# Patient Record
Sex: MALE | Race: WHITE | Employment: OTHER | URBAN - METROPOLITAN AREA
[De-identification: names, ages, dates, MRNs, and addresses within clinical notes are randomized per-mention and may not be internally consistent; named-entity substitution may affect disease eponyms.]

---

## 2020-06-19 ENCOUNTER — HOSPITAL ENCOUNTER (EMERGENCY)
Age: 75
Discharge: HOME | End: 2020-06-19
Attending: EMERGENCY MEDICINE
Payer: MEDICARE

## 2020-06-19 ENCOUNTER — APPOINTMENT (OUTPATIENT)
Dept: GENERAL RADIOLOGY | Age: 75
End: 2020-06-19
Attending: EMERGENCY MEDICINE
Payer: MEDICARE

## 2020-06-19 VITALS
OXYGEN SATURATION: 96 % | SYSTOLIC BLOOD PRESSURE: 165 MMHG | BODY MASS INDEX: 25.71 KG/M2 | WEIGHT: 160 LBS | HEART RATE: 80 BPM | RESPIRATION RATE: 15 BRPM | DIASTOLIC BLOOD PRESSURE: 107 MMHG | HEIGHT: 66 IN | TEMPERATURE: 98.6 F

## 2020-06-19 DIAGNOSIS — M10.9 ACUTE GOUT INVOLVING TOE OF LEFT FOOT, UNSPECIFIED CAUSE: ICD-10-CM

## 2020-06-19 DIAGNOSIS — M79.675 GREAT TOE PAIN, LEFT: Primary | ICD-10-CM

## 2020-06-19 LAB
ANION GAP SERPL CALC-SCNC: 5 MMOL/L (ref 5–15)
BASOPHILS # BLD: 0 K/UL (ref 0–0.1)
BASOPHILS NFR BLD: 0 % (ref 0–1)
BUN SERPL-MCNC: 19 MG/DL (ref 6–20)
BUN/CREAT SERPL: 18 (ref 12–20)
CALCIUM SERPL-MCNC: 8.5 MG/DL (ref 8.5–10.1)
CHLORIDE SERPL-SCNC: 104 MMOL/L (ref 97–108)
CO2 SERPL-SCNC: 29 MMOL/L (ref 21–32)
CREAT SERPL-MCNC: 1.06 MG/DL (ref 0.7–1.3)
DIFFERENTIAL METHOD BLD: ABNORMAL
EOSINOPHIL # BLD: 0 K/UL (ref 0–0.4)
EOSINOPHIL NFR BLD: 0 % (ref 0–7)
ERYTHROCYTE [DISTWIDTH] IN BLOOD BY AUTOMATED COUNT: 13.4 % (ref 11.5–14.5)
GLUCOSE SERPL-MCNC: 109 MG/DL (ref 65–100)
HCT VFR BLD AUTO: 42.4 % (ref 36.6–50.3)
HGB BLD-MCNC: 14.4 G/DL (ref 12.1–17)
IMM GRANULOCYTES # BLD AUTO: 0 K/UL (ref 0–0.04)
IMM GRANULOCYTES NFR BLD AUTO: 0 % (ref 0–0.5)
LYMPHOCYTES # BLD: 0.8 K/UL (ref 0.8–3.5)
LYMPHOCYTES NFR BLD: 8 % (ref 12–49)
MCH RBC QN AUTO: 31.2 PG (ref 26–34)
MCHC RBC AUTO-ENTMCNC: 34 G/DL (ref 30–36.5)
MCV RBC AUTO: 91.8 FL (ref 80–99)
MONOCYTES # BLD: 0.8 K/UL (ref 0–1)
MONOCYTES NFR BLD: 8 % (ref 5–13)
NEUTS SEG # BLD: 8.2 K/UL (ref 1.8–8)
NEUTS SEG NFR BLD: 84 % (ref 32–75)
NRBC # BLD: 0 K/UL (ref 0–0.01)
NRBC BLD-RTO: 0 PER 100 WBC
PLATELET # BLD AUTO: 178 K/UL (ref 150–400)
PMV BLD AUTO: 12.4 FL (ref 8.9–12.9)
POTASSIUM SERPL-SCNC: 3.5 MMOL/L (ref 3.5–5.1)
RBC # BLD AUTO: 4.62 M/UL (ref 4.1–5.7)
RBC MORPH BLD: ABNORMAL
SODIUM SERPL-SCNC: 138 MMOL/L (ref 136–145)
WBC # BLD AUTO: 9.8 K/UL (ref 4.1–11.1)

## 2020-06-19 PROCEDURE — 73630 X-RAY EXAM OF FOOT: CPT

## 2020-06-19 PROCEDURE — 99283 EMERGENCY DEPT VISIT LOW MDM: CPT

## 2020-06-19 PROCEDURE — 80048 BASIC METABOLIC PNL TOTAL CA: CPT

## 2020-06-19 PROCEDURE — 85025 COMPLETE CBC W/AUTO DIFF WBC: CPT

## 2020-06-19 PROCEDURE — 74011250637 HC RX REV CODE- 250/637: Performed by: EMERGENCY MEDICINE

## 2020-06-19 PROCEDURE — 36415 COLL VENOUS BLD VENIPUNCTURE: CPT

## 2020-06-19 RX ORDER — IBUPROFEN 600 MG/1
600 TABLET ORAL
Status: COMPLETED | OUTPATIENT
Start: 2020-06-19 | End: 2020-06-19

## 2020-06-19 RX ORDER — NAPROXEN 500 MG/1
500 TABLET ORAL 2 TIMES DAILY WITH MEALS
Qty: 10 TAB | Refills: 0 | Status: SHIPPED | OUTPATIENT
Start: 2020-06-19 | End: 2020-07-31

## 2020-06-19 RX ORDER — COLCHICINE 0.6 MG/1
0.6 TABLET ORAL
Status: COMPLETED | OUTPATIENT
Start: 2020-06-19 | End: 2020-06-19

## 2020-06-19 RX ORDER — CEPHALEXIN 500 MG/1
500 CAPSULE ORAL 4 TIMES DAILY
Qty: 28 CAP | Refills: 0 | Status: SHIPPED | OUTPATIENT
Start: 2020-06-19 | End: 2020-06-26

## 2020-06-19 RX ORDER — LIDOCAINE 40 MG/G
CREAM TOPICAL
Qty: 30 G | Refills: 0 | Status: SHIPPED | OUTPATIENT
Start: 2020-06-19 | End: 2020-07-31

## 2020-06-19 RX ADMIN — IBUPROFEN 600 MG: 600 TABLET, FILM COATED ORAL at 11:33

## 2020-06-19 RX ADMIN — COLCHICINE 0.6 MG: 0.6 TABLET, FILM COATED ORAL at 11:33

## 2020-06-19 NOTE — ED NOTES
Dr Lee Vargas  reviewed discharge instructions with the patient. The patient verbalized understanding. All questions and concerns were addressed. The patient declined a wheelchair and is discharged ambulatory in the care of family members with instructions and prescriptions in hand. Pt is alert and oriented x 4. Respirations are clear and unlabored.

## 2020-06-19 NOTE — ED PROVIDER NOTES
EMERGENCY DEPARTMENT HISTORY AND PHYSICAL EXAM      Date: 6/19/2020  Patient Name: Rayray Angel    Please note that this dictation was completed with NetMinder, the computer voice recognition software. Quite often unanticipated grammatical, syntax, homophones, and other interpretive errors are inadvertently transcribed by the computer software. Please disregard these errors. Please excuse any errors that have escaped final proofreading. History of Presenting Illness     Chief Complaint   Patient presents with    Foot Injury     Into triage via wheelchair with c/ Lt foot pain that started 6/16, when he was carrying something heavy - pt does not recall injury, but thinks he may have \"stepped wrong. History Provided By: Patient    HPI: Rayray Angel, 76 y.o. male, with a past medical history significant for coronary artery disease. He denied any past medical history of me, this is in the chart. He is complaining of left foot pain. States he does remember hitting it on something. He has a painful and swollen left first MTP. Rates his pain as moderate. Has tried some tumor rec with no relief. No other exacerbating or relieving factors. Reports generally feeling unwell and feeling mildly nauseous secondary to pain. PCP: Carrie, Not On File    No current facility-administered medications on file prior to encounter. Current Outpatient Medications on File Prior to Encounter   Medication Sig Dispense Refill    cod liver oil Cap Take 1 Cap by mouth daily. Past History     Past Medical History:  Past Medical History:   Diagnosis Date    Acute MI anterior wall first episode care [facility]) 9/21/2010    CAD (coronary artery disease), native coronary artery 9/21/2010    Coronary stent 9/21/2010    Hyperlipidemia 9/21/2010       Past Surgical History:  No past surgical history on file. Family History:  No family history on file.     Social History:  Social History     Tobacco Use    Smoking status: Never Smoker   Substance Use Topics    Alcohol use: Yes    Drug use: No       Allergies:  No Known Allergies      Review of Systems   Review of Systems   Constitutional: Positive for fatigue. Negative for fever. HENT: Negative for congestion. Respiratory: Negative for shortness of breath. Gastrointestinal: Positive for nausea. Musculoskeletal: Positive for arthralgias and joint swelling. Negative for back pain and myalgias. Skin: Negative for wound. Hematological: Does not bruise/bleed easily. Physical Exam   Physical Exam  Vitals signs and nursing note reviewed. Constitutional:       Appearance: He is well-developed. HENT:      Head: Normocephalic and atraumatic. Eyes:      General:         Right eye: No discharge. Left eye: No discharge. Conjunctiva/sclera: Conjunctivae normal.      Pupils: Pupils are equal, round, and reactive to light. Neck:      Musculoskeletal: Normal range of motion and neck supple. Trachea: No tracheal deviation. Cardiovascular:      Rate and Rhythm: Regular rhythm. Tachycardia present. Heart sounds: Normal heart sounds. No murmur. Pulmonary:      Effort: Pulmonary effort is normal. No respiratory distress. Breath sounds: Normal breath sounds. No wheezing or rales. Abdominal:      General: Bowel sounds are normal.      Palpations: Abdomen is soft. Tenderness: There is no abdominal tenderness. There is no guarding or rebound. Musculoskeletal: Normal range of motion. General: No tenderness or deformity. Skin:     General: Skin is warm and dry. Findings: No erythema or rash. Neurological:      Mental Status: He is alert and oriented to person, place, and time.    Psychiatric:         Behavior: Behavior normal.         Diagnostic Study Results     Labs -     Recent Results (from the past 12 hour(s))   CBC WITH AUTOMATED DIFF    Collection Time: 06/19/20 11:27 AM   Result Value Ref Range WBC 9.8 4.1 - 11.1 K/uL    RBC 4.62 4.10 - 5.70 M/uL    HGB 14.4 12.1 - 17.0 g/dL    HCT 42.4 36.6 - 50.3 %    MCV 91.8 80.0 - 99.0 FL    MCH 31.2 26.0 - 34.0 PG    MCHC 34.0 30.0 - 36.5 g/dL    RDW 13.4 11.5 - 14.5 %    PLATELET 676 823 - 933 K/uL    MPV 12.4 8.9 - 12.9 FL    NRBC 0.0 0  WBC    ABSOLUTE NRBC 0.00 0.00 - 0.01 K/uL    NEUTROPHILS 84 (H) 32 - 75 %    LYMPHOCYTES 8 (L) 12 - 49 %    MONOCYTES 8 5 - 13 %    EOSINOPHILS 0 0 - 7 %    BASOPHILS 0 0 - 1 %    IMMATURE GRANULOCYTES 0 0.0 - 0.5 %    ABS. NEUTROPHILS 8.2 (H) 1.8 - 8.0 K/UL    ABS. LYMPHOCYTES 0.8 0.8 - 3.5 K/UL    ABS. MONOCYTES 0.8 0.0 - 1.0 K/UL    ABS. EOSINOPHILS 0.0 0.0 - 0.4 K/UL    ABS. BASOPHILS 0.0 0.0 - 0.1 K/UL    ABS. IMM. GRANS. 0.0 0.00 - 0.04 K/UL    DF AUTOMATED      RBC COMMENTS NORMOCYTIC, NORMOCHROMIC     METABOLIC PANEL, BASIC    Collection Time: 06/19/20 11:27 AM   Result Value Ref Range    Sodium 138 136 - 145 mmol/L    Potassium 3.5 3.5 - 5.1 mmol/L    Chloride 104 97 - 108 mmol/L    CO2 29 21 - 32 mmol/L    Anion gap 5 5 - 15 mmol/L    Glucose 109 (H) 65 - 100 mg/dL    BUN 19 6 - 20 MG/DL    Creatinine 1.06 0.70 - 1.30 MG/DL    BUN/Creatinine ratio 18 12 - 20      GFR est AA >60 >60 ml/min/1.73m2    GFR est non-AA >60 >60 ml/min/1.73m2    Calcium 8.5 8.5 - 10.1 MG/DL       Radiologic Studies -   XR FOOT LT MIN 3 V   Final Result   IMPRESSION: No acute abnormality. CT Results  (Last 48 hours)    None        CXR Results  (Last 48 hours)    None            Medical Decision Making   I am the first provider for this patient. I reviewed the vital signs, available nursing notes, past medical history, past surgical history, family history and social history. Vital Signs-Reviewed the patient's vital signs.   Patient Vitals for the past 12 hrs:   Temp Pulse Resp BP SpO2   06/19/20 1232  80 15 (!) 165/107    06/19/20 1045 98.6 °F (37 °C) 88 17 (!) 162/98 96 %           Records Reviewed:   Nursing notes, Prior visits     Provider Notes (Medical Decision Making):   Swollen left first MTP consistent with gout. Clinically doubt infection. Doubt that this is a traumatic injury. Patient is reporting nausea and general malaise and fatigue. He does not see a primary care doctor. For this reason before starting him on NSAIDs or colchicine will check basic labs. ED Course:   Initial assessment performed. The patients presenting problems have been discussed, and they are in agreement with the care plan formulated and outlined with them. I have encouraged them to ask questions as they arise throughout their visit. Hypertensive Education  Patient was hypertensive here in the emergency department. Their symptoms do no appear to be cause by the hypertension. The abnormal vital sign is likely related to the patient's pain, stress, discomfort, or missed dose of medicine. It could also be caused by untreated or undiagnosed hypertension. Patient educated on hypertension including the disease course, complications and common treatments. Patient educated to check blood pressure at home once daily, but do not be alarmed if pressure is high without any symptoms. Patient educated that no matter their blood pressure if they are having signs of stroke, chest pain, shortness of breath or any other concerning symptom they should return to the ED regardless of their blood pressure. If they are concerned about their blood pressure they are welcome back at anytime for further evaluation. Patient educated to follow up with their primary care provider at the next available appointment to discuss their blood pressure and for a blood pressure recheck. Critical Care Time:   none    Disposition:  DISCHARGE NOTE  Patients results have been reviewed with them.   Patient and/or family have verbally conveyed their understanding and agreement of the patient's signs, symptoms, diagnosis, treatment and prognosis and additionally agree to follow up as recommended or return to the Emergency Room should their condition change or have any new concerns prior to their follow-up appointment. Patient verbally agrees with the care-plan and verbally conveys that all of their questions have been answered. Discharge instructions have also been provided to the patient with some educational information regarding their diagnosis as well a list of reasons why they would want to return to the ER prior to their follow-up appointment should their condition change. PLAN:  1. Discharge Medication List as of 6/19/2020 12:19 PM      CONTINUE these medications which have NOT CHANGED    Details   cod liver oil Cap 1 Cap, Oral, DAILY, Until Discontinued, Historical Med           2. Follow-up Information     Follow up With Specialties Details Why Contact Info    [facility] EM[facility] Emergency Medicine  If symptoms worsen [address] [place] [address]  [phone]    See attached list for local pcp              Return to ED if worse     Diagnosis     Clinical Impression:   1. Great toe pain, left    2. Acute gout involving toe of left foot, unspecified cause        Attestations:   This note was completed by Burgess Lashell DO

## 2020-06-19 NOTE — DISCHARGE INSTRUCTIONS
Patient Education        Gout: Care Instructions  Your Care Instructions     Gout is a form of arthritis caused by a buildup of uric acid crystals in a joint. It causes sudden attacks of pain, swelling, redness, and stiffness, usually in one joint, especially the big toe. Gout usually comes on without a cause. But it can be brought on by drinking alcohol (especially beer) or eating seafood and red meat. Taking certain medicines, such as diuretics or aspirin, also can bring on an attack of gout. Taking your medicines as prescribed and following up with your doctor regularly can help you avoid gout attacks in the future. Follow-up care is a key part of your treatment and safety. Be sure to make and go to all appointments, and call your doctor if you are having problems. It's also a good idea to know your test results and keep a list of the medicines you take. How can you care for yourself at home? · If the joint is swollen, put ice or a cold pack on the area for 10 to 20 minutes at a time. Put a thin cloth between the ice and your skin. · Prop up the sore limb on a pillow when you ice it or anytime you sit or lie down during the next 3 days. Try to keep it above the level of your heart. This will help reduce swelling. · Rest sore joints. Avoid activities that put weight or strain on the joints for a few days. Take short rest breaks from your regular activities during the day. · Take your medicines exactly as prescribed. Call your doctor if you think you are having a problem with your medicine. · Take pain medicines exactly as directed. ? If the doctor gave you a prescription medicine for pain, take it as prescribed. ? If you are not taking a prescription pain medicine, ask your doctor if you can take an over-the-counter medicine. · Eat less seafood and red meat. · Check with your doctor before drinking alcohol. · Losing weight, if you are overweight, may help reduce attacks of gout.  But do not go on a \"crash diet. \" Losing a lot of weight in a short amount of time can cause a gout attack. When should you call for help? Call your doctor now or seek immediate medical care if:  · You have a fever. · The joint is so painful you cannot use it. · You have sudden, unexplained swelling, redness, warmth, or severe pain in one or more joints. Watch closely for changes in your health, and be sure to contact your doctor if:  · You have joint pain. · Your symptoms get worse or are not improving after 2 or 3 days. Where can you learn more? Go to http://torin-bobbi.info/  Enter E531 in the search box to learn more about \"Gout: Care Instructions. \"  Current as of: December 9, 2019               Content Version: 12.5  © 3016-9056 [employer]. Care instructions adapted under license by [employer] (which disclaims liability or warranty for this information). If you have questions about a medical condition or this instruction, always ask your healthcare professional. [place] [address] any warranty or liability for your use of this information. Patient Education        Foot Pain: Care Instructions  Your Care Instructions  Foot injuries that cause pain and swelling are fairly common. Almost all sports or home repair projects can cause a misstep that ends up as foot pain. Normal wear and tear, especially as you get older, also can cause foot pain. Most minor foot injuries will heal on their own, and home treatment is usually all you need to do. If you have a severe injury, you may need tests and treatment. Follow-up care is a key part of your treatment and safety. Be sure to make and go to all appointments, and call your doctor if you are having problems. It's also a good idea to know your test results and keep a list of the medicines you take. How can you care for yourself at home? · Take pain medicines exactly as directed.   ? If the doctor gave you a prescription medicine for pain, take it as prescribed. ? If you are not taking a prescription pain medicine, ask your doctor if you can take an over-the-counter medicine. · Rest and protect your foot. Take a break from any activity that may cause pain. · Put ice or a cold pack on your foot for 10 to 20 minutes at a time. Put a thin cloth between the ice and your skin. · Prop up the sore foot on a pillow when you ice it or anytime you sit or lie down during the next 3 days. Try to keep it above the level of your heart. This will help reduce swelling. · Your doctor may recommend that you wrap your foot with an elastic bandage. Keep your foot wrapped for as long as your doctor advises. · If your doctor recommends crutches, use them as directed. · Wear roomy footwear. · As soon as pain and swelling end, begin gentle exercises of your foot. Your doctor can tell you which exercises will help. When should you call for help? [id number] anytime you think you may need emergency care. For example, call if:  · Your foot turns pale, white, blue, or cold. Call your doctor now or seek immediate medical care if:  · You cannot move or stand on your foot. · Your foot looks twisted or out of its normal position. · Your foot is not stable when you step down. · You have signs of infection, such as:  ? Increased pain, swelling, warmth, or redness. ? Red streaks leading from the sore area. ? Pus draining from a place on your foot. ? A fever. · Your foot is numb or tingly. Watch closely for changes in your health, and be sure to contact your doctor if:  · You do not get better as expected. · You have bruises from an injury that last longer than 2 weeks. Where can you learn more? Go to http://torin-bobbi.info/  Enter D999 in the search box to learn more about \"Foot Pain: Care Instructions. \"  Current as of: March 2, 2020               Content Version: 12.5  © 0351-4608 [employer].    Care instructions adapted under license by [employer] (which disclaims liability or warranty for this information). If you have questions about a medical condition or this instruction, always ask your healthcare professional. [address] any warranty or liability for your use of this information. Patient Education        Purine-Restricted Diet: Care Instructions  Your Care Instructions     Purines are substances that are found in some foods. Your body turns purines into uric acid. High levels of uric acid can cause gout, which is a form of arthritis that causes pain and inflammation in joints. You may be able to help control the amount of uric acid in your body by limiting high-purine foods in your diet. Follow-up care is a key part of your treatment and safety. Be sure to make and go to all appointments, and call your doctor if you are having problems. It's also a good idea to know your test results and keep a list of the medicines you take. How can you care for yourself at home? · Plan your meals and snacks around foods that are low in purines and are safe for you to eat. These foods include:  ? Green vegetables and tomatoes. ? Fruits. ? Whole-grain breads, rice, and cereals. ? Eggs, peanut butter, and nuts. ? Low-fat milk, cheese, and other milk products. ? Popcorn. ? Gelatin desserts, chocolate, cocoa, and cakes and sweets, in small amounts. · You can eat certain foods that are medium-high in purines, but eat them only once in a while. These foods include:  ? Legumes, such as dried beans and dried peas. You can have 1 cup cooked legumes each day. ? Asparagus, cauliflower, spinach, mushrooms, and green peas. ? Fish and seafood (other than very high-purine seafood). ? Oatmeal, wheat bran, and wheat germ. · Limit very high-purine foods, including:  ? Organ meats, such as liver, kidneys, sweetbreads, and brains.   ? Meats, including bautista, beef, pork, and lamb.  ? Game meats and any other meats in large amounts. ? Anchovies, sardines, herring, mackerel, and scallops. ? Gravy. ? Beer. Where can you learn more? Go to http://torin-bobbi.info/  Enter F448 in the search box to learn more about \"Purine-Restricted Diet: Care Instructions. \"  Current as of: August 22, 2019               Content Version: 12.5  © 0023-6764 [employer]. Care instructions adapted under license by [employer] (which disclaims liability or warranty for this information). If you have questions about a medical condition or this instruction, always ask your healthcare professional. [address] any warranty or liability for your use of this information. Thank you! Thank you for allowing me to care for you in the emergency department. I sincerely hope that you are satisfied with your visit today. It is my goal to provide you with excellent care. Below you will find a list of your labs and imaging from your visit today. Should you have any questions regarding these results please do not hesitate to call the emergency department. Labs -     Recent Results (from the past 12 hour(s))   CBC WITH AUTOMATED DIFF    Collection Time: 06/19/20 11:27 AM   Result Value Ref Range    WBC 9.8 4.1 - 11.1 K/uL    RBC 4.62 4.10 - 5.70 M/uL    HGB 14.4 12.1 - 17.0 g/dL    HCT 42.4 36.6 - 50.3 %    MCV 91.8 80.0 - 99.0 FL    MCH 31.2 26.0 - 34.0 PG    MCHC 34.0 30.0 - 36.5 g/dL    RDW 13.4 11.5 - 14.5 %    PLATELET 326 775 - 079 K/uL    MPV 12.4 8.9 - 12.9 FL    NRBC 0.0 0  WBC    ABSOLUTE NRBC 0.00 0.00 - 0.01 K/uL    NEUTROPHILS PENDING %    LYMPHOCYTES PENDING %    MONOCYTES PENDING %    EOSINOPHILS PENDING %    BASOPHILS PENDING %    IMMATURE GRANULOCYTES PENDING %    ABS. NEUTROPHILS PENDING K/UL    ABS. LYMPHOCYTES PENDING K/UL    ABS. MONOCYTES PENDING K/UL    ABS. EOSINOPHILS PENDING K/UL    ABS.  BASOPHILS PENDING K/UL    ABS. IMM. GRANS. PENDING K/UL    DF PENDING    METABOLIC PANEL, BASIC    Collection Time: 06/19/20 11:27 AM   Result Value Ref Range    Sodium 138 136 - 145 mmol/L    Potassium 3.5 3.5 - 5.1 mmol/L    Chloride 104 97 - 108 mmol/L    CO2 29 21 - 32 mmol/L    Anion gap 5 5 - 15 mmol/L    Glucose 109 (H) 65 - 100 mg/dL    BUN 19 6 - 20 MG/DL    Creatinine 1.06 0.70 - 1.30 MG/DL    BUN/Creatinine ratio 18 12 - 20      GFR est AA >60 >60 ml/min/1.73m2    GFR est non-AA >60 >60 ml/min/1.73m2    Calcium 8.5 8.5 - 10.1 MG/DL       Radiologic Studies -   XR FOOT LT MIN 3 V   Final Result   IMPRESSION: No acute abnormality. CT Results  (Last 48 hours)    None        CXR Results  (Last 48 hours)    None             If you feel that you have not received excellent quality care or timely care, please ask to speak to the nurse manager. Please choose us in the future for your continued health care needs. ------------------------------------------------------------------------------------------------------------  The exam and treatment you received in the Emergency Department were for an urgent problem and are not intended as complete care. It is important that you follow up with a doctor, nurse practitioner, or physician assistant for ongoing care. If your symptoms become worse or you do not improve as expected and you are unable to reach your usual health care provider, you should return to the Emergency Department. We are available 24 hours a day. Please take your discharge instructions with you when you go to your follow-up appointment. If you have any problem arranging a follow-up appointment, contact the Emergency Department immediately. If a prescription has been provided, please have it filled as soon as possible to prevent a delay in treatment. Read the entire medication instruction sheet provided to you by the pharmacy.  If you have any questions or reservations about taking the medication due to side effects or interactions with other medications, please call your primary care physician or contact the ER to speak with the charge nurse. Make an appointment with your family doctor or the physician you were referred to for follow-up of this visit as instructed on your discharge paperwork, as this is mandatory follow-up. Return to the ER if you are unable to be seen or if you are unable to be seen in a timely manner. If you have any problem arranging the follow-up visit, contact the Emergency Department immediately.

## 2020-07-22 ENCOUNTER — APPOINTMENT (OUTPATIENT)
Dept: GENERAL RADIOLOGY | Age: 75
End: 2020-07-22
Attending: EMERGENCY MEDICINE
Payer: MEDICARE

## 2020-07-22 ENCOUNTER — HOSPITAL ENCOUNTER (EMERGENCY)
Age: 75
Discharge: HOME | End: 2020-07-22
Attending: EMERGENCY MEDICINE
Payer: MEDICARE

## 2020-07-22 VITALS
DIASTOLIC BLOOD PRESSURE: 100 MMHG | TEMPERATURE: 98 F | HEART RATE: 90 BPM | RESPIRATION RATE: 20 BRPM | OXYGEN SATURATION: 96 % | SYSTOLIC BLOOD PRESSURE: 160 MMHG

## 2020-07-22 DIAGNOSIS — R79.89 ELEVATED BRAIN NATRIURETIC PEPTIDE (BNP) LEVEL: ICD-10-CM

## 2020-07-22 DIAGNOSIS — R06.09 DYSPNEA ON EXERTION: Primary | ICD-10-CM

## 2020-07-22 DIAGNOSIS — J90 BILATERAL PLEURAL EFFUSION: ICD-10-CM

## 2020-07-22 LAB
ALBUMIN SERPL-MCNC: 3.7 G/DL (ref 3.5–5)
ALBUMIN/GLOB SERPL: 1 {RATIO} (ref 1.1–2.2)
ALP SERPL-CCNC: 76 U/L (ref 45–117)
ALT SERPL-CCNC: 55 U/L (ref 12–78)
ANION GAP SERPL CALC-SCNC: 4 MMOL/L (ref 5–15)
AST SERPL-CCNC: 29 U/L (ref 15–37)
BASOPHILS # BLD: 0 K/UL (ref 0–0.1)
BASOPHILS NFR BLD: 0 % (ref 0–1)
BILIRUB SERPL-MCNC: 1.1 MG/DL (ref 0.2–1)
BNP SERPL-MCNC: 2929 PG/ML
BUN SERPL-MCNC: 22 MG/DL (ref 6–20)
BUN/CREAT SERPL: 19 (ref 12–20)
CALCIUM SERPL-MCNC: 8.9 MG/DL (ref 8.5–10.1)
CHLORIDE SERPL-SCNC: 110 MMOL/L (ref 97–108)
CK SERPL-CCNC: 64 U/L (ref 39–308)
CO2 SERPL-SCNC: 28 MMOL/L (ref 21–32)
CREAT SERPL-MCNC: 1.13 MG/DL (ref 0.7–1.3)
DIFFERENTIAL METHOD BLD: ABNORMAL
EOSINOPHIL # BLD: 0.3 K/UL (ref 0–0.4)
EOSINOPHIL NFR BLD: 4 % (ref 0–7)
ERYTHROCYTE [DISTWIDTH] IN BLOOD BY AUTOMATED COUNT: 14 % (ref 11.5–14.5)
GLOBULIN SER CALC-MCNC: 3.7 G/DL (ref 2–4)
GLUCOSE SERPL-MCNC: 99 MG/DL (ref 65–100)
HCT VFR BLD AUTO: 45.8 % (ref 36.6–50.3)
HGB BLD-MCNC: 15.3 G/DL (ref 12.1–17)
IMM GRANULOCYTES # BLD AUTO: 0 K/UL (ref 0–0.04)
IMM GRANULOCYTES NFR BLD AUTO: 1 % (ref 0–0.5)
LYMPHOCYTES # BLD: 1.3 K/UL (ref 0.8–3.5)
LYMPHOCYTES NFR BLD: 17 % (ref 12–49)
MCH RBC QN AUTO: 31.2 PG (ref 26–34)
MCHC RBC AUTO-ENTMCNC: 33.4 G/DL (ref 30–36.5)
MCV RBC AUTO: 93.3 FL (ref 80–99)
MONOCYTES # BLD: 0.6 K/UL (ref 0–1)
MONOCYTES NFR BLD: 8 % (ref 5–13)
NEUTS SEG # BLD: 5.3 K/UL (ref 1.8–8)
NEUTS SEG NFR BLD: 70 % (ref 32–75)
NRBC # BLD: 0 K/UL (ref 0–0.01)
NRBC BLD-RTO: 0 PER 100 WBC
PLATELET # BLD AUTO: 205 K/UL (ref 150–400)
PMV BLD AUTO: 11.6 FL (ref 8.9–12.9)
POTASSIUM SERPL-SCNC: 3.4 MMOL/L (ref 3.5–5.1)
PROT SERPL-MCNC: 7.4 G/DL (ref 6.4–8.2)
RBC # BLD AUTO: 4.91 M/UL (ref 4.1–5.7)
SODIUM SERPL-SCNC: 142 MMOL/L (ref 136–145)
TROPONIN I SERPL-MCNC: <0.05 NG/ML
WBC # BLD AUTO: 7.6 K/UL (ref 4.1–11.1)

## 2020-07-22 PROCEDURE — 74011250637 HC RX REV CODE- 250/637: Performed by: EMERGENCY MEDICINE

## 2020-07-22 PROCEDURE — 74011250636 HC RX REV CODE- 250/636: Performed by: EMERGENCY MEDICINE

## 2020-07-22 PROCEDURE — 99285 EMERGENCY DEPT VISIT HI MDM: CPT

## 2020-07-22 PROCEDURE — 96374 THER/PROPH/DIAG INJ IV PUSH: CPT

## 2020-07-22 PROCEDURE — 71045 X-RAY EXAM CHEST 1 VIEW: CPT

## 2020-07-22 PROCEDURE — 80053 COMPREHEN METABOLIC PANEL: CPT

## 2020-07-22 PROCEDURE — 85025 COMPLETE CBC W/AUTO DIFF WBC: CPT

## 2020-07-22 PROCEDURE — 84484 ASSAY OF TROPONIN QUANT: CPT

## 2020-07-22 PROCEDURE — 83880 ASSAY OF NATRIURETIC PEPTIDE: CPT

## 2020-07-22 PROCEDURE — 36415 COLL VENOUS BLD VENIPUNCTURE: CPT

## 2020-07-22 PROCEDURE — 82550 ASSAY OF CK (CPK): CPT

## 2020-07-22 RX ORDER — FUROSEMIDE 10 MG/ML
20 INJECTION INTRAMUSCULAR; INTRAVENOUS
Status: COMPLETED | OUTPATIENT
Start: 2020-07-22 | End: 2020-07-22

## 2020-07-22 RX ORDER — FUROSEMIDE 20 MG/1
20 TABLET ORAL DAILY
Qty: 15 TAB | Refills: 0 | Status: SHIPPED | OUTPATIENT
Start: 2020-07-22 | End: 2020-08-14

## 2020-07-22 RX ORDER — ASPIRIN 325 MG
325 TABLET ORAL DAILY
Qty: 30 TAB | Refills: 0 | Status: SHIPPED | OUTPATIENT
Start: 2020-07-22 | End: 2020-08-14

## 2020-07-22 RX ORDER — ASPIRIN 325 MG
325 TABLET ORAL ONCE
Status: COMPLETED | OUTPATIENT
Start: 2020-07-22 | End: 2020-07-22

## 2020-07-22 RX ORDER — LISINOPRIL 20 MG/1
20 TABLET ORAL DAILY
Qty: 30 TAB | Refills: 0 | Status: SHIPPED | OUTPATIENT
Start: 2020-07-22 | End: 2020-08-14

## 2020-07-22 RX ADMIN — ASPIRIN 325 MG: 325 TABLET, FILM COATED ORAL at 09:25

## 2020-07-22 RX ADMIN — FUROSEMIDE 20 MG: 10 INJECTION, SOLUTION INTRAMUSCULAR; INTRAVENOUS at 10:17

## 2020-07-22 NOTE — ED NOTES
Discharge instructions provided to patient. Verbalized understanding. Alert and oriented. IV lock removed. Offered w/c, but patient decline. NAD. Ambulated with steady gait out of ED.

## 2020-07-22 NOTE — DISCHARGE INSTRUCTIONS
Patient Education        Heart Failure: Care Instructions  Your Care Instructions   Heart failure occurs when your heart does not pump as much blood as the body needs. Failure does not mean that the heart has stopped pumping but rather that it is not pumping as well as it should. Over time, this causes fluid buildup in your lungs and other parts of your body. Fluid buildup can cause shortness of breath, fatigue, swollen ankles, and other problems. By taking medicines regularly, reducing sodium (salt) in your diet, checking your weight every day, and making lifestyle changes, you can feel better and live longer. Follow-up care is a key part of your treatment and safety. Be sure to make and go to all appointments, and call your doctor if you are having problems. It's also a good idea to know your test results and keep a list of the medicines you take. How can you care for yourself at home? Medicines  · Be safe with medicines. Take your medicines exactly as prescribed. Call your doctor if you think you are having a problem with your medicine. · Do not take any vitamins, over-the-counter medicine, or herbal products without talking to your doctor first. Linnea Norse not take ibuprofen (Advil or Motrin) and naproxen (Aleve) without talking to your doctor first. They could make your heart failure worse. · You may take some of the following medicine. ? Angiotensin-converting enzyme inhibitors (ACEIs) or angiotensin II receptor blockers (ARBs) reduce the heart's workload, lower blood pressure, and reduce swelling. Taking an ACEI or ARB may lower your chance of needing to be hospitalized. ? Beta-blockers can slow heart rate, decrease blood pressure, and improve your condition. Taking a beta-blocker may lower your chance of needing to be hospitalized. ? Diuretics, also called water pills, reduce swelling. You will get more details on the specific medicines your doctor prescribes.   Diet  · Your doctor may suggest that you limit sodium. Your doctor can tell you how much sodium is right for you. An example is less than 3,000 mg a day. This includes all the salt you eat in cooking or in packaged foods. People get most of their sodium from processed foods. Fast food and restaurant meals also tend to be very high in sodium. · Ask your doctor how much liquid you can drink each day. You may have to limit liquids. Weight  · Weigh yourself without clothing at the same time each day. Record your weight. Call your doctor if you have a sudden weight gain, such as more than 2 to 3 pounds in a day or 5 pounds in a week. (Your doctor may suggest a different range of weight gain.) A sudden weight gain may mean that your heart failure is getting worse. Activity level  · Start light exercise (if your doctor says it is okay). Even if you can only do a small amount, exercise will help you get stronger, have more energy, and manage your weight and your stress. Walking is an easy way to get exercise. Start out by walking a little more than you did before. Bit by bit, increase the amount you walk. · When you exercise, watch for signs that your heart is working too hard. You are pushing yourself too hard if you cannot talk while you are exercising. If you become short of breath or dizzy or have chest pain, stop, sit down, and rest.  · If you feel \"wiped out\" the day after you exercise, walk slower or for a shorter distance until you can work up to a better pace. · Get enough rest at night. Sleeping with 1 or 2 pillows under your upper body and head may help you breathe easier. Lifestyle changes  · Do not smoke. Smoking can make a heart condition worse. If you need help quitting, talk to your doctor about stop-smoking programs and medicines. These can increase your chances of quitting for good. Quitting smoking may be the most important step you can take to protect your heart. · Limit alcohol to 2 drinks a day for men and 1 drink a day for women.  Too much alcohol can cause health problems. · Avoid getting sick from colds and the flu. Get a pneumococcal vaccine shot. If you have had one before, ask your doctor whether you need another dose. Get a flu shot each year. If you must be around people with colds or the flu, wash your hands often. When should you call for help? Call 911 if you have symptoms of sudden heart failure such as:  · You have severe trouble breathing. · You cough up pink, foamy mucus. · You have a new irregular or rapid heartbeat. Call your doctor now or seek immediate medical care if:  · You have new or increased shortness of breath. · You are dizzy or lightheaded, or you feel like you may faint. · You have sudden weight gain, such as more than 2 to 3 pounds in a day or 5 pounds in a week. (Your doctor may suggest a different range of weight gain.)  · You have increased swelling in your legs, ankles, or feet. · You are suddenly so tired or weak that you cannot do your usual activities. Watch closely for changes in your health, and be sure to contact your doctor if you develop new symptoms. Where can you learn more? Go to http://torin-bobbi.info/  Enter Y415 in the search box to learn more about \"Heart Failure: Care Instructions. \"  Current as of: December 16, 2019               Content Version: 12.5  © 5016-4180 Healthwise, Incorporated. Care instructions adapted under license by [employer] (which disclaims liability or warranty for this information). If you have questions about a medical condition or this instruction, always ask your healthcare professional. [place] [address] any warranty or liability for your use of this information. Patient Education        Elevated Blood Pressure: Care Instructions  Your Care Instructions  Blood pressure is a measure of how hard the blood pushes against the walls of your arteries.  It's normal for blood pressure to go up and down throughout the day. But if it stays up over time, you have high blood pressure. Two numbers tell you your blood pressure. The first number is the systolic pressure. It shows how hard the blood pushes when your heart is pumping. The second number is the diastolic pressure. It shows how hard the blood pushes between heartbeats, when your heart is relaxed and filling with blood. An ideal blood pressure in adults is less than 120/80 (say \"120 over 80\"). High blood pressure is 140/90 or higher. You have high blood pressure if your top number is 140 or higher or your bottom number is 90 or higher, or both. The main test for high blood pressure is simple, fast, and painless. To diagnose high blood pressure, your doctor will test your blood pressure at different times. After testing your blood pressure, your doctor may ask you to test it again when you are home. If you are diagnosed with high blood pressure, you can work with your doctor to make a long-term plan to manage it. Follow-up care is a key part of your treatment and safety. Be sure to make and go to all appointments, and call your doctor if you are having problems. It's also a good idea to know your test results and keep a list of the medicines you take. How can you care for yourself at home? · Do not smoke. Smoking increases your risk for heart attack and stroke. If you need help quitting, talk to your doctor about stop-smoking programs and medicines. These can increase your chances of quitting for good. · Stay at a healthy weight. · Try to limit how much sodium you eat to less than 2,300 milligrams (mg) a day. Your doctor may ask you to try to eat less than 1,500 mg a day. · Be physically active. Get at least 30 minutes of exercise on most days of the week. Walking is a good choice. You also may want to do other activities, such as running, swimming, cycling, or playing tennis or team sports. · Avoid or limit alcohol.  Talk to your doctor about whether you can drink any alcohol. · Eat plenty of fruits, vegetables, and low-fat dairy products. Eat less saturated and total fats. · Learn how to check your blood pressure at home. When should you call for help? Call your doctor now or seek immediate medical care if:  ? · Your blood pressure is much higher than normal (such as 180/110 or higher). ? · You think high blood pressure is causing symptoms such as:  ¨ Severe headache. ¨ Blurry vision. ? Watch closely for changes in your health, and be sure to contact your doctor if:  ? · You do not get better as expected. Where can you learn more? Go to http://otrin-bobbi.info/. Enter C310 in the search box to learn more about \"Elevated Blood Pressure: Care Instructions. \"  Current as of: September 21, 2016  Content Version: 11.4  © 4675-6850 Healthwise, Incorporated. Care instructions adapted under license by [employer] (which disclaims liability or warranty for this information). If you have questions about a medical condition or this instruction, always ask your healthcare professional. [address] any warranty or liability for your use of this information.

## 2020-07-22 NOTE — ED PROVIDER NOTES
EMERGENCY DEPARTMENT HISTORY AND PHYSICAL EXAM          Date: 7/22/2020  Patient Name: Corazon Kumari    History of Presenting Illness     Chief Complaint   Patient presents with    Shortness of Breath     2 weeks of shortness and breath after cleaning mice poop, patient thinks he has Hantavirus pulmonary syndrome       History Provided By: Patient    HPI: Corazon Kumari is a 76 y.o. male, pmhx CAD status post stenting not on any medications, who presents ambulatory to the ED c/o short of breath    Patient explains for the past 2 weeks he has been feeling short of breath with dyspnea on exertion as well as PND. He states he has been sleeping in a recliner. He is concerned that he has octavio virus [place] around. Patient specifically denies any recent fevers, chills, nausea, vomiting, diarrhea, abd pain, CP, coughing, urinary sxs, changes in BM, or headache. Patient admits that he does not follow-up with medical care and has not seen a cardiologist or primary care since after his heart attack. He does not take any medications including even a baby aspirin. PCP: Does not follow with a PCP or cardiology    Allergies: None known  Social Hx: -tobacco, -EtOH, -Illicit Drugs; There are no other complaints, changes, or physical findings at this time. Current Outpatient Medications   Medication Sig Dispense Refill    furosemide (Lasix) 20 mg tablet Take 1 Tab by mouth daily. 15 Tab 0    lisinopriL (PRINIVIL, ZESTRIL) 20 mg tablet Take 1 Tab by mouth daily. 30 Tab 0    aspirin (ASPIRIN) 325 mg tablet Take 1 Tab by mouth daily. 30 Tab 0    naproxen (NAPROSYN) 500 mg tablet Take 1 Tab by mouth two (2) times daily (with meals). 10 Tab 0    lidocaine (XYLOCAINE) 4 % topical cream Apply  to affected area three (3) times daily as needed for Pain. 30 g 0    cod liver oil Cap Take 1 Cap by mouth daily.          Past History     Past Medical History:  Past Medical History:   Diagnosis Date    Acute MI anterior wall first episode care [facility]) 9/21/2010    CAD (coronary artery disease), native coronary artery 9/21/2010    Coronary stent 9/21/2010    Hyperlipidemia 9/21/2010       Past Surgical History:  No past surgical history on file. Family History:  No family history on file. Social History:  Social History     Tobacco Use    Smoking status: Never Smoker   Substance Use Topics    Alcohol use: Yes    Drug use: No       Allergies:  No Known Allergies      Review of Systems   Review of Systems   Constitutional: Negative for activity change, appetite change, chills, fever and unexpected weight change (Patient is dieting and has lost 5 pounds). HENT: Negative for congestion. Eyes: Negative for pain and visual disturbance. Respiratory: Positive for shortness of breath. Negative for cough. Cardiovascular: Negative for chest pain. Gastrointestinal: Negative for abdominal pain, diarrhea, nausea and vomiting. Genitourinary: Negative for dysuria. Musculoskeletal: Negative for back pain. Skin: Negative for rash. Neurological: Negative for headaches. Physical Exam   Physical Exam  Vitals signs and nursing note reviewed. Constitutional:       Appearance: He is well-developed. He is not diaphoretic. Comments: This is a healthy-appearing elderly male with significantly elevated blood pressure currently in mild distress   HENT:      Head: Normocephalic and atraumatic. Eyes:      General:         Right eye: No discharge. Left eye: No discharge. Conjunctiva/sclera: Conjunctivae normal.      Pupils: Pupils are equal, round, and reactive to light. Neck:      Musculoskeletal: Normal range of motion and neck supple. Cardiovascular:      Rate and Rhythm: Normal rate and regular rhythm. Pulses: Normal pulses. Heart sounds: Normal heart sounds. No murmur. No friction rub. No gallop.     Pulmonary: Effort: Pulmonary effort is normal. No respiratory distress. Breath sounds: Normal breath sounds. No wheezing or rales. Abdominal:      General: Bowel sounds are normal. There is no distension. Palpations: Abdomen is soft. Tenderness: There is no abdominal tenderness. Musculoskeletal: Normal range of motion. Right lower leg: He exhibits no tenderness. Edema present. Left lower leg: He exhibits no tenderness. Edema present. Comments: Trace, symmetric bilateral lower extremity edema to the mid shin   Skin:     General: Skin is warm and dry. Findings: No rash. Neurological:      Mental Status: He is alert and oriented to person, place, and time. Cranial Nerves: No cranial nerve deficit. Motor: No abnormal muscle tone. Diagnostic Study Results     Labs -     Recent Results (from the past 12 hour(s))   CBC WITH AUTOMATED DIFF    Collection Time: 07/22/20  9:13 AM   Result Value Ref Range    WBC 7.6 4.1 - 11.1 K/uL    RBC 4.91 4.10 - 5.70 M/uL    HGB 15.3 12.1 - 17.0 g/dL    HCT 45.8 36.6 - 50.3 %    MCV 93.3 80.0 - 99.0 FL    MCH 31.2 26.0 - 34.0 PG    MCHC 33.4 30.0 - 36.5 g/dL    RDW 14.0 11.5 - 14.5 %    PLATELET 034 843 - 182 K/uL    MPV 11.6 8.9 - 12.9 FL    NRBC 0.0 0  WBC    ABSOLUTE NRBC 0.00 0.00 - 0.01 K/uL    NEUTROPHILS 70 32 - 75 %    LYMPHOCYTES 17 12 - 49 %    MONOCYTES 8 5 - 13 %    EOSINOPHILS 4 0 - 7 %    BASOPHILS 0 0 - 1 %    IMMATURE GRANULOCYTES 1 (H) 0.0 - 0.5 %    ABS. NEUTROPHILS 5.3 1.8 - 8.0 K/UL    ABS. LYMPHOCYTES 1.3 0.8 - 3.5 K/UL    ABS. MONOCYTES 0.6 0.0 - 1.0 K/UL    ABS. EOSINOPHILS 0.3 0.0 - 0.4 K/UL    ABS. BASOPHILS 0.0 0.0 - 0.1 K/UL    ABS. IMM.  GRANS. 0.0 0.00 - 0.04 K/UL    DF AUTOMATED     METABOLIC PANEL, COMPREHENSIVE    Collection Time: 07/22/20  9:13 AM   Result Value Ref Range    Sodium 142 136 - 145 mmol/L    Potassium 3.4 (L) 3.5 - 5.1 mmol/L    Chloride 110 (H) 97 - 108 mmol/L    CO2 28 21 - 32 mmol/L Anion gap 4 (L) 5 - 15 mmol/L    Glucose 99 65 - 100 mg/dL    BUN 22 (H) 6 - 20 MG/DL    Creatinine 1.13 0.70 - 1.30 MG/DL    BUN/Creatinine ratio 19 12 - 20      GFR est AA >60 >60 ml/min/1.73m2    GFR est non-AA >60 >60 ml/min/1.73m2    Calcium 8.9 8.5 - 10.1 MG/DL    Bilirubin, total 1.1 (H) 0.2 - 1.0 MG/DL    ALT (SGPT) 55 12 - 78 U/L    AST (SGOT) 29 15 - 37 U/L    Alk. phosphatase 76 45 - 117 U/L    Protein, total 7.4 6.4 - 8.2 g/dL    Albumin 3.7 3.5 - 5.0 g/dL    Globulin 3.7 2.0 - 4.0 g/dL    A-G Ratio 1.0 (L) 1.1 - 2.2     CK W/ REFLX CKMB    Collection Time: 07/22/20  9:13 AM   Result Value Ref Range    CK 64 39 - 308 U/L   TROPONIN I    Collection Time: 07/22/20  9:13 AM   Result Value Ref Range    Troponin-I, Qt. <0.05 <0.05 ng/mL   NT-PRO BNP    Collection Time: 07/22/20  9:13 AM   Result Value Ref Range    NT pro-BNP 2,929 (H) <450 PG/ML       Radiologic Studies -   XR CHEST PORT   Final Result   IMPRESSION:   Small bilateral pleural effusions and bibasilar airspace disease/atelectasis. No   pulmonary edema or pneumothorax. CT Results  (Last 48 hours)    None        CXR Results  (Last 48 hours)               07/22/20 0926  XR CHEST PORT Final result    Impression:  IMPRESSION:   Small bilateral pleural effusions and bibasilar airspace disease/atelectasis. No   pulmonary edema or pneumothorax. Narrative:  INDICATION: chest pain       EXAM:  AP CHEST RADIOGRAPH       COMPARISON: September 18, 2020       FINDINGS:       AP portable view of the chest demonstrates a normal cardiomediastinal   silhouette. Bibasilar airspace opacities and small pleural effusions. No   pulmonary edema or pneumothorax. The osseous structures are unremarkable. Medical Decision Making   I am the first provider for this patient. I reviewed the vital signs, available nursing notes, past medical history, past surgical history, family history and social history.     Vital Signs-Reviewed the patient's vital signs. Patient Vitals for the past 12 hrs:   Temp Pulse Resp BP SpO2   07/22/20 1027 98 °F (36.7 °C) 90 20 (!) 160/100 96 %   07/22/20 1017  93  (!) 162/117    07/22/20 1000  94 26 (!) 162/117 96 %   07/22/20 0930  92 26 (!) 171/124 95 %   07/22/20 0921    (!) 143/118 96 %   07/22/20 0918    (!) 152/128 96 %   07/22/20 0905 97.7 °F (36.5 °C) 99 22 (!) 164/129 97 %       Pulse Oximetry Analysis - 97% on RA    Cardiac Monitor:   Rate: 90bpm    Rhythm: Normal Sinus Rhythm        HCPS should be suspected in settings in which a patient from a rural area or with potential exposure to wild rodents presents with fever, chills, and myalgias, especially in the presence of nausea, abdominal pain, and vomiting. Specific serologic diagnosis should be considered in patients with thrombocytopenia, leukocytosis, bilateral interstitial infiltrates, or elevated lactate dehydrogenase. Records Reviewed: Nursing Notes and Old Medical Records    Provider Notes (Medical Decision Making):   MDM: Elderly male, non-comlpiant who does not follow up with routine medical care presenting with possible angina equivalent with SOB. Pt without CP and oxygen normal. Low suspicion PE, dissection, MI.    ED Course:   Initial assessment performed. The patients presenting problems have been discussed, and they are in agreement with the care plan formulated and outlined with them. I have encouraged them to ask questions as they arise throughout their visit. EKG interpretation: (Preliminary)  Rhythm: Sinus rhythm at a regular rate with an occasional PVC at 99 bpm; normal AL; prolonged QRS; widened QTC; borderline right axis deviation with a right bundle branch block. This EKG was interpreted by ED Provider Corinna Wilcox MD    PROGRESS NOTE:  10:20 PM  Pt sitting comfortably in bed. Blood pressure remains elevated but patient remains stable.   Discussed his results and the likelihood that this is coronary artery disease/chf. Of note patient is concerned that he has hot virus secondary to the mouse poop in his yard. Given his lab results with normal platelet count and white count I do not see any evidence of end-stage Silver virus causing the cardiopulmonary complications we see on x-ray. Discussed with patient the appropriate follow-up, appropriately taking medications, and strict return precautions. Patient does not remember his original cardiologist.  Recommendations given for him to follow-up with [facility] as they are our first on-call group today. Discharge note:  Pt re-evaluated and noted to be feeling better, ready for discharge. Updated pt on all final lab and x-ray findings. Will follow up as instructed. All questions have been answered, pt voiced understanding and agreement with plan. Specific return precautions provided as well as instructions to return to the ED should sx worsen at any time. Vital signs stable for discharge. Critical Care Time:   0      Diagnosis     Clinical Impression:   1. Dyspnea on exertion    2. Elevated brain natriuretic peptide (BNP) level    3. Bilateral pleural effusion        PLAN:  1. Discharge Medication List as of 7/22/2020 10:10 AM      START taking these medications    Details   furosemide (Lasix) 20 mg tablet Take 1 Tab by mouth daily. , Normal, Disp-15 Tab,R-0      lisinopriL (PRINIVIL, ZESTRIL) 20 mg tablet Take 1 Tab by mouth daily. , Normal, Disp-30 Tab,R-0      aspirin (ASPIRIN) 325 mg tablet Take 1 Tab by mouth daily. , Normal, Disp-30 Tab,R-0         CONTINUE these medications which have NOT CHANGED    Details   naproxen (NAPROSYN) 500 mg tablet Take 1 Tab by mouth two (2) times daily (with meals). , Print, Disp-10 Tab, R-0      lidocaine (XYLOCAINE) 4 % topical cream Apply  to affected area three (3) times daily as needed for Pain., Print, Disp-30 g, R-0      cod liver oil Cap 1 Cap, Oral, DAILY, Until Discontinued, Historical Med 2.   Follow-up Information     Follow up With Specialties Details Why [address] [facility] Cardiology Schedule an appointment as soon as possible for a visit  [address] [place] [address] [address]   [address]     \[facility] [place] [facility]\"" EMERGENCY DEPT Emergency Medicine  If symptoms worsen [address]  [address] [facility]  [phone]        Return to ED if worse     Disposition:  Home       Please note, this dictation was completed with One[employer]iot, the computer voice recognition software. Quite often unanticipated grammatical, syntax, homophones, and other interpretive errors are inadvertently transcribed by the computer software. Please disregard these errors. Please excuse any errors that have escaped final proof reading.

## 2020-07-23 ENCOUNTER — PATIENT OUTREACH (OUTPATIENT)
Dept: CASE MANAGEMENT | Age: 75
End: 2020-07-23

## 2020-07-23 NOTE — PROGRESS NOTES
Patient contacted regarding recent discharge and COVID-19 risk. Discussed COVID-19 related testing which was not done at this time. Test results were not done. Patient informed of results, if available? n/a    Care Transition Nurse/ Ambulatory Care Manager contacted the patient by telephone to perform post discharge assessment. Verified name and  with patient as identifiers. Patient has following risk factors of: CAD,acute MI. CTN/ACM reviewed discharge instructions, medical action plan and red flags related to discharge diagnosis. Reviewed and educated them on any new and changed medications related to discharge diagnosis. Advised obtaining a 90-day supply of all daily and as-needed medications. Advance Care Planning:   Does patient have an Advance Directive: not on file; education provided     Education provided regarding infection prevention, and signs and symptoms of COVID-19 and when to seek medical attention with patient who verbalized understanding. Discussed exposure protocols and quarantine from [address] you at higher risk for severe illness  and given an opportunity for questions and concerns. The patient agrees to contact the COVID-19 hotline [phone] or PCP office for questions related to their healthcare. CTN/ACM provided contact information for future reference. From CDC: Are you at higher risk for severe illness?  Wash your hands often.  Avoid close contact (6 feet, which is about two arm lengths) with people who are sick.  Put distance between yourself and other people if COVID-19 is spreading in your community.  Clean and disinfect frequently touched surfaces.  Avoid all cruise travel and non-essential air travel.  Call your healthcare professional if you have concerns about COVID-19 and your underlying condition or if you are sick.     For more information on steps you can take to protect yourself, see CDC's How to Protect Yourself Patient/family/caregiver given information for [employer] and agrees to enroll yes  Patient's preferred e-mail:  [email or web address]  Patient's preferred phone number:  [phone]  Based on Loop alert triggers, patient will be contacted by nurse care manager for worsening symptoms. Pt will be further monitored by COVID Loop Team based on severity of symptoms and risk factors.  DMB

## 2020-07-31 ENCOUNTER — APPOINTMENT (OUTPATIENT)
Dept: CT IMAGING | Age: 75
DRG: 233 | End: 2020-07-31
Attending: EMERGENCY MEDICINE
Payer: MEDICARE

## 2020-07-31 ENCOUNTER — HOSPITAL ENCOUNTER (INPATIENT)
Age: 75
LOS: 14 days | Discharge: NURSING, REHAB OR LONG-TERM CARE | DRG: 233 | End: 2020-08-14
Attending: EMERGENCY MEDICINE | Admitting: THORACIC SURGERY (CARDIOTHORACIC VASCULAR SURGERY)
Payer: MEDICARE

## 2020-07-31 ENCOUNTER — APPOINTMENT (OUTPATIENT)
Dept: GENERAL RADIOLOGY | Age: 75
DRG: 233 | End: 2020-07-31
Attending: EMERGENCY MEDICINE
Payer: MEDICARE

## 2020-07-31 DIAGNOSIS — I50.23 ACUTE ON CHRONIC SYSTOLIC CONGESTIVE HEART FAILURE (HCC): ICD-10-CM

## 2020-07-31 DIAGNOSIS — I50.9 ACUTE ON CHRONIC CONGESTIVE HEART FAILURE, UNSPECIFIED HEART FAILURE TYPE (HCC): ICD-10-CM

## 2020-07-31 DIAGNOSIS — I65.23 BILATERAL CAROTID ARTERY STENOSIS: ICD-10-CM

## 2020-07-31 DIAGNOSIS — I25.111 CORONARY ARTERY DISEASE INVOLVING NATIVE CORONARY ARTERY OF NATIVE HEART WITH ANGINA PECTORIS WITH DOCUMENTED SPASM (HCC): ICD-10-CM

## 2020-07-31 DIAGNOSIS — E78.2 MIXED HYPERLIPIDEMIA: Chronic | ICD-10-CM

## 2020-07-31 DIAGNOSIS — I25.5 ISCHEMIC CARDIOMYOPATHY: ICD-10-CM

## 2020-07-31 DIAGNOSIS — J90 PLEURAL EFFUSION, BILATERAL: Primary | ICD-10-CM

## 2020-07-31 DIAGNOSIS — I48.0 PAROXYSMAL ATRIAL FIBRILLATION (HCC): ICD-10-CM

## 2020-07-31 DIAGNOSIS — I25.110 CORONARY ARTERY DISEASE INVOLVING NATIVE CORONARY ARTERY OF NATIVE HEART WITH UNSTABLE ANGINA PECTORIS (HCC): Chronic | ICD-10-CM

## 2020-07-31 DIAGNOSIS — Z01.810 PREOP CARDIOVASCULAR EXAM: ICD-10-CM

## 2020-07-31 DIAGNOSIS — Z95.1 S/P CABG X 4: ICD-10-CM

## 2020-07-31 PROBLEM — I26.99 PULMONARY EMBOLISM (HCC): Status: ACTIVE | Noted: 2020-07-31

## 2020-07-31 LAB
ALBUMIN SERPL-MCNC: 3.6 G/DL (ref 3.5–5)
ALBUMIN/GLOB SERPL: 1 {RATIO} (ref 1.1–2.2)
ALP SERPL-CCNC: 80 U/L (ref 45–117)
ALT SERPL-CCNC: 97 U/L (ref 12–78)
ANION GAP SERPL CALC-SCNC: 5 MMOL/L (ref 5–15)
AST SERPL-CCNC: 50 U/L (ref 15–37)
ATRIAL RATE: 93 BPM
BASOPHILS # BLD: 0.1 K/UL (ref 0–0.1)
BASOPHILS NFR BLD: 1 % (ref 0–1)
BILIRUB SERPL-MCNC: 0.9 MG/DL (ref 0.2–1)
BUN SERPL-MCNC: 29 MG/DL (ref 6–20)
BUN/CREAT SERPL: 23 (ref 12–20)
CALCIUM SERPL-MCNC: 9 MG/DL (ref 8.5–10.1)
CALCULATED P AXIS, ECG09: 44 DEGREES
CALCULATED R AXIS, ECG10: 96 DEGREES
CALCULATED T AXIS, ECG11: 27 DEGREES
CHLORIDE SERPL-SCNC: 108 MMOL/L (ref 97–108)
CO2 SERPL-SCNC: 27 MMOL/L (ref 21–32)
CREAT SERPL-MCNC: 1.24 MG/DL (ref 0.7–1.3)
D DIMER PPP FEU-MCNC: 2.21 MG/L FEU (ref 0–0.65)
DIAGNOSIS, 93000: NORMAL
DIFFERENTIAL METHOD BLD: ABNORMAL
EOSINOPHIL # BLD: 0.1 K/UL (ref 0–0.4)
EOSINOPHIL NFR BLD: 1 % (ref 0–7)
ERYTHROCYTE [DISTWIDTH] IN BLOOD BY AUTOMATED COUNT: 14.5 % (ref 11.5–14.5)
GLOBULIN SER CALC-MCNC: 3.5 G/DL (ref 2–4)
GLUCOSE SERPL-MCNC: 91 MG/DL (ref 65–100)
HCT VFR BLD AUTO: 47.1 % (ref 36.6–50.3)
HGB BLD-MCNC: 15.6 G/DL (ref 12.1–17)
IMM GRANULOCYTES # BLD AUTO: 0.1 K/UL (ref 0–0.04)
IMM GRANULOCYTES NFR BLD AUTO: 1 % (ref 0–0.5)
LYMPHOCYTES # BLD: 1.2 K/UL (ref 0.8–3.5)
LYMPHOCYTES NFR BLD: 13 % (ref 12–49)
MCH RBC QN AUTO: 31.2 PG (ref 26–34)
MCHC RBC AUTO-ENTMCNC: 33.1 G/DL (ref 30–36.5)
MCV RBC AUTO: 94.2 FL (ref 80–99)
MONOCYTES # BLD: 0.6 K/UL (ref 0–1)
MONOCYTES NFR BLD: 7 % (ref 5–13)
NEUTS SEG # BLD: 6.7 K/UL (ref 1.8–8)
NEUTS SEG NFR BLD: 77 % (ref 32–75)
NRBC # BLD: 0 K/UL (ref 0–0.01)
NRBC BLD-RTO: 0 PER 100 WBC
P-R INTERVAL, ECG05: 148 MS
PLATELET # BLD AUTO: 221 K/UL (ref 150–400)
PMV BLD AUTO: 11.7 FL (ref 8.9–12.9)
POTASSIUM SERPL-SCNC: 3.7 MMOL/L (ref 3.5–5.1)
PROT SERPL-MCNC: 7.1 G/DL (ref 6.4–8.2)
Q-T INTERVAL, ECG07: 416 MS
QRS DURATION, ECG06: 134 MS
QTC CALCULATION (BEZET), ECG08: 517 MS
RBC # BLD AUTO: 5 M/UL (ref 4.1–5.7)
SODIUM SERPL-SCNC: 140 MMOL/L (ref 136–145)
TROPONIN I SERPL-MCNC: <0.05 NG/ML
VENTRICULAR RATE, ECG03: 93 BPM
WBC # BLD AUTO: 8.7 K/UL (ref 4.1–11.1)

## 2020-07-31 PROCEDURE — 84484 ASSAY OF TROPONIN QUANT: CPT

## 2020-07-31 PROCEDURE — 99285 EMERGENCY DEPT VISIT HI MDM: CPT

## 2020-07-31 PROCEDURE — 65660000000 HC RM CCU STEPDOWN

## 2020-07-31 PROCEDURE — 71275 CT ANGIOGRAPHY CHEST: CPT

## 2020-07-31 PROCEDURE — 74011636320 HC RX REV CODE- 636/320: Performed by: EMERGENCY MEDICINE

## 2020-07-31 PROCEDURE — 85025 COMPLETE CBC W/AUTO DIFF WBC: CPT

## 2020-07-31 PROCEDURE — 80053 COMPREHEN METABOLIC PANEL: CPT

## 2020-07-31 PROCEDURE — 36415 COLL VENOUS BLD VENIPUNCTURE: CPT

## 2020-07-31 PROCEDURE — 74011250637 HC RX REV CODE- 250/637: Performed by: INTERNAL MEDICINE

## 2020-07-31 PROCEDURE — 85379 FIBRIN DEGRADATION QUANT: CPT

## 2020-07-31 PROCEDURE — 74011250636 HC RX REV CODE- 250/636: Performed by: EMERGENCY MEDICINE

## 2020-07-31 PROCEDURE — 74011250636 HC RX REV CODE- 250/636: Performed by: INTERNAL MEDICINE

## 2020-07-31 PROCEDURE — 71045 X-RAY EXAM CHEST 1 VIEW: CPT

## 2020-07-31 RX ORDER — FUROSEMIDE 10 MG/ML
40 INJECTION INTRAMUSCULAR; INTRAVENOUS
Status: COMPLETED | OUTPATIENT
Start: 2020-07-31 | End: 2020-07-31

## 2020-07-31 RX ORDER — ACETAMINOPHEN 650 MG/1
650 SUPPOSITORY RECTAL
Status: DISCONTINUED | OUTPATIENT
Start: 2020-07-31 | End: 2020-08-06

## 2020-07-31 RX ORDER — SODIUM CHLORIDE 0.9 % (FLUSH) 0.9 %
5-40 SYRINGE (ML) INJECTION AS NEEDED
Status: DISCONTINUED | OUTPATIENT
Start: 2020-07-31 | End: 2020-08-06

## 2020-07-31 RX ORDER — ASPIRIN 81 MG/1
81 TABLET ORAL DAILY
Status: DISCONTINUED | OUTPATIENT
Start: 2020-08-01 | End: 2020-08-06

## 2020-07-31 RX ORDER — LISINOPRIL 20 MG/1
20 TABLET ORAL DAILY
Status: DISCONTINUED | OUTPATIENT
Start: 2020-07-31 | End: 2020-08-06

## 2020-07-31 RX ORDER — ACETAMINOPHEN 325 MG/1
650 TABLET ORAL
Status: DISCONTINUED | OUTPATIENT
Start: 2020-07-31 | End: 2020-08-06

## 2020-07-31 RX ORDER — HYDRALAZINE HYDROCHLORIDE 20 MG/ML
10 INJECTION INTRAMUSCULAR; INTRAVENOUS
Status: DISCONTINUED | OUTPATIENT
Start: 2020-07-31 | End: 2020-08-06

## 2020-07-31 RX ORDER — METOPROLOL TARTRATE 25 MG/1
25 TABLET, FILM COATED ORAL 2 TIMES DAILY
Status: DISCONTINUED | OUTPATIENT
Start: 2020-07-31 | End: 2020-08-02

## 2020-07-31 RX ORDER — SODIUM CHLORIDE 0.9 % (FLUSH) 0.9 %
5-40 SYRINGE (ML) INJECTION EVERY 8 HOURS
Status: DISCONTINUED | OUTPATIENT
Start: 2020-07-31 | End: 2020-08-06

## 2020-07-31 RX ORDER — PROMETHAZINE HYDROCHLORIDE 25 MG/1
12.5 TABLET ORAL
Status: DISCONTINUED | OUTPATIENT
Start: 2020-07-31 | End: 2020-08-06

## 2020-07-31 RX ORDER — ENOXAPARIN SODIUM 100 MG/ML
1 INJECTION SUBCUTANEOUS EVERY 12 HOURS
Status: DISCONTINUED | OUTPATIENT
Start: 2020-07-31 | End: 2020-08-03

## 2020-07-31 RX ORDER — FUROSEMIDE 10 MG/ML
60 INJECTION INTRAMUSCULAR; INTRAVENOUS 2 TIMES DAILY
Status: DISCONTINUED | OUTPATIENT
Start: 2020-07-31 | End: 2020-08-03

## 2020-07-31 RX ORDER — SODIUM CHLORIDE 0.9 % (FLUSH) 0.9 %
10 SYRINGE (ML) INJECTION
Status: COMPLETED | OUTPATIENT
Start: 2020-07-31 | End: 2020-07-31

## 2020-07-31 RX ORDER — ONDANSETRON 2 MG/ML
4 INJECTION INTRAMUSCULAR; INTRAVENOUS
Status: DISCONTINUED | OUTPATIENT
Start: 2020-07-31 | End: 2020-08-06

## 2020-07-31 RX ADMIN — METOPROLOL TARTRATE 25 MG: 25 TABLET, FILM COATED ORAL at 21:15

## 2020-07-31 RX ADMIN — FUROSEMIDE 40 MG: 10 INJECTION, SOLUTION INTRAMUSCULAR; INTRAVENOUS at 16:31

## 2020-07-31 RX ADMIN — FUROSEMIDE 60 MG: 10 INJECTION, SOLUTION INTRAMUSCULAR; INTRAVENOUS at 17:39

## 2020-07-31 RX ADMIN — LISINOPRIL 20 MG: 20 TABLET ORAL at 17:38

## 2020-07-31 RX ADMIN — IOPAMIDOL 68 ML: 755 INJECTION, SOLUTION INTRAVENOUS at 15:08

## 2020-07-31 RX ADMIN — Medication 10 ML: at 15:09

## 2020-07-31 RX ADMIN — ENOXAPARIN SODIUM 70 MG: 80 INJECTION SUBCUTANEOUS at 17:38

## 2020-07-31 RX ADMIN — HYDRALAZINE HYDROCHLORIDE 10 MG: 20 INJECTION INTRAMUSCULAR; INTRAVENOUS at 17:39

## 2020-07-31 RX ADMIN — Medication 10 ML: at 17:39

## 2020-07-31 RX ADMIN — Medication 5 ML: at 21:15

## 2020-07-31 NOTE — ED NOTES
Pt arrievs via triage reporting SOB for the last 3 weeks and lower leg/ankle swelling for the last 2 days. Pt denies chest pain and is afebrile at this time. Pt ankles are +4 pitting at this time.

## 2020-07-31 NOTE — PROGRESS NOTES
Pharmacy Clarification of Prior to Admission Medication Regimen     The patient was interviewed regarding clarification of the prior to admission medication regimen. Patient present in room and obtained permission from patient to discuss drug regimen with visitor(s) present. Patient was questioned regarding use of any other inhalers, topical products, over the counter medications, herbal medications, vitamin products or ophthalmic/nasal/otic medication use. Information Obtained From: Patient Doirna Bond, [address]    Pertinent Pharmacy Findings:   Updated patients preferred outpatient pharmacy to: [address] medication list was corrected to the following:     Prior to Admission Medications   Prescriptions Last Dose Informant Taking?   aspirin (ASPIRIN) 325 mg tablet 7/31/2020 at Unknown time Self Yes   Sig: Take 1 Tab by mouth daily. cod liver oil Cap 7/31/2020 at Unknown time Self Yes   Sig: Take 1 Cap by mouth daily. furosemide (Lasix) 20 mg tablet 7/31/2020 at Unknown time Self Yes   Sig: Take 1 Tab by mouth daily. lisinopriL (PRINIVIL, ZESTRIL) 20 mg tablet 7/31/2020 at Unknown time Self Yes   Sig: Take 1 Tab by mouth daily.       Facility-Administered Medications: None          Thank you,  Tammy GRIGGS Do, [facility]  Medication History Technician

## 2020-07-31 NOTE — ROUTINE PROCESS
Bedside shift change report given to Melissa Shah (oncoming nurse) by Rinku CHAN RN (offgoing nurse). Report included the following information SBAR, Kardex and MAR.

## 2020-07-31 NOTE — ROUTINE PROCESS
TRANSFER - IN REPORT: 
 
Verbal report received from SarinaRn(name) on [facility]  being received from ED(unit) for routine progression of care Report consisted of patients Situation, Background, Assessment and  
Recommendations(SBAR). Information from the following report(s) SBAR, Kardex, Intake/Output and MAR was reviewed with the receiving nurse. Opportunity for questions and clarification was provided.

## 2020-07-31 NOTE — ED NOTES
TRANSFER - OUT REPORT:    Verbal report given to Mami Preciado RN on Laquita Barth  being transferred to [facility]. Report consisted of patients Situation, Background, Assessment and   Recommendations(SBAR). Information from the following report(s) SBAR, ED Summary, Intake/Output, MAR and Recent Results was reviewed with the receiving nurse. Lines:   Peripheral IV 07/31/20 Right Antecubital (Active)   Site Assessment Clean, dry, & intact 07/31/20 1306   Phlebitis Assessment 0 07/31/20 1306   Infiltration Assessment 0 07/31/20 1306   Dressing Status Clean, dry, & intact 07/31/20 1306   Dressing Type Transparent 07/31/20 1306   Hub Color/Line Status Pink;Flushed 07/31/20 1306   Action Taken Blood drawn 07/31/20 1306        Opportunity for questions and clarification was provided.

## 2020-07-31 NOTE — H&P
Hospitalist Admission Note    NAME: Cordell Helton   :  1945   MRN:  [id number]     Date/Time:  2020 3:57 PM    Patient PCP: Nan Robb, Not On File none currently-has not seen a medical doctor for last 10 years  ______________________________________________________________________  Given the patient's current clinical presentation, I have a high level of concern for decompensation if discharged from the emergency department. Complex decision making was performed, which includes reviewing the patient's available past medical records, laboratory results, and x-ray films. My assessment of this patient's clinical condition and my plan of care is as follows. Assessment / Plan:  Bilateral pleural effusion with compressive atelectasis POA-worsening over the past few weeks  Suspect new onset CHF POA-likely systolic  History of CAD status post MI status post stent in  by Dr. Lizette Aschoff ([facility])  Uncontrolled hypertension POA  History of hyperlipidemia-not taking any meds in the last 10 years as per patient  Medical noncompliance POA  Mildly elevated LFTs  Troponin negative proBNP 2929 on recent ED visit  CTA chest= cannot rule out PE on the bases, large bilateral effusions with compressive atelectasis    Admit to telemetry bed patient not hypoxic, Oxygen as needed  Aggressive IV diuresis with Lasix 60 twice daily for now  Check BMP magnesium  Check 2D echo  Strict I's and O's  Daily weight  Start metoprolol twice daily  Continue recently started lisinopril  Continue baby aspirin  Check lipid panel in the a.m. Inpatient cardiology consult-patient already has an appointment with Dr. Inder Estrella as outpatient to continue following RCA cardiology. Patient may need thoracentesis if not improved with diuresis over the weekend.   Check cytology when the fluid sent to rule out any occult malignancies  CTA cannot rule out PE at this point due to fluid, will continue Lovenox therapeutic dose for now  Repeat CT chest with contrast to rule out PE in 48 to 72 hours  IV hydralazine as needed        Code Status: Full code  Surrogate Decision Maker: Friend as per patient Ivis Villar #[id number]    DVT Prophylaxis: Subcu Lovenox as above  GI Prophylaxis: not indicated    Baseline: Patient is independent with ADLs at home, has not seen a medical doctor in years, last had an MI in 2010 when he had a stent placed by Dr. Ann Goldmann:   CHIEF COMPLAINT: Worsening shortness of breath with dyspnea on exertion and orthopnea for weeks    HISTORY OF PRESENT ILLNESS:     Nita Hamilton is a 76 y.o.  male who presents with above complaints complaint ambulatory  Patient presents with chief complaint of worsening shortness of breath for the past 3 weeks. History of associated dyspnea on exertion, orthopnea. Denies any history of chest pain, palpitation, syncope  Patient was seen in the ED 3 weeks ago started on Lasix p.o. along with lisinopril and aspirin-was asked to follow-up with PCP for mild bilateral pleural effusion seen on imaging then. Patient presented today due to progression of symptoms-was reimaged with a CTA chest to rule out PE because d-dimer was elevated -could not rule out PE given the basilar but was found to have bilateral large pleural effusion which had progressed. Patient apparently not hypoxic in the ED but unable to lay flat and feels comfortable only when he is propped up, and is tachypneic on minimal exertion. We were asked to admit for work up and evaluation of the above problems. Past Medical History:   Diagnosis Date    Acute MI anterior wall first episode care [facility]) 9/21/2010    CAD (coronary artery disease), native coronary artery 9/21/2010    Coronary stent 9/21/2010    Hyperlipidemia 9/21/2010        History reviewed. No pertinent surgical history.     Social History     Tobacco Use    Smoking status: Never Smoker    Smokeless tobacco: Never Used Substance Use Topics    Alcohol use: Yes      Family history  Denies any history of cancers, heart disease, stroke or diabetes in the family  No Known Allergies     Prior to Admission medications    Medication Sig Start Date End Date Taking? Authorizing Provider   furosemide (Lasix) 20 mg tablet Take 1 Tab by mouth daily. 7/22/20   Lobo Villeda MD   lisinopriL (PRINIVIL, ZESTRIL) 20 mg tablet Take 1 Tab by mouth daily. 7/22/20   Lobo Villeda MD   aspirin (ASPIRIN) 325 mg tablet Take 1 Tab by mouth daily. 7/22/20   Lobo Villeda MD   naproxen (NAPROSYN) 500 mg tablet Take 1 Tab by mouth two (2) times daily (with meals). 6/19/20   Valentin Dunbar DO   lidocaine (XYLOCAINE) 4 % topical cream Apply  to affected area three (3) times daily as needed for Pain. 6/19/20   Valentin Dunbar DO   cod liver oil Cap Take 1 Cap by mouth daily.     Other, MD Soledad       REVIEW OF SYSTEMS:        Total of 12 systems reviewed as follows:       POSITIVE= underlined text  Negative = text not underlined  General:  fever, chills, sweats, generalized weakness, weight loss/gain,      loss of appetite   Eyes:    blurred vision, eye pain, loss of vision, double vision  ENT:    rhinorrhea, pharyngitis   Respiratory:   cough, sputum production, SOB, NARANJO, wheezing, pleuritic pain   Cardiology:   chest pain, palpitations, orthopnea, PND, edema, syncope   Gastrointestinal:  abdominal pain , N/V, diarrhea, dysphagia, constipation, bleeding   Genitourinary:  frequency, urgency, dysuria, hematuria, incontinence   Muskuloskeletal :  arthralgia, myalgia, back pain  Hematology:  easy bruising, nose or gum bleeding, lymphadenopathy   Dermatological: rash, ulceration, pruritis, color change / jaundice  Endocrine:   hot flashes or polydipsia   Neurological:  headache, dizziness, confusion, focal weakness, paresthesia,     Speech difficulties, memory loss, gait difficulty  Psychological: Feelings of anxiety, depression, agitation    Objective:   VITALS:    Visit Vitals  BP (!) 144/106   Pulse 86   Temp 98.8 °F (37.1 °C)   Resp (!) 33   Ht 5' 6\" (1.676 m)   Wt 74.3 kg (163 lb 12.8 oz)   SpO2 94%   BMI 26.44 kg/m²       PHYSICAL EXAM:    General:    Alert, cooperative, no distress, appears stated age. HEENT: Atraumatic, anicteric sclerae, pink conjunctivae     No oral ulcers, mucosa moist, throat clear, dentition fair  Neck:  Supple, symmetrical,  thyroid: non tender  Lungs:   Decreased breath sounds noted bilaterally at bases +, crackles noted in bilateral upper and midlung field  Chest wall:  No tenderness  No Accessory muscle use. Heart:   Regular  rhythm,  No  murmur   lower extremity pitting edema noted+  Abdomen:   Soft, non-tender. Not distended. Bowel sounds normal  Extremities: No cyanosis. No clubbing,      Skin turgor normal, Capillary refill normal, Radial dial pulse 2+  Skin:     Not pale. Not Jaundiced  No rashes   Psych:  Good insight. Not depressed. Not anxious or agitated. Neurologic: EOMs intact. No facial asymmetry. No aphasia or slurred speech. Symmetrical strength, Sensation grossly intact.  Alert and oriented X 4.     _______________________________________________________________________  Care Plan discussed with:    Comments   Patient x    Family      RN x    Care Manager                    Consultant:  cathryn Higgins   _______________________________________________________________________  Expected  Disposition:   Home with Family x   HH/PT/OT/RN x   SNF/LTC    LYN    ________________________________________________________________________  TOTAL TIME:  64 Minutes    Critical Care Provided     Minutes non procedure based      Comments    x Reviewed previous records   >50% of visit spent in counseling and coordination of care x Discussion with patient  and questions answered       ________________________________________________________________________  Signed: Zahra Bias, MD    Procedures: see electronic medical records for all procedures/Xrays and details which were not copied into this note but were reviewed prior to creation of Plan. LAB DATA REVIEWED:    Recent Results (from the past 24 hour(s))   EKG, 12 LEAD, INITIAL    Collection Time: 07/31/20 11:51 AM   Result Value Ref Range    Ventricular Rate 93 BPM    Atrial Rate 93 BPM    P-R Interval 148 ms    QRS Duration 134 ms    Q-T Interval 416 ms    QTC Calculation (Bezet) 517 ms    Calculated P Axis 44 degrees    Calculated R Axis 96 degrees    Calculated T Axis 27 degrees    Diagnosis       Sinus rhythm with occasional premature ventricular complexes  Possible Left atrial enlargement  Right bundle branch block  When compared with ECG of 22-JUL-2020 09:01,  No significant change was found     CBC WITH AUTOMATED DIFF    Collection Time: 07/31/20 12:04 PM   Result Value Ref Range    WBC 8.7 4.1 - 11.1 K/uL    RBC 5.00 4. 10 - 5.70 M/uL    HGB 15.6 12.1 - 17.0 g/dL    HCT 47.1 36.6 - 50.3 %    MCV 94.2 80.0 - 99.0 FL    MCH 31.2 26.0 - 34.0 PG    MCHC 33.1 30.0 - 36.5 g/dL    RDW 14.5 11.5 - 14.5 %    PLATELET 151 146 - 105 K/uL    MPV 11.7 8.9 - 12.9 FL    NRBC 0.0 0  WBC    ABSOLUTE NRBC 0.00 0.00 - 0.01 K/uL    NEUTROPHILS 77 (H) 32 - 75 %    LYMPHOCYTES 13 12 - 49 %    MONOCYTES 7 5 - 13 %    EOSINOPHILS 1 0 - 7 %    BASOPHILS 1 0 - 1 %    IMMATURE GRANULOCYTES 1 (H) 0.0 - 0.5 %    ABS. NEUTROPHILS 6.7 1.8 - 8.0 K/UL    ABS. LYMPHOCYTES 1.2 0.8 - 3.5 K/UL    ABS. MONOCYTES 0.6 0.0 - 1.0 K/UL    ABS. EOSINOPHILS 0.1 0.0 - 0.4 K/UL    ABS. BASOPHILS 0.1 0.0 - 0.1 K/UL    ABS. IMM.  GRANS. 0.1 (H) 0.00 - 0.04 K/UL    DF AUTOMATED     METABOLIC PANEL, COMPREHENSIVE    Collection Time: 07/31/20 12:04 PM   Result Value Ref Range    Sodium 140 136 - 145 mmol/L    Potassium 3.7 3.5 - 5.1 mmol/L    Chloride 108 97 - 108 mmol/L    CO2 27 21 - 32 mmol/L    Anion gap 5 5 - 15 mmol/L    Glucose 91 65 - 100 mg/dL    BUN 29 (H) 6 - 20 MG/DL    Creatinine 1.24 0.70 - 1.30 MG/DL    BUN/Creatinine ratio 23 (H) 12 - 20      GFR est AA >60 >60 ml/min/1.73m2    GFR est non-AA 57 (L) >60 ml/min/1.73m2    Calcium 9.0 8.5 - 10.1 MG/DL    Bilirubin, total 0.9 0.2 - 1.0 MG/DL    ALT (SGPT) 97 (H) 12 - 78 U/L    AST (SGOT) 50 (H) 15 - 37 U/L    Alk.  phosphatase 80 45 - 117 U/L    Protein, total 7.1 6.4 - 8.2 g/dL    Albumin 3.6 3.5 - 5.0 g/dL    Globulin 3.5 2.0 - 4.0 g/dL    A-G Ratio 1.0 (L) 1.1 - 2.2     TROPONIN I    Collection Time: 07/31/20 12:04 PM   Result Value Ref Range    Troponin-I, Qt. <0.05 <0.05 ng/mL   D DIMER    Collection Time: 07/31/20  1:49 PM   Result Value Ref Range    D-dimer 2.21 (H) 0.00 - 0.65 mg/L FEU

## 2020-07-31 NOTE — ED PROVIDER NOTES
EMERGENCY DEPARTMENT HISTORY AND PHYSICAL EXAM      Date: 7/31/2020  Patient Name: Felix Teixeira    History of Presenting Illness     Chief Complaint   Patient presents with    Shortness of Breath     Patient reports onset 3 weeks ago. He reports his PCP referred him here. History Provided By: Patient    HPI: Felix Teixeira, 76 y.o. male presents to the ED with cc of shortness of breath. Symptoms started 3 weeks ago. Shortness of breath can occur at rest or with exertion. He denies chest pain, cough, fever or chills. He denies leg pain but has some lower ankle edema for the last 2 days. Denies any recent immobilization or surgery. Denies headache, abdominal pain or diarrhea. He has no known exposure to a COVID-19 patient. He states that it does not hurt to take a deep breath. There are no other complaints, changes, or physical findings at this time. PCP: Carrie, Not On File    No current facility-administered medications on file prior to encounter. Current Outpatient Medications on File Prior to Encounter   Medication Sig Dispense Refill    furosemide (Lasix) 20 mg tablet Take 1 Tab by mouth daily. 15 Tab 0    lisinopriL (PRINIVIL, ZESTRIL) 20 mg tablet Take 1 Tab by mouth daily. 30 Tab 0    aspirin (ASPIRIN) 325 mg tablet Take 1 Tab by mouth daily. 30 Tab 0    naproxen (NAPROSYN) 500 mg tablet Take 1 Tab by mouth two (2) times daily (with meals). 10 Tab 0    lidocaine (XYLOCAINE) 4 % topical cream Apply  to affected area three (3) times daily as needed for Pain. 30 g 0    cod liver oil Cap Take 1 Cap by mouth daily. Past History     Past Medical History:  Past Medical History:   Diagnosis Date    Acute MI anterior wall first [facility] care [facility]) 9/21/2010    CAD (coronary artery disease), native coronary artery 9/21/2010    Coronary stent 9/21/2010    Hyperlipidemia 9/21/2010       Past Surgical History:  History reviewed.  No pertinent surgical history. Family History:  History reviewed. No pertinent family history. Social History:  Social History     Tobacco Use    Smoking status: Never Smoker    Smokeless tobacco: Never Used   Substance Use Topics    Alcohol use: Yes    Drug use: No       Allergies:  No Known Allergies      Review of Systems   Review of Systems   Constitutional: Negative for fever. HENT: Negative for congestion. Eyes: Negative. Respiratory: Positive for shortness of breath. Cardiovascular: Negative for chest pain. Gastrointestinal: Negative for abdominal pain. Endocrine: Negative for heat intolerance. Genitourinary: Negative for dysuria. Musculoskeletal: Negative for back pain. Skin: Negative for rash. Allergic/Immunologic: Negative for immunocompromised state. Neurological: Negative for dizziness. Hematological: Does not bruise/bleed easily. Psychiatric/Behavioral: Negative. All other systems reviewed and are negative. Physical Exam   Physical Exam  Vitals signs and nursing note reviewed. Constitutional:       General: He is not in acute distress. Appearance: He is well-developed. HENT:      Head: Normocephalic and atraumatic. Neck:      Musculoskeletal: Normal range of motion. Cardiovascular:      Rate and Rhythm: Normal rate and regular rhythm. Heart sounds: Normal heart sounds. Pulmonary:      Effort: Pulmonary effort is normal.      Breath sounds: Normal breath sounds. Abdominal:      General: Bowel sounds are normal.      Palpations: Abdomen is soft. Musculoskeletal:      Right lower leg: Edema present. Left lower leg: Edema present. Comments: Trace to 1+ edema in ankles   Skin:     General: Skin is warm and dry. Neurological:      General: No focal deficit present. Mental Status: He is alert and oriented to person, place, and time.       Coordination: Coordination normal.   Psychiatric:         Mood and Affect: Mood normal.         Behavior: Behavior normal.         Diagnostic Study Results     Labs -     Recent Results (from the past 12 hour(s))   EKG, 12 LEAD, INITIAL    Collection Time: 07/31/20 11:51 AM   Result Value Ref Range    Ventricular Rate 93 BPM    Atrial Rate 93 BPM    P-R Interval 148 ms    QRS Duration 134 ms    Q-T Interval 416 ms    QTC Calculation (Bezet) 517 ms    Calculated P Axis 44 degrees    Calculated R Axis 96 degrees    Calculated T Axis 27 degrees    Diagnosis       Sinus rhythm with occasional premature ventricular complexes  Possible Left atrial enlargement  Right bundle branch block  When compared with ECG of 22-JUL-2020 09:01,  No significant change was found     CBC WITH AUTOMATED DIFF    Collection Time: 07/31/20 12:04 PM   Result Value Ref Range    WBC 8.7 4.1 - 11.1 K/uL    RBC 5.00 4. 10 - 5.70 M/uL    HGB 15.6 12.1 - 17.0 g/dL    HCT 47.1 36.6 - 50.3 %    MCV 94.2 80.0 - 99.0 FL    MCH 31.2 26.0 - 34.0 PG    MCHC 33.1 30.0 - 36.5 g/dL    RDW 14.5 11.5 - 14.5 %    PLATELET 530 281 - 964 K/uL    MPV 11.7 8.9 - 12.9 FL    NRBC 0.0 0  WBC    ABSOLUTE NRBC 0.00 0.00 - 0.01 K/uL    NEUTROPHILS 77 (H) 32 - 75 %    LYMPHOCYTES 13 12 - 49 %    MONOCYTES 7 5 - 13 %    EOSINOPHILS 1 0 - 7 %    BASOPHILS 1 0 - 1 %    IMMATURE GRANULOCYTES 1 (H) 0.0 - 0.5 %    ABS. NEUTROPHILS 6.7 1.8 - 8.0 K/UL    ABS. LYMPHOCYTES 1.2 0.8 - 3.5 K/UL    ABS. MONOCYTES 0.6 0.0 - 1.0 K/UL    ABS. EOSINOPHILS 0.1 0.0 - 0.4 K/UL    ABS. BASOPHILS 0.1 0.0 - 0.1 K/UL    ABS. IMM.  GRANS. 0.1 (H) 0.00 - 0.04 K/UL    DF AUTOMATED     METABOLIC PANEL, COMPREHENSIVE    Collection Time: 07/31/20 12:04 PM   Result Value Ref Range    Sodium 140 136 - 145 mmol/L    Potassium 3.7 3.5 - 5.1 mmol/L    Chloride 108 97 - 108 mmol/L    CO2 27 21 - 32 mmol/L    Anion gap 5 5 - 15 mmol/L    Glucose 91 65 - 100 mg/dL    BUN 29 (H) 6 - 20 MG/DL    Creatinine 1.24 0.70 - 1.30 MG/DL    BUN/Creatinine ratio 23 (H) 12 - 20      GFR est AA >60 >60 ml/min/1.73m2    GFR est non-AA 57 (L) >60 ml/min/1.73m2    Calcium 9.0 8.5 - 10.1 MG/DL    Bilirubin, total 0.9 0.2 - 1.0 MG/DL    ALT (SGPT) 97 (H) 12 - 78 U/L    AST (SGOT) 50 (H) 15 - 37 U/L    Alk. phosphatase 80 45 - 117 U/L    Protein, total 7.1 6.4 - 8.2 g/dL    Albumin 3.6 3.5 - 5.0 g/dL    Globulin 3.5 2.0 - 4.0 g/dL    A-G Ratio 1.0 (L) 1.1 - 2.2     TROPONIN I    Collection Time: 07/31/20 12:04 PM   Result Value Ref Range    Troponin-I, Qt. <0.05 <0.05 ng/mL   D DIMER    Collection Time: 07/31/20  1:49 PM   Result Value Ref Range    D-dimer 2.21 (H) 0.00 - 0.65 mg/L FEU       Radiologic Studies -   CTA CHEST W OR W WO CONT   Final Result   impression:   1. Large bilateral pleural effusions with cardiomegaly and interstitial edema. 2. Cannot exclude small bibasilar pulmonary emboli. XR CHEST PORT   Final Result   impression: No definite change, bilateral pleural effusion. CT Results  (Last 48 hours)               07/31/20 1508  CTA CHEST W OR W WO CONT Final result    Impression:  impression:   1. Large bilateral pleural effusions with cardiomegaly and interstitial edema. 2. Cannot exclude small bibasilar pulmonary emboli. Narrative:  Clinical indication: Shortness of breath. Localizer obtained without contrast at the level of the pulmonary arteries. Fast   injection rate of 68 cc of Isovue-370 with review of the raw data and MIP   reconstructions. No prior. CT dose reduction was achieved through the use of a   standardized protocol tailored for this examination and automatic exposure   control for dose modulation . Kirby Angry Large bilateral pleural effusions. Cardiomegaly. Bibasal atelectasis. Mild interstitial prominence possibly CHF. Cannot exclude small lateral basilar distal pulmonary emboli. CXR Results  (Last 48 hours)    None          Medical Decision Making   I am the first provider for this patient.     I reviewed the vital signs, available nursing notes, past medical history, past surgical history, family history and social history. Vital Signs-Reviewed the patient's vital signs. Patient Vitals for the past 12 hrs:   Temp Pulse Resp BP SpO2   07/31/20 1307     97 %   07/31/20 1305  90 25  96 %   07/31/20 1304    (!) 141/99    07/31/20 1248   (!) 90 (!) 156/110 96 %   07/31/20 1141 98.8 °F (37.1 °C) 96 18 (!) 152/97 99 %       EKG interpretation: (Preliminary)  Rhythm: normal sinus rhythm and PVC's; and regular . Rate (approx.): 93; Axis: normal; [place] interval: normal; QRS interval: normal ; ST/T wave: normal; Other findings: unchanged from previous ekg. right bundle branch block    Records Reviewed: Nursing Notes, Old Medical Records, Previous electrocardiograms, Previous Radiology Studies and Previous Laboratory Studies    Provider Notes (Medical Decision Making):   CAD, CHF, bronchitis, pulmonary embolism, anemia,    ED Course:   Initial assessment performed. The patients presenting problems have been discussed, and they are in agreement with the care plan formulated and outlined with them. I have encouraged them to ask questions as they arise throughout their visit. Consult note:    Discussed the case with Dr. Martell Walsh, hospitalist.  He recommends Lovenox given that we cannot rule out pulmonary embolism and Lasix. PROCEDURES      Critical Care Time:   0    Disposition:  admit        DISCHARGE PLAN:  1. Current Discharge Medication List        2. Follow-up Information    None       3. Return to ED if worse     Diagnosis     Clinical Impression:   1. Pleural effusion, bilateral    2. Acute on chronic congestive heart failure, unspecified heart failure type ([address]        Attestations:    Smitha Rdz MD    Please note that this dictation was completed with [employer], the computer voice recognition software.   Quite often unanticipated grammatical, syntax, homophones, and other interpretive errors are inadvertently transcribed by the computer software. Please disregard these errors. Please excuse any errors that have escaped final proofreading. Thank you.

## 2020-07-31 NOTE — Clinical Note
TRANSFER - OUT REPORT:     Verbal report given to: Alyx Low RN. Report consisted of patient's Situation, Background, Assessment and   Recommendations(SBAR). Opportunity for questions and clarification was provided. Patient transported to: [facility].

## 2020-07-31 NOTE — ED NOTES
Bedside and Verbal shift change report given to Charlotte RN (oncoming nurse) by [place] Cervantes RN (offgoing nurse). Report included the following information SBAR, ED Summary, MAR and Recent Results.

## 2020-07-31 NOTE — ED NOTES
Bedside and verbal shift report received from Collins Smith RN. Assumed care of pt at this time. Pt resting in NAD. Denies any change in sx. VSS. Call bell within reach.

## 2020-07-31 NOTE — PROGRESS NOTES
Paged on call cardiologist and Dr Marietta Gutierrez called and informed him about consult and he stated he will see pt tomorrow morning

## 2020-08-01 ENCOUNTER — APPOINTMENT (OUTPATIENT)
Dept: NON INVASIVE DIAGNOSTICS | Age: 75
DRG: 233 | End: 2020-08-01
Attending: INTERNAL MEDICINE
Payer: MEDICARE

## 2020-08-01 LAB
ANION GAP SERPL CALC-SCNC: 8 MMOL/L (ref 5–15)
BUN SERPL-MCNC: 25 MG/DL (ref 6–20)
BUN/CREAT SERPL: 22 (ref 12–20)
CALCIUM SERPL-MCNC: 8.6 MG/DL (ref 8.5–10.1)
CHLORIDE SERPL-SCNC: 109 MMOL/L (ref 97–108)
CHOLEST SERPL-MCNC: 173 MG/DL
CO2 SERPL-SCNC: 24 MMOL/L (ref 21–32)
CREAT SERPL-MCNC: 1.15 MG/DL (ref 0.7–1.3)
EST. AVERAGE GLUCOSE BLD GHB EST-MCNC: 111 MG/DL
GLUCOSE SERPL-MCNC: 82 MG/DL (ref 65–100)
HBA1C MFR BLD: 5.5 % (ref 4–5.6)
HDLC SERPL-MCNC: 38 MG/DL
HDLC SERPL: 4.6 {RATIO} (ref 0–5)
LDLC SERPL CALC-MCNC: 119.8 MG/DL (ref 0–100)
LIPID PROFILE,FLP: ABNORMAL
MAGNESIUM SERPL-MCNC: 2.2 MG/DL (ref 1.6–2.4)
POTASSIUM SERPL-SCNC: 3.2 MMOL/L (ref 3.5–5.1)
SODIUM SERPL-SCNC: 141 MMOL/L (ref 136–145)
TRIGL SERPL-MCNC: 76 MG/DL (ref ?–150)
VLDLC SERPL CALC-MCNC: 15.2 MG/DL

## 2020-08-01 PROCEDURE — 83036 HEMOGLOBIN GLYCOSYLATED A1C: CPT

## 2020-08-01 PROCEDURE — 74011250637 HC RX REV CODE- 250/637: Performed by: INTERNAL MEDICINE

## 2020-08-01 PROCEDURE — 83735 ASSAY OF MAGNESIUM: CPT

## 2020-08-01 PROCEDURE — 36415 COLL VENOUS BLD VENIPUNCTURE: CPT

## 2020-08-01 PROCEDURE — 80048 BASIC METABOLIC PNL TOTAL CA: CPT

## 2020-08-01 PROCEDURE — 80061 LIPID PANEL: CPT

## 2020-08-01 PROCEDURE — 93306 TTE W/DOPPLER COMPLETE: CPT

## 2020-08-01 PROCEDURE — 74011250636 HC RX REV CODE- 250/636: Performed by: INTERNAL MEDICINE

## 2020-08-01 PROCEDURE — 65660000000 HC RM CCU STEPDOWN

## 2020-08-01 RX ORDER — POTASSIUM CHLORIDE 7.45 MG/ML
10 INJECTION INTRAVENOUS
Status: DISPENSED | OUTPATIENT
Start: 2020-08-01 | End: 2020-08-01

## 2020-08-01 RX ORDER — POTASSIUM CHLORIDE 750 MG/1
40 TABLET, FILM COATED, EXTENDED RELEASE ORAL DAILY
Status: DISCONTINUED | OUTPATIENT
Start: 2020-08-01 | End: 2020-08-06

## 2020-08-01 RX ADMIN — ENOXAPARIN SODIUM 70 MG: 80 INJECTION SUBCUTANEOUS at 06:18

## 2020-08-01 RX ADMIN — POTASSIUM CHLORIDE 40 MEQ: 750 TABLET, FILM COATED, EXTENDED RELEASE ORAL at 12:18

## 2020-08-01 RX ADMIN — POTASSIUM CHLORIDE 10 MEQ: 7.46 INJECTION, SOLUTION INTRAVENOUS at 19:55

## 2020-08-01 RX ADMIN — POTASSIUM CHLORIDE 10 MEQ: 7.46 INJECTION, SOLUTION INTRAVENOUS at 23:58

## 2020-08-01 RX ADMIN — FUROSEMIDE 60 MG: 10 INJECTION, SOLUTION INTRAMUSCULAR; INTRAVENOUS at 17:27

## 2020-08-01 RX ADMIN — METOPROLOL TARTRATE 25 MG: 25 TABLET, FILM COATED ORAL at 09:23

## 2020-08-01 RX ADMIN — Medication 5 ML: at 06:18

## 2020-08-01 RX ADMIN — ENOXAPARIN SODIUM 70 MG: 80 INJECTION SUBCUTANEOUS at 17:27

## 2020-08-01 RX ADMIN — POTASSIUM CHLORIDE 10 MEQ: 7.46 INJECTION, SOLUTION INTRAVENOUS at 21:57

## 2020-08-01 RX ADMIN — FUROSEMIDE 60 MG: 10 INJECTION, SOLUTION INTRAMUSCULAR; INTRAVENOUS at 09:23

## 2020-08-01 RX ADMIN — Medication 10 ML: at 21:38

## 2020-08-01 RX ADMIN — LISINOPRIL 20 MG: 20 TABLET ORAL at 09:23

## 2020-08-01 RX ADMIN — METOPROLOL TARTRATE 25 MG: 25 TABLET, FILM COATED ORAL at 21:36

## 2020-08-01 RX ADMIN — ASPIRIN 81 MG: 81 TABLET, COATED ORAL at 09:23

## 2020-08-01 RX ADMIN — Medication 10 ML: at 17:27

## 2020-08-01 NOTE — PROGRESS NOTES
Hospitalist Progress Note    NAME: Kati Tillman   :  1945   MRN:  [id number]       Assessment / Plan:    Bilateral pleural effusion with compressive atelectasis POA-worsening over the past few weeks  Suspect new onset CHF POA-likely systolic  History of CAD status post MI status post stent in  by Dr. Ivonne Hatch ([facility])  Uncontrolled hypertension POA  History of hyperlipidemia-not taking any meds in the last 10 years as per patient  Medical noncompliance POA  Mildly elevated LFTs  Troponin negative proBNP 2929 on recent ED visit  CTA chest= cannot rule out PE on the bases, large bilateral effusions with compressive atelectasis     Remains on telemetry bed patient not hypoxic, Oxygen as needed  Continue ggressive IV diuresis with Lasix 60 twice daily for now  Replete potassium  Check CXR in AM  Check BMP magnesium  Check 2D echo - results pending  Strict I's and O's  Daily weight  Continue metoprolol twice daily  Continue recently started lisinopril  Continue baby aspirin  Check lipid panel in the a.m. Inpatient cardiology consult-patient already has an appointment with Dr. Irvin Ayala as outpatient to continue following RCA cardiology. Patient may need thoracentesis if not improved with diuresis over the weekend.   Check cytology when the fluid sent to rule out any occult malignancies  CTA cannot rule out PE at this point due to fluid, will continue Lovenox therapeutic dose for now  Repeat CT chest with contrast to rule out PE in 48 to 72 hours  IV hydralazine as needed           Code Status: Full code  Surrogate Decision Maker: Friend as per patient Sendy Cornejo #[phone]     DVT Prophylaxis: Subcu Lovenox as above  GI Prophylaxis: not indicated     Baseline: Patient is independent with ADLs at home, has not seen a medical doctor in years, last had an MI in  when he had a stent placed by Dr. Padmini Ward:     Chief Complaint / Reason for Physician Visit  Patient states he feels 80-90% better. He has no compalints. Discussed with RN events overnight. Review of Systems:  Symptom Y/N Comments  Symptom Y/N Comments   Fever/Chills n   Chest Pain y    Poor Appetite n   Edema n    Cough n   Abdominal Pain n    Sputum n   Joint Pain n    SOB/NARANJO y   Pruritis/Rash n    Nausea/vomit n   Tolerating PT/OT na    Diarrhea n   Tolerating Diet y    Constipation n   Other       Could NOT obtain due to:      Objective:     VITALS:   Last 24hrs VS reviewed since prior progress note. Most recent are:  Patient Vitals for the past 24 hrs:   Temp Pulse Resp BP SpO2   08/01/20 1552 97.7 °F (36.5 °C) 83 20 (!) 136/95 96 %   08/01/20 1206 97.4 °F (36.3 °C) 83 20 127/82 97 %   08/01/20 1040    151/90    08/01/20 0750 98 °F (36.7 °C) 84 20 151/90 95 %   08/01/20 0351 97.7 °F (36.5 °C) 86 20 (!) 140/96 95 %   07/31/20 2322 98.1 °F (36.7 °C) 73 20 135/84 96 %   07/31/20 2020 98.5 °F (36.9 °C) 93 20 144/86 96 %   07/31/20 1840  82  121/81        Intake/Output Summary (Last 24 hours) at 8/1/2020 1808  Last data filed at 8/1/2020 1553  Gross per 24 hour   Intake 1200 ml   Output 4300 ml   Net -3100 ml        PHYSICAL EXAM:  General: WD, WN. Alert, cooperative, no acute distress    EENT:  EOMI. Anicteric sclerae. MMM  Resp:  CTA bilaterally, no wheezing or rales. No accessory muscle use  CV:  Regular  rhythm,  No edema  GI:  Soft, Non distended, Non tender.  +Bowel sounds  Neurologic:  Alert and oriented X 3, normal speech,   Psych:   Good insight. Not anxious nor agitated  Skin:  No rashes.   No jaundice    Reviewed most current lab test results and cultures  YES  Reviewed most current radiology test results   YES  Review and summation of old records today    NO  Reviewed patient's current orders and MAR    YES  PMH/SH reviewed - no change compared to H&P  ________________________________________________________________________  Care Plan discussed with:    Comments   Patient y    Family      RN y    Care Manager     Consultant                        Multidiciplinary team rounds were held today with , nursing, pharmacist and clinical coordinator. Patient's plan of care was discussed; medications were reviewed and discharge planning was addressed. ________________________________________________________________________  Total NON critical care TIME:  30  Minutes    Total CRITICAL CARE TIME Spent:   Minutes non procedure based      Comments   >50% of visit spent in counseling and coordination of care x    ________________________________________________________________________  Callie Damon MD     Procedures: see electronic medical records for all procedures/Xrays and details which were not copied into this note but were reviewed prior to creation of Plan. LABS:  I reviewed today's most current labs and imaging studies.   Pertinent labs include:  Recent Labs     07/31/20  1204   WBC 8.7   HGB 15.6   HCT 47.1        Recent Labs     08/01/20  0626 07/31/20  1204    140   K 3.2* 3.7   * 108   CO2 24 27   GLU 82 91   BUN 25* 29*   CREA 1.15 1.24   CA 8.6 9.0   MG 2.2  --    ALB  --  3.6   TBILI  --  0.9   ALT  --  97*       Signed: Callie Damon MD

## 2020-08-01 NOTE — CONSULTS
Subjective:      Date of  Admission: 7/31/2020 12:51 PM     Admission type:Emergency    Bee Guerrero is a 76 y.o. male admitted for Pleural effusion, bilateral [J90];CHF, acute on chronic ([facility] [I50.9]. Presented with worsening SOB and leg swelling over last 3 wks. Has not been taking meds regularly. Never followed up after last PCI. Switching cardiac care to RCA. He denies chest pain, chest pressure/discomfort, palpitations, irregular heart beats, near-syncope, syncope, fatigue, exertional chest pressure/discomfort, claudication. Patient Active Problem List    Diagnosis Date Noted    Pleural effusion, bilateral 07/31/2020    CHF, acute on chronic ([facility] 07/31/2020    [facility] 07/31/2020    CAD (coronary artery disease), native coronary artery 09/21/2010    Hyperlipidemia 09/21/2010    Coronary stent 09/21/2010      Bsi, Not On File  Past Medical History:   Diagnosis Date    Acute MI anterior wall first episode care [facility]) 9/21/2010    CAD (coronary artery disease), native coronary artery 9/21/2010    Coronary stent 9/21/2010    Hyperlipidemia 9/21/2010      History reviewed. No pertinent surgical history. No Known Allergies   History reviewed. No pertinent family history.    Current Facility-Administered Medications   Medication Dose Route Frequency    potassium chloride SR (KLOR-CON 10) tablet 40 mEq  40 mEq Oral DAILY    aspirin delayed-release tablet 81 mg  81 mg Oral DAILY    lisinopriL (PRINIVIL, ZESTRIL) tablet 20 mg  20 mg Oral DAILY    furosemide (LASIX) injection 60 mg  60 mg IntraVENous BID    sodium chloride (NS) flush 5-40 mL  5-40 mL IntraVENous Q8H    sodium chloride (NS) flush 5-40 mL  5-40 mL IntraVENous PRN    acetaminophen (TYLENOL) tablet 650 mg  650 mg Oral Q6H PRN    Or    acetaminophen (TYLENOL) suppository 650 mg  650 mg Rectal Q6H PRN    promethazine (PHENERGAN) tablet 12.5 mg  12.5 mg Oral Q6H PRN    Or    [facility] injection 4 mg  4 mg IntraVENous Q6H PRN    enoxaparin (LOVENOX) injection 70 mg  1 mg/kg SubCUTAneous Q12H    metoprolol tartrate (LOPRESSOR) tablet 25 mg  25 mg Oral BID    hydrALAZINE (APRESOLINE) 20 mg/mL injection 10 mg  10 mg IntraVENous Q6H PRN         Review of Symptoms:  Constitutional: negative  Eyes: negative  Ears, nose, mouth, throat, and face: negative  Respiratory: No cough, hemoptysis, URI. Cardiovascular: No CP, palpitations, sweating, lightheadedness, dizziness, syncope, presyncope. Gastrointestinal: No nausea, vomiting, diarrhea, constipation, abdominal pain, hematemesis, melena, hematochezia  Genitourinary:No urinary complaints. Musculoskeletal:negative  Neurological: negative  Behvioral/Psych: negative  Endocrine: negative     Subjective:      Visit Vitals  /90   Pulse 84   Temp 98 °F (36.7 °C)   Resp 20   Ht 5' 6\" (1.676 m)   Wt 151 lb 0.2 oz (68.5 kg)   SpO2 95%   BMI 24.37 kg/m²       Physical Exam  Resting comfortably.    No resp distress   Extremities: extremities normal, atraumatic, no cyanosism, 1+ edema  Heart: regular rate and rhythm, S1, S2 normal, no murmur, click, rub or gallop  Neurologic: Grossly normal    Cardiographics    Telemetry: SR    ECG: SR, RBBB    Echocardiogram: Not done    Labs:   Recent Results (from the past 24 hour(s))   EKG, 12 LEAD, INITIAL    Collection Time: 07/31/20 11:51 AM   Result Value Ref Range    Ventricular Rate 93 BPM    Atrial Rate 93 BPM    P-R Interval 148 ms    QRS Duration 134 ms    Q-T Interval 416 ms    QTC Calculation (Bezet) 517 ms    Calculated P Axis 44 degrees    Calculated R Axis 96 degrees    Calculated T Axis 27 degrees    Diagnosis       Sinus rhythm with occasional premature ventricular complexes  Possible Left atrial enlargement  Right bundle branch block  When compared with ECG of 22-JUL-2020 09:01,  No significant change was found     CBC WITH AUTOMATED DIFF    Collection Time: 07/31/20 12:04 PM   Result Value Ref Range    WBC 8.7 4.1 - 11.1 K/uL    RBC 5.00 4. 10 - 5.70 M/uL    HGB 15.6 12.1 - 17.0 g/dL    HCT 47.1 36.6 - 50.3 %    MCV 94.2 80.0 - 99.0 FL    MCH 31.2 26.0 - 34.0 PG    MCHC 33.1 30.0 - 36.5 g/dL    RDW 14.5 11.5 - 14.5 %    PLATELET 364 629 - 379 K/uL    MPV 11.7 8.9 - 12.9 FL    NRBC 0.0 0  WBC    ABSOLUTE NRBC 0.00 0.00 - 0.01 K/uL    NEUTROPHILS 77 (H) 32 - 75 %    LYMPHOCYTES 13 12 - 49 %    MONOCYTES 7 5 - 13 %    EOSINOPHILS 1 0 - 7 %    BASOPHILS 1 0 - 1 %    IMMATURE GRANULOCYTES 1 (H) 0.0 - 0.5 %    ABS. NEUTROPHILS 6.7 1.8 - 8.0 K/UL    ABS. LYMPHOCYTES 1.2 0.8 - 3.5 K/UL    ABS. MONOCYTES 0.6 0.0 - 1.0 K/UL    ABS. EOSINOPHILS 0.1 0.0 - 0.4 K/UL    ABS. BASOPHILS 0.1 0.0 - 0.1 K/UL    ABS. IMM. GRANS. 0.1 (H) 0.00 - 0.04 K/UL    DF AUTOMATED     METABOLIC PANEL, COMPREHENSIVE    Collection Time: 07/31/20 12:04 PM   Result Value Ref Range    Sodium 140 136 - 145 mmol/L    Potassium 3.7 3.5 - 5.1 mmol/L    Chloride 108 97 - 108 mmol/L    CO2 27 21 - 32 mmol/L    Anion gap 5 5 - 15 mmol/L    Glucose 91 65 - 100 mg/dL    BUN 29 (H) 6 - 20 MG/DL    Creatinine 1.24 0.70 - 1.30 MG/DL    BUN/Creatinine ratio 23 (H) 12 - 20      GFR est AA >60 >60 ml/min/1.73m2    GFR est non-AA 57 (L) >60 ml/min/1.73m2    Calcium 9.0 8.5 - 10.1 MG/DL    Bilirubin, total 0.9 0.2 - 1.0 MG/DL    ALT (SGPT) 97 (H) 12 - 78 U/L    AST (SGOT) 50 (H) 15 - 37 U/L    Alk.  phosphatase 80 45 - 117 U/L    Protein, total 7.1 6.4 - 8.2 g/dL    Albumin 3.6 3.5 - 5.0 g/dL    Globulin 3.5 2.0 - 4.0 g/dL    A-G Ratio 1.0 (L) 1.1 - 2.2     TROPONIN I    Collection Time: 07/31/20 12:04 PM   Result Value Ref Range    Troponin-I, Qt. <0.05 <0.05 ng/mL   D DIMER    Collection Time: 07/31/20  1:49 PM   Result Value Ref Range    D-dimer 2.21 (H) 0.00 - 0.65 mg/L FEU   METABOLIC PANEL, BASIC    Collection Time: 08/01/20  6:26 AM   Result Value Ref Range    Sodium 141 136 - 145 mmol/L    Potassium 3.2 (L) 3.5 - 5.1 mmol/L    Chloride 109 (H) 97 - 108 mmol/L    CO2 24 21 - 32 mmol/L    Anion gap 8 5 - 15 mmol/L    Glucose 82 65 - 100 mg/dL    BUN 25 (H) 6 - 20 MG/DL    Creatinine 1.15 0.70 - 1.30 MG/DL    BUN/Creatinine ratio 22 (H) 12 - 20      GFR est AA >60 >60 ml/min/1.73m2    GFR est non-AA >60 >60 ml/min/1.73m2    Calcium 8.6 8.5 - 10.1 MG/DL   MAGNESIUM    Collection Time: 08/01/20  6:26 AM   Result Value Ref Range    Magnesium 2.2 1.6 - 2.4 mg/dL        Assessment:     Assessment:       Active Problems:    CAD (coronary artery disease), native coronary artery (9/21/2010)      Hyperlipidemia (9/21/2010)      Pleural effusion, bilateral (7/31/2020)      CHF, acute on chronic ([facility] (7/31/2020)      Pulmonary embolism ([facility] (7/31/2020)         Plan:     1. SOB, b/l pleural effusion: possible CHF: excellent clinical response to IV diuresis. Continue lasix. -ve fluid balance. Wt is down. Follow Echo. Further recommendations afterwards. 2. CAD: s/p LAD ALISON PCI 2010. Never followed up after that. Has not been taking any prescribe meds at home. 3. ? PE as per CT: continue lovenox. May need repeat CT. 4. Check FLP. 5. BP: monitor. Continue current meds.

## 2020-08-01 NOTE — PROGRESS NOTES
Bedside and Verbal shift change report given to Duy Castillo (oncoming nurse) by Santa Kaur (offgoing nurse). Report included the following information SBAR, Kardex, Procedure Summary, Intake/Output, MAR, Accordion and Recent Results.

## 2020-08-02 ENCOUNTER — APPOINTMENT (OUTPATIENT)
Dept: GENERAL RADIOLOGY | Age: 75
DRG: 233 | End: 2020-08-02
Attending: INTERNAL MEDICINE
Payer: MEDICARE

## 2020-08-02 LAB
ALBUMIN SERPL-MCNC: 3.4 G/DL (ref 3.5–5)
ALBUMIN/GLOB SERPL: 1 {RATIO} (ref 1.1–2.2)
ALP SERPL-CCNC: 71 U/L (ref 45–117)
ALT SERPL-CCNC: 77 U/L (ref 12–78)
ANION GAP SERPL CALC-SCNC: 7 MMOL/L (ref 5–15)
AST SERPL-CCNC: 29 U/L (ref 15–37)
BASOPHILS # BLD: 0 K/UL (ref 0–0.1)
BASOPHILS NFR BLD: 1 % (ref 0–1)
BILIRUB SERPL-MCNC: 1 MG/DL (ref 0.2–1)
BUN SERPL-MCNC: 25 MG/DL (ref 6–20)
BUN/CREAT SERPL: 23 (ref 12–20)
CALCIUM SERPL-MCNC: 8.9 MG/DL (ref 8.5–10.1)
CHLORIDE SERPL-SCNC: 108 MMOL/L (ref 97–108)
CHOLEST SERPL-MCNC: 186 MG/DL
CO2 SERPL-SCNC: 25 MMOL/L (ref 21–32)
CREAT SERPL-MCNC: 1.07 MG/DL (ref 0.7–1.3)
DIFFERENTIAL METHOD BLD: NORMAL
ECHO AO ROOT DIAM: 3.66 CM
ECHO AV AREA PEAK VELOCITY: 2.52 CM2
ECHO AV AREA PEAK VELOCITY: 2.58 CM2
ECHO AV AREA PEAK VELOCITY: 2.59 CM2
ECHO AV AREA PEAK VELOCITY: 2.66 CM2
ECHO AV AREA VTI: 2.35 CM2
ECHO AV AREA/BSA VTI: 1.3 CM2/M2
ECHO AV MEAN GRADIENT: 1.75 MMHG
ECHO AV PEAK GRADIENT: 3.34 MMHG
ECHO AV PEAK GRADIENT: 3.51 MMHG
ECHO AV PEAK VELOCITY: 91.34 CM/S
ECHO AV PEAK VELOCITY: 93.64 CM/S
ECHO AV VTI: 15.44 CM
ECHO EST RA PRESSURE: 10 MMHG
ECHO LA AREA 4C: 14.46 CM2
ECHO LA MAJOR AXIS: 4.22 CM
ECHO LA MINOR AXIS: 2.38 CM
ECHO LA VOL 4C: 35.23 ML (ref 18–58)
ECHO LA VOLUME INDEX A4C: 19.85 ML/M2 (ref 16–28)
ECHO LV E' SEPTAL VELOCITY: 3.98 CM/S
ECHO LV EDV A4C: 185.02 ML
ECHO LV EDV INDEX A4C: 104.2 ML/M2
ECHO LV EJECTION FRACTION A4C: 21 PERCENT
ECHO LV ESV A4C: 146.03 ML
ECHO LV ESV INDEX A4C: 82.3 ML/M2
ECHO LV INTERNAL DIMENSION DIASTOLIC: 5.91 CM (ref 4.2–5.9)
ECHO LV INTERNAL DIMENSION SYSTOLIC: 4.17 CM
ECHO LV IVSD: 1.11 CM (ref 0.6–1)
ECHO LV MASS 2D: 274.3 G (ref 88–224)
ECHO LV MASS INDEX 2D: 154.5 G/M2 (ref 49–115)
ECHO LV POSTERIOR WALL DIASTOLIC: 1.1 CM (ref 0.6–1)
ECHO LVOT CARDIAC OUTPUT: 2.67 LITER/MINUTE
ECHO LVOT DIAM: 2 CM
ECHO LVOT PEAK GRADIENT: 2.25 MMHG
ECHO LVOT PEAK GRADIENT: 2.38 MMHG
ECHO LVOT PEAK VELOCITY: 74.98 CM/S
ECHO LVOT PEAK VELOCITY: 77.18 CM/S
ECHO LVOT SV: 36.3 ML
ECHO LVOT VTI: 11.51 CM
ECHO MV A VELOCITY: 26.13 CM/S
ECHO MV AREA PHT: 4.92 CM2
ECHO MV AREA VTI: 1.57 CM2
ECHO MV E DECELERATION TIME (DT): 0.18 S
ECHO MV E VELOCITY: 68.19 CM/S
ECHO MV E/A RATIO: 2.61
ECHO MV E/E' SEPTAL: 17.13
ECHO MV MAX VELOCITY: 91.14 CM/S
ECHO MV MEAN GRADIENT: 1.29 MMHG
ECHO MV PEAK GRADIENT: 3.32 MMHG
ECHO MV PRESSURE HALF TIME (PHT): 0.04 S
ECHO MV VTI: 23.15 CM
ECHO PV MAX VELOCITY: 58.67 CM/S
ECHO PV MAX VELOCITY: 67.56 CM/S
ECHO PV MEAN GRADIENT: 0.82 MMHG
ECHO PV PEAK INSTANTANEOUS GRADIENT SYSTOLIC: 1.38 MMHG
ECHO PV PEAK INSTANTANEOUS GRADIENT SYSTOLIC: 1.83 MMHG
ECHO PV REGURGITANT MAX VELOCITY: 362.86 CM/S
ECHO PV VTI: 10.09 CM
ECHO RA AREA 4C: 12.72 CM2
ECHO RIGHT VENTRICULAR SYSTOLIC PRESSURE (RVSP): 47.38 MMHG
ECHO TV REGURGITANT MAX VELOCITY: 305.7 CM/S
ECHO TV REGURGITANT PEAK GRADIENT: 37.38 MMHG
EOSINOPHIL # BLD: 0.3 K/UL (ref 0–0.4)
EOSINOPHIL NFR BLD: 5 % (ref 0–7)
ERYTHROCYTE [DISTWIDTH] IN BLOOD BY AUTOMATED COUNT: 14.4 % (ref 11.5–14.5)
GLOBULIN SER CALC-MCNC: 3.4 G/DL (ref 2–4)
GLUCOSE SERPL-MCNC: 84 MG/DL (ref 65–100)
HCT VFR BLD AUTO: 45.1 % (ref 36.6–50.3)
HDLC SERPL-MCNC: 42 MG/DL
HDLC SERPL: 4.4 {RATIO} (ref 0–5)
HGB BLD-MCNC: 15.4 G/DL (ref 12.1–17)
IMM GRANULOCYTES # BLD AUTO: 0 K/UL (ref 0–0.04)
IMM GRANULOCYTES NFR BLD AUTO: 0 % (ref 0–0.5)
LDLC SERPL CALC-MCNC: 128 MG/DL (ref 0–100)
LIPID PROFILE,FLP: ABNORMAL
LVOT MG: 0.96 MMHG
LYMPHOCYTES # BLD: 1.4 K/UL (ref 0.8–3.5)
LYMPHOCYTES NFR BLD: 21 % (ref 12–49)
MCH RBC QN AUTO: 31.4 PG (ref 26–34)
MCHC RBC AUTO-ENTMCNC: 34.1 G/DL (ref 30–36.5)
MCV RBC AUTO: 92 FL (ref 80–99)
MONOCYTES # BLD: 0.7 K/UL (ref 0–1)
MONOCYTES NFR BLD: 10 % (ref 5–13)
NEUTS SEG # BLD: 4.5 K/UL (ref 1.8–8)
NEUTS SEG NFR BLD: 63 % (ref 32–75)
NRBC # BLD: 0 K/UL (ref 0–0.01)
NRBC BLD-RTO: 0 PER 100 WBC
PLATELET # BLD AUTO: 206 K/UL (ref 150–400)
PMV BLD AUTO: 11.7 FL (ref 8.9–12.9)
POTASSIUM SERPL-SCNC: 3.5 MMOL/L (ref 3.5–5.1)
PROT SERPL-MCNC: 6.8 G/DL (ref 6.4–8.2)
RBC # BLD AUTO: 4.9 M/UL (ref 4.1–5.7)
SODIUM SERPL-SCNC: 140 MMOL/L (ref 136–145)
TRIGL SERPL-MCNC: 80 MG/DL (ref ?–150)
VLDLC SERPL CALC-MCNC: 16 MG/DL
WBC # BLD AUTO: 7 K/UL (ref 4.1–11.1)

## 2020-08-02 PROCEDURE — 71046 X-RAY EXAM CHEST 2 VIEWS: CPT

## 2020-08-02 PROCEDURE — 36415 COLL VENOUS BLD VENIPUNCTURE: CPT

## 2020-08-02 PROCEDURE — 74011250636 HC RX REV CODE- 250/636: Performed by: INTERNAL MEDICINE

## 2020-08-02 PROCEDURE — 80053 COMPREHEN METABOLIC PANEL: CPT

## 2020-08-02 PROCEDURE — 65660000000 HC RM CCU STEPDOWN

## 2020-08-02 PROCEDURE — 74011250637 HC RX REV CODE- 250/637: Performed by: INTERNAL MEDICINE

## 2020-08-02 PROCEDURE — 80061 LIPID PANEL: CPT

## 2020-08-02 PROCEDURE — 85025 COMPLETE CBC W/AUTO DIFF WBC: CPT

## 2020-08-02 RX ORDER — ATORVASTATIN CALCIUM 20 MG/1
20 TABLET, FILM COATED ORAL
Status: DISCONTINUED | OUTPATIENT
Start: 2020-08-02 | End: 2020-08-14 | Stop reason: HOSPADM

## 2020-08-02 RX ORDER — CARVEDILOL 3.12 MG/1
3.12 TABLET ORAL 2 TIMES DAILY
Status: DISCONTINUED | OUTPATIENT
Start: 2020-08-02 | End: 2020-08-06

## 2020-08-02 RX ORDER — POTASSIUM CHLORIDE 7.45 MG/ML
10 INJECTION INTRAVENOUS ONCE
Status: COMPLETED | OUTPATIENT
Start: 2020-08-02 | End: 2020-08-02

## 2020-08-02 RX ADMIN — POTASSIUM CHLORIDE 40 MEQ: 750 TABLET, FILM COATED, EXTENDED RELEASE ORAL at 08:23

## 2020-08-02 RX ADMIN — ATORVASTATIN CALCIUM 20 MG: 20 TABLET, FILM COATED ORAL at 21:49

## 2020-08-02 RX ADMIN — LISINOPRIL 20 MG: 20 TABLET ORAL at 08:23

## 2020-08-02 RX ADMIN — POTASSIUM CHLORIDE 10 MEQ: 7.46 INJECTION, SOLUTION INTRAVENOUS at 02:09

## 2020-08-02 RX ADMIN — METOPROLOL TARTRATE 25 MG: 25 TABLET, FILM COATED ORAL at 08:23

## 2020-08-02 RX ADMIN — ENOXAPARIN SODIUM 70 MG: 80 INJECTION SUBCUTANEOUS at 05:16

## 2020-08-02 RX ADMIN — ASPIRIN 81 MG: 81 TABLET, COATED ORAL at 08:23

## 2020-08-02 RX ADMIN — HYDRALAZINE HYDROCHLORIDE 10 MG: 20 INJECTION INTRAMUSCULAR; INTRAVENOUS at 04:04

## 2020-08-02 RX ADMIN — Medication 10 ML: at 05:18

## 2020-08-02 RX ADMIN — ENOXAPARIN SODIUM 70 MG: 80 INJECTION SUBCUTANEOUS at 17:42

## 2020-08-02 RX ADMIN — FUROSEMIDE 60 MG: 10 INJECTION, SOLUTION INTRAMUSCULAR; INTRAVENOUS at 17:43

## 2020-08-02 RX ADMIN — FUROSEMIDE 60 MG: 10 INJECTION, SOLUTION INTRAMUSCULAR; INTRAVENOUS at 08:22

## 2020-08-02 RX ADMIN — Medication 10 ML: at 17:44

## 2020-08-02 RX ADMIN — CARVEDILOL 3.12 MG: 3.12 TABLET, FILM COATED ORAL at 09:19

## 2020-08-02 RX ADMIN — CARVEDILOL 3.12 MG: 3.12 TABLET, FILM COATED ORAL at 17:43

## 2020-08-02 RX ADMIN — Medication 5 ML: at 22:00

## 2020-08-02 NOTE — ROUTINE PROCESS
Bedside shift change report given to Radha Stevens (oncoming nurse) by Paola CHAN RN (offgoing nurse). Report included the following information SBAR, Kardex and MAR.

## 2020-08-02 NOTE — PROGRESS NOTES
Hospitalist Progress Note    NAME: Stefan Riedel   :  1945   MRN:  [id number]       Assessment / Plan:  Patient admitted for chest pain and dyspnea, with bilateral pleural effusion, likely cardiac in nature. Bilateral pleural effusion with compressive atelectasis POA-  New onset CHF POA-likely systolic  History of CAD status post MI status post stent in  by Dr. Donita Alfredo ([facility])  Uncontrolled hypertension POA  History of hyperlipidemia-not taking any meds in the last 10 years as per patient  Medical noncompliance POA  Mildly elevated LFTs  Troponin negative proBNP 2929 on recent ED visit  CTA chest= cannot rule out PE on the bases, large bilateral effusions with compressive atelectasis     - Patient remains on telemetry bed patient not hypoxic, Oxygen as needed  - Cardiology following - To Proceed to [address] [place] [address]  - Continue agressive IV diuresis with Lasix 60 twice daily for now  - Replete potassium  - Check CXR in AM  - Check BMP magnesium  - ECHO shows EF 20-25%  - Strict I's and O's  - Daily weight  - Continue metoprolol twice daily  - Continue recently started lisinopril  - Continue baby aspirin  - Check lipid panel in the a.m. Inpatient cardiology consult-patient already has an appointment with Dr. Talon Corcoran as outpatient to continue following RCA cardiology.   Patient may need thoracentesis if not improved with diuresis  Check cytology when the fluid sent to rule out any occult malignancies    CTA cannot rule out PE at this point due to fluid, will continue Lovenox therapeutic dose for now  Repeat CT chest with contrast to rule out PE in 48 to 72 hours  IV hydralazine as needed           Code Status: Full code  Surrogate Decision Maker: Friend as per patient [facility] #[phone]     DVT Prophylaxis: Subcu Lovenox as above  GI Prophylaxis: not indicated     Baseline: Patient is independent with ADLs at home, has not seen a medical doctor in years, last had an MI in  when he had a stent placed by Dr. Paul Records:     Chief Complaint / Reason for Physician Visit  Patient states he feels 80-90% better. He has no compalints. He has agreed to a LHC with cardiology. Discussed with RN events overnight. Review of Systems:  Symptom Y/N Comments  Symptom Y/N Comments   Fever/Chills n   Chest Pain y    Poor Appetite n   Edema n    Cough n   Abdominal Pain n    Sputum n   Joint Pain n    SOB/NARANJO y   Pruritis/Rash n    Nausea/vomit n   Tolerating PT/OT na    Diarrhea n   Tolerating Diet y    Constipation n   Other       Could NOT obtain due to:      Objective:     VITALS:   Last 24hrs VS reviewed since prior progress note. Most recent are:  Patient Vitals for the past 24 hrs:   Temp Pulse Resp BP SpO2   08/02/20 1505 98.3 °F (36.8 °C) 65 20 118/76 98 %   08/02/20 1138 97.3 °F (36.3 °C) 72  115/76 98 %   08/02/20 0737 98.5 °F (36.9 °C) 88 20 (!) 129/91 95 %   08/02/20 0330 97.7 °F (36.5 °C) 85 20 (!) 150/111 95 %   08/02/20 0107 97.9 °F (36.6 °C) 75 20 140/89 95 %   08/01/20 2136  90  (!) 156/98    08/01/20 1939 97.8 °F (36.6 °C) 73 20 98/56 98 %   08/01/20 1552 97.7 °F (36.5 °C) 83 20 (!) 136/95 96 %       Intake/Output Summary (Last 24 hours) at 8/2/2020 1531  Last data filed at 8/2/2020 1246  Gross per 24 hour   Intake 680 ml   Output 2275 ml   Net -1595 ml        PHYSICAL EXAM:  General: WD, WN. Alert, cooperative, no acute distress    EENT:  EOMI. Anicteric sclerae. MMM  Resp:  CTA bilaterally, no wheezing or rales. No accessory muscle use  CV:  Regular  rhythm,  No edema  GI:  Soft, Non distended, Non tender.  +Bowel sounds  Neurologic:  Alert and oriented X 3, normal speech,   Psych:   Good insight. Not anxious nor agitated  Skin:  No rashes.   No jaundice    Reviewed most current lab test results and cultures  YES  Reviewed most current radiology test results   YES  Review and summation of old records today    NO  Reviewed patient's current orders and MAR    YES  PMH/SH reviewed - no change compared to H&P  ________________________________________________________________________  Care Plan discussed with:    Comments   Patient y    Family      RN y    Care Manager     Consultant                        Multidiciplinary team rounds were held today with , nursing, pharmacist and clinical coordinator. Patient's plan of care was discussed; medications were reviewed and discharge planning was addressed. ________________________________________________________________________  Total NON critical care TIME:  35  Minutes    Total CRITICAL CARE TIME Spent:   Minutes non procedure based      Comments   >50% of visit spent in counseling and coordination of care x    ________________________________________________________________________  Linda Ramirez MD     Procedures: see electronic medical records for all procedures/Xrays and details which were not copied into this note but were reviewed prior to creation of Plan. LABS:  I reviewed today's most current labs and imaging studies.   Pertinent labs include:  Recent Labs     08/02/20  0537 07/31/20  1204   WBC 7.0 8.7   HGB 15.4 15.6   HCT 45.1 47.1    221     Recent Labs     08/02/20  0537 08/01/20  0626 07/31/20  1204    141 140   K 3.5 3.2* 3.7    109* 108   CO2 25 24 27   GLU 84 82 91   BUN 25* 25* 29*   CREA 1.07 1.15 1.24   CA 8.9 8.6 9.0   MG  --  2.2  --    ALB 3.4*  --  3.6   TBILI 1.0  --  0.9   ALT 77  --  97*       Signed: Linda Ramirez MD

## 2020-08-02 NOTE — PROGRESS NOTES
8/2/2020 8:48 AM    Admit Date: 7/31/2020    Admit Diagnosis: Pleural effusion, bilateral [J90];CHF, acute on chronic (HCC) [I50.9]    Subjective: Carlos De Dios   denies chest pain, chest pressure/discomfort, dyspnea, palpitations, irregular heart beats, near-syncope, syncope, fatigue, orthopnea, paroxysmal nocturnal dyspnea, exertional chest pressure/discomfort, claudication, lower extremity edema, tachypnea. Visit Vitals  BP (!) 129/91   Pulse 88   Temp 98.5 °F (36.9 °C)   Resp 20   Ht 5' 6\" (1.676 m)   Wt 151 lb 14.4 oz (68.9 kg)   SpO2 95%   BMI 24.52 kg/m²     Current Facility-Administered Medications   Medication Dose Route Frequency    carvediloL (COREG) tablet 3.125 mg  3.125 mg Oral BID    atorvastatin (LIPITOR) tablet 20 mg  20 mg Oral QHS    potassium chloride SR (KLOR-CON 10) tablet 40 mEq  40 mEq Oral DAILY    aspirin delayed-release tablet 81 mg  81 mg Oral DAILY    lisinopriL (PRINIVIL, ZESTRIL) tablet 20 mg  20 mg Oral DAILY    furosemide (LASIX) injection 60 mg  60 mg IntraVENous BID    sodium chloride (NS) flush 5-40 mL  5-40 mL IntraVENous Q8H    sodium chloride (NS) flush 5-40 mL  5-40 mL IntraVENous PRN    acetaminophen (TYLENOL) tablet 650 mg  650 mg Oral Q6H PRN    Or    acetaminophen (TYLENOL) suppository 650 mg  650 mg Rectal Q6H PRN    promethazine (PHENERGAN) tablet 12.5 mg  12.5 mg Oral Q6H PRN    Or    ondansetron (ZOFRAN) injection 4 mg  4 mg IntraVENous Q6H PRN    enoxaparin (LOVENOX) injection 70 mg  1 mg/kg SubCUTAneous Q12H    hydrALAZINE (APRESOLINE) 20 mg/mL injection 10 mg  10 mg IntraVENous Q6H PRN         Objective:      Visit Vitals  BP (!) 129/91   Pulse 88   Temp 98.5 °F (36.9 °C)   Resp 20   Ht 5' 6\" (1.676 m)   Wt 151 lb 14.4 oz (68.9 kg)   SpO2 95%   BMI 24.52 kg/m²       Physical Exam:  Resting comfortably. No resp distress.    Extremities: extremities normal, atraumatic, no cyanosis or edema  Heart: regular rate and rhythm, S1, S2 normal, no murmur, click, rub or gallop  Lungs: clear to auscultation bilaterally  Neurologic: Grossly normal    Data Review:   Labs:    Recent Results (from the past 24 hour(s))   ECHO ADULT COMPLETE    Collection Time: 08/01/20 10:59 AM   Result Value Ref Range    IVSd 1.11 (A) 0.6 - 1.0 cm    LVIDd 5.91 (A) 4.2 - 5.9 cm    LVIDs 4.17 cm    LVOT d 2.00 cm    LVPWd 1.10 (A) 0.6 - 1.0 cm    LV Ejection Fraction MOD 4C 21 percent    LV ED Vol A4C 185.02 mL    LV ES Vol A4C 146.03 mL    LVOT Cardiac Output 2.67 liter/minute    LVOT Peak Gradient 2.38 mmHg    LVOT Peak Gradient 2.25 mmHg    Left Ventricular Outflow Tract Mean Gradient 0.96 mmHg    LVOT SV 36.3 mL    LVOT Peak Velocity 77.18 cm/s    LVOT Peak Velocity 74.98 cm/s    LVOT VTI 11.51 cm    RVSP 47.38 mmHg    Left Atrium Major Axis 4.22 cm    LA Area 4C 14.46 cm2    LA Vol 4C 35.23 18 - 58 mL    Right Atrial Area 4C 12.72 cm2    Est. RA Pressure 10.00 mmHg    Aortic Valve Area by Continuity of Peak Velocity 2.59 cm2    Aortic Valve Area by Continuity of Peak Velocity 2.52 cm2    Aortic Valve Area by Continuity of Peak Velocity 2.58 cm2    Aortic Valve Area by Continuity of Peak Velocity 2.66 cm2    Aortic Valve Area by Continuity of VTI 2.35 cm2    AoV PG 3.51 mmHg    AoV PG 3.34 mmHg    Aortic Valve Systolic Mean Gradient 6.70 mmHg    Aortic Valve Systolic Peak Velocity 79.46 cm/s    Aortic Valve Systolic Peak Velocity 12.06 cm/s    AoV VTI 15.44 cm    MV A Yusef 26.13 cm/s    Mitral Valve E Wave Deceleration Time 0.18 s    MV E Yusef 68.19 cm/s    E/E' septal 17.13     LV E' Septal Velocity 3.98 cm/s    MVA VTI 1.57 cm2    Pulmonic Regurgitant End Max Velocity 362.86 cm/s    MV Peak Gradient 3.32 mmHg    MV Mean Gradient 1.29 mmHg    Mitral Valve Pressure Half-time 0.04 s    Mitral Valve Max Velocity 91.14 cm/s    Mitral Valve Annulus Velocity Time Integral 23.15 cm    MVA (PHT) 4.92 cm2    Pulmonic Valve Systolic Peak Instantaneous Gradient 1.38 mmHg    Pulmonic Valve Systolic Peak Instantaneous Gradient 1.83 mmHg    Pulmonic Valve Systolic Mean Gradient 2.35 mmHg    Pulmonic Valve Max Velocity 67.56 cm/s    Pulmonic Valve Max Velocity 58.67 cm/s    Pulmonic Valve Systolic Velocity Time Integral 10.09 cm    Triscuspid Valve Regurgitation Peak Gradient 37.38 mmHg    TR Max Velocity 305.70 cm/s    Ao Root D 3.66 cm    MV E/A 2.61     LV Mass .3 88 - 224 g    LV Mass AL Index 154.5 49 - 115 g/m2    Left Atrium Minor Axis 2.38 cm    LA Vol Index 19.85 16 - 28 ml/m2    LVED Vol Index A4C 104.2 mL/m2    LVES Vol Index A4C 82.3 mL/m2    AMY/BSA VTI 1.3 cm2/m2   CBC WITH AUTOMATED DIFF    Collection Time: 08/02/20  5:37 AM   Result Value Ref Range    WBC 7.0 4.1 - 11.1 K/uL    RBC 4.90 4. 10 - 5.70 M/uL    HGB 15.4 12.1 - 17.0 g/dL    HCT 45.1 36.6 - 50.3 %    MCV 92.0 80.0 - 99.0 FL    MCH 31.4 26.0 - 34.0 PG    MCHC 34.1 30.0 - 36.5 g/dL    RDW 14.4 11.5 - 14.5 %    PLATELET 812 338 - 987 K/uL    MPV 11.7 8.9 - 12.9 FL    NRBC 0.0 0  WBC    ABSOLUTE NRBC 0.00 0.00 - 0.01 K/uL    NEUTROPHILS 63 32 - 75 %    LYMPHOCYTES 21 12 - 49 %    MONOCYTES 10 5 - 13 %    EOSINOPHILS 5 0 - 7 %    BASOPHILS 1 0 - 1 %    IMMATURE GRANULOCYTES 0 0.0 - 0.5 %    ABS. NEUTROPHILS 4.5 1.8 - 8.0 K/UL    ABS. LYMPHOCYTES 1.4 0.8 - 3.5 K/UL    ABS. MONOCYTES 0.7 0.0 - 1.0 K/UL    ABS. EOSINOPHILS 0.3 0.0 - 0.4 K/UL    ABS. BASOPHILS 0.0 0.0 - 0.1 K/UL    ABS. IMM.  GRANS. 0.0 0.00 - 0.04 K/UL    DF AUTOMATED     METABOLIC PANEL, COMPREHENSIVE    Collection Time: 08/02/20  5:37 AM   Result Value Ref Range    Sodium 140 136 - 145 mmol/L    Potassium 3.5 3.5 - 5.1 mmol/L    Chloride 108 97 - 108 mmol/L    CO2 25 21 - 32 mmol/L    Anion gap 7 5 - 15 mmol/L    Glucose 84 65 - 100 mg/dL    BUN 25 (H) 6 - 20 MG/DL    Creatinine 1.07 0.70 - 1.30 MG/DL    BUN/Creatinine ratio 23 (H) 12 - 20      GFR est AA >60 >60 ml/min/1.73m2    GFR est non-AA >60 >60 ml/min/1.73m2    Calcium 8.9 8.5 - 10.1 MG/DL    Bilirubin, total 1.0 0.2 - 1.0 MG/DL    ALT (SGPT) 77 12 - 78 U/L    AST (SGOT) 29 15 - 37 U/L    Alk. phosphatase 71 45 - 117 U/L    Protein, total 6.8 6.4 - 8.2 g/dL    Albumin 3.4 (L) 3.5 - 5.0 g/dL    Globulin 3.4 2.0 - 4.0 g/dL    A-G Ratio 1.0 (L) 1.1 - 2.2         Telemetry: SR      Assessment:     Active Problems:    CAD (coronary artery disease), native coronary artery (9/21/2010)      Hyperlipidemia (9/21/2010)      Pleural effusion, bilateral (7/31/2020)      CHF, acute on chronic ([facility] [address] (7/31/2020)      Pulmonary embolism ([facility] [address] (7/31/2020)        Plan:     1. Cardiomyopathy, SOB, b/l pleural effusion: Echo noted. Switch lopressor to coreg. On ACEI now. Recommend cardiac cath. I discussed the risks/benefits/alternatives of the procedure with the patient. Risks include (but are not limited to) bleeding, infection, cva/mi/tamponade/death. The patient understands and agrees to proceed. 2. CAD: s/p LAD ALISON PCI 2010. Never followed up after that. As above. 3. ? PE as per CT: continue lovenox. May need repeat CT. 4. FLP noted. Add statin. 5. BP: monitor. Continue current meds.

## 2020-08-03 ENCOUNTER — APPOINTMENT (OUTPATIENT)
Dept: GENERAL RADIOLOGY | Age: 75
DRG: 233 | End: 2020-08-03
Attending: INTERNAL MEDICINE
Payer: MEDICARE

## 2020-08-03 PROBLEM — I25.5 ISCHEMIC CARDIOMYOPATHY: Status: ACTIVE | Noted: 2020-07-31

## 2020-08-03 LAB
ALBUMIN SERPL-MCNC: 3.2 G/DL (ref 3.5–5)
ALBUMIN/GLOB SERPL: 1 {RATIO} (ref 1.1–2.2)
ALP SERPL-CCNC: 71 U/L (ref 45–117)
ALT SERPL-CCNC: 64 U/L (ref 12–78)
ANION GAP SERPL CALC-SCNC: 4 MMOL/L (ref 5–15)
AST SERPL-CCNC: 25 U/L (ref 15–37)
BASOPHILS # BLD: 0 K/UL (ref 0–0.1)
BASOPHILS NFR BLD: 1 % (ref 0–1)
BILIRUB SERPL-MCNC: 1.2 MG/DL (ref 0.2–1)
BUN SERPL-MCNC: 25 MG/DL (ref 6–20)
BUN/CREAT SERPL: 23 (ref 12–20)
CALCIUM SERPL-MCNC: 8.4 MG/DL (ref 8.5–10.1)
CHLORIDE SERPL-SCNC: 106 MMOL/L (ref 97–108)
CO2 SERPL-SCNC: 29 MMOL/L (ref 21–32)
CREAT SERPL-MCNC: 1.1 MG/DL (ref 0.7–1.3)
DIFFERENTIAL METHOD BLD: ABNORMAL
EOSINOPHIL # BLD: 0.5 K/UL (ref 0–0.4)
EOSINOPHIL NFR BLD: 8 % (ref 0–7)
ERYTHROCYTE [DISTWIDTH] IN BLOOD BY AUTOMATED COUNT: 14.2 % (ref 11.5–14.5)
GLOBULIN SER CALC-MCNC: 3.2 G/DL (ref 2–4)
GLUCOSE SERPL-MCNC: 81 MG/DL (ref 65–100)
HCT VFR BLD AUTO: 43.6 % (ref 36.6–50.3)
HGB BLD-MCNC: 14.7 G/DL (ref 12.1–17)
IMM GRANULOCYTES # BLD AUTO: 0 K/UL (ref 0–0.04)
IMM GRANULOCYTES NFR BLD AUTO: 0 % (ref 0–0.5)
LYMPHOCYTES # BLD: 1.4 K/UL (ref 0.8–3.5)
LYMPHOCYTES NFR BLD: 25 % (ref 12–49)
MAGNESIUM SERPL-MCNC: 2.1 MG/DL (ref 1.6–2.4)
MCH RBC QN AUTO: 31.1 PG (ref 26–34)
MCHC RBC AUTO-ENTMCNC: 33.7 G/DL (ref 30–36.5)
MCV RBC AUTO: 92.4 FL (ref 80–99)
MONOCYTES # BLD: 0.6 K/UL (ref 0–1)
MONOCYTES NFR BLD: 11 % (ref 5–13)
NEUTS SEG # BLD: 3.1 K/UL (ref 1.8–8)
NEUTS SEG NFR BLD: 55 % (ref 32–75)
NRBC # BLD: 0 K/UL (ref 0–0.01)
NRBC BLD-RTO: 0 PER 100 WBC
PLATELET # BLD AUTO: 193 K/UL (ref 150–400)
PMV BLD AUTO: 11.7 FL (ref 8.9–12.9)
POTASSIUM SERPL-SCNC: 3.4 MMOL/L (ref 3.5–5.1)
PROT SERPL-MCNC: 6.4 G/DL (ref 6.4–8.2)
RBC # BLD AUTO: 4.72 M/UL (ref 4.1–5.7)
SODIUM SERPL-SCNC: 139 MMOL/L (ref 136–145)
WBC # BLD AUTO: 5.7 K/UL (ref 4.1–11.1)

## 2020-08-03 PROCEDURE — 83735 ASSAY OF MAGNESIUM: CPT

## 2020-08-03 PROCEDURE — C1894 INTRO/SHEATH, NON-LASER: HCPCS | Performed by: INTERNAL MEDICINE

## 2020-08-03 PROCEDURE — 65660000000 HC RM CCU STEPDOWN

## 2020-08-03 PROCEDURE — 71045 X-RAY EXAM CHEST 1 VIEW: CPT

## 2020-08-03 PROCEDURE — 77030019569 HC BND COMPR RAD TERU -B: Performed by: INTERNAL MEDICINE

## 2020-08-03 PROCEDURE — 77030015766: Performed by: INTERNAL MEDICINE

## 2020-08-03 PROCEDURE — 74011636320 HC RX REV CODE- 636/320: Performed by: INTERNAL MEDICINE

## 2020-08-03 PROCEDURE — 77030004549 HC CATH ANGI DX PRF MRTM -A: Performed by: INTERNAL MEDICINE

## 2020-08-03 PROCEDURE — 74011250637 HC RX REV CODE- 250/637: Performed by: INTERNAL MEDICINE

## 2020-08-03 PROCEDURE — 77030008543 HC TBNG MON PRSS MRTM -A: Performed by: INTERNAL MEDICINE

## 2020-08-03 PROCEDURE — C1769 GUIDE WIRE: HCPCS | Performed by: INTERNAL MEDICINE

## 2020-08-03 PROCEDURE — 93458 L HRT ARTERY/VENTRICLE ANGIO: CPT | Performed by: INTERNAL MEDICINE

## 2020-08-03 PROCEDURE — 77030019698 HC SYR ANGI MDLON MRTM -A: Performed by: INTERNAL MEDICINE

## 2020-08-03 PROCEDURE — 99152 MOD SED SAME PHYS/QHP 5/>YRS: CPT | Performed by: INTERNAL MEDICINE

## 2020-08-03 PROCEDURE — 74011000250 HC RX REV CODE- 250: Performed by: INTERNAL MEDICINE

## 2020-08-03 PROCEDURE — B2111ZZ FLUOROSCOPY OF MULTIPLE CORONARY ARTERIES USING LOW OSMOLAR CONTRAST: ICD-10-PCS | Performed by: INTERNAL MEDICINE

## 2020-08-03 PROCEDURE — 80053 COMPREHEN METABOLIC PANEL: CPT

## 2020-08-03 PROCEDURE — B2151ZZ FLUOROSCOPY OF LEFT HEART USING LOW OSMOLAR CONTRAST: ICD-10-PCS | Performed by: INTERNAL MEDICINE

## 2020-08-03 PROCEDURE — 77030010221 HC SPLNT WR POS TELE -B: Performed by: INTERNAL MEDICINE

## 2020-08-03 PROCEDURE — 74011250636 HC RX REV CODE- 250/636: Performed by: INTERNAL MEDICINE

## 2020-08-03 PROCEDURE — 99153 MOD SED SAME PHYS/QHP EA: CPT | Performed by: INTERNAL MEDICINE

## 2020-08-03 PROCEDURE — 85025 COMPLETE CBC W/AUTO DIFF WBC: CPT

## 2020-08-03 PROCEDURE — 77010033678 HC OXYGEN DAILY

## 2020-08-03 PROCEDURE — 36415 COLL VENOUS BLD VENIPUNCTURE: CPT

## 2020-08-03 PROCEDURE — 4A023N7 MEASUREMENT OF CARDIAC SAMPLING AND PRESSURE, LEFT HEART, PERCUTANEOUS APPROACH: ICD-10-PCS | Performed by: INTERNAL MEDICINE

## 2020-08-03 RX ORDER — HEPARIN SODIUM 200 [USP'U]/100ML
INJECTION, SOLUTION INTRAVENOUS
Status: COMPLETED | OUTPATIENT
Start: 2020-08-03 | End: 2020-08-03

## 2020-08-03 RX ORDER — SODIUM CHLORIDE 0.9 % (FLUSH) 0.9 %
5-40 SYRINGE (ML) INJECTION AS NEEDED
Status: DISCONTINUED | OUTPATIENT
Start: 2020-08-03 | End: 2020-08-06

## 2020-08-03 RX ORDER — FENTANYL CITRATE 50 UG/ML
INJECTION, SOLUTION INTRAMUSCULAR; INTRAVENOUS AS NEEDED
Status: DISCONTINUED | OUTPATIENT
Start: 2020-08-03 | End: 2020-08-03 | Stop reason: HOSPADM

## 2020-08-03 RX ORDER — VERAPAMIL HYDROCHLORIDE 2.5 MG/ML
INJECTION, SOLUTION INTRAVENOUS AS NEEDED
Status: DISCONTINUED | OUTPATIENT
Start: 2020-08-03 | End: 2020-08-03 | Stop reason: HOSPADM

## 2020-08-03 RX ORDER — SODIUM CHLORIDE 0.9 % (FLUSH) 0.9 %
5-40 SYRINGE (ML) INJECTION EVERY 8 HOURS
Status: DISCONTINUED | OUTPATIENT
Start: 2020-08-03 | End: 2020-08-06

## 2020-08-03 RX ORDER — HEPARIN SODIUM 1000 [USP'U]/ML
INJECTION, SOLUTION INTRAVENOUS; SUBCUTANEOUS AS NEEDED
Status: DISCONTINUED | OUTPATIENT
Start: 2020-08-03 | End: 2020-08-03 | Stop reason: HOSPADM

## 2020-08-03 RX ORDER — FUROSEMIDE 40 MG/1
40 TABLET ORAL
Status: DISCONTINUED | OUTPATIENT
Start: 2020-08-03 | End: 2020-08-06

## 2020-08-03 RX ORDER — GUAIFENESIN 100 MG/5ML
LIQUID (ML) ORAL AS NEEDED
Status: DISCONTINUED | OUTPATIENT
Start: 2020-08-03 | End: 2020-08-03 | Stop reason: HOSPADM

## 2020-08-03 RX ORDER — ENOXAPARIN SODIUM 100 MG/ML
40 INJECTION SUBCUTANEOUS
Status: DISCONTINUED | OUTPATIENT
Start: 2020-08-04 | End: 2020-08-05

## 2020-08-03 RX ORDER — LIDOCAINE HYDROCHLORIDE 10 MG/ML
INJECTION, SOLUTION EPIDURAL; INFILTRATION; INTRACAUDAL; PERINEURAL AS NEEDED
Status: DISCONTINUED | OUTPATIENT
Start: 2020-08-03 | End: 2020-08-03 | Stop reason: HOSPADM

## 2020-08-03 RX ORDER — MIDAZOLAM HYDROCHLORIDE 1 MG/ML
INJECTION, SOLUTION INTRAMUSCULAR; INTRAVENOUS AS NEEDED
Status: DISCONTINUED | OUTPATIENT
Start: 2020-08-03 | End: 2020-08-03 | Stop reason: HOSPADM

## 2020-08-03 RX ADMIN — ATORVASTATIN CALCIUM 20 MG: 20 TABLET, FILM COATED ORAL at 22:43

## 2020-08-03 RX ADMIN — FUROSEMIDE 40 MG: 40 TABLET ORAL at 18:15

## 2020-08-03 RX ADMIN — Medication 10 ML: at 22:43

## 2020-08-03 RX ADMIN — POTASSIUM CHLORIDE 40 MEQ: 750 TABLET, FILM COATED, EXTENDED RELEASE ORAL at 12:46

## 2020-08-03 RX ADMIN — LISINOPRIL 20 MG: 20 TABLET ORAL at 12:46

## 2020-08-03 RX ADMIN — FUROSEMIDE 40 MG: 40 TABLET ORAL at 12:51

## 2020-08-03 RX ADMIN — Medication 10 ML: at 06:06

## 2020-08-03 RX ADMIN — CARVEDILOL 3.12 MG: 3.12 TABLET, FILM COATED ORAL at 12:46

## 2020-08-03 RX ADMIN — CARVEDILOL 3.12 MG: 3.12 TABLET, FILM COATED ORAL at 18:15

## 2020-08-03 NOTE — PROGRESS NOTES
ANNA  Home  Follow up appointments  Patient reports Dr. Dayan Rubio is his established Cardiologist.    Patient is looking for a new PCP. He will establish appt with new PCP. Reason for Admission:   Pelural effusion, bilateral CHF acute on chronic                  RUR Score:     15             PCP:     Patient informed CM that he had a PCP of many years prior to admission. PCP was not available to be seen prior to presenting to ED. First and Last name:  Pt is looking for a new PCP   Name of Practice:    Are you a current patient: Yes/No:    Approximate date of last visit:    Can you participate in a virtual visit if needed:     Do you (patient/family) have any concerns for transition/discharge? Patient lives alone and voices no concerns. Plan for utilizing home health:   N/a at this time    Current Advanced Directive/Advance Care Plan:    Jena Collazo  [phone]  Patient states he has a copy at home. CM met with patient to discuss discharge planning. Pt name and  confirmed as pt identifiers. Demographics confirmed. ADL's/IADL's - independent pta to include steps and driving. DME - none  [facility]        Patient intends to drive self home at discharge. Vehicle is onsite. Care Management Interventions  PCP Verified by CM: No(patient states he had a PCP prior to admission but was unable to establish appointment. Wants a new PCP . He will establish)  Mode of Transport at Discharge: Self  Discharge Durable Medical Equipment: No  Physical Therapy Consult: No  Occupational Therapy Consult: No  Speech Therapy Consult: No  Current Support Network: Lives Alone(single story home with steps for entry.   independent with steps)  Confirm Follow Up Transport: Self  Discharge Location  Discharge Placement: Lul Ohara RN CM  Ext [phone]

## 2020-08-03 NOTE — PROGRESS NOTES
8/3/2020 7:07 AM    Admit Date: 7/31/2020    Admit Diagnosis: Pleural effusion, bilateral [J90];CHF, acute on chronic (HCC) [I50.9]    Subjective: Vertis Aschoff   denies chest pain, chest pressure/discomfort, dyspnea, palpitations, irregular heart beats, near-syncope, syncope, fatigue, orthopnea, paroxysmal nocturnal dyspnea, exertional chest pressure/discomfort, claudication, lower extremity edema. Visit Vitals  BP (!) 131/93   Pulse 83   Temp 98.5 °F (36.9 °C)   Resp 18   Ht 5' 6\" (1.676 m)   Wt 149 lb 4 oz (67.7 kg)   SpO2 94%   BMI 24.09 kg/m²     Current Facility-Administered Medications   Medication Dose Route Frequency    furosemide (LASIX) tablet 40 mg  40 mg Oral ACB&D    carvediloL (COREG) tablet 3.125 mg  3.125 mg Oral BID    atorvastatin (LIPITOR) tablet 20 mg  20 mg Oral QHS    potassium chloride SR (KLOR-CON 10) tablet 40 mEq  40 mEq Oral DAILY    aspirin delayed-release tablet 81 mg  81 mg Oral DAILY    lisinopriL (PRINIVIL, ZESTRIL) tablet 20 mg  20 mg Oral DAILY    sodium chloride (NS) flush 5-40 mL  5-40 mL IntraVENous Q8H    sodium chloride (NS) flush 5-40 mL  5-40 mL IntraVENous PRN    acetaminophen (TYLENOL) tablet 650 mg  650 mg Oral Q6H PRN    Or    acetaminophen (TYLENOL) suppository 650 mg  650 mg Rectal Q6H PRN    promethazine (PHENERGAN) tablet 12.5 mg  12.5 mg Oral Q6H PRN    Or    ondansetron (ZOFRAN) injection 4 mg  4 mg IntraVENous Q6H PRN    enoxaparin (LOVENOX) injection 70 mg  1 mg/kg SubCUTAneous Q12H    hydrALAZINE (APRESOLINE) 20 mg/mL injection 10 mg  10 mg IntraVENous Q6H PRN         Objective:      Visit Vitals  BP (!) 131/93   Pulse 83   Temp 98.5 °F (36.9 °C)   Resp 18   Ht 5' 6\" (1.676 m)   Wt 149 lb 4 oz (67.7 kg)   SpO2 94%   BMI 24.09 kg/m²       Physical Exam:  Resting comfortably. No resp distress.    Extremities: extremities normal, atraumatic, no cyanosis or edema  Heart: regular rate and rhythm, S1, S2 normal, no murmur, click, rub or gallop  Lungs: clear to auscultation bilaterally  Neurologic: Grossly normal    Data Review:   Labs:    Recent Results (from the past 24 hour(s))   CBC WITH AUTOMATED DIFF    Collection Time: 08/03/20  4:10 AM   Result Value Ref Range    WBC 5.7 4.1 - 11.1 K/uL    RBC 4.72 4.10 - 5.70 M/uL    HGB 14.7 12.1 - 17.0 g/dL    HCT 43.6 36.6 - 50.3 %    MCV 92.4 80.0 - 99.0 FL    MCH 31.1 26.0 - 34.0 PG    MCHC 33.7 30.0 - 36.5 g/dL    RDW 14.2 11.5 - 14.5 %    PLATELET 024 039 - 530 K/uL    MPV 11.7 8.9 - 12.9 FL    NRBC 0.0 0  WBC    ABSOLUTE NRBC 0.00 0.00 - 0.01 K/uL    NEUTROPHILS 55 32 - 75 %    LYMPHOCYTES 25 12 - 49 %    MONOCYTES 11 5 - 13 %    EOSINOPHILS 8 (H) 0 - 7 %    BASOPHILS 1 0 - 1 %    IMMATURE GRANULOCYTES 0 0.0 - 0.5 %    ABS. NEUTROPHILS 3.1 1.8 - 8.0 K/UL    ABS. LYMPHOCYTES 1.4 0.8 - 3.5 K/UL    ABS. MONOCYTES 0.6 0.0 - 1.0 K/UL    ABS. EOSINOPHILS 0.5 (H) 0.0 - 0.4 K/UL    ABS. BASOPHILS 0.0 0.0 - 0.1 K/UL    ABS. IMM. GRANS. 0.0 0.00 - 0.04 K/UL    DF AUTOMATED     METABOLIC PANEL, COMPREHENSIVE    Collection Time: 08/03/20  4:10 AM   Result Value Ref Range    Sodium 139 136 - 145 mmol/L    Potassium 3.4 (L) 3.5 - 5.1 mmol/L    Chloride 106 97 - 108 mmol/L    CO2 29 21 - 32 mmol/L    Anion gap 4 (L) 5 - 15 mmol/L    Glucose 81 65 - 100 mg/dL    BUN 25 (H) 6 - 20 MG/DL    Creatinine 1.10 0.70 - 1.30 MG/DL    BUN/Creatinine ratio 23 (H) 12 - 20      GFR est AA >60 >60 ml/min/1.73m2    GFR est non-AA >60 >60 ml/min/1.73m2    Calcium 8.4 (L) 8.5 - 10.1 MG/DL    Bilirubin, total 1.2 (H) 0.2 - 1.0 MG/DL    ALT (SGPT) 64 12 - 78 U/L    AST (SGOT) 25 15 - 37 U/L    Alk.  phosphatase 71 45 - 117 U/L    Protein, total 6.4 6.4 - 8.2 g/dL    Albumin 3.2 (L) 3.5 - 5.0 g/dL    Globulin 3.2 2.0 - 4.0 g/dL    A-G Ratio 1.0 (L) 1.1 - 2.2     MAGNESIUM    Collection Time: 08/03/20  4:10 AM   Result Value Ref Range    Magnesium 2.1 1.6 - 2.4 mg/dL       Telemetry: SR      Assessment: Active Problems:    CAD (coronary artery disease), native coronary artery (9/21/2010)      Hyperlipidemia (9/21/2010)      Pleural effusion, bilateral (7/31/2020)      CHF, acute on chronic ([facility] [address] (7/31/2020)      Pulmonary embolism ([facility] [address] (7/31/2020)        Plan:     1. Cardiomyopathy, SOB, b/l pleural effusion: clinically much better. Switch to PO lasix. For cardiac cath today. Will need lifevest.   2. CAD: s/p LAD ALISON PCI 2010. Never followed up after that. As above. 3. ? PE as per CT: continue lovenox. May need repeat CT. 4. On statin.     5. BP: Continue current meds.

## 2020-08-03 NOTE — ROUTINE PROCESS
0803: Report received from [place], [facility] and [facility] Nunez, in [address] [place] [address]. Pt just arrived to [place] [address]. Site CDI. No complaints.

## 2020-08-03 NOTE — PROGRESS NOTES
Problem: Falls - Risk of  Goal: *Absence of Falls  Description: Document Wilfrid Amaya Fall Risk and appropriate interventions in the flowsheet.   Outcome: Progressing Towards Goal  Note: Fall Risk Interventions:            Medication Interventions: Patient to call before getting OOB, Teach patient to arise slowly                   Problem: Patient Education: Go to Patient Education Activity  Goal: Patient/Family Education  Outcome: Progressing Towards Goal

## 2020-08-03 NOTE — PROGRESS NOTES

## 2020-08-03 NOTE — PROGRESS NOTES
Hospitalist Service  Progress Note    Daily Progress Note: 8/3/2020    Assessment/Plan:   Ischemic severe cardiomyopathy new onset   -status post cardiac cath today severe 3-vessel disease  Spoke with cardiology Dr. Lore Tabor patient has a pending order for MRI of heart and cardiac surgeon will evaluate the patient tomorrow  -Echo reviewed EF of 20 to 25%  -Currently on aspirin 81 mg daily Coreg 3.125 mg twice daily Lasix 40 mg twice daily lisinopril 20 mg daily and Lipitor 20 mg daily   -Needs cardiac vest prior to discharge we will consult with     Pulmonary embolism  There is no report of pulmonary embolism on CTA.   Spoke with cardiology will start full dose Lovenox     Subjective:   No complaint    Review of Systems:       Objective:   Physical examination  Visit Vitals  /69   Pulse 74   Temp 98.5 °F (36.9 °C)   Resp 25   Ht 5' 6\" (1.676 m)   Wt 67.7 kg (149 lb 4 oz)   SpO2 96%   BMI 24.09 kg/m²      Temp (24hrs), Av °F (36.7 °C), Min:97.6 °F (36.4 °C), Max:98.5 °F (36.9 °C)     O2 Flow Rate (L/min): 2 l/min   O2 Device: Nasal cannula  Patient Vitals for the past 24 hrs:   Temp Pulse Resp BP SpO2   20 1100  74  125/69 96 %   20 1045  74  128/67 97 %   20 1031  78  121/68 96 %   20 1015  76  114/62 95 %   20 1000  77 25 101/82 94 %   20 0945  78 20 108/72 96 %   20 0940  75 20 115/62 93 %   20 0935  77 26 116/64 93 %   20 0930  75 21 127/63 94 %   20 0925  75 (!) 31 125/72 95 %   20 0920  77 23 92/67 96 %   20 0915  80  (!) 137/98    08 0901  80  123/84 95 %   20 0845  65  102/60 91 %   20 0830  64  114/67 90 %   20 0815  68  107/61 (!) 89 %   20 0806  62  111/73 95 %   20 0355 98.5 °F (36.9 °C) 83 18 (!) 131/93 94 %   20 0044 98.2 °F (36.8 °C) 75 18 116/79 94 %   20 97.6 °F (36.4 °C) 78 20 121/78 97 %   20 2008 97.6 °F (36.4 °C) 78 20 123/40 97 %   08/02/20 1742  71  121/85        Intake/Output Summary (Last 24 hours) at 8/3/2020 1711  Last data filed at 8/3/2020 0603  Gross per 24 hour   Intake 240 ml   Output 2325 ml   Net -2085 ml     Last shift:    No intake/output data recorded. Last 3 shifts:    08/01 1901 - 08/03 0700  In: 1170 [P.O.:1170]  Out: 5400 [Urine:5400]    General:   Alert, cooperative, no acute distress   Head:   Atraumatic   Eyes:   Conjunctivae clear   ENT:  Oral mucosa normal   Neck:  Supple, trachea midline, no adenopathy   No JVD   Back:    No CVA tenderness    Chest wall:    No tenderness or deformities    Lungs:   Clear to auscultation bilaterally    Heart:   Regular rhythm, no murmur   Abdomen:    Soft, non-tender   No masses or organomegaly    Extremities:  No edema or DVT signs   Pulses:  Symmetric all extremities   Skin:  Warm and dry    No rashes or lesions   Neurologic:  Oriented   No focal deficits   Psychiatric:          Data Review:       Recent Labs     08/03/20  0410 08/02/20  0537   WBC 5.7 7.0   HGB 14.7 15.4   HCT 43.6 45.1    206     Recent Labs     08/03/20  0410 08/02/20  0537 08/01/20  0626    140 141   K 3.4* 3.5 3.2*    108 109*   CO2 29 25 24   GLU 81 84 82   BUN 25* 25* 25*   CREA 1.10 1.07 1.15   CA 8.4* 8.9 8.6   MG 2.1  --  2.2   ALB 3.2* 3.4*  --    ALT 64 77  --      No results for input(s): PH, PCO2, PO2, HCO3, FIO2 in the last 72 hours.     Lab Results   Component Value Date/Time    Glucose 81 08/03/2020 04:10 AM        All Micro Results     None        Lab Results   Component Value Date/Time    Specimen Description: URINE 09/20/2010 08:45 PM    Specimen Description: BLOOD 09/20/2010 08:35 PM     Lab Results   Component Value Date/Time    Culture result: NO GROWTH 2 DAYS 09/20/2010 08:45 PM    Culture result: NO GROWTH 5 DAYS 09/20/2010 08:35 PM     Medications reviewed  Current Facility-Administered Medications   Medication Dose Route Frequency    furosemide (LASIX) tablet 40 mg  40 mg Oral ACB&D    sodium chloride (NS) flush 5-40 mL  5-40 mL IntraVENous Q8H    sodium chloride (NS) flush 5-40 mL  5-40 mL IntraVENous PRN    carvediloL (COREG) tablet 3.125 mg  3.125 mg Oral BID    atorvastatin (LIPITOR) tablet 20 mg  20 mg Oral QHS    potassium chloride SR (KLOR-CON 10) tablet 40 mEq  40 mEq Oral DAILY    aspirin delayed-release tablet 81 mg  81 mg Oral DAILY    lisinopriL (PRINIVIL, ZESTRIL) tablet 20 mg  20 mg Oral DAILY    sodium chloride (NS) flush 5-40 mL  5-40 mL IntraVENous Q8H    sodium chloride (NS) flush 5-40 mL  5-40 mL IntraVENous PRN    acetaminophen (TYLENOL) tablet 650 mg  650 mg Oral Q6H PRN    Or    acetaminophen (TYLENOL) suppository 650 mg  650 mg Rectal Q6H PRN    promethazine (PHENERGAN) tablet 12.5 mg  12.5 mg Oral Q6H PRN    Or    ondansetron (ZOFRAN) injection 4 mg  4 mg IntraVENous Q6H PRN    enoxaparin (LOVENOX) injection 70 mg  1 mg/kg SubCUTAneous Q12H    hydrALAZINE (APRESOLINE) 20 mg/mL injection 10 mg  10 mg IntraVENous Q6H PRN         Signed:   Steven Beasley MD   Internist / Hospitalist

## 2020-08-04 ENCOUNTER — APPOINTMENT (OUTPATIENT)
Dept: VASCULAR SURGERY | Age: 75
DRG: 233 | End: 2020-08-04
Attending: NURSE PRACTITIONER
Payer: MEDICARE

## 2020-08-04 ENCOUNTER — APPOINTMENT (OUTPATIENT)
Dept: MRI IMAGING | Age: 75
DRG: 233 | End: 2020-08-04
Attending: NURSE PRACTITIONER
Payer: MEDICARE

## 2020-08-04 PROBLEM — Z01.810 PREOP CARDIOVASCULAR EXAM: Status: ACTIVE | Noted: 2020-08-04

## 2020-08-04 PROBLEM — I65.23 BILATERAL CAROTID ARTERY STENOSIS: Status: ACTIVE | Noted: 2020-08-04

## 2020-08-04 LAB
ANION GAP SERPL CALC-SCNC: 4 MMOL/L (ref 5–15)
APPEARANCE UR: CLEAR
BACTERIA URNS QL MICRO: NEGATIVE /HPF
BILIRUB UR QL: NEGATIVE
BUN SERPL-MCNC: 20 MG/DL (ref 6–20)
BUN/CREAT SERPL: 21 (ref 12–20)
CALCIUM SERPL-MCNC: 8.6 MG/DL (ref 8.5–10.1)
CHLORIDE SERPL-SCNC: 106 MMOL/L (ref 97–108)
CO2 SERPL-SCNC: 29 MMOL/L (ref 21–32)
COLOR UR: NORMAL
CREAT SERPL-MCNC: 0.97 MG/DL (ref 0.7–1.3)
EPITH CASTS URNS QL MICRO: NORMAL /LPF
GLUCOSE SERPL-MCNC: 88 MG/DL (ref 65–100)
GLUCOSE UR STRIP.AUTO-MCNC: NEGATIVE MG/DL
HGB UR QL STRIP: NEGATIVE
HYALINE CASTS URNS QL MICRO: NORMAL /LPF (ref 0–5)
KETONES UR QL STRIP.AUTO: NEGATIVE MG/DL
LEFT CCA DIST DIAS: 15.4 CM/S
LEFT CCA DIST SYS: 47.8 CM/S
LEFT CCA PROX DIAS: 16.4 CM/S
LEFT CCA PROX SYS: 79.5 CM/S
LEFT ECA DIAS: 5.8 CM/S
LEFT ECA SYS: 57.9 CM/S
LEFT ICA DIST DIAS: 21.9 CM/S
LEFT ICA DIST SYS: 43.1 CM/S
LEFT ICA MID DIAS: 16.9 CM/S
LEFT ICA MID SYS: 39.2 CM/S
LEFT ICA PROX DIAS: 9.7 CM/S
LEFT ICA PROX SYS: 24.9 CM/S
LEFT ICA/CCA SYS: 0.9
LEFT SUBCLAVIAN DIAS: 0 CM/S
LEFT SUBCLAVIAN SYS: 55.1 CM/S
LEFT VERTEBRAL DIAS: 5.18 CM/S
LEFT VERTEBRAL SYS: 17.8 CM/S
LEUKOCYTE ESTERASE UR QL STRIP.AUTO: NEGATIVE
NITRITE UR QL STRIP.AUTO: NEGATIVE
PH UR STRIP: 6 [PH] (ref 5–8)
POTASSIUM SERPL-SCNC: 3.7 MMOL/L (ref 3.5–5.1)
PROT UR STRIP-MCNC: NEGATIVE MG/DL
RBC #/AREA URNS HPF: NORMAL /HPF (ref 0–5)
RIGHT CCA DIST DIAS: 14.1 CM/S
RIGHT CCA DIST SYS: 42.6 CM/S
RIGHT CCA PROX DIAS: 19.7 CM/S
RIGHT CCA PROX SYS: 82.2 CM/S
RIGHT ECA DIAS: 10.89 CM/S
RIGHT ECA SYS: 51.2 CM/S
RIGHT ICA DIST DIAS: 20.2 CM/S
RIGHT ICA DIST SYS: 44.7 CM/S
RIGHT ICA MID DIAS: 16.3 CM/S
RIGHT ICA MID SYS: 35.7 CM/S
RIGHT ICA PROX DIAS: 12.5 CM/S
RIGHT ICA PROX SYS: 31.4 CM/S
RIGHT ICA/CCA SYS: 1.1
RIGHT SUBCLAVIAN DIAS: 0 CM/S
RIGHT SUBCLAVIAN SYS: 43.3 CM/S
RIGHT VERTEBRAL DIAS: 21.87 CM/S
RIGHT VERTEBRAL SYS: 47.8 CM/S
SODIUM SERPL-SCNC: 139 MMOL/L (ref 136–145)
SP GR UR REFRACTOMETRY: 1.01 (ref 1–1.03)
UA: UC IF INDICATED,UAUC: NORMAL
UROBILINOGEN UR QL STRIP.AUTO: 0.2 EU/DL (ref 0.2–1)
WBC URNS QL MICRO: NORMAL /HPF (ref 0–4)

## 2020-08-04 PROCEDURE — 81001 URINALYSIS AUTO W/SCOPE: CPT

## 2020-08-04 PROCEDURE — 74011250637 HC RX REV CODE- 250/637: Performed by: INTERNAL MEDICINE

## 2020-08-04 PROCEDURE — 74011250636 HC RX REV CODE- 250/636: Performed by: INTERNAL MEDICINE

## 2020-08-04 PROCEDURE — 93880 EXTRACRANIAL BILAT STUDY: CPT | Performed by: PSYCHIATRY & NEUROLOGY

## 2020-08-04 PROCEDURE — 93005 ELECTROCARDIOGRAM TRACING: CPT

## 2020-08-04 PROCEDURE — 93880 EXTRACRANIAL BILAT STUDY: CPT

## 2020-08-04 PROCEDURE — 77010033678 HC OXYGEN DAILY

## 2020-08-04 PROCEDURE — 74011636320 HC RX REV CODE- 636/320: Performed by: INTERNAL MEDICINE

## 2020-08-04 PROCEDURE — A9576 INJ PROHANCE MULTIPACK: HCPCS | Performed by: INTERNAL MEDICINE

## 2020-08-04 PROCEDURE — 65660000000 HC RM CCU STEPDOWN

## 2020-08-04 PROCEDURE — 94760 N-INVAS EAR/PLS OXIMETRY 1: CPT

## 2020-08-04 PROCEDURE — 75561 CARDIAC MRI FOR MORPH W/DYE: CPT

## 2020-08-04 PROCEDURE — 36415 COLL VENOUS BLD VENIPUNCTURE: CPT

## 2020-08-04 PROCEDURE — 80048 BASIC METABOLIC PNL TOTAL CA: CPT

## 2020-08-04 RX ORDER — SODIUM CHLORIDE 0.9 % (FLUSH) 0.9 %
5-40 SYRINGE (ML) INJECTION AS NEEDED
Status: DISCONTINUED | OUTPATIENT
Start: 2020-08-04 | End: 2020-08-06

## 2020-08-04 RX ORDER — SODIUM CHLORIDE 0.9 % (FLUSH) 0.9 %
5-40 SYRINGE (ML) INJECTION EVERY 8 HOURS
Status: DISCONTINUED | OUTPATIENT
Start: 2020-08-04 | End: 2020-08-06

## 2020-08-04 RX ADMIN — Medication 10 ML: at 20:36

## 2020-08-04 RX ADMIN — Medication 10 ML: at 06:00

## 2020-08-04 RX ADMIN — CARVEDILOL 3.12 MG: 3.12 TABLET, FILM COATED ORAL at 18:07

## 2020-08-04 RX ADMIN — ASPIRIN 81 MG: 81 TABLET, COATED ORAL at 09:44

## 2020-08-04 RX ADMIN — CARVEDILOL 3.12 MG: 3.12 TABLET, FILM COATED ORAL at 09:44

## 2020-08-04 RX ADMIN — ENOXAPARIN SODIUM 40 MG: 40 INJECTION SUBCUTANEOUS at 20:36

## 2020-08-04 RX ADMIN — ATORVASTATIN CALCIUM 20 MG: 20 TABLET, FILM COATED ORAL at 20:36

## 2020-08-04 RX ADMIN — LISINOPRIL 20 MG: 20 TABLET ORAL at 09:44

## 2020-08-04 RX ADMIN — FUROSEMIDE 40 MG: 40 TABLET ORAL at 18:07

## 2020-08-04 RX ADMIN — GADOTERIDOL 21 ML: 279.3 INJECTION, SOLUTION INTRAVENOUS at 14:08

## 2020-08-04 RX ADMIN — FUROSEMIDE 40 MG: 40 TABLET ORAL at 09:44

## 2020-08-04 RX ADMIN — POTASSIUM CHLORIDE 40 MEQ: 750 TABLET, FILM COATED, EXTENDED RELEASE ORAL at 09:44

## 2020-08-04 NOTE — PROGRESS NOTES
CM acknowledged consult for Life Vest.  This is managed by Cardiology . CM will review with Cardiologyl    943am  Reviewed consult with Shilpa Burt NP. No action on my part at this time is required.     Grace Waldron RN CM  Ext [phone]

## 2020-08-04 NOTE — PROGRESS NOTES
Patient drove self to ED on admission. This CM has been informed that on morning of 8/7/2020 construction will begin in the ED parking lot. All cars need to be removed. Patient has a [address] 4 door [place] in the ED parking lot. Patient has called emergency contact who is 85yo and not willing to come onsite due to [place] in the community. [facility], [facility] informed CM that cars will be towed at owner's expense if still on ED parking lot Friday morning. Suggested CM follow up with Alexandria Moon on 8/5/2020 in [address] [facility] office and/or [address] [facility]. CM has reached out to Case  Yovany Bray for assistance in getting patient's car relocated on [address]. CM reviewed with Alyx Low RN and Ghulam To RN, Director. CM will continue to follow.       Dariana Ramos RN CM  Ext [phone]

## 2020-08-04 NOTE — CARDIO/PULMONARY
Cardiac Rehab Note: chart review Cath>CTS consult CHF BUNDLE   
 
EF 20-25  on 8/1/20 per echo Smoking history assessed. Patient is a non smoker. Smoking Cessation Program link has not been added to the AVS. \"Living with Heart Failure\" book with daily weight calendar and s/s of heart failure magnet provided to Sanna Uriarteswick on bedside chart 8/4/20 Patient was not available/able to meet at this time. Patient is currently under review with CTS for revascularization. CP Rehab will attempt to follow up.

## 2020-08-04 NOTE — PROGRESS NOTES
8/4/2020 7:34 AM    Admit Date: 7/31/2020    Admit Diagnosis: Pleural effusion, bilateral [J90];CHF, acute on chronic (HCC) [I50.9]    Subjective: Corazon Kumari   denies chest pain, chest pressure/discomfort, dyspnea, palpitations, irregular heart beats, near-syncope, syncope, fatigue, orthopnea, paroxysmal nocturnal dyspnea, exertional chest pressure/discomfort, claudication, lower extremity edema. Visit Vitals  /80   Pulse 74   Temp 98 °F (36.7 °C)   Resp 18   Ht 5' 6\" (1.676 m)   Wt 152 lb 1.9 oz (69 kg)   SpO2 98%   BMI 24.55 kg/m²     Current Facility-Administered Medications   Medication Dose Route Frequency    furosemide (LASIX) tablet 40 mg  40 mg Oral ACB&D    sodium chloride (NS) flush 5-40 mL  5-40 mL IntraVENous Q8H    sodium chloride (NS) flush 5-40 mL  5-40 mL IntraVENous PRN    enoxaparin (LOVENOX) injection 40 mg  40 mg SubCUTAneous QHS    carvediloL (COREG) tablet 3.125 mg  3.125 mg Oral BID    atorvastatin (LIPITOR) tablet 20 mg  20 mg Oral QHS    potassium chloride SR (KLOR-CON 10) tablet 40 mEq  40 mEq Oral DAILY    aspirin delayed-release tablet 81 mg  81 mg Oral DAILY    lisinopriL (PRINIVIL, ZESTRIL) tablet 20 mg  20 mg Oral DAILY    sodium chloride (NS) flush 5-40 mL  5-40 mL IntraVENous Q8H    sodium chloride (NS) flush 5-40 mL  5-40 mL IntraVENous PRN    acetaminophen (TYLENOL) tablet 650 mg  650 mg Oral Q6H PRN    Or    acetaminophen (TYLENOL) suppository 650 mg  650 mg Rectal Q6H PRN    promethazine (PHENERGAN) tablet 12.5 mg  12.5 mg Oral Q6H PRN    Or    ondansetron (ZOFRAN) injection 4 mg  4 mg IntraVENous Q6H PRN    hydrALAZINE (APRESOLINE) 20 mg/mL injection 10 mg  10 mg IntraVENous Q6H PRN         Objective:      Visit Vitals  /80   Pulse 74   Temp 98 °F (36.7 °C)   Resp 18   Ht 5' 6\" (1.676 m)   Wt 152 lb 1.9 oz (69 kg)   SpO2 98%   BMI 24.55 kg/m²       Physical Exam:  Resting comfortably. No resp distress. Extremities: extremities normal, atraumatic, no cyanosis or edema  Heart: regular rate and rhythm, S1, S2 normal, no murmur, click, rub or gallop  Lungs: clear to auscultation bilaterally  Neurologic: Grossly normal    Data Review:   Labs:    Recent Results (from the past 24 hour(s))   METABOLIC PANEL, BASIC    Collection Time: 08/04/20  4:47 AM   Result Value Ref Range    Sodium 139 136 - 145 mmol/L    Potassium 3.7 3.5 - 5.1 mmol/L    Chloride 106 97 - 108 mmol/L    CO2 29 21 - 32 mmol/L    Anion gap 4 (L) 5 - 15 mmol/L    Glucose 88 65 - 100 mg/dL    BUN 20 6 - 20 MG/DL    Creatinine 0.97 0.70 - 1.30 MG/DL    BUN/Creatinine ratio 21 (H) 12 - 20      GFR est AA >60 >60 ml/min/1.73m2    GFR est non-AA >60 >60 ml/min/1.73m2    Calcium 8.6 8.5 - 10.1 MG/DL       Telemetry: SR      Assessment:     Principal Problem:    Ischemic cardiomyopathy (7/31/2020)      Overview: Added automatically from request for surgery [id number]    Active Problems:    CAD (coronary artery disease), native coronary artery (9/21/2010)      Hyperlipidemia (9/21/2010)      Pleural effusion, bilateral (7/31/2020)      CHF, acute on chronic ([facility] [place] [address] (7/31/2020)      Pulmonary embolism ([facility] [place] [address] (7/31/2020)        Plan:     1. Multivessel CAD, Ischemic Cardiomyopathy: for cardiac MRI today to assess for viability. CTS to see. Decision about revascularization afterwards. Continue current meds.    2. On statin.    4. BP: Continue current meds.

## 2020-08-04 NOTE — PROGRESS NOTES
Hospitalist Progress Note    NAME: Felicity Morrison   :  1945   MRN:  [id number]     Interim Hospital Summary: 76 y.o. male whom presented on 2020 with      Assessment / Plan:  CAD, s/p stent  Ischemic severe cardiomyopathy new onset  HTN  HLD  CTA chest IMPRESSION  1. Large bilateral pleural effusions with cardiomegaly and interstitial edema. 2. Cannot exclude small bibasilar pulmonary emboli.  -continue lovenox prophylaxis    -status post cardiac cath demonstrating 3-vessel disease  -Echo reviewed EF of 20 to 25%  -cardiac MRI today - results pending  -CTS input noted. Waiting for MRI  -continue asa, BB, lasix and statin    -recommend cardiac vest prior to discharge        18.5 - 24.9 Normal weight / Body mass index is 24.55 kg/m². Code status: Full  Prophylaxis: Lovenox  Recommended Disposition: Home w/Family       Subjective:     Chief Complaint / Reason for Physician Visit  Follow up of CAD, CMP, pleural effusion  Chart reviewed in detail. Discussed with RN events overnight. Review of Systems:  Symptom Y/N Comments  Symptom Y/N Comments   Fever/Chills    Chest Pain     Poor Appetite    Edema     Cough    Abdominal Pain     Sputum    Joint Pain     SOB/NARANJO    Pruritis/Rash     Nausea/vomit    Tolerating PT/OT     Diarrhea    Tolerating Diet     Constipation    Other       Could NOT obtain due to:      PO intake:   Patient Vitals for the past 72 hrs:   % Diet Eaten   20 1746 100 %   20 1246 100 %   20 0919 100 %     Objective:     VITALS:   Last 24hrs VS reviewed since prior progress note.  Most recent are:  Patient Vitals for the past 24 hrs:   Temp Pulse Resp BP SpO2   20 1113 98.7 °F (37.1 °C) 73 15 108/71 97 %   20 0713 98 °F (36.7 °C) 74 18 116/80 98 %   20 0449 97.8 °F (36.6 °C) 82 19 123/82 98 %   20 0000 98 °F (36.7 °C) 90 17 131/85 98 %   20 1900 98.4 °F (36.9 °C) 76 16 111/65 98 % Intake/Output Summary (Last 24 hours) at 8/4/2020 1830  Last data filed at 8/4/2020 0000  Gross per 24 hour   Intake 510 ml   Output    Net 510 ml        PHYSICAL EXAM:  General: WD, WN. Alert, cooperative, no acute distress    EENT:  EOMI. Anicteric sclerae. MMM  Resp:  CTA bilaterally, no wheezing or rales. No accessory muscle use  CV:  Regular  rhythm,  No edema  GI:  Soft, Non distended, Non tender.  +Bowel sounds  Neurologic:  Alert and oriented X 3, normal speech,   Psych:   Good insight. Not anxious nor agitated  Skin:  No rashes. No jaundice    Reviewed most current lab test results and cultures  YES  Reviewed most current radiology test results   YES  Review and summation of old records today    NO  Reviewed patient's current orders and MAR    YES  PMH/SH reviewed - no change compared to H&P  ________________________________________________________________________  Care Plan discussed with:    Comments   Patient x    Family      RN x    Care Manager     Consultant                        Multidiciplinary team rounds were held today with , nursing, pharmacist and clinical coordinator. Patient's plan of care was discussed; medications were reviewed and discharge planning was addressed. ________________________________________________________________________  Total NON critical care TIME:   25  Minutes    Total CRITICAL CARE TIME Spent:   Minutes non procedure based      Comments   >50% of visit spent in counseling and coordination of care x     This includes time during multidisciplinary rounds if indicated above   ________________________________________________________________________  Carly Mai MD     Procedures: see electronic medical records for all procedures/Xrays and details which were not copied into this note but were reviewed prior to creation of Plan. LABS:  I reviewed today's most current labs and imaging studies.   Pertinent labs include:  Recent Labs 08/03/20  0410 08/02/20  0537   WBC 5.7 7.0   HGB 14.7 15.4   HCT 43.6 45.1    206     Recent Labs     08/04/20  0447 08/03/20 0410 08/02/20  0537    139 140   K 3.7 3.4* 3.5    106 108   CO2 29 29 25   GLU 88 81 84   BUN 20 25* 25*   CREA 0.97 1.10 1.07   CA 8.6 8.4* 8.9   MG  --  2.1  --    ALB  --  3.2* 3.4*   TBILI  --  1.2* 1.0   ALT  --  64 77

## 2020-08-04 NOTE — CONSULTS
CSS Consult    Subjective: Cordell Helton is a 76 y.o. male who was referred for cardiac evaluation by Dr. Jin Brown for CAD. The patient came to the ER complaining of SOB that had started about a week ago and got progressively worse. He was SOB at rest, +orthopnea/PND. He had some LE edema over the past couple of weeks that resolved with compression stockings. He also reports CP with exertion about a month ago that resolved with rest and had not been bothering him recently. He denies claudication, palpitations, dizziness or recent CP. He has a medical history of HTN, CAD with previous stent 10 years ago, gout. He reports BPH that he resolved with over the counter herbal medications. He denies taking prescription medications PTA. He lives alone. He is retired but works in construction part time. He denies pertinent family history. He has a friend who can help him after surgery. He denies smoking and alcohol. Cardiac Testing    Cardiac catheterization 8/3/20:   Left Main    Mid LM lesion, 20% stenosed. Left Anterior Descending    Prox LAD lesion, 90% stenosed. The lesion was previously treated using a drug-eluting stent. Previous treatment took place >2 years ago. Previous stent has restenosis present. First Diagonal Branch    There is moderate disease. Left Circumflex    There is mild disease. First Obtuse Marginal Branch    There is mild disease. Second Obtuse Marginal Branch    2nd Mrg lesion, 100% stenosed. Diagnostic coronary angiography shows positive for . Third Obtuse Marginal Branch    There is mild disease. Right Coronary Artery    Mid RCA lesion, 80% stenosed. ECHO 8/1/20:   Left Ventricle  Normal wall thickness. Mildly dilated left ventricle. Severe systolic dysfunction. The estimated ejection fraction is 20 - 25%. Wall Scoring  The left ventricular wall motion is globally hypokinetic. Left Atrium  Mildly dilated left atrium.     Right Ventricle  Normal cavity size. Borderline low systolic function. Right Atrium  Normal cavity size. Aortic Valve  Normal valve structure, trileaflet valve structure, no stenosis and no regurgitation. Mitral Valve  Normal valve structure and no stenosis. Mild regurgitation. Tricuspid Valve  Normal valve structure, no stenosis and no regurgitation. Pulmonic Valve  Normal valve structure and no stenosis. Mild regurgitation. Aorta  Normal aortic root. IVC/Hepatic Veins  Normal structure. Normal central venous pressure (0-5 mmHg); IVC diameter is less than 21 mm and collapses more than 50% with respiration. Pericardium  No evidence of pericardial effusion. Past Medical History:   Diagnosis Date    Acute MI anterior wall first episode care [facility]) 9/21/2010    CAD (coronary artery disease), native coronary artery 9/21/2010    Coronary stent 9/21/2010    Hyperlipidemia 9/21/2010     History reviewed. No pertinent surgical history. Social History     Tobacco Use    Smoking status: Never Smoker    Smokeless tobacco: Never Used   Substance Use Topics    Alcohol use: Never     Frequency: Never      Family History   Problem Relation Age of Onset    No Known Problems Mother     No Known Problems Father      Prior to Admission medications    Medication Sig Start Date End Date Taking? Authorizing Provider   furosemide (Lasix) 20 mg tablet Take 1 Tab by mouth daily. 7/22/20  Yes Angelo Villeda MD   lisinopriL (PRINIVIL, ZESTRIL) 20 mg tablet Take 1 Tab by mouth daily. 7/22/20  Yes Angelo Villeda MD   aspirin (ASPIRIN) 325 mg tablet Take 1 Tab by mouth daily. 7/22/20  Yes Angelo Villeda MD   cod liver oil Cap Take 1 Cap by mouth daily. Yes Other, MD Soledad       No Known Allergies     Review of Systems:   Consititutional: Denies fever or chills. Eyes:  Denies use of glasses or vision problems(cataracts). ENT:  Denies hearing or swallowing difficulty.   CV: Denies CP, claudication, + HTN. Resp: Denies productive cough. + dyspnea  : Denies dialysis or kidney problems. GI: Denies ulcers, esophageal strictures, liver problems. M/S: Denies joint or bone problems, or implanted artificial hardware. Skin: Denies varicose veins, edema. Neuro: Denies strokes, or TIAs. Psych: Denies anxiety or depression. Endocrine: Denies thyroid problems or diabetes. Heme/Lymphatic: Denies easy bruising or lymphedema. Objective:     VS:   Visit Vitals  /71   Pulse 73   Temp 98.7 °F (37.1 °C)   Resp 15   Ht 5' 6\" (1.676 m)   Wt 152 lb 1.9 oz (69 kg)   SpO2 97%   BMI 24.55 kg/m²       Physical Exam:    General appearance: alert, cooperative, no distress  Head: normocephalic, without obvious abnormality; atraumatic  Eyes: conjunctivae/corneas clear; EOM's intact. Nose: nares normal; no drainage. Neck: no carotid bruit and no JVD  Lungs: clear to auscultation bilaterally  Heart: regular rate and rhythm; no murmur  Abdomen: soft, non-tender; bowel sounds normal  Extremities: moves all extremities; no weakness. Skin: Skin color normal; No varicose veins or edema.   Neurologic: Grossly normal      Labs:   Recent Labs     08/04/20  0447 08/03/20  0410   WBC  --  5.7   HGB  --  14.7   HCT  --  43.6   PLT  --  193    139   K 3.7 3.4*   BUN 20 25*   CREA 0.97 1.10   GLU 88 81       Diagnostics:   PA and lateral: pending    Carotid doppler: pending    PFTS-FEV1: nonsmoker    EKG: pending    Assessment:     Principal Problem:    Ischemic cardiomyopathy (7/31/2020)      Overview: Added automatically from request for surgery [id number]    Active Problems:    CAD (coronary artery disease), native coronary artery (9/21/2010)      Hyperlipidemia (9/21/2010)      Pleural effusion, bilateral (7/31/2020)      CHF, acute on chronic ([facility] [address] (7/31/2020)      Pulmonary embolism ([facility] [address] (7/31/2020)        Plan:   The risk and benefit of surgery were reviewed with patient and family and all questions answered and the patient wishes to proceed. Risk include infection, bleeding, stroke, heart attack, irregular heart rhythm, kidney failure and death. STS Risk Calculator V2.81 - Discussed by surgeon with patient. Procedure: Isolated CAB   Risk of Mortality:  1.924%    Renal Failure:  0.931%    Permanent Stroke:  1.415%    Prolonged Ventilation:  8.146%    DSW Infection:  0.118%    Reoperation:  2.898%    Morbidity or Mortality:  12.547%    Short Length of Stay:  37.907%    Long Length of Stay:  5.164%         Treatment Plan:    1. CAD: s/p ALISON to LAD in 2010. On ASA, statin, BB, for cardiac MRI today, preop workup in process, surgical plans per Dr. Jaki Hughes. 2. Acute on chronic systolic heart failure: EF 20-25% on TTE, on coreg, lisinopril, lasix. Check proBNP, Will need to hold lisinopril 48 hrs preop. 3. HLD: on statin  4. HTN: on coreg, lisinopril  5.  Pleural effusions: SOB improved, cont lasix BID          Signed By: Debi Briones NP     August 4, 2020

## 2020-08-05 ENCOUNTER — APPOINTMENT (OUTPATIENT)
Dept: GENERAL RADIOLOGY | Age: 75
DRG: 233 | End: 2020-08-05
Attending: NURSE PRACTITIONER
Payer: MEDICARE

## 2020-08-05 ENCOUNTER — ANESTHESIA EVENT (OUTPATIENT)
Dept: CARDIOTHORACIC SURGERY | Age: 75
DRG: 233 | End: 2020-08-05
Payer: MEDICARE

## 2020-08-05 LAB
ALBUMIN SERPL-MCNC: 3.2 G/DL (ref 3.5–5)
ALBUMIN/GLOB SERPL: 1 {RATIO} (ref 1.1–2.2)
ALP SERPL-CCNC: 65 U/L (ref 45–117)
ALT SERPL-CCNC: 97 U/L (ref 12–78)
ANION GAP SERPL CALC-SCNC: 6 MMOL/L (ref 5–15)
APTT PPP: 27.1 SEC (ref 22.1–32)
ARTERIAL PATENCY WRIST A: YES
AST SERPL-CCNC: 70 U/L (ref 15–37)
ATRIAL RATE: 75 BPM
BASE EXCESS BLD CALC-SCNC: 0 MMOL/L
BASOPHILS # BLD: 0 K/UL (ref 0–0.1)
BASOPHILS NFR BLD: 1 % (ref 0–1)
BDY SITE: ABNORMAL
BILIRUB SERPL-MCNC: 1.1 MG/DL (ref 0.2–1)
BNP SERPL-MCNC: 1079 PG/ML
BUN SERPL-MCNC: 19 MG/DL (ref 6–20)
BUN/CREAT SERPL: 19 (ref 12–20)
CA-I BLD-SCNC: 1.19 MMOL/L (ref 1.12–1.32)
CALCIUM SERPL-MCNC: 8.4 MG/DL (ref 8.5–10.1)
CALCULATED P AXIS, ECG09: 68 DEGREES
CALCULATED R AXIS, ECG10: 87 DEGREES
CHLORIDE SERPL-SCNC: 105 MMOL/L (ref 97–108)
CO2 SERPL-SCNC: 27 MMOL/L (ref 21–32)
CREAT SERPL-MCNC: 1 MG/DL (ref 0.7–1.3)
DIAGNOSIS, 93000: NORMAL
DIFFERENTIAL METHOD BLD: NORMAL
EOSINOPHIL # BLD: 0.4 K/UL (ref 0–0.4)
EOSINOPHIL NFR BLD: 7 % (ref 0–7)
ERYTHROCYTE [DISTWIDTH] IN BLOOD BY AUTOMATED COUNT: 14.1 % (ref 11.5–14.5)
GAS FLOW.O2 O2 DELIVERY SYS: ABNORMAL L/MIN
GLOBULIN SER CALC-MCNC: 3.2 G/DL (ref 2–4)
GLUCOSE SERPL-MCNC: 91 MG/DL (ref 65–100)
HCO3 BLD-SCNC: 24.4 MMOL/L (ref 22–26)
HCT VFR BLD AUTO: 43.2 % (ref 36.6–50.3)
HGB BLD-MCNC: 14.4 G/DL (ref 12.1–17)
IMM GRANULOCYTES # BLD AUTO: 0 K/UL (ref 0–0.04)
IMM GRANULOCYTES NFR BLD AUTO: 0 % (ref 0–0.5)
INR PPP: 1 (ref 0.9–1.1)
LYMPHOCYTES # BLD: 1.4 K/UL (ref 0.8–3.5)
LYMPHOCYTES NFR BLD: 25 % (ref 12–49)
MAGNESIUM SERPL-MCNC: 2.2 MG/DL (ref 1.6–2.4)
MCH RBC QN AUTO: 31.3 PG (ref 26–34)
MCHC RBC AUTO-ENTMCNC: 33.3 G/DL (ref 30–36.5)
MCV RBC AUTO: 93.9 FL (ref 80–99)
MONOCYTES # BLD: 0.6 K/UL (ref 0–1)
MONOCYTES NFR BLD: 10 % (ref 5–13)
NEUTS SEG # BLD: 3.4 K/UL (ref 1.8–8)
NEUTS SEG NFR BLD: 57 % (ref 32–75)
NRBC # BLD: 0 K/UL (ref 0–0.01)
NRBC BLD-RTO: 0 PER 100 WBC
P-R INTERVAL, ECG05: 168 MS
PCO2 BLD: 37.6 MMHG (ref 35–45)
PH BLD: 7.42 [PH] (ref 7.35–7.45)
PLATELET # BLD AUTO: 195 K/UL (ref 150–400)
PMV BLD AUTO: 11.7 FL (ref 8.9–12.9)
PO2 BLD: 95 MMHG (ref 80–100)
POTASSIUM SERPL-SCNC: 3.6 MMOL/L (ref 3.5–5.1)
PROT SERPL-MCNC: 6.4 G/DL (ref 6.4–8.2)
PROTHROMBIN TIME: 10.5 SEC (ref 9–11.1)
Q-T INTERVAL, ECG07: 444 MS
QRS DURATION, ECG06: 134 MS
QTC CALCULATION (BEZET), ECG08: 495 MS
RBC # BLD AUTO: 4.6 M/UL (ref 4.1–5.7)
SAO2 % BLD: 98 % (ref 92–97)
SODIUM SERPL-SCNC: 138 MMOL/L (ref 136–145)
SPECIMEN TYPE: ABNORMAL
THERAPEUTIC RANGE,PTTT: NORMAL SECS (ref 58–77)
TOTAL RESP. RATE, ITRR: 16
VENTRICULAR RATE, ECG03: 75 BPM
WBC # BLD AUTO: 5.8 K/UL (ref 4.1–11.1)

## 2020-08-05 PROCEDURE — 36415 COLL VENOUS BLD VENIPUNCTURE: CPT

## 2020-08-05 PROCEDURE — 85610 PROTHROMBIN TIME: CPT

## 2020-08-05 PROCEDURE — 36600 WITHDRAWAL OF ARTERIAL BLOOD: CPT

## 2020-08-05 PROCEDURE — 71046 X-RAY EXAM CHEST 2 VIEWS: CPT

## 2020-08-05 PROCEDURE — 86900 BLOOD TYPING SEROLOGIC ABO: CPT

## 2020-08-05 PROCEDURE — 83880 ASSAY OF NATRIURETIC PEPTIDE: CPT

## 2020-08-05 PROCEDURE — 74011250637 HC RX REV CODE- 250/637: Performed by: INTERNAL MEDICINE

## 2020-08-05 PROCEDURE — 80053 COMPREHEN METABOLIC PANEL: CPT

## 2020-08-05 PROCEDURE — 65660000000 HC RM CCU STEPDOWN

## 2020-08-05 PROCEDURE — 85025 COMPLETE CBC W/AUTO DIFF WBC: CPT

## 2020-08-05 PROCEDURE — 83735 ASSAY OF MAGNESIUM: CPT

## 2020-08-05 PROCEDURE — 86923 COMPATIBILITY TEST ELECTRIC: CPT

## 2020-08-05 PROCEDURE — 82803 BLOOD GASES ANY COMBINATION: CPT

## 2020-08-05 PROCEDURE — 85730 THROMBOPLASTIN TIME PARTIAL: CPT

## 2020-08-05 PROCEDURE — 99233 SBSQ HOSP IP/OBS HIGH 50: CPT | Performed by: INTERNAL MEDICINE

## 2020-08-05 RX ORDER — NITROGLYCERIN 20 MG/100ML
0-200 INJECTION INTRAVENOUS
Status: DISCONTINUED | OUTPATIENT
Start: 2020-08-06 | End: 2020-08-06

## 2020-08-05 RX ORDER — PROTAMINE SULFATE 10 MG/ML
250 INJECTION, SOLUTION INTRAVENOUS
Status: COMPLETED | OUTPATIENT
Start: 2020-08-06 | End: 2020-08-06

## 2020-08-05 RX ORDER — MAGNESIUM SULFATE HEPTAHYDRATE 40 MG/ML
2 INJECTION, SOLUTION INTRAVENOUS ONCE
Status: DISCONTINUED | OUTPATIENT
Start: 2020-08-06 | End: 2020-08-06

## 2020-08-05 RX ORDER — DOBUTAMINE HYDROCHLORIDE 200 MG/100ML
2 INJECTION INTRAVENOUS
Status: DISCONTINUED | OUTPATIENT
Start: 2020-08-06 | End: 2020-08-10

## 2020-08-05 RX ORDER — NOREPINEPHRINE BITARTRATE/D5W 8 MG/250ML
2-16 PLASTIC BAG, INJECTION (ML) INTRAVENOUS
Status: DISCONTINUED | OUTPATIENT
Start: 2020-08-06 | End: 2020-08-06

## 2020-08-05 RX ORDER — POTASSIUM CHLORIDE 29.8 MG/ML
20 INJECTION INTRAVENOUS ONCE
Status: COMPLETED | OUTPATIENT
Start: 2020-08-06 | End: 2020-08-06

## 2020-08-05 RX ORDER — DESMOPRESSIN ACETATE 4 UG/ML
2 INJECTION, SOLUTION INTRAVENOUS; SUBCUTANEOUS ONCE
Status: COMPLETED | OUTPATIENT
Start: 2020-08-06 | End: 2020-08-06

## 2020-08-05 RX ORDER — DOPAMINE HYDROCHLORIDE 320 MG/100ML
5-20 INJECTION, SOLUTION INTRAVENOUS
Status: DISCONTINUED | OUTPATIENT
Start: 2020-08-06 | End: 2020-08-06

## 2020-08-05 RX ADMIN — Medication 10 ML: at 17:49

## 2020-08-05 RX ADMIN — ATORVASTATIN CALCIUM 20 MG: 20 TABLET, FILM COATED ORAL at 23:28

## 2020-08-05 RX ADMIN — Medication 10 ML: at 03:25

## 2020-08-05 RX ADMIN — CARVEDILOL 3.12 MG: 3.12 TABLET, FILM COATED ORAL at 17:49

## 2020-08-05 RX ADMIN — POTASSIUM CHLORIDE 40 MEQ: 750 TABLET, FILM COATED, EXTENDED RELEASE ORAL at 08:43

## 2020-08-05 RX ADMIN — FUROSEMIDE 40 MG: 40 TABLET ORAL at 08:44

## 2020-08-05 RX ADMIN — Medication 10 ML: at 14:00

## 2020-08-05 RX ADMIN — ASPIRIN 81 MG: 81 TABLET, COATED ORAL at 08:44

## 2020-08-05 RX ADMIN — CARVEDILOL 3.12 MG: 3.12 TABLET, FILM COATED ORAL at 08:44

## 2020-08-05 RX ADMIN — FUROSEMIDE 40 MG: 40 TABLET ORAL at 17:49

## 2020-08-05 NOTE — PROGRESS NOTES
8/5/2020 1025 AM    Admit Date: 7/31/2020    Admit Diagnosis: Pleural effusion, bilateral [J90];CHF, acute on chronic (HCC) [I50.9]    Subjective: Nati Perez   denies chest pain, chest pressure/discomfort, dyspnea, palpitations, irregular heart beats, near-syncope, syncope, fatigue, orthopnea, paroxysmal nocturnal dyspnea, exertional chest pressure/discomfort, claudication, lower extremity edema. Ambulating in room. No complaints. States CTS came by to discuss surgery plans for am.     VSS.   Labs steady    Visit Vitals  /90   Pulse 79   Temp 97.7 °F (36.5 °C)   Resp 16   Ht 5' 6\" (1.676 m)   Wt 68.9 kg (151 lb 14.4 oz)   SpO2 97%   BMI 24.52 kg/m²     Current Facility-Administered Medications   Medication Dose Route Frequency    sodium chloride (NS) flush 5-40 mL  5-40 mL IntraVENous Q8H    sodium chloride (NS) flush 5-40 mL  5-40 mL IntraVENous PRN    furosemide (LASIX) tablet 40 mg  40 mg Oral ACB&D    sodium chloride (NS) flush 5-40 mL  5-40 mL IntraVENous Q8H    sodium chloride (NS) flush 5-40 mL  5-40 mL IntraVENous PRN    enoxaparin (LOVENOX) injection 40 mg  40 mg SubCUTAneous QHS    carvediloL (COREG) tablet 3.125 mg  3.125 mg Oral BID    atorvastatin (LIPITOR) tablet 20 mg  20 mg Oral QHS    potassium chloride SR (KLOR-CON 10) tablet 40 mEq  40 mEq Oral DAILY    aspirin delayed-release tablet 81 mg  81 mg Oral DAILY    [Held by provider] lisinopriL (PRINIVIL, ZESTRIL) tablet 20 mg  20 mg Oral DAILY    sodium chloride (NS) flush 5-40 mL  5-40 mL IntraVENous Q8H    sodium chloride (NS) flush 5-40 mL  5-40 mL IntraVENous PRN    acetaminophen (TYLENOL) tablet 650 mg  650 mg Oral Q6H PRN    Or    acetaminophen (TYLENOL) suppository 650 mg  650 mg Rectal Q6H PRN    promethazine (PHENERGAN) tablet 12.5 mg  12.5 mg Oral Q6H PRN    Or    ondansetron (ZOFRAN) injection 4 mg  4 mg IntraVENous Q6H PRN    hydrALAZINE (APRESOLINE) 20 mg/mL injection 10 mg  10 mg IntraVENous Q6H PRN         Objective:      Visit Vitals  /90   Pulse 79   Temp 97.7 °F (36.5 °C)   Resp 16   Ht 5' 6\" (1.676 m)   Wt 68.9 kg (151 lb 14.4 oz)   SpO2 97%   BMI 24.52 kg/m²       Physical Exam:  General: well-nourished, well-appearing,  male in NAD  Extremities: extremities normal, atraumatic, no cyanosis or edema. DP/PT palpable. Right wrist CDI.    Heart: regular rate and rhythm, S1, S2 normal, no murmur, click, rub or gallop  Lungs: clear to auscultation bilaterally,RA  Neurologic: Grossly normal    Data Review:   Labs:    Recent Results (from the past 24 hour(s))   DUPLEX CAROTID BILATERAL    Collection Time: 08/04/20  3:03 PM   Result Value Ref Range    Right subclavian sys 43.3 cm/s    RIGHT SUBCLAVIAN ARTERY D 0.00 cm/s    Right cca dist sys 42.6 cm/s    Right CCA dist mendoza 14.1 cm/s    Right CCA prox sys 82.2 cm/s    Right CCA prox mendoza 19.7 cm/s    Right ICA dist sys 44.7 cm/s    Right ICA dist mendoza 20.2 cm/s    Right ICA mid sys 35.7 cm/s    Right ICA mid mendoza 16.3 cm/s    Right ICA prox sys 31.4 cm/s    Right ICA prox mendoza 12.5 cm/s    Right eca sys 51.2 cm/s    RIGHT EXTERNAL CAROTID ARTERY D 10.89 cm/s    Right vertebral sys 47.8 cm/s    RIGHT VERTEBRAL ARTERY D 21.87 cm/s    Right ICA/CCA sys 1.1     Left subclavian sys 55.1 cm/s    LEFT SUBCLAVIAN ARTERY D 0.00 cm/s    Left CCA dist sys 47.8 cm/s    Left CCA dist mendoza 15.4 cm/s    Left CCA prox sys 79.5 cm/s    Left CCA prox mendoza 16.4 cm/s    Left ICA dist sys 43.1 cm/s    Left ICA dist mendoza 21.9 cm/s    Left ICA mid sys 39.2 cm/s    Left ICA mid mendoza 16.9 cm/s    Left ICA prox sys 24.9 cm/s    Left ICA prox mendoza 9.7 cm/s    Left ECA sys 57.9 cm/s    LEFT EXTERNAL CAROTID ARTERY D 5.80 cm/s    Left vertebral sys 17.8 cm/s    LEFT VERTEBRAL ARTERY D 5.18 cm/s    Left ICA/CCA sys 0.90    URINALYSIS W/ REFLEX CULTURE    Collection Time: 08/04/20  6:03 PM    Specimen: Urine   Result Value Ref Range    Color YELLOW/STRAW Appearance CLEAR CLEAR      Specific gravity 1.014 1.003 - 1.030      pH (UA) 6.0 5.0 - 8.0      Protein Negative NEG mg/dL    Glucose Negative NEG mg/dL    Ketone Negative NEG mg/dL    Bilirubin Negative NEG      Blood Negative NEG      Urobilinogen 0.2 0.2 - 1.0 EU/dL    Nitrites Negative NEG      Leukocyte Esterase Negative NEG      WBC 0-4 0 - 4 /hpf    RBC 0-5 0 - 5 /hpf    Epithelial cells FEW FEW /lpf    Bacteria Negative NEG /hpf    UA:UC IF INDICATED CULTURE NOT INDICATED BY UA RESULT CNI      Hyaline cast 0-2 0 - 5 /lpf   EKG, 12 LEAD, INITIAL    Collection Time: 08/04/20  6:10 PM   Result Value Ref Range    Ventricular Rate 75 BPM    Atrial Rate 75 BPM    P-R Interval 168 ms    QRS Duration 134 ms    Q-T Interval 444 ms    QTC Calculation (Bezet) 495 ms    Calculated P Axis 68 degrees    Calculated R Axis 87 degrees    Diagnosis       Sinus rhythm with premature atrial complexes  Right bundle branch block  Abnormal ECG  When compared with ECG of 31-JUL-2020 11:51,  MANUAL COMPARISON REQUIRED, DATA IS UNCONFIRMED     METABOLIC PANEL, COMPREHENSIVE    Collection Time: 08/05/20  3:21 AM   Result Value Ref Range    Sodium 138 136 - 145 mmol/L    Potassium 3.6 3.5 - 5.1 mmol/L    Chloride 105 97 - 108 mmol/L    CO2 27 21 - 32 mmol/L    Anion gap 6 5 - 15 mmol/L    Glucose 91 65 - 100 mg/dL    BUN 19 6 - 20 MG/DL    Creatinine 1.00 0.70 - 1.30 MG/DL    BUN/Creatinine ratio 19 12 - 20      GFR est AA >60 >60 ml/min/1.73m2    GFR est non-AA >60 >60 ml/min/1.73m2    Calcium 8.4 (L) 8.5 - 10.1 MG/DL    Bilirubin, total 1.1 (H) 0.2 - 1.0 MG/DL    ALT (SGPT) 97 (H) 12 - 78 U/L    AST (SGOT) 70 (H) 15 - 37 U/L    Alk.  phosphatase 65 45 - 117 U/L    Protein, total 6.4 6.4 - 8.2 g/dL    Albumin 3.2 (L) 3.5 - 5.0 g/dL    Globulin 3.2 2.0 - 4.0 g/dL    A-G Ratio 1.0 (L) 1.1 - 2.2     CBC WITH AUTOMATED DIFF    Collection Time: 08/05/20  3:21 AM   Result Value Ref Range    WBC 5.8 4.1 - 11.1 K/uL    RBC 4.60 4.10 - 5.70 M/uL    HGB 14.4 12.1 - 17.0 g/dL    HCT 43.2 36.6 - 50.3 %    MCV 93.9 80.0 - 99.0 FL    MCH 31.3 26.0 - 34.0 PG    MCHC 33.3 30.0 - 36.5 g/dL    RDW 14.1 11.5 - 14.5 %    PLATELET 640 301 - 832 K/uL    MPV 11.7 8.9 - 12.9 FL    NRBC 0.0 0  WBC    ABSOLUTE NRBC 0.00 0.00 - 0.01 K/uL    NEUTROPHILS 57 32 - 75 %    LYMPHOCYTES 25 12 - 49 %    MONOCYTES 10 5 - 13 %    EOSINOPHILS 7 0 - 7 %    BASOPHILS 1 0 - 1 %    IMMATURE GRANULOCYTES 0 0.0 - 0.5 %    ABS. NEUTROPHILS 3.4 1.8 - 8.0 K/UL    ABS. LYMPHOCYTES 1.4 0.8 - 3.5 K/UL    ABS. MONOCYTES 0.6 0.0 - 1.0 K/UL    ABS. EOSINOPHILS 0.4 0.0 - 0.4 K/UL    ABS. BASOPHILS 0.0 0.0 - 0.1 K/UL    ABS. IMM. GRANS. 0.0 0.00 - 0.04 K/UL    DF AUTOMATED     PROTHROMBIN TIME + INR    Collection Time: 08/05/20  3:21 AM   Result Value Ref Range    INR 1.0 0.9 - 1.1      Prothrombin time 10.5 9.0 - 11.1 sec   PTT    Collection Time: 08/05/20  3:21 AM   Result Value Ref Range    aPTT 27.1 22.1 - 32.0 sec    aPTT, therapeutic range     58.0 - 77.0 SECS   MAGNESIUM    Collection Time: 08/05/20  3:21 AM   Result Value Ref Range    Magnesium 2.2 1.6 - 2.4 mg/dL   NT-PRO BNP    Collection Time: 08/05/20  3:21 AM   Result Value Ref Range    NT pro-BNP 1,079 (H) <450 PG/ML       Telemetry: SR  Cardiac MRI:   1. Markedly dilated left ventricle with severe left ventricular systolicdysfunction. Severe global hypokinesis with regional variation. LVEF 20%. 2. Normal right ventricular size with mild right ventricular systolic dysfunction. RVEF 35%. 3. No significant valvular disease. 4. On LGE study for viability there is a medium-size infarct involving 25% to 50% thickness of the mid to distal anterior wall, anteroapical wall, anterolateral wall in the LAD distribution with descent viable myocardiumsubtending this infarct that if revascularize should improve.  There is a large  infarct involving 25-50% thickness of the base to mid inferior, inferolateral wall with modest viable myocardium in that if revascularized may improve. Allother myocardial walls does not demonstrate infarct and are viable. There is no  features of infiltrative sarcoidosis or cardiac amyloidosis. There is nofeatures of inflammatory myocarditis. 5. Normal pericardium without significant pericardial effusion. Medium-sizebilateral pleural effusions. Assessment:     Principal Problem:    Ischemic cardiomyopathy (7/31/2020)      Overview: Added automatically from request for surgery [id number]    Active Problems:    CAD (coronary artery disease), native coronary artery (9/21/2010)      Hyperlipidemia (9/21/2010)      Pleural effusion, bilateral (7/31/2020)      CHF, acute on chronic ([facility] [address] (7/31/2020)      Pulmonary embolism ([facility] [address] (7/31/2020)      Plan:     1. Multivessel CAD, Ischemic Cardiomyopathy: EF 20-25% NT pro-BNP 1079.   -Cardiac MRI result noted   -CTS saw, plan for surgery tomorrow am.   -continue ASA, statin, Coreg, lasix  -ACEi held   -Continue to monitor tele. , labs    HLD:  -Continue statin    HTN:controlled   -continue BB, lasix  -PRn hydralazine    Billie Ramirez,TIANNA  MSN,RN,AG-ACNP-BC    Patient seen and examined by me with nurse practitioner. I personally performed all components of the history, physical, and medical decision making and agree with the assessment and plan as noted. MRI findings noted. D/w Dr. Leslie Catalan. Plans for CABG tomorrow.      Vick Bourne MD

## 2020-08-05 NOTE — PROGRESS NOTES
Bedside shift change report given to Hilda (oncoming nurse) by Maddy Mtz (offgoing nurse). Report included the following information SBAR, Kardex, Procedure Summary, Intake/Output, Recent Results and Cardiac Rhythm NSR.

## 2020-08-05 NOTE — PROGRESS NOTES
Bedside and Verbal shift change report given to dorene (oncoming nurse) by Sergey Briones (offgoing nurse). Report included the following information SBAR and Kardex.

## 2020-08-05 NOTE — PROGRESS NOTES
Hospitalist Progress Note    NAME: Felix Teixeira   :  1945   MRN:  [id number]     Interim Hospital Summary: 76 y.o. male whom presented on 2020 with      Assessment / Plan:  CAD, s/p stent  Ischemic severe cardiomyopathy new onset  HTN  HLD  CTA chest IMPRESSION  1. Large bilateral pleural effusions with cardiomegaly and interstitial edema. 2. Cannot exclude small bibasilar pulmonary emboli.  -continue lovenox prophylaxis    Cardiac MRI IMPRESSION  1. Markedly dilated left ventricle with severe left ventricular systolic  dysfunction. Severe global hypokinesis with regional variation. LVEF 20%. 2. Normal right ventricular size with mild right ventricular systolic  dysfunction. RVEF 35%. 3. No significant valvular disease. 4. On LGE study for viability there is a medium-size infarct involving 25% to  50% thickness of the mid to distal anterior wall, anteroapical wall,  anterolateral wall in the LAD distribution with descent viable myocardium  subtending this infarct that if revascularize should improve. There is a large  infarct involving 25-50% thickness of the base to mid inferior, inferolateral  wall with modest viable myocardium in that if revascularized may improve. All  other myocardial walls does not demonstrate infarct and are viable. There is no  features of infiltrative sarcoidosis or cardiac amyloidosis. There is no  features of inflammatory myocarditis. 5. Normal pericardium without significant pericardial effusion. Medium-size  bilateral pleural effusions. -status post cardiac cath demonstrating 3-vessel disease  -Echo EF of 20 to 25%  -continue asa, BB, lasix and statin    -waiting on final cardiology and CTS recs. Addendum:  Noted plans for proceeding with surgery in AM.  Will sign off in AM and turn over care to the CTS team.     18.5 - 24.9 Normal weight / Body mass index is 24.52 kg/m².     Code status: Full  Prophylaxis: Lovenox  Recommended Disposition: Home w/Family       Subjective:     Chief Complaint / Reason for Physician Visit  Follow up of CAD, CMP, pleural effusion  Chart reviewed in detail. Discussed with RN events overnight. Review of Systems:  Symptom Y/N Comments  Symptom Y/N Comments   Fever/Chills    Chest Pain n    Poor Appetite    Edema n    Cough    Abdominal Pain     Sputum    Joint Pain     SOB/NARANJO n   Pruritis/Rash     Nausea/vomit    Tolerating PT/OT     Diarrhea    Tolerating Diet     Constipation    Other       Could NOT obtain due to:      PO intake:   Patient Vitals for the past 72 hrs:   % Diet Eaten   08/02/20 1746 100 %   08/02/20 1246 100 %   08/02/20 0919 100 %     Objective:     VITALS:   Last 24hrs VS reviewed since prior progress note. Most recent are:  Patient Vitals for the past 24 hrs:   Temp Pulse Resp BP SpO2   08/05/20 0321 98 °F (36.7 °C) 73 16 129/86 98 %   08/04/20 2300 97.5 °F (36.4 °C) 74 16 131/83 93 %   08/04/20 1915 97.6 °F (36.4 °C) 70 16 119/75    08/04/20 1113 98.7 °F (37.1 °C) 73 15 108/71 97 %       Intake/Output Summary (Last 24 hours) at 8/5/2020 0746  Last data filed at 8/4/2020 2020  Gross per 24 hour   Intake 600 ml   Output    Net 600 ml        PHYSICAL EXAM:  General: WD, WN. Alert, cooperative, no acute distress    EENT:  EOMI. Anicteric sclerae. MMM  Resp:  CTA bilaterally, no wheezing or rales. No accessory muscle use  CV:  Regular  rhythm,  No edema  GI:  Soft, Non distended, Non tender.  +Bowel sounds  Neurologic:  Alert and oriented X 3, normal speech,   Psych:   Good insight. Not anxious nor agitated  Skin:  No rashes.   No jaundice    Reviewed most current lab test results and cultures  YES  Reviewed most current radiology test results   YES  Review and summation of old records today    NO  Reviewed patient's current orders and MAR    YES  PMH/SH reviewed - no change compared to H&P  ________________________________________________________________________  Care Plan discussed with:    Comments   Patient x    Family      RN x    Care Manager     Consultant                        Multidiciplinary team rounds were held today with , nursing, pharmacist and clinical coordinator. Patient's plan of care was discussed; medications were reviewed and discharge planning was addressed. ________________________________________________________________________  Total NON critical care TIME:   10  Minutes    Total CRITICAL CARE TIME Spent:   Minutes non procedure based      Comments   >50% of visit spent in counseling and coordination of care x     This includes time during multidisciplinary rounds if indicated above   ________________________________________________________________________  Sher Brannon MD     Procedures: see electronic medical records for all procedures/Xrays and details which were not copied into this note but were reviewed prior to creation of Plan. LABS:  I reviewed today's most current labs and imaging studies.   Pertinent labs include:  Recent Labs     08/05/20  0321 08/03/20  0410   WBC 5.8 5.7   HGB 14.4 14.7   HCT 43.2 43.6    193     Recent Labs     08/05/20  0321 08/04/20  0447 08/03/20  0410    139 139   K 3.6 3.7 3.4*    106 106   CO2 27 29 29   GLU 91 88 81   BUN 19 20 25*   CREA 1.00 0.97 1.10   CA 8.4* 8.6 8.4*   MG 2.2  --  2.1   ALB 3.2*  --  3.2*   TBILI 1.1*  --  1.2*   ALT 97*  --  64   INR 1.0  --   --

## 2020-08-05 NOTE — CARDIO/PULMONARY
Cardiac Rehab Note: chart review Consult has been acknowledged Cath>CTS> CABG scheduled 8/6/20 Smoking history assessed. Patient is a non smoker. Smoking Cessation Program link has not been added to the AVS. EF 20-25  on 8/1/20 per echo CABG educational folder with \"Cardiac Surgery Post-Op Instructions\" book, heart healthy eating, warning signs, heart facts, heart aware, resources, and CABG Surgery booklet to Wybronwyn BentleyTamiko on 8/5/20 Educated using teach back method. Assessed patient's understanding of diagnosis and upcoming surgery. Reviewed general pre-op instructions including the need for thermometer and scale post-op, use of incentive spirometer, understanding of pain scale, and answered general post-surgery questions. Discussed risk factors for CAD to include the following: family history, elevated BMI, hyperlipidemia, hypertension, diabetes, and stress. Encouraged patient to consider participation in a Cardiac Rehab Program, after discharge, to assist with their risk modification and management. Emeka Hager verbalized understanding with questions answered. CP Rehab will continue to follow and educate as indicated.

## 2020-08-05 NOTE — PROGRESS NOTES
Cardiac Surgery Care Coordinator-  Met with Kieran Zuniga, Introduced role of the Cardiac Surgery Care Coordinator. Reviewed plan of care and began pre-op education. Discussed day of surgery expectations for the pt and family. Instructed pt on the proper use of the incentive spirometer. Reviewed material in the CABG educational folder including Cardiac Surgery Pathway. Reinforced sternal precautions and keeping your move in the tube. Encouraged Kieran Zuniga to verbalize and offered emotional support.  Chidi Christensen RN

## 2020-08-05 NOTE — PROGRESS NOTES
Problem: Falls - Risk of  Goal: *Absence of Falls  Description: Document Too Luis Fall Risk and appropriate interventions in the flowsheet.   Outcome: Progressing Towards Goal  Note: Fall Risk Interventions:            Medication Interventions: Teach patient to arise slowly                   Problem: Patient Education: Go to Patient Education Activity  Goal: Patient/Family Education  Outcome: Progressing Towards Goal

## 2020-08-05 NOTE — PROGRESS NOTES
[facility] FLOOR Progress Note    Admit Date: 2020        Subjective:   Pt seen with Dr. Mable Ashley. Afebrile, on RA. No CP. No complaints. Objective:     Visit Vitals  /90   Pulse 79   Temp 97.7 °F (36.5 °C)   Resp 16   Ht 5' 6\" (1.676 m)   Wt 151 lb 14.4 oz (68.9 kg)   SpO2 97%   BMI 24.52 kg/m²       Temp (24hrs), Av.9 °F (36.6 °C), Min:97.5 °F (36.4 °C), Max:98.7 °F (37.1 °C)      Last 24hr Input/Output:    Intake/Output Summary (Last 24 hours) at 2020 1033  Last data filed at 2020  Gross per 24 hour   Intake 600 ml   Output    Net 600 ml        EKG: NSR    Oxygen: RA    CXR:   CXR Results  (Last 48 hours)               20 0740  XR CHEST PA LAT Final result    Impression:  IMPRESSION: Small bilateral pleural effusions. Narrative:  Clinical indication: Preop heart. Frontal and lateral views of the chest obtained. No prior. Small bilateral   pleural effusions. Normal size heart. No infiltrate. Admission Weight: Last Weight   Weight: 163 lb 12.8 oz (74.3 kg) Weight: 151 lb 14.4 oz (68.9 kg)       EXAM:      Lungs:   Clear to auscultation bilaterally. Heart:  Regular rate and rhythm, S1, S2 normal, no murmur, click, rub or gallop. Abdomen:   Soft, non-tender. Bowel sounds normal. No masses,  No organomegaly. Extremities:  No edema. PPP. Neurologic:  Gross motor and sensory apparatus intact.        Activity: up ad isidro    Diet:  cardiac    Lab Data Reviewed:   Recent Labs     20  0321   WBC 5.8   HGB 14.4   HCT 43.2      CREA 1.00   INR 1.0       Cardiac Testing:       Assessment:     Principal Problem:    Ischemic cardiomyopathy (2020)      Overview: Added automatically from request for surgery [id number]    Active Problems:    CAD (coronary artery disease), native coronary artery (2010)      Hyperlipidemia (2010)      Pleural effusion, bilateral (2020)      CHF, acute on chronic ([address] (2020)      Pulmonary embolism ([facility] [address] (2020)             Plan/Recommendations/Medical Decision Makin. CAD: s/p ALISON to LAD in . On ASA, statin, BB, preop workup in process, plan for CABG tomorrow morning with Dr. Collins Zhou. 2. Acute on chronic systolic heart failure: EF 20-25% on TTE, on coreg, lisinopril, lasix. Hold lisinopril 48 hrs preop. Would like to get PA line prior to surgery. 3. HLD: on statin  4. HTN: on coreg  5. Pleural effusions: SOB improved, cont lasix BID  6. Dispo: Plan for CABG tomorrow morning. Transfer to CCU for PA line today if bed available.     Signed By: Caridad Rasmussen NP

## 2020-08-05 NOTE — ANESTHESIA PREPROCEDURE EVALUATION
Relevant Problems   No relevant active problems       Anesthetic History   No history of anesthetic complications            Review of Systems / Medical History  Patient summary reviewed, nursing notes reviewed and pertinent labs reviewed    Pulmonary                Comments: Pleural effusion, bilateral    Neuro/Psych   Within defined limits           Cardiovascular        Angina  CHF    Past MI, CAD, PAD, cardiac stents and hyperlipidemia    Exercise tolerance: <4 METS  Comments: TTE 8/1/2020  · LV: Normal wall thickness. Mildly dilated left ventricle. Severe systolic function. Estimated left ventricular ejection fraction is 20 - 25%. · LA: Mildly dilated left atrium. · MV: Mild mitral valve regurgitation is present. · PV: Mild pulmonic valve regurgitation is present. Carotid duplex 2020  · There is mild stenosis in the right ICA (<50%). · The right vertebral is antegrade. · There is mild stenosis in the left ICA (<50%). · The left vertebral is antegrade. [facility] 2020  Left Main   Mid LM lesion, 20% stenosed. Left Anterior Descending   Prox LAD lesion, 90% stenosed. The lesion was previously treated using a drug-eluting stent. Previous treatment took place >2 years ago. Previous stent has restenosis present. First Diagonal Branch   There is moderate disease. Left Circumflex   There is mild disease. First Obtuse Marginal Branch   There is mild disease. Second Obtuse Marginal Branch   2nd Mrg lesion, 100% stenosed. Diagnostic coronary angiography shows positive for . Third Obtuse Marginal Branch   There is mild disease. Right Coronary Artery   Mid RCA lesion, 80% stenosed.      EKG 8/2020  Sinus rhythm with premature atrial complexes   Right bundle branch block        GI/Hepatic/Renal  Within defined limits           Pertinent negatives: No liver disease and renal disease   Endo/Other  Within defined limits           Other Findings            Physical Exam    Airway  Mallampati: II  TM Distance: 4 - 6 cm  Neck ROM: normal range of motion   Mouth opening: Normal     Cardiovascular  Regular rate and rhythm,  S1 and S2 normal,  no murmur, click, rub, or gallop  Rhythm: regular  Rate: normal         Dental    Dentition: Caps/crowns     Pulmonary  Breath sounds clear to auscultation               Abdominal  GI exam deferred       Other Findings            Anesthetic Plan    ASA: 4  Anesthesia type: general    Monitoring Plan: Arterial line, BIS, CVP, Gordo-Zeferino and JUN    Post procedure ventilation   Induction: Intravenous  Anesthetic plan and risks discussed with: Patient

## 2020-08-06 ENCOUNTER — ANESTHESIA (OUTPATIENT)
Dept: CARDIOTHORACIC SURGERY | Age: 75
DRG: 233 | End: 2020-08-06
Payer: MEDICARE

## 2020-08-06 ENCOUNTER — HOSPITAL ENCOUNTER (INPATIENT)
Dept: NON INVASIVE DIAGNOSTICS | Age: 75
Discharge: HOME | DRG: 233 | End: 2020-08-06
Attending: THORACIC SURGERY (CARDIOTHORACIC VASCULAR SURGERY)
Payer: MEDICARE

## 2020-08-06 ENCOUNTER — APPOINTMENT (OUTPATIENT)
Dept: GENERAL RADIOLOGY | Age: 75
DRG: 233 | End: 2020-08-06
Attending: PHYSICIAN ASSISTANT
Payer: MEDICARE

## 2020-08-06 PROBLEM — Z95.1 S/P CABG X 4: Status: ACTIVE | Noted: 2020-08-06

## 2020-08-06 PROBLEM — I25.10 CAD (CORONARY ARTERY DISEASE): Status: ACTIVE | Noted: 2020-08-06

## 2020-08-06 LAB
ADMINISTERED INITIALS, ADMINIT: NORMAL
ALBUMIN SERPL-MCNC: 2.9 G/DL (ref 3.5–5)
ALBUMIN SERPL-MCNC: 3.2 G/DL (ref 3.5–5)
ALBUMIN/GLOB SERPL: 1 {RATIO} (ref 1.1–2.2)
ALBUMIN/GLOB SERPL: 1.5 {RATIO} (ref 1.1–2.2)
ALP SERPL-CCNC: 44 U/L (ref 45–117)
ALP SERPL-CCNC: 64 U/L (ref 45–117)
ALT SERPL-CCNC: 55 U/L (ref 12–78)
ALT SERPL-CCNC: 90 U/L (ref 12–78)
ANION GAP SERPL CALC-SCNC: 1 MMOL/L (ref 5–15)
ANION GAP SERPL CALC-SCNC: 5 MMOL/L (ref 5–15)
ANION GAP SERPL CALC-SCNC: 9 MMOL/L (ref 5–15)
APTT PPP: 43 SEC (ref 22.1–32)
ARTERIAL PATENCY WRIST A: ABNORMAL
ARTERIAL PATENCY WRIST A: ABNORMAL
AST SERPL-CCNC: 49 U/L (ref 15–37)
AST SERPL-CCNC: 65 U/L (ref 15–37)
BASE DEFICIT BLD-SCNC: 4 MMOL/L
BASE DEFICIT BLD-SCNC: 5 MMOL/L
BASOPHILS # BLD: 0 K/UL (ref 0–0.1)
BASOPHILS NFR BLD: 0 % (ref 0–1)
BDY SITE: ABNORMAL
BDY SITE: ABNORMAL
BILIRUB SERPL-MCNC: 0.9 MG/DL (ref 0.2–1)
BILIRUB SERPL-MCNC: 1.2 MG/DL (ref 0.2–1)
BUN SERPL-MCNC: 16 MG/DL (ref 6–20)
BUN SERPL-MCNC: 17 MG/DL (ref 6–20)
BUN SERPL-MCNC: 19 MG/DL (ref 6–20)
BUN/CREAT SERPL: 16 (ref 12–20)
BUN/CREAT SERPL: 17 (ref 12–20)
BUN/CREAT SERPL: 20 (ref 12–20)
CA-I BLD-SCNC: 1.33 MMOL/L (ref 1.12–1.32)
CA-I BLD-SCNC: 1.35 MMOL/L (ref 1.12–1.32)
CALCIUM SERPL-MCNC: 8.2 MG/DL (ref 8.5–10.1)
CALCIUM SERPL-MCNC: 8.6 MG/DL (ref 8.5–10.1)
CALCIUM SERPL-MCNC: 8.8 MG/DL (ref 8.5–10.1)
CHLORIDE SERPL-SCNC: 106 MMOL/L (ref 97–108)
CHLORIDE SERPL-SCNC: 110 MMOL/L (ref 97–108)
CHLORIDE SERPL-SCNC: 114 MMOL/L (ref 97–108)
CO2 SERPL-SCNC: 25 MMOL/L (ref 21–32)
CO2 SERPL-SCNC: 28 MMOL/L (ref 21–32)
CO2 SERPL-SCNC: 29 MMOL/L (ref 21–32)
CREAT SERPL-MCNC: 0.96 MG/DL (ref 0.7–1.3)
CREAT SERPL-MCNC: 1 MG/DL (ref 0.7–1.3)
CREAT SERPL-MCNC: 1.02 MG/DL (ref 0.7–1.3)
D50 ADMINISTERED, D50ADM: 0 ML
D50 ADMINISTERED, D50ADM: 8 ML
D50 ORDER, D50ORD: 0 ML
D50 ORDER, D50ORD: 8 ML
DIFFERENTIAL METHOD BLD: NORMAL
EOSINOPHIL # BLD: 0.4 K/UL (ref 0–0.4)
EOSINOPHIL NFR BLD: 6 % (ref 0–7)
ERYTHROCYTE [DISTWIDTH] IN BLOOD BY AUTOMATED COUNT: 14 % (ref 11.5–14.5)
ERYTHROCYTE [DISTWIDTH] IN BLOOD BY AUTOMATED COUNT: 14.2 % (ref 11.5–14.5)
GAS FLOW.O2 O2 DELIVERY SYS: ABNORMAL L/MIN
GAS FLOW.O2 O2 DELIVERY SYS: ABNORMAL L/MIN
GAS FLOW.O2 SETTING OXYMISER: 16 BPM
GLOBULIN SER CALC-MCNC: 2 G/DL (ref 2–4)
GLOBULIN SER CALC-MCNC: 3.3 G/DL (ref 2–4)
GLSCOM COMMENTS: NORMAL
GLUCOSE BLD STRIP.AUTO-MCNC: 102 MG/DL (ref 65–100)
GLUCOSE BLD STRIP.AUTO-MCNC: 104 MG/DL (ref 65–100)
GLUCOSE BLD STRIP.AUTO-MCNC: 112 MG/DL (ref 65–100)
GLUCOSE BLD STRIP.AUTO-MCNC: 112 MG/DL (ref 65–100)
GLUCOSE BLD STRIP.AUTO-MCNC: 113 MG/DL (ref 65–100)
GLUCOSE BLD STRIP.AUTO-MCNC: 118 MG/DL (ref 65–100)
GLUCOSE BLD STRIP.AUTO-MCNC: 141 MG/DL (ref 65–100)
GLUCOSE BLD STRIP.AUTO-MCNC: 170 MG/DL (ref 65–100)
GLUCOSE BLD STRIP.AUTO-MCNC: 79 MG/DL (ref 65–100)
GLUCOSE BLD STRIP.AUTO-MCNC: 83 MG/DL (ref 65–100)
GLUCOSE BLD STRIP.AUTO-MCNC: 84 MG/DL (ref 65–100)
GLUCOSE BLD STRIP.AUTO-MCNC: 99 MG/DL (ref 65–100)
GLUCOSE SERPL-MCNC: 117 MG/DL (ref 65–100)
GLUCOSE SERPL-MCNC: 173 MG/DL (ref 65–100)
GLUCOSE SERPL-MCNC: 94 MG/DL (ref 65–100)
GLUCOSE, GLC: 101 MG/DL
GLUCOSE, GLC: 104 MG/DL
GLUCOSE, GLC: 107 MG/DL
GLUCOSE, GLC: 112 MG/DL
GLUCOSE, GLC: 112 MG/DL
GLUCOSE, GLC: 113 MG/DL
GLUCOSE, GLC: 118 MG/DL
GLUCOSE, GLC: 141 MG/DL
GLUCOSE, GLC: 162 MG/DL
GLUCOSE, GLC: 170 MG/DL
GLUCOSE, GLC: 79 MG/DL
GLUCOSE, GLC: 83 MG/DL
GLUCOSE, GLC: 99 MG/DL
HCO3 BLD-SCNC: 20.2 MMOL/L (ref 22–26)
HCO3 BLD-SCNC: 22.2 MMOL/L (ref 22–26)
HCT VFR BLD AUTO: 36.5 % (ref 36.6–50.3)
HCT VFR BLD AUTO: 41.5 % (ref 36.6–50.3)
HCT VFR BLD AUTO: 44.9 % (ref 36.6–50.3)
HGB BLD-MCNC: 12.2 G/DL (ref 12.1–17)
HGB BLD-MCNC: 13.8 G/DL (ref 12.1–17)
HGB BLD-MCNC: 14.6 G/DL (ref 12.1–17)
HIGH TARGET, HITG: 140 MG/DL
IMM GRANULOCYTES # BLD AUTO: 0 K/UL (ref 0–0.04)
IMM GRANULOCYTES NFR BLD AUTO: 0 % (ref 0–0.5)
INR PPP: 1.2 (ref 0.9–1.1)
INSULIN ADMINSTERED, INSADM: 0 UNITS/HOUR
INSULIN ADMINSTERED, INSADM: 0.8 UNITS/HOUR
INSULIN ADMINSTERED, INSADM: 0.8 UNITS/HOUR
INSULIN ADMINSTERED, INSADM: 0.9 UNITS/HOUR
INSULIN ADMINSTERED, INSADM: 0.9 UNITS/HOUR
INSULIN ADMINSTERED, INSADM: 1.4 UNITS/HOUR
INSULIN ADMINSTERED, INSADM: 1.6 UNITS/HOUR
INSULIN ADMINSTERED, INSADM: 1.6 UNITS/HOUR
INSULIN ADMINSTERED, INSADM: 1.7 UNITS/HOUR
INSULIN ADMINSTERED, INSADM: 2.6 UNITS/HOUR
INSULIN ADMINSTERED, INSADM: 3.1 UNITS/HOUR
INSULIN ADMINSTERED, INSADM: 4.1 UNITS/HOUR
INSULIN ADMINSTERED, INSADM: 4.4 UNITS/HOUR
INSULIN ORDER, INSORD: 0 UNITS/HOUR
INSULIN ORDER, INSORD: 0.8 UNITS/HOUR
INSULIN ORDER, INSORD: 0.8 UNITS/HOUR
INSULIN ORDER, INSORD: 0.9 UNITS/HOUR
INSULIN ORDER, INSORD: 0.9 UNITS/HOUR
INSULIN ORDER, INSORD: 1.4 UNITS/HOUR
INSULIN ORDER, INSORD: 1.6 UNITS/HOUR
INSULIN ORDER, INSORD: 1.6 UNITS/HOUR
INSULIN ORDER, INSORD: 1.7 UNITS/HOUR
INSULIN ORDER, INSORD: 2.6 UNITS/HOUR
INSULIN ORDER, INSORD: 3.1 UNITS/HOUR
INSULIN ORDER, INSORD: 4.1 UNITS/HOUR
INSULIN ORDER, INSORD: 4.4 UNITS/HOUR
LOW TARGET, LOT: 100 MG/DL
LYMPHOCYTES # BLD: 1.4 K/UL (ref 0.8–3.5)
LYMPHOCYTES NFR BLD: 20 % (ref 12–49)
MAGNESIUM SERPL-MCNC: 2.3 MG/DL (ref 1.6–2.4)
MAGNESIUM SERPL-MCNC: 2.6 MG/DL (ref 1.6–2.4)
MCH RBC QN AUTO: 30.8 PG (ref 26–34)
MCH RBC QN AUTO: 31.2 PG (ref 26–34)
MCHC RBC AUTO-ENTMCNC: 32.5 G/DL (ref 30–36.5)
MCHC RBC AUTO-ENTMCNC: 33.3 G/DL (ref 30–36.5)
MCV RBC AUTO: 93.9 FL (ref 80–99)
MCV RBC AUTO: 94.7 FL (ref 80–99)
MINUTES UNTIL NEXT BG, NBG: 15 MIN
MINUTES UNTIL NEXT BG, NBG: 60 MIN
MONOCYTES # BLD: 0.6 K/UL (ref 0–1)
MONOCYTES NFR BLD: 8 % (ref 5–13)
MULTIPLIER, MUL: 0.02
MULTIPLIER, MUL: 0.02
MULTIPLIER, MUL: 0.03
MULTIPLIER, MUL: 0.04
MULTIPLIER, MUL: 0.04
MULTIPLIER, MUL: 0.05
MULTIPLIER, MUL: 0.05
NEUTS SEG # BLD: 4.8 K/UL (ref 1.8–8)
NEUTS SEG NFR BLD: 66 % (ref 32–75)
NRBC # BLD: 0 K/UL (ref 0–0.01)
NRBC # BLD: 0 K/UL (ref 0–0.01)
NRBC BLD-RTO: 0 PER 100 WBC
NRBC BLD-RTO: 0 PER 100 WBC
O2/TOTAL GAS SETTING VFR VENT: 50 %
O2/TOTAL GAS SETTING VFR VENT: 50 %
ORDER INITIALS, ORDINIT: NORMAL
PCO2 BLD: 35.9 MMHG (ref 35–45)
PCO2 BLD: 40.8 MMHG (ref 35–45)
PEEP RESPIRATORY: 6 CMH2O
PEEP RESPIRATORY: 6 CMH2O
PH BLD: 7.34 [PH] (ref 7.35–7.45)
PH BLD: 7.36 [PH] (ref 7.35–7.45)
PLATELET # BLD AUTO: 108 K/UL (ref 150–400)
PLATELET # BLD AUTO: 186 K/UL (ref 150–400)
PMV BLD AUTO: 11.5 FL (ref 8.9–12.9)
PMV BLD AUTO: 11.6 FL (ref 8.9–12.9)
PO2 BLD: 109 MMHG (ref 80–100)
PO2 BLD: 147 MMHG (ref 80–100)
POTASSIUM SERPL-SCNC: 3.4 MMOL/L (ref 3.5–5.1)
POTASSIUM SERPL-SCNC: 3.8 MMOL/L (ref 3.5–5.1)
POTASSIUM SERPL-SCNC: 4.5 MMOL/L (ref 3.5–5.1)
PRESSURE SUPPORT SETTING VENT: 6 CMH2O
PRESSURE SUPPORT SETTING VENT: 6 CMH2O
PROT SERPL-MCNC: 4.9 G/DL (ref 6.4–8.2)
PROT SERPL-MCNC: 6.5 G/DL (ref 6.4–8.2)
PROTHROMBIN TIME: 11.9 SEC (ref 9–11.1)
RBC # BLD AUTO: 4.42 M/UL (ref 4.1–5.7)
RBC # BLD AUTO: 4.74 M/UL (ref 4.1–5.7)
SAO2 % BLD: 98 % (ref 92–97)
SAO2 % BLD: 99 % (ref 92–97)
SERVICE CMNT-IMP: ABNORMAL
SERVICE CMNT-IMP: NORMAL
SODIUM SERPL-SCNC: 139 MMOL/L (ref 136–145)
SODIUM SERPL-SCNC: 144 MMOL/L (ref 136–145)
SODIUM SERPL-SCNC: 144 MMOL/L (ref 136–145)
SPECIMEN TYPE: ABNORMAL
SPECIMEN TYPE: ABNORMAL
THERAPEUTIC RANGE,PTTT: ABNORMAL SECS (ref 58–77)
TOTAL RESP. RATE, ITRR: 15
TOTAL RESP. RATE, ITRR: 16
TSH SERPL DL<=0.05 MIU/L-ACNC: 3.51 UIU/ML (ref 0.36–3.74)
VENTILATION MODE VENT: ABNORMAL
VENTILATION MODE VENT: ABNORMAL
VT SETTING VENT: 500 ML
WBC # BLD AUTO: 13.7 K/UL (ref 4.1–11.1)
WBC # BLD AUTO: 7.3 K/UL (ref 4.1–11.1)

## 2020-08-06 PROCEDURE — 77030013861 HC PNCH AORT CLNCUT QUES -B: Performed by: THORACIC SURGERY (CARDIOTHORACIC VASCULAR SURGERY)

## 2020-08-06 PROCEDURE — 80053 COMPREHEN METABOLIC PANEL: CPT

## 2020-08-06 PROCEDURE — 74011250636 HC RX REV CODE- 250/636: Performed by: ANESTHESIOLOGY

## 2020-08-06 PROCEDURE — 77030018835 HC SOL IRR LR ICUM -A: Performed by: THORACIC SURGERY (CARDIOTHORACIC VASCULAR SURGERY)

## 2020-08-06 PROCEDURE — 77030020167 HC PERF SET MULT MEDT -B: Performed by: THORACIC SURGERY (CARDIOTHORACIC VASCULAR SURGERY)

## 2020-08-06 PROCEDURE — 77030005401 HC CATH RAD ARRO -A: Performed by: ANESTHESIOLOGY

## 2020-08-06 PROCEDURE — 76060000041 HC ANESTHESIA 5 TO 5.5 HR: Performed by: THORACIC SURGERY (CARDIOTHORACIC VASCULAR SURGERY)

## 2020-08-06 PROCEDURE — 76010000116 HC CV SURG 5 TO 5.5 HR: Performed by: THORACIC SURGERY (CARDIOTHORACIC VASCULAR SURGERY)

## 2020-08-06 PROCEDURE — 83735 ASSAY OF MAGNESIUM: CPT

## 2020-08-06 PROCEDURE — 74011250636 HC RX REV CODE- 250/636: Performed by: PHYSICIAN ASSISTANT

## 2020-08-06 PROCEDURE — 77030018702 HC CLP LIG TI TELE -A: Performed by: THORACIC SURGERY (CARDIOTHORACIC VASCULAR SURGERY)

## 2020-08-06 PROCEDURE — 77030027138 HC INCENT SPIROMETER -A

## 2020-08-06 PROCEDURE — 5A1221Z PERFORMANCE OF CARDIAC OUTPUT, CONTINUOUS: ICD-10-PCS | Performed by: THORACIC SURGERY (CARDIOTHORACIC VASCULAR SURGERY)

## 2020-08-06 PROCEDURE — 77030041244 HC CBL PACE EXT TEMP REMG -B: Performed by: THORACIC SURGERY (CARDIOTHORACIC VASCULAR SURGERY)

## 2020-08-06 PROCEDURE — 36415 COLL VENOUS BLD VENIPUNCTURE: CPT

## 2020-08-06 PROCEDURE — 77030020263 HC SOL INJ SOD CL0.9% LFCR 1000ML: Performed by: THORACIC SURGERY (CARDIOTHORACIC VASCULAR SURGERY)

## 2020-08-06 PROCEDURE — 74011000250 HC RX REV CODE- 250: Performed by: THORACIC SURGERY (CARDIOTHORACIC VASCULAR SURGERY)

## 2020-08-06 PROCEDURE — B24BZZ4 ULTRASONOGRAPHY OF HEART WITH AORTA, TRANSESOPHAGEAL: ICD-10-PCS | Performed by: ANESTHESIOLOGY

## 2020-08-06 PROCEDURE — 77030019579 HC CBL PACE DISP REMG -B: Performed by: THORACIC SURGERY (CARDIOTHORACIC VASCULAR SURGERY)

## 2020-08-06 PROCEDURE — 021209W BYPASS CORONARY ARTERY, THREE ARTERIES FROM AORTA WITH AUTOLOGOUS VENOUS TISSUE, OPEN APPROACH: ICD-10-PCS | Performed by: THORACIC SURGERY (CARDIOTHORACIC VASCULAR SURGERY)

## 2020-08-06 PROCEDURE — 71045 X-RAY EXAM CHEST 1 VIEW: CPT

## 2020-08-06 PROCEDURE — 74011000250 HC RX REV CODE- 250: Performed by: NURSE ANESTHETIST, CERTIFIED REGISTERED

## 2020-08-06 PROCEDURE — 74011250636 HC RX REV CODE- 250/636

## 2020-08-06 PROCEDURE — 77030012390 HC DRN CHST BTL GTNG -B: Performed by: THORACIC SURGERY (CARDIOTHORACIC VASCULAR SURGERY)

## 2020-08-06 PROCEDURE — C1751 CATH, INF, PER/CENT/MIDLINE: HCPCS | Performed by: ANESTHESIOLOGY

## 2020-08-06 PROCEDURE — 77030005513 HC CATH URETH FOL11 MDII -B: Performed by: THORACIC SURGERY (CARDIOTHORACIC VASCULAR SURGERY)

## 2020-08-06 PROCEDURE — 82803 BLOOD GASES ANY COMBINATION: CPT

## 2020-08-06 PROCEDURE — 74011250637 HC RX REV CODE- 250/637: Performed by: PHYSICIAN ASSISTANT

## 2020-08-06 PROCEDURE — 94002 VENT MGMT INPAT INIT DAY: CPT

## 2020-08-06 PROCEDURE — 77030022199 HC SYS HARV VESL GTNG -G1: Performed by: THORACIC SURGERY (CARDIOTHORACIC VASCULAR SURGERY)

## 2020-08-06 PROCEDURE — 77030020256 HC SOL INJ NACL 0.9%  500ML: Performed by: THORACIC SURGERY (CARDIOTHORACIC VASCULAR SURGERY)

## 2020-08-06 PROCEDURE — 82962 GLUCOSE BLOOD TEST: CPT

## 2020-08-06 PROCEDURE — 84443 ASSAY THYROID STIM HORMONE: CPT

## 2020-08-06 PROCEDURE — 77030008684 HC TU ET CUF COVD -B: Performed by: ANESTHESIOLOGY

## 2020-08-06 PROCEDURE — 85610 PROTHROMBIN TIME: CPT

## 2020-08-06 PROCEDURE — 77030002973 HC SUT PLEDG CV SFT OVL TELE -B: Performed by: THORACIC SURGERY (CARDIOTHORACIC VASCULAR SURGERY)

## 2020-08-06 PROCEDURE — 77030014008 HC SPNG HEMSTAT J&J -C: Performed by: THORACIC SURGERY (CARDIOTHORACIC VASCULAR SURGERY)

## 2020-08-06 PROCEDURE — 02100Z9 BYPASS CORONARY ARTERY, ONE ARTERY FROM LEFT INTERNAL MAMMARY, OPEN APPROACH: ICD-10-PCS | Performed by: THORACIC SURGERY (CARDIOTHORACIC VASCULAR SURGERY)

## 2020-08-06 PROCEDURE — 74011250636 HC RX REV CODE- 250/636: Performed by: THORACIC SURGERY (CARDIOTHORACIC VASCULAR SURGERY)

## 2020-08-06 PROCEDURE — 77030008771 HC TU NG SALEM SUMP -A: Performed by: ANESTHESIOLOGY

## 2020-08-06 PROCEDURE — 77030006247 HC LD PCMKR MYOCRD BPLR TEMP MEDT -B: Performed by: THORACIC SURGERY (CARDIOTHORACIC VASCULAR SURGERY)

## 2020-08-06 PROCEDURE — 77030006689 HC BLD OPHTH BVR BD -A: Performed by: THORACIC SURGERY (CARDIOTHORACIC VASCULAR SURGERY)

## 2020-08-06 PROCEDURE — 77030010938 HC CLP LIG TELE -A: Performed by: THORACIC SURGERY (CARDIOTHORACIC VASCULAR SURGERY)

## 2020-08-06 PROCEDURE — 77030014491 HC PLEDG PTFE BARD -A: Performed by: THORACIC SURGERY (CARDIOTHORACIC VASCULAR SURGERY)

## 2020-08-06 PROCEDURE — 77030019908 HC STETH ESOPH SIMS -A: Performed by: ANESTHESIOLOGY

## 2020-08-06 PROCEDURE — 77030013798 HC KT TRNSDUC PRSSR EDWD -B: Performed by: THORACIC SURGERY (CARDIOTHORACIC VASCULAR SURGERY)

## 2020-08-06 PROCEDURE — 77030018836 HC SOL IRR NACL ICUM -A: Performed by: THORACIC SURGERY (CARDIOTHORACIC VASCULAR SURGERY)

## 2020-08-06 PROCEDURE — 77030002912 HC SUT ETHBND J&J -A: Performed by: THORACIC SURGERY (CARDIOTHORACIC VASCULAR SURGERY)

## 2020-08-06 PROCEDURE — 05HM33Z INSERTION OF INFUSION DEVICE INTO RIGHT INTERNAL JUGULAR VEIN, PERCUTANEOUS APPROACH: ICD-10-PCS | Performed by: ANESTHESIOLOGY

## 2020-08-06 PROCEDURE — 77010033678 HC OXYGEN DAILY

## 2020-08-06 PROCEDURE — 77030040361 HC SLV COMPR DVT MDII -B

## 2020-08-06 PROCEDURE — P9045 ALBUMIN (HUMAN), 5%, 250 ML: HCPCS | Performed by: PHYSICIAN ASSISTANT

## 2020-08-06 PROCEDURE — 74011000250 HC RX REV CODE- 250: Performed by: PHYSICIAN ASSISTANT

## 2020-08-06 PROCEDURE — 77030010389 HC WRE ATR PACE AEMC -B: Performed by: THORACIC SURGERY (CARDIOTHORACIC VASCULAR SURGERY)

## 2020-08-06 PROCEDURE — 77030010797: Performed by: THORACIC SURGERY (CARDIOTHORACIC VASCULAR SURGERY)

## 2020-08-06 PROCEDURE — P9045 ALBUMIN (HUMAN), 5%, 250 ML: HCPCS | Performed by: THORACIC SURGERY (CARDIOTHORACIC VASCULAR SURGERY)

## 2020-08-06 PROCEDURE — 77030010813: Performed by: THORACIC SURGERY (CARDIOTHORACIC VASCULAR SURGERY)

## 2020-08-06 PROCEDURE — 77030018729 HC ELECTRD DEFIB PAD CARD -B: Performed by: THORACIC SURGERY (CARDIOTHORACIC VASCULAR SURGERY)

## 2020-08-06 PROCEDURE — 85018 HEMOGLOBIN: CPT

## 2020-08-06 PROCEDURE — 77030018673: Performed by: THORACIC SURGERY (CARDIOTHORACIC VASCULAR SURGERY)

## 2020-08-06 PROCEDURE — 77030003020 HC SUT TICRN COVD -A: Performed by: THORACIC SURGERY (CARDIOTHORACIC VASCULAR SURGERY)

## 2020-08-06 PROCEDURE — 77030011640 HC PAD GRND REM COVD -A: Performed by: THORACIC SURGERY (CARDIOTHORACIC VASCULAR SURGERY)

## 2020-08-06 PROCEDURE — 74011000258 HC RX REV CODE- 258: Performed by: THORACIC SURGERY (CARDIOTHORACIC VASCULAR SURGERY)

## 2020-08-06 PROCEDURE — 77030040504 HC DRN WND MDII -B: Performed by: THORACIC SURGERY (CARDIOTHORACIC VASCULAR SURGERY)

## 2020-08-06 PROCEDURE — 77030041076 HC DRSG AG OPTICELL MDII -A: Performed by: THORACIC SURGERY (CARDIOTHORACIC VASCULAR SURGERY)

## 2020-08-06 PROCEDURE — 77030010818: Performed by: THORACIC SURGERY (CARDIOTHORACIC VASCULAR SURGERY)

## 2020-08-06 PROCEDURE — 77030006920 HC BLD STRNL SAW STRY -B: Performed by: THORACIC SURGERY (CARDIOTHORACIC VASCULAR SURGERY)

## 2020-08-06 PROCEDURE — 77030010605 HC BLWR MR MAL MEDT -B: Performed by: THORACIC SURGERY (CARDIOTHORACIC VASCULAR SURGERY)

## 2020-08-06 PROCEDURE — 77030006994: Performed by: THORACIC SURGERY (CARDIOTHORACIC VASCULAR SURGERY)

## 2020-08-06 PROCEDURE — 74011000258 HC RX REV CODE- 258: Performed by: PHYSICIAN ASSISTANT

## 2020-08-06 PROCEDURE — 85027 COMPLETE CBC AUTOMATED: CPT

## 2020-08-06 PROCEDURE — 77030020061 HC IV BLD WRMR ADMIN SET 3M -B: Performed by: ANESTHESIOLOGY

## 2020-08-06 PROCEDURE — 85730 THROMBOPLASTIN TIME PARTIAL: CPT

## 2020-08-06 PROCEDURE — 77030007667 HC INSRT SUT HLD MEDT -B: Performed by: THORACIC SURGERY (CARDIOTHORACIC VASCULAR SURGERY)

## 2020-08-06 PROCEDURE — 74011250636 HC RX REV CODE- 250/636: Performed by: NURSE ANESTHETIST, CERTIFIED REGISTERED

## 2020-08-06 PROCEDURE — 65620000000 HC RM CCU GENERAL

## 2020-08-06 PROCEDURE — 06BQ0ZZ EXCISION OF LEFT SAPHENOUS VEIN, OPEN APPROACH: ICD-10-PCS | Performed by: THORACIC SURGERY (CARDIOTHORACIC VASCULAR SURGERY)

## 2020-08-06 PROCEDURE — 77030002933 HC SUT MCRYL J&J -A: Performed by: THORACIC SURGERY (CARDIOTHORACIC VASCULAR SURGERY)

## 2020-08-06 PROCEDURE — 77030018846 HC SOL IRR STRL H20 ICUM -A: Performed by: THORACIC SURGERY (CARDIOTHORACIC VASCULAR SURGERY)

## 2020-08-06 PROCEDURE — 85025 COMPLETE CBC W/AUTO DIFF WBC: CPT

## 2020-08-06 PROCEDURE — 77030003029 HC SUT VCRL J&J -B: Performed by: THORACIC SURGERY (CARDIOTHORACIC VASCULAR SURGERY)

## 2020-08-06 PROCEDURE — 77030037878 HC DRSG MEPILEX >48IN BORD MOLN -B: Performed by: THORACIC SURGERY (CARDIOTHORACIC VASCULAR SURGERY)

## 2020-08-06 PROCEDURE — C1713 ANCHOR/SCREW BN/BN,TIS/BN: HCPCS | Performed by: THORACIC SURGERY (CARDIOTHORACIC VASCULAR SURGERY)

## 2020-08-06 PROCEDURE — 77030008771 HC TU NG SALEM SUMP -A: Performed by: THORACIC SURGERY (CARDIOTHORACIC VASCULAR SURGERY)

## 2020-08-06 PROCEDURE — 77030026438 HC STYL ET INTUB CARD -A: Performed by: ANESTHESIOLOGY

## 2020-08-06 PROCEDURE — 77030003010 HC SUT SURG STL J&J -B: Performed by: THORACIC SURGERY (CARDIOTHORACIC VASCULAR SURGERY)

## 2020-08-06 PROCEDURE — 74011636637 HC RX REV CODE- 636/637: Performed by: THORACIC SURGERY (CARDIOTHORACIC VASCULAR SURGERY)

## 2020-08-06 DEVICE — PUTTY BONE HEMSTAT ABSORB MTRX 2.0GRAM: Type: IMPLANTABLE DEVICE | Status: FUNCTIONAL

## 2020-08-06 RX ORDER — SODIUM CHLORIDE 0.9 % (FLUSH) 0.9 %
5-40 SYRINGE (ML) INJECTION AS NEEDED
Status: DISCONTINUED | OUTPATIENT
Start: 2020-08-06 | End: 2020-08-14 | Stop reason: HOSPADM

## 2020-08-06 RX ORDER — ONDANSETRON 2 MG/ML
4 INJECTION INTRAMUSCULAR; INTRAVENOUS
Status: DISCONTINUED | OUTPATIENT
Start: 2020-08-06 | End: 2020-08-14 | Stop reason: HOSPADM

## 2020-08-06 RX ORDER — GUAIFENESIN 100 MG/5ML
81 LIQUID (ML) ORAL DAILY
Status: DISCONTINUED | OUTPATIENT
Start: 2020-08-07 | End: 2020-08-14 | Stop reason: HOSPADM

## 2020-08-06 RX ORDER — KETOROLAC TROMETHAMINE 30 MG/ML
15 INJECTION, SOLUTION INTRAMUSCULAR; INTRAVENOUS EVERY 6 HOURS
Status: DISPENSED | OUTPATIENT
Start: 2020-08-06 | End: 2020-08-07

## 2020-08-06 RX ORDER — ONDANSETRON 2 MG/ML
4 INJECTION INTRAMUSCULAR; INTRAVENOUS AS NEEDED
Status: CANCELLED | OUTPATIENT
Start: 2020-08-06

## 2020-08-06 RX ORDER — MAGNESIUM SULFATE 1 G/100ML
1 INJECTION INTRAVENOUS AS NEEDED
Status: DISCONTINUED | OUTPATIENT
Start: 2020-08-06 | End: 2020-08-10

## 2020-08-06 RX ORDER — SUFENTANIL CITRATE 50 UG/ML
INJECTION EPIDURAL; INTRAVENOUS
Status: DISCONTINUED | OUTPATIENT
Start: 2020-08-06 | End: 2020-08-06 | Stop reason: HOSPADM

## 2020-08-06 RX ORDER — POTASSIUM CHLORIDE 29.8 MG/ML
20 INJECTION INTRAVENOUS
Status: ACTIVE | OUTPATIENT
Start: 2020-08-06 | End: 2020-08-07

## 2020-08-06 RX ORDER — MORPHINE SULFATE 10 MG/ML
2 INJECTION, SOLUTION INTRAMUSCULAR; INTRAVENOUS
Status: CANCELLED | OUTPATIENT
Start: 2020-08-06

## 2020-08-06 RX ORDER — POLYETHYLENE GLYCOL 3350 17 G/17G
17 POWDER, FOR SOLUTION ORAL DAILY
Status: DISCONTINUED | OUTPATIENT
Start: 2020-08-07 | End: 2020-08-14 | Stop reason: HOSPADM

## 2020-08-06 RX ORDER — MORPHINE SULFATE 2 MG/ML
INJECTION, SOLUTION INTRAMUSCULAR; INTRAVENOUS
Status: DISCONTINUED
Start: 2020-08-06 | End: 2020-08-06 | Stop reason: WASHOUT

## 2020-08-06 RX ORDER — LIDOCAINE HYDROCHLORIDE 10 MG/ML
0.1 INJECTION, SOLUTION EPIDURAL; INFILTRATION; INTRACAUDAL; PERINEURAL AS NEEDED
Status: DISCONTINUED | OUTPATIENT
Start: 2020-08-06 | End: 2020-08-06

## 2020-08-06 RX ORDER — ALBUMIN HUMAN 50 G/1000ML
12.5 SOLUTION INTRAVENOUS AS NEEDED
Status: DISCONTINUED | OUTPATIENT
Start: 2020-08-06 | End: 2020-08-10

## 2020-08-06 RX ORDER — LANOLIN ALCOHOL/MO/W.PET/CERES
400 CREAM (GRAM) TOPICAL 2 TIMES DAILY
Status: DISCONTINUED | OUTPATIENT
Start: 2020-08-07 | End: 2020-08-14 | Stop reason: HOSPADM

## 2020-08-06 RX ORDER — PAPAVERINE HYDROCHLORIDE 30 MG/ML
30 INJECTION INTRAMUSCULAR; INTRAVENOUS ONCE
Status: COMPLETED | OUTPATIENT
Start: 2020-08-06 | End: 2020-08-06

## 2020-08-06 RX ORDER — SODIUM CHLORIDE, SODIUM LACTATE, POTASSIUM CHLORIDE, CALCIUM CHLORIDE 600; 310; 30; 20 MG/100ML; MG/100ML; MG/100ML; MG/100ML
INJECTION, SOLUTION INTRAVENOUS
Status: DISCONTINUED | OUTPATIENT
Start: 2020-08-06 | End: 2020-08-06 | Stop reason: HOSPADM

## 2020-08-06 RX ORDER — OXYCODONE HYDROCHLORIDE 5 MG/1
5 TABLET ORAL
Status: DISCONTINUED | OUTPATIENT
Start: 2020-08-06 | End: 2020-08-14 | Stop reason: HOSPADM

## 2020-08-06 RX ORDER — INSULIN GLARGINE 100 [IU]/ML
1-50 INJECTION, SOLUTION SUBCUTANEOUS
Status: ACTIVE | OUTPATIENT
Start: 2020-08-06 | End: 2020-08-07

## 2020-08-06 RX ORDER — MUPIROCIN 20 MG/G
OINTMENT TOPICAL 2 TIMES DAILY
Status: DISCONTINUED | OUTPATIENT
Start: 2020-08-06 | End: 2020-08-10

## 2020-08-06 RX ORDER — AMIODARONE HYDROCHLORIDE 200 MG/1
400 TABLET ORAL EVERY 12 HOURS
Status: DISCONTINUED | OUTPATIENT
Start: 2020-08-07 | End: 2020-08-08

## 2020-08-06 RX ORDER — ALBUTEROL SULFATE 0.83 MG/ML
2.5 SOLUTION RESPIRATORY (INHALATION)
Status: DISCONTINUED | OUTPATIENT
Start: 2020-08-06 | End: 2020-08-14 | Stop reason: HOSPADM

## 2020-08-06 RX ORDER — HYDROMORPHONE HYDROCHLORIDE 1 MG/ML
0.2 INJECTION, SOLUTION INTRAMUSCULAR; INTRAVENOUS; SUBCUTANEOUS
Status: CANCELLED | OUTPATIENT
Start: 2020-08-06

## 2020-08-06 RX ORDER — SUCCINYLCHOLINE CHLORIDE 20 MG/ML
INJECTION INTRAMUSCULAR; INTRAVENOUS AS NEEDED
Status: DISCONTINUED | OUTPATIENT
Start: 2020-08-06 | End: 2020-08-06 | Stop reason: HOSPADM

## 2020-08-06 RX ORDER — ROCURONIUM BROMIDE 10 MG/ML
INJECTION, SOLUTION INTRAVENOUS AS NEEDED
Status: DISCONTINUED | OUTPATIENT
Start: 2020-08-06 | End: 2020-08-06 | Stop reason: HOSPADM

## 2020-08-06 RX ORDER — INSULIN LISPRO 100 [IU]/ML
INJECTION, SOLUTION INTRAVENOUS; SUBCUTANEOUS
Status: DISCONTINUED | OUTPATIENT
Start: 2020-08-06 | End: 2020-08-06 | Stop reason: SDUPTHER

## 2020-08-06 RX ORDER — CEFAZOLIN SODIUM 1 G/3ML
1 INJECTION, POWDER, FOR SOLUTION INTRAMUSCULAR; INTRAVENOUS ONCE
Status: COMPLETED | OUTPATIENT
Start: 2020-08-06 | End: 2020-08-06

## 2020-08-06 RX ORDER — MIDAZOLAM HYDROCHLORIDE 1 MG/ML
1 INJECTION, SOLUTION INTRAMUSCULAR; INTRAVENOUS
Status: DISCONTINUED | OUTPATIENT
Start: 2020-08-06 | End: 2020-08-10

## 2020-08-06 RX ORDER — SODIUM CHLORIDE 9 MG/ML
50 INJECTION, SOLUTION INTRAVENOUS CONTINUOUS
Status: DISCONTINUED | OUTPATIENT
Start: 2020-08-06 | End: 2020-08-06

## 2020-08-06 RX ORDER — SODIUM CHLORIDE, SODIUM LACTATE, POTASSIUM CHLORIDE, CALCIUM CHLORIDE 600; 310; 30; 20 MG/100ML; MG/100ML; MG/100ML; MG/100ML
75 INJECTION, SOLUTION INTRAVENOUS CONTINUOUS
Status: DISCONTINUED | OUTPATIENT
Start: 2020-08-06 | End: 2020-08-06

## 2020-08-06 RX ORDER — SODIUM CHLORIDE 9 MG/ML
INJECTION, SOLUTION INTRAVENOUS
Status: DISCONTINUED | OUTPATIENT
Start: 2020-08-06 | End: 2020-08-06 | Stop reason: HOSPADM

## 2020-08-06 RX ORDER — MIDAZOLAM HYDROCHLORIDE 1 MG/ML
INJECTION, SOLUTION INTRAMUSCULAR; INTRAVENOUS
Status: COMPLETED
Start: 2020-08-06 | End: 2020-08-06

## 2020-08-06 RX ORDER — FACIAL-BODY WIPES
10 EACH TOPICAL DAILY PRN
Status: DISCONTINUED | OUTPATIENT
Start: 2020-08-06 | End: 2020-08-14 | Stop reason: HOSPADM

## 2020-08-06 RX ORDER — GLYCOPYRROLATE 0.2 MG/ML
INJECTION INTRAMUSCULAR; INTRAVENOUS AS NEEDED
Status: DISCONTINUED | OUTPATIENT
Start: 2020-08-06 | End: 2020-08-06 | Stop reason: HOSPADM

## 2020-08-06 RX ORDER — FENTANYL CITRATE 50 UG/ML
25 INJECTION, SOLUTION INTRAMUSCULAR; INTRAVENOUS
Status: DISCONTINUED | OUTPATIENT
Start: 2020-08-06 | End: 2020-08-10

## 2020-08-06 RX ORDER — MIDAZOLAM HYDROCHLORIDE 1 MG/ML
1 INJECTION, SOLUTION INTRAMUSCULAR; INTRAVENOUS AS NEEDED
Status: DISCONTINUED | OUTPATIENT
Start: 2020-08-06 | End: 2020-08-06

## 2020-08-06 RX ORDER — SODIUM BICARBONATE 1 MEQ/ML
50 SYRINGE (ML) INTRAVENOUS ONCE
Status: COMPLETED | OUTPATIENT
Start: 2020-08-06 | End: 2020-08-06

## 2020-08-06 RX ORDER — MAGNESIUM SULFATE 100 %
4 CRYSTALS MISCELLANEOUS AS NEEDED
Status: DISCONTINUED | OUTPATIENT
Start: 2020-08-06 | End: 2020-08-14 | Stop reason: HOSPADM

## 2020-08-06 RX ORDER — OXYCODONE HYDROCHLORIDE 5 MG/1
10 TABLET ORAL
Status: DISCONTINUED | OUTPATIENT
Start: 2020-08-06 | End: 2020-08-14 | Stop reason: HOSPADM

## 2020-08-06 RX ORDER — MIDAZOLAM HYDROCHLORIDE 1 MG/ML
INJECTION, SOLUTION INTRAMUSCULAR; INTRAVENOUS AS NEEDED
Status: DISCONTINUED | OUTPATIENT
Start: 2020-08-06 | End: 2020-08-06 | Stop reason: HOSPADM

## 2020-08-06 RX ORDER — HEPARIN SODIUM 1000 [USP'U]/ML
2000 INJECTION, SOLUTION INTRAVENOUS; SUBCUTANEOUS ONCE
Status: COMPLETED | OUTPATIENT
Start: 2020-08-06 | End: 2020-08-06

## 2020-08-06 RX ORDER — INSULIN LISPRO 100 [IU]/ML
INJECTION, SOLUTION INTRAVENOUS; SUBCUTANEOUS
Status: ACTIVE | OUTPATIENT
Start: 2020-08-07 | End: 2020-08-08

## 2020-08-06 RX ORDER — DIPHENHYDRAMINE HYDROCHLORIDE 50 MG/ML
25 INJECTION, SOLUTION INTRAMUSCULAR; INTRAVENOUS
Status: DISCONTINUED | OUTPATIENT
Start: 2020-08-06 | End: 2020-08-10

## 2020-08-06 RX ORDER — FENTANYL CITRATE 50 UG/ML
INJECTION, SOLUTION INTRAMUSCULAR; INTRAVENOUS AS NEEDED
Status: DISCONTINUED | OUTPATIENT
Start: 2020-08-06 | End: 2020-08-06 | Stop reason: HOSPADM

## 2020-08-06 RX ORDER — SODIUM CHLORIDE 0.9 % (FLUSH) 0.9 %
5-40 SYRINGE (ML) INJECTION EVERY 8 HOURS
Status: CANCELLED | OUTPATIENT
Start: 2020-08-06

## 2020-08-06 RX ORDER — AMOXICILLIN 250 MG
1 CAPSULE ORAL 2 TIMES DAILY
Status: DISCONTINUED | OUTPATIENT
Start: 2020-08-07 | End: 2020-08-14 | Stop reason: HOSPADM

## 2020-08-06 RX ORDER — FENTANYL CITRATE 50 UG/ML
25 INJECTION, SOLUTION INTRAMUSCULAR; INTRAVENOUS
Status: CANCELLED | OUTPATIENT
Start: 2020-08-06

## 2020-08-06 RX ORDER — LANOLIN ALCOHOL/MO/W.PET/CERES
3 CREAM (GRAM) TOPICAL
Status: DISCONTINUED | OUTPATIENT
Start: 2020-08-06 | End: 2020-08-14 | Stop reason: HOSPADM

## 2020-08-06 RX ORDER — HYDROMORPHONE HYDROCHLORIDE 1 MG/ML
1 INJECTION, SOLUTION INTRAMUSCULAR; INTRAVENOUS; SUBCUTANEOUS
Status: DISCONTINUED | OUTPATIENT
Start: 2020-08-06 | End: 2020-08-10

## 2020-08-06 RX ORDER — SODIUM CHLORIDE 0.9 % (FLUSH) 0.9 %
5-40 SYRINGE (ML) INJECTION AS NEEDED
Status: CANCELLED | OUTPATIENT
Start: 2020-08-06

## 2020-08-06 RX ORDER — MIDAZOLAM HYDROCHLORIDE 1 MG/ML
0.5 INJECTION, SOLUTION INTRAMUSCULAR; INTRAVENOUS
Status: CANCELLED | OUTPATIENT
Start: 2020-08-06

## 2020-08-06 RX ORDER — HEPARIN SODIUM 1000 [USP'U]/ML
INJECTION, SOLUTION INTRAVENOUS; SUBCUTANEOUS AS NEEDED
Status: DISCONTINUED | OUTPATIENT
Start: 2020-08-06 | End: 2020-08-06 | Stop reason: HOSPADM

## 2020-08-06 RX ORDER — ACETAMINOPHEN 325 MG/1
650 TABLET ORAL EVERY 4 HOURS
Status: DISPENSED | OUTPATIENT
Start: 2020-08-06 | End: 2020-08-08

## 2020-08-06 RX ORDER — CHLORHEXIDINE GLUCONATE 1.2 MG/ML
10 RINSE ORAL EVERY 12 HOURS
Status: DISCONTINUED | OUTPATIENT
Start: 2020-08-06 | End: 2020-08-10

## 2020-08-06 RX ORDER — CEFAZOLIN SODIUM 1 G/3ML
1 INJECTION, POWDER, FOR SOLUTION INTRAMUSCULAR; INTRAVENOUS ONCE
Status: DISCONTINUED | OUTPATIENT
Start: 2020-08-06 | End: 2020-08-06

## 2020-08-06 RX ORDER — SODIUM CHLORIDE, SODIUM LACTATE, POTASSIUM CHLORIDE, CALCIUM CHLORIDE 600; 310; 30; 20 MG/100ML; MG/100ML; MG/100ML; MG/100ML
75 INJECTION, SOLUTION INTRAVENOUS CONTINUOUS
Status: CANCELLED | OUTPATIENT
Start: 2020-08-06

## 2020-08-06 RX ORDER — DIPHENHYDRAMINE HYDROCHLORIDE 50 MG/ML
12.5 INJECTION, SOLUTION INTRAMUSCULAR; INTRAVENOUS AS NEEDED
Status: CANCELLED | OUTPATIENT
Start: 2020-08-06 | End: 2020-08-06

## 2020-08-06 RX ORDER — INSULIN LISPRO 100 [IU]/ML
INJECTION, SOLUTION INTRAVENOUS; SUBCUTANEOUS
Status: DISCONTINUED | OUTPATIENT
Start: 2020-08-06 | End: 2020-08-06

## 2020-08-06 RX ORDER — SODIUM CHLORIDE 9 MG/ML
9 INJECTION, SOLUTION INTRAVENOUS CONTINUOUS
Status: DISCONTINUED | OUTPATIENT
Start: 2020-08-06 | End: 2020-08-10

## 2020-08-06 RX ORDER — ETOMIDATE 2 MG/ML
INJECTION INTRAVENOUS AS NEEDED
Status: DISCONTINUED | OUTPATIENT
Start: 2020-08-06 | End: 2020-08-06 | Stop reason: HOSPADM

## 2020-08-06 RX ORDER — SODIUM CHLORIDE, SODIUM LACTATE, POTASSIUM CHLORIDE, CALCIUM CHLORIDE 600; 310; 30; 20 MG/100ML; MG/100ML; MG/100ML; MG/100ML
25 INJECTION, SOLUTION INTRAVENOUS CONTINUOUS
Status: DISCONTINUED | OUTPATIENT
Start: 2020-08-06 | End: 2020-08-06

## 2020-08-06 RX ORDER — SODIUM BICARBONATE 1 MEQ/ML
SYRINGE (ML) INTRAVENOUS
Status: DISPENSED
Start: 2020-08-06 | End: 2020-08-07

## 2020-08-06 RX ORDER — DEXMEDETOMIDINE HYDROCHLORIDE 4 UG/ML
.2-.7 INJECTION, SOLUTION INTRAVENOUS
Status: DISCONTINUED | OUTPATIENT
Start: 2020-08-06 | End: 2020-08-07

## 2020-08-06 RX ORDER — SODIUM CHLORIDE 450 MG/100ML
10 INJECTION, SOLUTION INTRAVENOUS CONTINUOUS
Status: DISCONTINUED | OUTPATIENT
Start: 2020-08-06 | End: 2020-08-10

## 2020-08-06 RX ORDER — DIPHENHYDRAMINE HCL 25 MG
25 CAPSULE ORAL
Status: DISCONTINUED | OUTPATIENT
Start: 2020-08-06 | End: 2020-08-14 | Stop reason: HOSPADM

## 2020-08-06 RX ORDER — OXYCODONE AND ACETAMINOPHEN 5; 325 MG/1; MG/1
1 TABLET ORAL AS NEEDED
Status: CANCELLED | OUTPATIENT
Start: 2020-08-06

## 2020-08-06 RX ORDER — POTASSIUM CHLORIDE 29.8 MG/ML
20 INJECTION INTRAVENOUS
Status: COMPLETED | OUTPATIENT
Start: 2020-08-06 | End: 2020-08-06

## 2020-08-06 RX ORDER — SODIUM CHLORIDE 9 MG/ML
50 INJECTION, SOLUTION INTRAVENOUS CONTINUOUS
Status: DISCONTINUED | OUTPATIENT
Start: 2020-08-06 | End: 2020-08-10

## 2020-08-06 RX ORDER — FENTANYL CITRATE 50 UG/ML
50 INJECTION, SOLUTION INTRAMUSCULAR; INTRAVENOUS AS NEEDED
Status: DISCONTINUED | OUTPATIENT
Start: 2020-08-06 | End: 2020-08-06

## 2020-08-06 RX ORDER — SODIUM CHLORIDE 0.9 % (FLUSH) 0.9 %
5-40 SYRINGE (ML) INJECTION EVERY 8 HOURS
Status: DISCONTINUED | OUTPATIENT
Start: 2020-08-06 | End: 2020-08-14 | Stop reason: HOSPADM

## 2020-08-06 RX ORDER — SODIUM CHLORIDE 0.9 % (FLUSH) 0.9 %
5-40 SYRINGE (ML) INJECTION EVERY 8 HOURS
Status: DISCONTINUED | OUTPATIENT
Start: 2020-08-06 | End: 2020-08-06

## 2020-08-06 RX ORDER — DEXTROSE 50 % IN WATER (D50W) INTRAVENOUS SYRINGE
12.5-25 AS NEEDED
Status: DISCONTINUED | OUTPATIENT
Start: 2020-08-06 | End: 2020-08-14 | Stop reason: HOSPADM

## 2020-08-06 RX ORDER — NEOSTIGMINE METHYLSULFATE 1 MG/ML
INJECTION INTRAVENOUS AS NEEDED
Status: DISCONTINUED | OUTPATIENT
Start: 2020-08-06 | End: 2020-08-06 | Stop reason: HOSPADM

## 2020-08-06 RX ORDER — SODIUM CHLORIDE 9 MG/ML
50 INJECTION, SOLUTION INTRAVENOUS CONTINUOUS
Status: CANCELLED | OUTPATIENT
Start: 2020-08-06

## 2020-08-06 RX ORDER — BACITRACIN 500 UNIT/G
1 PACKET (EA) TOPICAL AS NEEDED
Status: DISCONTINUED | OUTPATIENT
Start: 2020-08-06 | End: 2020-08-10

## 2020-08-06 RX ORDER — SODIUM CHLORIDE 0.9 % (FLUSH) 0.9 %
5-40 SYRINGE (ML) INJECTION AS NEEDED
Status: DISCONTINUED | OUTPATIENT
Start: 2020-08-06 | End: 2020-08-06

## 2020-08-06 RX ORDER — NALOXONE HYDROCHLORIDE 0.4 MG/ML
0.4 INJECTION, SOLUTION INTRAMUSCULAR; INTRAVENOUS; SUBCUTANEOUS AS NEEDED
Status: DISCONTINUED | OUTPATIENT
Start: 2020-08-06 | End: 2020-08-14 | Stop reason: HOSPADM

## 2020-08-06 RX ORDER — HYDROMORPHONE HYDROCHLORIDE 1 MG/ML
0.5 INJECTION, SOLUTION INTRAMUSCULAR; INTRAVENOUS; SUBCUTANEOUS
Status: DISCONTINUED | OUTPATIENT
Start: 2020-08-06 | End: 2020-08-10

## 2020-08-06 RX ORDER — ALBUMIN HUMAN 50 G/1000ML
12.5 SOLUTION INTRAVENOUS
Status: COMPLETED | OUTPATIENT
Start: 2020-08-06 | End: 2020-08-06

## 2020-08-06 RX ORDER — CEFAZOLIN SODIUM 1 G/3ML
2 INJECTION, POWDER, FOR SOLUTION INTRAMUSCULAR; INTRAVENOUS ONCE
Status: COMPLETED | OUTPATIENT
Start: 2020-08-06 | End: 2020-08-06

## 2020-08-06 RX ORDER — MORPHINE SULFATE 10 MG/ML
INJECTION, SOLUTION INTRAMUSCULAR; INTRAVENOUS AS NEEDED
Status: DISCONTINUED | OUTPATIENT
Start: 2020-08-06 | End: 2020-08-06 | Stop reason: HOSPADM

## 2020-08-06 RX ORDER — FAMOTIDINE 20 MG/1
20 TABLET, FILM COATED ORAL EVERY 12 HOURS
Status: DISCONTINUED | OUTPATIENT
Start: 2020-08-07 | End: 2020-08-08

## 2020-08-06 RX ORDER — INSULIN LISPRO 100 [IU]/ML
INJECTION, SOLUTION INTRAVENOUS; SUBCUTANEOUS
Status: DISCONTINUED | OUTPATIENT
Start: 2020-08-08 | End: 2020-08-11

## 2020-08-06 RX ADMIN — WATER 2 G: 1 INJECTION INTRAMUSCULAR; INTRAVENOUS; SUBCUTANEOUS at 22:54

## 2020-08-06 RX ADMIN — Medication 10 ML: at 21:02

## 2020-08-06 RX ADMIN — SUCCINYLCHOLINE CHLORIDE 140 MG: 20 INJECTION, SOLUTION INTRAMUSCULAR; INTRAVENOUS at 07:51

## 2020-08-06 RX ADMIN — SODIUM CHLORIDE 50 ML/HR: 900 INJECTION, SOLUTION INTRAVENOUS at 13:30

## 2020-08-06 RX ADMIN — ACETAMINOPHEN 650 MG: 325 TABLET ORAL at 18:59

## 2020-08-06 RX ADMIN — SODIUM CHLORIDE: 9 INJECTION, SOLUTION INTRAVENOUS at 12:51

## 2020-08-06 RX ADMIN — ALBUMIN (HUMAN) 12.5 G: 12.5 INJECTION, SOLUTION INTRAVENOUS at 14:04

## 2020-08-06 RX ADMIN — PROTAMINE SULFATE 200 MG: 10 INJECTION, SOLUTION INTRAVENOUS at 11:25

## 2020-08-06 RX ADMIN — MIDAZOLAM HYDROCHLORIDE 1 MG: 1 INJECTION, SOLUTION INTRAMUSCULAR; INTRAVENOUS at 13:41

## 2020-08-06 RX ADMIN — EPINEPHRINE 3 MCG/MIN: 1 INJECTION INTRAMUSCULAR; INTRAVENOUS; SUBCUTANEOUS at 11:13

## 2020-08-06 RX ADMIN — SODIUM CHLORIDE, POTASSIUM CHLORIDE, SODIUM LACTATE AND CALCIUM CHLORIDE 25 ML/HR: 600; 310; 30; 20 INJECTION, SOLUTION INTRAVENOUS at 06:30

## 2020-08-06 RX ADMIN — POTASSIUM CHLORIDE 20 MEQ: 400 INJECTION, SOLUTION INTRAVENOUS at 12:46

## 2020-08-06 RX ADMIN — MORPHINE SULFATE 3 MG: 10 INJECTION INTRAVENOUS at 12:14

## 2020-08-06 RX ADMIN — MUPIROCIN: 20 OINTMENT TOPICAL at 18:59

## 2020-08-06 RX ADMIN — ATORVASTATIN CALCIUM 20 MG: 20 TABLET, FILM COATED ORAL at 21:01

## 2020-08-06 RX ADMIN — CEFAZOLIN 2 G: 1 INJECTION, POWDER, FOR SOLUTION INTRAMUSCULAR; INTRAVENOUS; PARENTERAL at 08:07

## 2020-08-06 RX ADMIN — FENTANYL CITRATE 50 MCG: 50 INJECTION, SOLUTION INTRAMUSCULAR; INTRAVENOUS at 07:51

## 2020-08-06 RX ADMIN — FAMOTIDINE 20 MG: 10 INJECTION, SOLUTION INTRAVENOUS at 14:51

## 2020-08-06 RX ADMIN — MIDAZOLAM HYDROCHLORIDE 2 MG: 1 INJECTION, SOLUTION INTRAMUSCULAR; INTRAVENOUS at 06:45

## 2020-08-06 RX ADMIN — HYDROMORPHONE HYDROCHLORIDE 0.5 MG: 1 INJECTION, SOLUTION INTRAMUSCULAR; INTRAVENOUS; SUBCUTANEOUS at 15:50

## 2020-08-06 RX ADMIN — ROCURONIUM BROMIDE 50 MG: 10 INJECTION INTRAVENOUS at 08:03

## 2020-08-06 RX ADMIN — ALBUMIN (HUMAN) 12.5 G: 12.5 INJECTION, SOLUTION INTRAVENOUS at 14:40

## 2020-08-06 RX ADMIN — AMINOCAPROIC ACID 10 G/HR: 250 INJECTION, SOLUTION INTRAVENOUS at 07:47

## 2020-08-06 RX ADMIN — MIDAZOLAM HYDROCHLORIDE 3 MG: 1 INJECTION, SOLUTION INTRAMUSCULAR; INTRAVENOUS at 07:51

## 2020-08-06 RX ADMIN — DESMOPRESSIN ACETATE 24 MCG: 4 INJECTION INTRAVENOUS at 11:31

## 2020-08-06 RX ADMIN — SODIUM CHLORIDE, POTASSIUM CHLORIDE, SODIUM LACTATE AND CALCIUM CHLORIDE: 600; 310; 30; 20 INJECTION, SOLUTION INTRAVENOUS at 07:47

## 2020-08-06 RX ADMIN — WATER 2 G: 1 INJECTION INTRAMUSCULAR; INTRAVENOUS; SUBCUTANEOUS at 18:46

## 2020-08-06 RX ADMIN — DOBUTAMINE HYDROCHLORIDE 5 MCG/KG/MIN: 200 INJECTION INTRAVENOUS at 11:06

## 2020-08-06 RX ADMIN — ACETAMINOPHEN 650 MG: 325 TABLET ORAL at 21:01

## 2020-08-06 RX ADMIN — SUFENTANIL CITRATE 0.3 MCG/KG/HR: 50 INJECTION EPIDURAL; INTRAVENOUS at 07:47

## 2020-08-06 RX ADMIN — FENTANYL CITRATE 25 MCG: 50 INJECTION, SOLUTION INTRAMUSCULAR; INTRAVENOUS at 23:25

## 2020-08-06 RX ADMIN — CHLORHEXIDINE GLUCONATE 0.12% ORAL RINSE 10 ML: 1.2 LIQUID ORAL at 15:32

## 2020-08-06 RX ADMIN — GLYCOPYRROLATE 0.4 MG: 0.2 INJECTION, SOLUTION INTRAMUSCULAR; INTRAVENOUS at 13:09

## 2020-08-06 RX ADMIN — SODIUM CHLORIDE 1.4 UNITS/HR: 9 INJECTION, SOLUTION INTRAVENOUS at 08:11

## 2020-08-06 RX ADMIN — EPINEPHRINE 2 MCG/MIN: 1 INJECTION INTRAMUSCULAR; INTRAVENOUS; SUBCUTANEOUS at 16:40

## 2020-08-06 RX ADMIN — SODIUM CHLORIDE 0.3 MCG/KG/HR: 9 INJECTION, SOLUTION INTRAVENOUS at 19:47

## 2020-08-06 RX ADMIN — SODIUM CHLORIDE 10 ML/HR: 450 INJECTION, SOLUTION INTRAVENOUS at 13:30

## 2020-08-06 RX ADMIN — KETOROLAC TROMETHAMINE 15 MG: 30 INJECTION, SOLUTION INTRAMUSCULAR at 16:08

## 2020-08-06 RX ADMIN — ALBUMIN (HUMAN) 12.5 G: 12.5 INJECTION, SOLUTION INTRAVENOUS at 22:57

## 2020-08-06 RX ADMIN — MIDAZOLAM HYDROCHLORIDE 1 MG: 1 INJECTION, SOLUTION INTRAMUSCULAR; INTRAVENOUS at 14:00

## 2020-08-06 RX ADMIN — NEOSTIGMINE METHYLSULFATE 2.5 MG: 1 INJECTION INTRAVENOUS at 13:09

## 2020-08-06 RX ADMIN — SODIUM CHLORIDE: 9 INJECTION, SOLUTION INTRAVENOUS at 07:47

## 2020-08-06 RX ADMIN — ACETAMINOPHEN 650 MG: 325 TABLET ORAL at 14:58

## 2020-08-06 RX ADMIN — POTASSIUM CHLORIDE 20 MEQ: 29.8 INJECTION, SOLUTION INTRAVENOUS at 15:22

## 2020-08-06 RX ADMIN — PHENYLEPHRINE HYDROCHLORIDE 50 MCG/MIN: 10 INJECTION INTRAVENOUS at 11:11

## 2020-08-06 RX ADMIN — CEFAZOLIN 2 G: 1 INJECTION, POWDER, FOR SOLUTION INTRAMUSCULAR; INTRAVENOUS; PARENTERAL at 11:04

## 2020-08-06 RX ADMIN — FENTANYL CITRATE 25 MCG: 50 INJECTION, SOLUTION INTRAMUSCULAR; INTRAVENOUS at 19:41

## 2020-08-06 RX ADMIN — SODIUM CHLORIDE 9 ML/HR: 900 INJECTION, SOLUTION INTRAVENOUS at 13:30

## 2020-08-06 RX ADMIN — FENTANYL CITRATE 50 MCG: 50 INJECTION, SOLUTION INTRAMUSCULAR; INTRAVENOUS at 06:46

## 2020-08-06 RX ADMIN — ALBUMIN (HUMAN) 12.5 G: 12.5 INJECTION, SOLUTION INTRAVENOUS at 16:42

## 2020-08-06 RX ADMIN — HEPARIN SODIUM 27000 UNITS: 1000 INJECTION, SOLUTION INTRAVENOUS; SUBCUTANEOUS at 09:02

## 2020-08-06 RX ADMIN — ETOMIDATE 20 MG: 2 INJECTION, SOLUTION INTRAVENOUS at 07:51

## 2020-08-06 RX ADMIN — DEXMEDETOMIDINE HYDROCHLORIDE 0.5 MCG/KG/HR: 100 INJECTION, SOLUTION, CONCENTRATE INTRAVENOUS at 10:42

## 2020-08-06 RX ADMIN — FAMOTIDINE 20 MG: 10 INJECTION, SOLUTION INTRAVENOUS at 20:57

## 2020-08-06 RX ADMIN — SODIUM CHLORIDE 40 MCG/MIN: 900 INJECTION, SOLUTION INTRAVENOUS at 08:06

## 2020-08-06 RX ADMIN — CHLORHEXIDINE GLUCONATE 0.12% ORAL RINSE 10 ML: 1.2 LIQUID ORAL at 20:57

## 2020-08-06 RX ADMIN — ALBUMIN (HUMAN) 12.5 G: 12.5 INJECTION, SOLUTION INTRAVENOUS at 22:04

## 2020-08-06 RX ADMIN — SODIUM BICARBONATE 50 MEQ: 84 INJECTION, SOLUTION INTRAVENOUS at 17:56

## 2020-08-06 RX ADMIN — KETOROLAC TROMETHAMINE 15 MG: 30 INJECTION, SOLUTION INTRAMUSCULAR at 23:25

## 2020-08-06 RX ADMIN — POTASSIUM CHLORIDE 20 MEQ: 29.8 INJECTION, SOLUTION INTRAVENOUS at 16:45

## 2020-08-06 RX ADMIN — Medication 10 ML: at 14:59

## 2020-08-06 NOTE — BRIEF OP NOTE
BRIEF OP NOTE    Pre-Op Diagnosis: CAD    Post-Op Diagnosis: CAD      Procedure:   CABG x 4, LIMA to LAD, RSVG to Diag-OM2, RSVG to PDA  Left Greater Saphenous Vein EVH  Left Femoral Arterial Line Insertion    Surgeon: Teresita Cespedes MD    Assistant(s): TONY Rivero    Anesthesia: General     Infusions/Support: Amiodarone, precedex, insulin, dobut, anali, epi    Estimated Blood Loss: 1860    Cell Saver: 1500    Specimens: * No specimens in log *    Drains and pacing wires: 2 atrial wires, 1 bipolar ventricular wire, 4 ford drains    Complications: none    Findings: CAD    Implants: * No implants in log *

## 2020-08-06 NOTE — CARDIO/PULMONARY
Cardiac Rehab Note: chart review  
  
Consult has been acknowledged  
  
Cath>CTS> CABG scheduled 8/6/20 
  
Smoking history assessed. Patient is a non smoker. Smoking Cessation Program link has not been added to the AVS.  
  
EF 20-25  on 8/1/20 per echo 
  
CABG educational folder with \"Cardiac Surgery Post-Op Instructions\" book, heart healthy eating, warning signs, heart facts, heart aware, resources, and CABG Surgery booklet to Bee Guerrero on 8/5/20 Patient seen for pre-op teaching, not seen today as recently transferred to CCU from [address] [place] [address]. CP Rehab will follow.

## 2020-08-06 NOTE — PROCEDURES
[facility]  JUN    Name:  Mackenzie Tinsley  MR#:  [id number]  :  1945  ACCOUNT #:  [de-identified]  DATE OF SERVICE:  2020    TRANSESOPHAGEAL ECHOCARDIOGRAPHIC REPORT    INDICATION:  The transesophageal echocardiographic exam was requested by the surgeon in order to evaluate real-time cardiac and valvular form and function in a patient with coronary artery disease and a cardiomyopathy, scheduled for on-pump coronary artery bypass grafting. The transesophageal echocardiographic probe was easily and atraumatically inserted into the patient's esophagus while the patient was sedated inside the operating room under general anesthesia. Modalities incorporate included 2-D, 3-D, color-flow mode, pulsed-wave Doppler, and continuous wave Doppler. EPIAORTIC:  The epiaortic exam was requested by the surgeon in order to evaluate the aorta for cross-clamp and bypass site locations. The epiaortic probe was sterilely handed to the surgeon, who obtained short axis views of the patient's aorta. AORTA:  Ascending aorta: The patient's ascending aorta measured 3.1 cm in diameter. There was no evidence of dissection. There was minimal and nonmobile plaque. Aortic arch: The aortic arch measured 2.6 cm in diameter. There was no evidence of dissection. There was minimal and nonmobile plaque. Descending aorta: The descending aorta measured 2.2 cm in diameter. There was no evidence of dissection. There was mild and nonmobile plaque. VALVES:  Aortic valve: The aortic valve annulus measured 2.3 cm. There was no evidence of aortic valve stenosis. The maximal velocity through the valve was 87 cm/sec. The peak gradient was 3 mmHg. The mean gradient was 2 mmHg. There was no significant aortic insufficiency. The aortic leaflet morphology and motion were both normal.    Mitral valve:  Mitral valve annulus was 4 cm. There was no mitral valve stenosis.   There was trace mitral insufficiency. Mitral leaflet morphology and motion were both normal.    Tricuspid valve: The tricuspid valve annulus measured 3.2 cm. There was no tricuspid valve stenosis. There was trace tricuspid insufficiency. The tricuspid leaflet morphology and motion were both normal.    Pulmonic valve: The pulmonic valve annulus measured 2.4 cm. There was no pulmonic stenosis. There was trace pulmonic insufficiency. The pulmonic leaflet morphology and motion were both grossly normal.    ATRIA:  Right Atrium:  Right atrial size was enlarged at 5.2 cm. There was no spontaneous echo contrast.  There was no right atrial thrombus or tumor. A pulmonary artery catheter was visualized. Left atrium:  The left atrial size was upper normal.  There was no spontaneous echo contrast.  There was no left atrial thrombus or tumor. No device was visualized. Left atrial appendage: There was no thrombus visualized within the left atrial appendage. Pulsed-wave Doppler within the appendage was less than 45 cm/sec. Interatrial septum:  The interatrial septum morphology was normal.  There was no patent foramen ovale with color-flow mode. VENTRICLES:  Right ventricle: The right ventricular major axis was 6.7 cm. There was mild right ventricular hypertrophy. There was no right ventricular thrombus and right ventricular ejection fraction was normal.    Left ventricle: The left ventricular major axis was 8.1 cm. There was mild-to-moderate left ventricular dilation. There was mild-to-moderate left ventricular hypertrophy. There was no left ventricular thrombus and the left ventricular ejection fraction was calculated at 25%. Interventricular septum:  The interventricular septum morphology exhibited mild concentric hypertrophy. REGIONAL FUNCTION. There was global hypokinesis. PERICARDIUM:  The pericardium was normal.    PLEURAE:  There was a moderate-sized left pleural effusion.     POST INTERVENTION FOLLOWUP STUDY:  After cardiopulmonary bypass, the patient was status post coronary artery bypass grafting of four vessels. The left ventricular ejection fraction was 27%. There was preserved right ventricular function. There was no significant aortic insufficiency. There was residual trace mitral, tricuspid, and pulmonic insufficiency. There was near resolution of the left pleural effusion. There was no pericardial effusion. The patient's ascending aorta was intact. These results were discussed and viewed with cardiac surgeon. The transesophageal echocardiographic probe was easily and atraumatically removed from the patient's esophagus. The patient tolerated the procedure without event.         Mohamud Faulkner DO      [id number]  D:  08/06/2020 13:17  T:  08/06/2020 19:34  JOB #:  [id number]

## 2020-08-06 NOTE — PROGRESS NOTES
Bilat BP     08/06/20 0425 08/06/20 0426   Vitals   Pulse (Heart Rate) 76 77   O2 Sat (%) 98 % 98 %   /80 137/83   MAP (Monitor) 88 96   BP 1 Location Left arm Right arm

## 2020-08-06 NOTE — PROGRESS NOTES
PT note:     Orders received and acknowledged. Chart reviewed and noted patient underwent CABG x 4 this date. Will follow up tomorrow for PT evaluation.      Giuseppe Rincon, PT, DPT

## 2020-08-06 NOTE — PROGRESS NOTES
Sofia Sanches, friend of Laquita Barth, provided her with update. She is without questions or concerns at this time. Will continue to follow for educational and emotional needs.  Celina Hernandes RN

## 2020-08-06 NOTE — ROUTINE PROCESS
sbar in note pt to holding area identifies self and procedure for today. Has been npo consents in order. Glucose at 102. No covid test on chart pt  Posted as urgent. .   Sometimes uses mask in public states no s/s covid virus and has not been around anyone with the covid virus that he knows.

## 2020-08-06 NOTE — PROGRESS NOTES
Woke up,extremely agitiated. Breathing 50/minute with tiny shallow volumes. Couldn't slow down despite verbal instruction. Appears to be in severe pain. Nods head yes to queation of pain. Cardiac index down to 1.3. Dilaudid 0.5 given. precedex drip increased from 0.3 to 0.5. Unable to wean vent at this time. Will give toradol .

## 2020-08-06 NOTE — ANESTHESIA POSTPROCEDURE EVALUATION
Procedure(s):  JUN AND EPIAORTIC ULTRASOUND BY DR Atkinson Knee - ON PUMP CORONARY ARTERY BYPASS GRAFT X 4 WITH GREATER RIGHT SAPHENOUS VEIN AND LEFT INTERNAL MAMMARY ARTERY GRAFTING - EV. general    Anesthesia Post Evaluation        Patient location during evaluation: ICU  Note status: Sedated. Post-procedure mental status: Sedated. Pain management: adequate  Airway patency: patent  Anesthetic complications: no  Cardiovascular status: acceptable  Respiratory status: acceptable  Hydration status: acceptable  Comments: Planned ventilation in ICU post CABG  Post anesthesia nausea and vomiting:  none      INITIAL Post-op Vital signs:   Vitals Value Taken Time   /62 8/6/2020  1:18 PM   Temp     Pulse 98 8/6/2020  1:20 PM   Resp 13 8/6/2020  1:20 PM   SpO2 100 % 8/6/2020  1:20 PM   Vitals shown include unvalidated device data.

## 2020-08-06 NOTE — ANESTHESIA PROCEDURE NOTES
Central Line Placement    Performed by: Anibal Flores DO  Authorized by: Anibal Flores DO     Indications: vascular access, central pressure monitoring and need for vasopressors  Preanesthetic Checklist: patient identified, risks and benefits discussed, anesthesia consent, site marked, patient being monitored and timeout performed      Pre-procedure: All elements of maximal sterile barrier technique followed?  Yes    2% Chlorhexidine for cutaneous antisepsis, Hand hygiene performed prior to catheter insertion and Ultrasound guidance    Sterile Ultrasound Technique followed?: Yes            Procedure:   Prep:  Chlorhexidine    Orientation:  Right  Patient position:  Trendelenburg  Catheter type:  Double lumen  Catheter size:  9 Fr  Catheter length:  12 cm  Number of attempts:  1  Successful placement: Yes      Assessment:   Post-procedure:  Catheter secured, sterile dressing applied and sterile dressing with CHG applied  Assessment:  Blood return through all ports, free fluid flow and guidewire removal verified  Insertion:  Uncomplicated  Patient tolerance:  Patient tolerated the procedure well with no immediate complications

## 2020-08-06 NOTE — PROGRESS NOTES
Bilateral Blood pressures     08/05/20 2249 08/05/20 2250   Vitals   Pulse (Heart Rate) 78 80   /80 119/72   MAP (Monitor) 84 83   BP 1 Location Right arm Left arm

## 2020-08-06 NOTE — PERIOP NOTES
TRANSFER - OUT REPORT:    Verbal report given to DSUTIN Johnston RN on Felix [place]  being transferred to CCU for routine post - op       Report consisted of patients Situation, Background, Assessment and   Recommendations(SBAR). Information from the following report(s) OR Summary and Procedure Summary was reviewed with the receiving nurse. Lines:   Double Lumen 08/06/20 Right (Active)   Central Line Being Utilized No 08/06/20 0710       Jerryl Sera Triple Right Neck (Active)   Central Line Being Utilized No 08/06/20 0710       Peripheral IV 07/31/20 Right Antecubital (Active)   Site Assessment Clean, dry, & intact 08/06/20 0425   Phlebitis Assessment 0 08/06/20 0425   Infiltration Assessment 0 08/06/20 0425   Dressing Status Clean, dry, & intact 08/06/20 0425   Dressing Type Tape;Transparent 08/06/20 0425   Hub Color/Line Status Pink;Flushed;Patent 08/06/20 0425   Action Taken Blood drawn 07/31/20 1306   Alcohol Cap Used Yes 08/02/20 1946       Arterial Line 08/06/20 Right Radial artery (Active)   Site Assessment Clean, dry, & intact 08/06/20 0710   Dressing Status Clean, dry, & intact 08/06/20 0710   Dressing Type Tape;Transparent 08/06/20 0710   Line Status Intact and in place 08/06/20 0710   Treatment Arm board on 08/06/20 0710        Opportunity for questions and clarification was provided. Patient transported with:   Monitor  O2 @ 10 liters via ET tube and AMBU Bag Ventilations accompanied by CRNA, PA-C, Perfusionist and RN Circulator.

## 2020-08-06 NOTE — PROGRESS NOTES
Cardiac Surgery Care Coordinator called the friend of Lynda Azevedo, introduced role of the Cardiac Surgery Coordinator. Reviewed plan of care and day of surgery expectations. Provided her with an update from OR. Encouraged her to verbalize and emotional support given. Will continue to update throughout the day.  Ronda Marinelli RN

## 2020-08-06 NOTE — PROGRESS NOTES
8/6/2020    INTENSIVIST PROGRESS NOTE:     Patient seen and evaluated, chart reviewed   77 yo male with severe CMP, CAD, today s/p CABG x 4  Now pt in CCU sedated, intubated post op    ROS: unable to obtain    Visit Vitals  /57   Pulse (!) (P) 106   Temp 98.6 °F (37 °C)   Resp (P) 16   Ht 5' 6\" (1.676 m)   Wt 68.5 kg (151 lb 0.2 oz)   SpO2 (P) 100%   BMI 24.37 kg/m²       General: sedated, intubated  Eyes: anicteric  HEENT: ETT in place  Neck: FROM  CV: RRR  Lungs: clear  Abd: soft  : no flank pain  Ext: no edema  Skin: no rash  Musculoskeletal: normal inspection  Neuro: sedated    CXR: pending    Labs reviewed    A/P:  - post op vent management: wean and extubate per CTS protocol  - CAD: s/p CABG  - severe CMP: diurese as needed  - wean pressors carefully  - chest tubes in place  - glycemic control  - pain control  - PUD/DVT prophylaxis  - wean sedation when ready to extubate  - Will assist on disposition planning when stable for transfer  Martin Grier MD

## 2020-08-06 NOTE — ANESTHESIA PROCEDURE NOTES
Arterial Line Placement    Performed by: Mendoza Huitron DO  Authorized by: Mendoza Huitron DO     Pre-Procedure  Indications:  Arterial pressure monitoring  Preanesthetic Checklist: patient identified, risks and benefits discussed, anesthesia consent, site marked, patient being monitored, timeout performed and patient being monitored      Procedure:   Prep:  ChloraPrep  Seldinger Technique?: Yes    Orientation:  Right  Location:  Radial artery  Catheter size:  20 G  Number of attempts:  1    Assessment:   Post-procedure:  Line secured and sterile dressing applied  Patient Tolerance:  Patient tolerated the procedure well with no immediate complications  Comment:   Collateral perfusion verified

## 2020-08-07 ENCOUNTER — APPOINTMENT (OUTPATIENT)
Dept: GENERAL RADIOLOGY | Age: 75
DRG: 233 | End: 2020-08-07
Attending: PHYSICIAN ASSISTANT
Payer: MEDICARE

## 2020-08-07 LAB
ADMINISTERED INITIALS, ADMINIT: NORMAL
ALBUMIN SERPL-MCNC: 3.2 G/DL (ref 3.5–5)
ALBUMIN/GLOB SERPL: 1.9 {RATIO} (ref 1.1–2.2)
ALP SERPL-CCNC: 37 U/L (ref 45–117)
ALT SERPL-CCNC: 37 U/L (ref 12–78)
ANION GAP SERPL CALC-SCNC: 0 MMOL/L (ref 5–15)
ANION GAP SERPL CALC-SCNC: 4 MMOL/L (ref 5–15)
AST SERPL-CCNC: 52 U/L (ref 15–37)
ATRIAL RATE: 101 BPM
BASOPHILS # BLD: 0 K/UL (ref 0–0.1)
BASOPHILS NFR BLD: 0 % (ref 0–1)
BILIRUB SERPL-MCNC: 1.4 MG/DL (ref 0.2–1)
BUN SERPL-MCNC: 16 MG/DL (ref 6–20)
BUN SERPL-MCNC: 19 MG/DL (ref 6–20)
BUN/CREAT SERPL: 15 (ref 12–20)
BUN/CREAT SERPL: 15 (ref 12–20)
CALCIUM SERPL-MCNC: 7.8 MG/DL (ref 8.5–10.1)
CALCIUM SERPL-MCNC: 7.9 MG/DL (ref 8.5–10.1)
CALCULATED P AXIS, ECG09: 64 DEGREES
CALCULATED R AXIS, ECG10: -11 DEGREES
CALCULATED T AXIS, ECG11: 30 DEGREES
CHLORIDE SERPL-SCNC: 104 MMOL/L (ref 97–108)
CHLORIDE SERPL-SCNC: 113 MMOL/L (ref 97–108)
CO2 SERPL-SCNC: 26 MMOL/L (ref 21–32)
CO2 SERPL-SCNC: 29 MMOL/L (ref 21–32)
CREAT SERPL-MCNC: 1.04 MG/DL (ref 0.7–1.3)
CREAT SERPL-MCNC: 1.26 MG/DL (ref 0.7–1.3)
D50 ADMINISTERED, D50ADM: 0 ML
D50 ORDER, D50ORD: 0 ML
DIAGNOSIS, 93000: NORMAL
DIFFERENTIAL METHOD BLD: ABNORMAL
EOSINOPHIL # BLD: 0 K/UL (ref 0–0.4)
EOSINOPHIL NFR BLD: 0 % (ref 0–7)
ERYTHROCYTE [DISTWIDTH] IN BLOOD BY AUTOMATED COUNT: 14 % (ref 11.5–14.5)
ERYTHROCYTE [DISTWIDTH] IN BLOOD BY AUTOMATED COUNT: 14.1 % (ref 11.5–14.5)
GLOBULIN SER CALC-MCNC: 1.7 G/DL (ref 2–4)
GLSCOM COMMENTS: NORMAL
GLUCOSE BLD STRIP.AUTO-MCNC: 101 MG/DL (ref 65–100)
GLUCOSE BLD STRIP.AUTO-MCNC: 106 MG/DL (ref 65–100)
GLUCOSE BLD STRIP.AUTO-MCNC: 107 MG/DL (ref 65–100)
GLUCOSE BLD STRIP.AUTO-MCNC: 108 MG/DL (ref 65–100)
GLUCOSE BLD STRIP.AUTO-MCNC: 110 MG/DL (ref 65–100)
GLUCOSE BLD STRIP.AUTO-MCNC: 112 MG/DL (ref 65–100)
GLUCOSE BLD STRIP.AUTO-MCNC: 126 MG/DL (ref 65–100)
GLUCOSE BLD STRIP.AUTO-MCNC: 163 MG/DL (ref 65–100)
GLUCOSE BLD STRIP.AUTO-MCNC: 95 MG/DL (ref 65–100)
GLUCOSE BLD STRIP.AUTO-MCNC: 96 MG/DL (ref 65–100)
GLUCOSE SERPL-MCNC: 185 MG/DL (ref 65–100)
GLUCOSE SERPL-MCNC: 91 MG/DL (ref 65–100)
GLUCOSE, GLC: 106 MG/DL
GLUCOSE, GLC: 107 MG/DL
GLUCOSE, GLC: 108 MG/DL
GLUCOSE, GLC: 110 MG/DL
GLUCOSE, GLC: 112 MG/DL
GLUCOSE, GLC: 126 MG/DL
GLUCOSE, GLC: 95 MG/DL
GLUCOSE, GLC: 96 MG/DL
HCT VFR BLD AUTO: 31.2 % (ref 36.6–50.3)
HCT VFR BLD AUTO: 32.4 % (ref 36.6–50.3)
HGB BLD-MCNC: 10.5 G/DL (ref 12.1–17)
HGB BLD-MCNC: 10.8 G/DL (ref 12.1–17)
HIGH TARGET, HITG: 140 MG/DL
IMM GRANULOCYTES # BLD AUTO: 0 K/UL (ref 0–0.04)
IMM GRANULOCYTES NFR BLD AUTO: 0 % (ref 0–0.5)
INSULIN ADMINSTERED, INSADM: 0 UNITS/HOUR
INSULIN ADMINSTERED, INSADM: 0.4 UNITS/HOUR
INSULIN ADMINSTERED, INSADM: 1 UNITS/HOUR
INSULIN ADMINSTERED, INSADM: 1 UNITS/HOUR
INSULIN ORDER, INSORD: 0 UNITS/HOUR
INSULIN ORDER, INSORD: 0.4 UNITS/HOUR
INSULIN ORDER, INSORD: 1 UNITS/HOUR
INSULIN ORDER, INSORD: 1 UNITS/HOUR
LOW TARGET, LOT: 100 MG/DL
LYMPHOCYTES # BLD: 0.4 K/UL (ref 0.8–3.5)
LYMPHOCYTES NFR BLD: 3 % (ref 12–49)
MAGNESIUM SERPL-MCNC: 2.2 MG/DL (ref 1.6–2.4)
MCH RBC QN AUTO: 31.1 PG (ref 26–34)
MCH RBC QN AUTO: 31.8 PG (ref 26–34)
MCHC RBC AUTO-ENTMCNC: 33.3 G/DL (ref 30–36.5)
MCHC RBC AUTO-ENTMCNC: 33.7 G/DL (ref 30–36.5)
MCV RBC AUTO: 93.4 FL (ref 80–99)
MCV RBC AUTO: 94.5 FL (ref 80–99)
MINUTES UNTIL NEXT BG, NBG: 60 MIN
MONOCYTES # BLD: 0.4 K/UL (ref 0–1)
MONOCYTES NFR BLD: 3 % (ref 5–13)
MULTIPLIER, MUL: 0
MULTIPLIER, MUL: 0.01
MULTIPLIER, MUL: 0.02
MULTIPLIER, MUL: 0.02
NEUTS BAND NFR BLD MANUAL: 2 %
NEUTS SEG # BLD: 13.9 K/UL (ref 1.8–8)
NEUTS SEG NFR BLD: 92 % (ref 32–75)
NRBC # BLD: 0 K/UL (ref 0–0.01)
NRBC # BLD: 0 K/UL (ref 0–0.01)
NRBC BLD-RTO: 0 PER 100 WBC
NRBC BLD-RTO: 0 PER 100 WBC
ORDER INITIALS, ORDINIT: NORMAL
P-R INTERVAL, ECG05: 160 MS
PLATELET # BLD AUTO: 83 K/UL (ref 150–400)
PLATELET # BLD AUTO: 90 K/UL (ref 150–400)
PMV BLD AUTO: 11.7 FL (ref 8.9–12.9)
PMV BLD AUTO: 12.2 FL (ref 8.9–12.9)
POTASSIUM SERPL-SCNC: 4.2 MMOL/L (ref 3.5–5.1)
POTASSIUM SERPL-SCNC: 4.3 MMOL/L (ref 3.5–5.1)
PROT SERPL-MCNC: 4.9 G/DL (ref 6.4–8.2)
Q-T INTERVAL, ECG07: 406 MS
QRS DURATION, ECG06: 130 MS
QTC CALCULATION (BEZET), ECG08: 526 MS
RBC # BLD AUTO: 3.3 M/UL (ref 4.1–5.7)
RBC # BLD AUTO: 3.47 M/UL (ref 4.1–5.7)
RBC MORPH BLD: ABNORMAL
SERVICE CMNT-IMP: ABNORMAL
SERVICE CMNT-IMP: NORMAL
SERVICE CMNT-IMP: NORMAL
SODIUM SERPL-SCNC: 134 MMOL/L (ref 136–145)
SODIUM SERPL-SCNC: 142 MMOL/L (ref 136–145)
VENTRICULAR RATE, ECG03: 101 BPM
WBC # BLD AUTO: 12.6 K/UL (ref 4.1–11.1)
WBC # BLD AUTO: 14.7 K/UL (ref 4.1–11.1)

## 2020-08-07 PROCEDURE — 74011250637 HC RX REV CODE- 250/637: Performed by: PHYSICIAN ASSISTANT

## 2020-08-07 PROCEDURE — 80053 COMPREHEN METABOLIC PANEL: CPT

## 2020-08-07 PROCEDURE — P9045 ALBUMIN (HUMAN), 5%, 250 ML: HCPCS | Performed by: THORACIC SURGERY (CARDIOTHORACIC VASCULAR SURGERY)

## 2020-08-07 PROCEDURE — 65620000000 HC RM CCU GENERAL

## 2020-08-07 PROCEDURE — 83735 ASSAY OF MAGNESIUM: CPT

## 2020-08-07 PROCEDURE — 74011250636 HC RX REV CODE- 250/636: Performed by: THORACIC SURGERY (CARDIOTHORACIC VASCULAR SURGERY)

## 2020-08-07 PROCEDURE — 97530 THERAPEUTIC ACTIVITIES: CPT

## 2020-08-07 PROCEDURE — 74011000250 HC RX REV CODE- 250: Performed by: PHYSICIAN ASSISTANT

## 2020-08-07 PROCEDURE — 97162 PT EVAL MOD COMPLEX 30 MIN: CPT

## 2020-08-07 PROCEDURE — 97165 OT EVAL LOW COMPLEX 30 MIN: CPT

## 2020-08-07 PROCEDURE — 97116 GAIT TRAINING THERAPY: CPT

## 2020-08-07 PROCEDURE — 36415 COLL VENOUS BLD VENIPUNCTURE: CPT

## 2020-08-07 PROCEDURE — 74011000258 HC RX REV CODE- 258: Performed by: PHYSICIAN ASSISTANT

## 2020-08-07 PROCEDURE — 82962 GLUCOSE BLOOD TEST: CPT

## 2020-08-07 PROCEDURE — 93005 ELECTROCARDIOGRAM TRACING: CPT

## 2020-08-07 PROCEDURE — 74011250636 HC RX REV CODE- 250/636: Performed by: PHYSICIAN ASSISTANT

## 2020-08-07 PROCEDURE — 85027 COMPLETE CBC AUTOMATED: CPT

## 2020-08-07 PROCEDURE — 99233 SBSQ HOSP IP/OBS HIGH 50: CPT | Performed by: INTERNAL MEDICINE

## 2020-08-07 PROCEDURE — 71045 X-RAY EXAM CHEST 1 VIEW: CPT

## 2020-08-07 RX ORDER — FUROSEMIDE 10 MG/ML
40 INJECTION INTRAMUSCULAR; INTRAVENOUS ONCE
Status: COMPLETED | OUTPATIENT
Start: 2020-08-07 | End: 2020-08-07

## 2020-08-07 RX ORDER — AMIODARONE HYDROCHLORIDE 200 MG/1
400 TABLET ORAL 2 TIMES DAILY
Status: DISCONTINUED | OUTPATIENT
Start: 2020-08-08 | End: 2020-08-08

## 2020-08-07 RX ORDER — MAGNESIUM SULFATE 1 G/100ML
1 INJECTION INTRAVENOUS ONCE
Status: COMPLETED | OUTPATIENT
Start: 2020-08-07 | End: 2020-08-08

## 2020-08-07 RX ADMIN — FENTANYL CITRATE 25 MCG: 50 INJECTION, SOLUTION INTRAMUSCULAR; INTRAVENOUS at 04:05

## 2020-08-07 RX ADMIN — WATER 2 G: 1 INJECTION INTRAMUSCULAR; INTRAVENOUS; SUBCUTANEOUS at 22:43

## 2020-08-07 RX ADMIN — WATER 2 G: 1 INJECTION INTRAMUSCULAR; INTRAVENOUS; SUBCUTANEOUS at 04:05

## 2020-08-07 RX ADMIN — CHLORHEXIDINE GLUCONATE 0.12% ORAL RINSE 10 ML: 1.2 LIQUID ORAL at 08:14

## 2020-08-07 RX ADMIN — FAMOTIDINE 20 MG: 20 TABLET, FILM COATED ORAL at 20:59

## 2020-08-07 RX ADMIN — ALBUMIN (HUMAN) 12.5 G: 12.5 INJECTION, SOLUTION INTRAVENOUS at 16:45

## 2020-08-07 RX ADMIN — ACETAMINOPHEN 650 MG: 325 TABLET ORAL at 05:53

## 2020-08-07 RX ADMIN — ALBUMIN (HUMAN) 12.5 G: 12.5 INJECTION, SOLUTION INTRAVENOUS at 18:40

## 2020-08-07 RX ADMIN — SODIUM CHLORIDE 50 ML/HR: 900 INJECTION, SOLUTION INTRAVENOUS at 20:56

## 2020-08-07 RX ADMIN — ATORVASTATIN CALCIUM 20 MG: 20 TABLET, FILM COATED ORAL at 21:01

## 2020-08-07 RX ADMIN — AMIODARONE HYDROCHLORIDE 400 MG: 200 TABLET ORAL at 17:06

## 2020-08-07 RX ADMIN — KETOROLAC TROMETHAMINE 15 MG: 30 INJECTION, SOLUTION INTRAMUSCULAR at 05:54

## 2020-08-07 RX ADMIN — OXYCODONE 10 MG: 5 TABLET ORAL at 20:56

## 2020-08-07 RX ADMIN — ACETAMINOPHEN 650 MG: 325 TABLET ORAL at 17:05

## 2020-08-07 RX ADMIN — DOBUTAMINE HYDROCHLORIDE 4 MCG/KG/MIN: 200 INJECTION INTRAVENOUS at 12:54

## 2020-08-07 RX ADMIN — ACETAMINOPHEN 650 MG: 325 TABLET ORAL at 21:01

## 2020-08-07 RX ADMIN — FUROSEMIDE 40 MG: 10 INJECTION, SOLUTION INTRAMUSCULAR; INTRAVENOUS at 21:26

## 2020-08-07 RX ADMIN — MUPIROCIN: 20 OINTMENT TOPICAL at 18:39

## 2020-08-07 RX ADMIN — ACETAMINOPHEN 650 MG: 325 TABLET ORAL at 10:52

## 2020-08-07 RX ADMIN — NICARDIPINE HYDROCHLORIDE 2 MG/HR: 2.5 INJECTION, SOLUTION INTRAVENOUS at 04:00

## 2020-08-07 RX ADMIN — ALBUMIN (HUMAN) 12.5 G: 12.5 INJECTION, SOLUTION INTRAVENOUS at 04:53

## 2020-08-07 RX ADMIN — POLYETHYLENE GLYCOL 3350 17 G: 17 POWDER, FOR SOLUTION ORAL at 08:14

## 2020-08-07 RX ADMIN — CHLORHEXIDINE GLUCONATE 0.12% ORAL RINSE 10 ML: 1.2 LIQUID ORAL at 20:18

## 2020-08-07 RX ADMIN — KETOROLAC TROMETHAMINE 15 MG: 30 INJECTION, SOLUTION INTRAMUSCULAR at 17:06

## 2020-08-07 RX ADMIN — DOCUSATE SODIUM - SENNOSIDES 1 TABLET: 50; 8.6 TABLET, FILM COATED ORAL at 08:14

## 2020-08-07 RX ADMIN — Medication 10 ML: at 17:10

## 2020-08-07 RX ADMIN — FAMOTIDINE 20 MG: 20 TABLET, FILM COATED ORAL at 06:01

## 2020-08-07 RX ADMIN — MAGNESIUM OXIDE 400 MG (241.3 MG MAGNESIUM) TABLET 400 MG: TABLET at 17:05

## 2020-08-07 RX ADMIN — ASPIRIN 81 MG CHEWABLE TABLET 81 MG: 81 TABLET CHEWABLE at 08:14

## 2020-08-07 RX ADMIN — WATER 2 G: 1 INJECTION INTRAMUSCULAR; INTRAVENOUS; SUBCUTANEOUS at 17:10

## 2020-08-07 RX ADMIN — OXYCODONE 5 MG: 5 TABLET ORAL at 10:53

## 2020-08-07 RX ADMIN — ACETAMINOPHEN 650 MG: 325 TABLET ORAL at 01:33

## 2020-08-07 RX ADMIN — FENTANYL CITRATE 25 MCG: 50 INJECTION, SOLUTION INTRAMUSCULAR; INTRAVENOUS at 06:24

## 2020-08-07 RX ADMIN — Medication 10 ML: at 05:54

## 2020-08-07 RX ADMIN — Medication 10 ML: at 20:20

## 2020-08-07 RX ADMIN — OXYCODONE 10 MG: 5 TABLET ORAL at 17:05

## 2020-08-07 RX ADMIN — MAGNESIUM SULFATE IN DEXTROSE 1 G: 10 INJECTION, SOLUTION INTRAVENOUS at 23:12

## 2020-08-07 RX ADMIN — WATER 2 G: 1 INJECTION INTRAMUSCULAR; INTRAVENOUS; SUBCUTANEOUS at 10:54

## 2020-08-07 RX ADMIN — DOCUSATE SODIUM - SENNOSIDES 1 TABLET: 50; 8.6 TABLET, FILM COATED ORAL at 17:05

## 2020-08-07 RX ADMIN — MUPIROCIN: 20 OINTMENT TOPICAL at 08:12

## 2020-08-07 NOTE — PROGRESS NOTES
8/7/2020 10:10 AM    Admit Date: 7/31/2020    Admit Diagnosis: Pleural effusion, bilateral [J90];CHF, acute on chronic (HCC) [I50.9];CAD (coronary artery disease) [I25.10]    Subjective: Felix Teixeira is sitting up in chair. He denies chest pain, chest pressure/discomfort, dyspnea, palpitations, irregular heart beats, near-syncope, syncope, fatigue, orthopnea, paroxysmal nocturnal dyspnea, exertional chest pressure/discomfort, claudication.     Visit Vitals  /59 (BP 1 Location: Left arm, BP Patient Position: At rest;Sitting)   Pulse 100   Temp 97.7 °F (36.5 °C)   Resp (!) 36   Ht 5' 6\" (1.676 m)   Wt 161 lb 13.1 oz (73.4 kg)   SpO2 95%   BMI 26.12 kg/m²     Current Facility-Administered Medications   Medication Dose Route Frequency    alteplase (CATHFLO) 1 mg in sterile water (preservative free) 1 mL injection  1 mg InterCATHeter PRN    bacitracin 500 unit/gram packet 1 Packet  1 Packet Topical PRN    sodium chloride (NS) flush 5-40 mL  5-40 mL IntraVENous Q8H    sodium chloride (NS) flush 5-40 mL  5-40 mL IntraVENous PRN    0.45% sodium chloride infusion  10 mL/hr IntraVENous CONTINUOUS    0.9% sodium chloride infusion  9 mL/hr IntraVENous CONTINUOUS    acetaminophen (TYLENOL) tablet 650 mg  650 mg Oral Q4H    oxyCODONE IR (ROXICODONE) tablet 5 mg  5 mg Oral Q4H PRN    oxyCODONE IR (ROXICODONE) tablet 10 mg  10 mg Oral Q4H PRN    naloxone (NARCAN) injection 0.4 mg  0.4 mg IntraVENous PRN    mupirocin (BACTROBAN) 2 % ointment   Both Nostrils BID    ceFAZolin (ANCEF) 2 g in sterile water (preservative free) 20 mL IV syringe  2 g IntraVENous Q6H    amiodarone (CORDARONE) tablet 400 mg  400 mg Oral Q12H    ondansetron (ZOFRAN) injection 4 mg  4 mg IntraVENous Q4H PRN    albuterol (PROVENTIL VENTOLIN) nebulizer solution 2.5 mg  2.5 mg Nebulization Q4H PRN    aspirin chewable tablet 81 mg  81 mg Oral DAILY    midazolam (VERSED) injection 1 mg  1 mg IntraVENous Q1H PRN  chlorhexidine (PERIDEX) 0.12 % mouthwash 10 mL  10 mL Oral Q12H    famotidine (PEPCID) tablet 20 mg  20 mg Oral Q12H    magnesium oxide (MAG-OX) tablet 400 mg  400 mg Oral BID    calcium chloride 1 g in 0.9% sodium chloride 250 mL IVPB  1 g IntraVENous PRN    bisacodyL (DULCOLAX) suppository 10 mg  10 mg Rectal DAILY PRN    senna-docusate (PERICOLACE) 8.6-50 mg per tablet 1 Tab  1 Tab Oral BID    polyethylene glycol (MIRALAX) packet 17 g  17 g Oral DAILY    ELECTROLYTE REPLACEMENT NOTE: Nurse to review Serum Potassium and Magnesuim levels and Initiate Electrolyte Replacement Protocol as needed  1 Each Other PRN    magnesium sulfate 1 g/100 ml IVPB (premix or compounded)  1 g IntraVENous PRN    glucose chewable tablet 16 g  4 Tab Oral PRN    dextrose (D50W) injection syrg 12.5-25 g  12.5-25 g IntraVENous PRN    glucagon (GLUCAGEN) injection 1 mg  1 mg IntraMUSCular PRN    insulin glargine (LANTUS) injection 1-50 Units  1-50 Units SubCUTAneous ONCE PRN    diphenhydrAMINE (BENADRYL) capsule 25 mg  25 mg Oral Q6H PRN    diphenhydrAMINE (BENADRYL) injection 25 mg  25 mg IntraVENous Q6H PRN    HYDROmorphone (PF) (DILAUDID) injection 0.5 mg  0.5 mg IntraVENous Q2H PRN    HYDROmorphone (PF) (DILAUDID) injection 1 mg  1 mg IntraVENous Q2H PRN    melatonin tablet 3 mg  3 mg Oral QHS PRN    potassium chloride 20 mEq in 50 ml IVPB  20 mEq IntraVENous Q2H PRN    potassium chloride 20 mEq in 50 ml IVPB  20 mEq IntraVENous Q2H PRN    ketorolac (TORADOL) injection 15 mg  15 mg IntraVENous Q6H    fentaNYL citrate (PF) injection 25 mcg  25 mcg IntraVENous Q2H PRN    0.9% sodium chloride infusion  50 mL/hr IntraVENous CONTINUOUS    albumin human 5% (BUMINATE) solution 12.5 g  12.5 g IntraVENous PRN    insulin lispro (HUMALOG) injection   SubCUTAneous TIDAC    [START ON 8/8/2020] insulin lispro (HUMALOG) injection   SubCUTAneous AC&HS    DOBUTamine (DOBUTREX) 500 mg/250 mL (2,000 mcg/mL) infusion  0-10 mcg/kg/min IntraVENous TITRATE    atorvastatin (LIPITOR) tablet 20 mg  20 mg Oral QHS         Objective:      Visit Vitals  /59 (BP 1 Location: Left arm, BP Patient Position: At rest;Sitting)   Pulse 100   Temp 97.7 °F (36.5 °C)   Resp (!) 36   Ht 5' 6\" (1.676 m)   Wt 161 lb 13.1 oz (73.4 kg)   SpO2 95%   BMI 26.12 kg/m²       Physical Exam:  Resting comfortably. No resp distress.    Extremities: extremities normal, atraumatic, no cyanosis or edema  Heart: regular rate and rhythm, S1, S2 normal, no murmur, click, rub or gallop  Lungs: clear to auscultation bilaterally  Neurologic: Grossly normal    Data Review:   Labs:    Recent Results (from the past 24 hour(s))   GLUCOSTABILIZER    Collection Time: 08/06/20 12:45 PM   Result Value Ref Range    Glucose 162 mg/dL    Insulin order 3.1 units/hour    Insulin adminstered 3.1 units/hour    Multiplier 0.030     Low target 100 mg/dL    High target 140 mg/dL    D50 order 0.0 ml    D50 administered 0.00 ml    Minutes until next BG 60 min    Order initials lp     Administered initials lp     GLSCOM Comments     GLUCOSE, POC    Collection Time: 08/06/20  1:19 PM   Result Value Ref Range    Glucose (POC) 170 (H) 65 - 100 mg/dL    Performed by Addy Melgar    Collection Time: 08/06/20  1:21 PM   Result Value Ref Range    Glucose 170 mg/dL    Insulin order 4.4 units/hour    Insulin adminstered 4.4 units/hour    Multiplier 0.040     Low target 100 mg/dL    High target 140 mg/dL    D50 order 0.0 ml    D50 administered 0.00 ml    Minutes until next BG 60 min    Order initials ct     Administered initials ct     GLSCOM Comments     CBC W/O DIFF    Collection Time: 08/06/20  1:22 PM   Result Value Ref Range    WBC 13.7 (H) 4.1 - 11.1 K/uL    RBC 4.42 4.10 - 5.70 M/uL    HGB 13.8 12.1 - 17.0 g/dL    HCT 41.5 36.6 - 50.3 %    MCV 93.9 80.0 - 99.0 FL    MCH 31.2 26.0 - 34.0 PG    MCHC 33.3 30.0 - 36.5 g/dL    RDW 14.0 11.5 - 14.5 %    PLATELET 887 (L) 226 - 400 K/uL    MPV 11.5 8.9 - 12.9 FL    NRBC 0.0 0  WBC    ABSOLUTE NRBC 0.00 0.00 - 0.01 K/uL   PTT    Collection Time: 08/06/20  1:22 PM   Result Value Ref Range    aPTT 43.0 (H) 22.1 - 32.0 sec    aPTT, therapeutic range     58.0 - 77.0 SECS   PROTHROMBIN TIME + INR    Collection Time: 08/06/20  1:22 PM   Result Value Ref Range    INR 1.2 (H) 0.9 - 1.1      Prothrombin time 11.9 (H) 9.0 - 19.0 sec   METABOLIC PANEL, COMPREHENSIVE    Collection Time: 08/06/20  1:23 PM   Result Value Ref Range    Sodium 144 136 - 145 mmol/L    Potassium 3.4 (L) 3.5 - 5.1 mmol/L    Chloride 110 (H) 97 - 108 mmol/L    CO2 25 21 - 32 mmol/L    Anion gap 9 5 - 15 mmol/L    Glucose 173 (H) 65 - 100 mg/dL    BUN 16 6 - 20 MG/DL    Creatinine 1.00 0.70 - 1.30 MG/DL    BUN/Creatinine ratio 16 12 - 20      GFR est AA >60 >60 ml/min/1.73m2    GFR est non-AA >60 >60 ml/min/1.73m2    Calcium 8.8 8.5 - 10.1 MG/DL    Bilirubin, total 1.2 (H) 0.2 - 1.0 MG/DL    ALT (SGPT) 55 12 - 78 U/L    AST (SGOT) 65 (H) 15 - 37 U/L    Alk. phosphatase 44 (L) 45 - 117 U/L    Protein, total 4.9 (L) 6.4 - 8.2 g/dL    Albumin 2.9 (L) 3.5 - 5.0 g/dL    Globulin 2.0 2.0 - 4.0 g/dL    A-G Ratio 1.5 1.1 - 2.2     MAGNESIUM    Collection Time: 08/06/20  1:23 PM   Result Value Ref Range    Magnesium 2.6 (H) 1.6 - 2.4 mg/dL   POC EG7    Collection Time: 08/06/20  1:59 PM   Result Value Ref Range    Calcium, ionized (POC) 1.33 (H) 1.12 - 1.32 mmol/L    FIO2 (POC) 50 %    pH (POC) 7.36 7.35 - 7.45      pCO2 (POC) 35.9 35.0 - 45.0 MMHG    pO2 (POC) 109 (H) 80 - 100 MMHG    HCO3 (POC) 20.2 (L) 22 - 26 MMOL/L    Base deficit (POC) 5 mmol/L    sO2 (POC) 98 (H) 92 - 97 %    Site DRAWN FROM ARTERIAL LINE      Device: VENT      Mode SIMV      Tidal volume 500 ml    Set Rate 16 bpm    PEEP/CPAP (POC) 6 cmH2O    Pressure support 6 cmH2O    Allens test (POC) N/A      Specimen type (POC) ARTERIAL      Total resp.  rate 16     GLUCOSE, POC    Collection Time: 08/06/20  2:31 PM Result Value Ref Range    Glucose (POC) 141 (H) 65 - 100 mg/dL    Performed by Addy Melgar    Collection Time: 08/06/20  2:33 PM   Result Value Ref Range    Glucose 141 mg/dL    Insulin order 4.1 units/hour    Insulin adminstered 4.1 units/hour    Multiplier 0.050     Low target 100 mg/dL    High target 140 mg/dL    D50 order 0.0 ml    D50 administered 0.00 ml    Minutes until next BG 60 min    Order initials ct     Administered initials ct     GLSCOM Comments     GLUCOSE, POC    Collection Time: 08/06/20  3:44 PM   Result Value Ref Range    Glucose (POC) 112 (H) 65 - 100 mg/dL    Performed by Alicia Ryan RN    GLUCOSTABILIZER    Collection Time: 08/06/20  3:47 PM   Result Value Ref Range    Glucose 112 mg/dL    Insulin order 2.6 units/hour    Insulin adminstered 2.6 units/hour    Multiplier 0.050     Low target 100 mg/dL    High target 140 mg/dL    D50 order 0.0 ml    D50 administered 0.00 ml    Minutes until next BG 60 min    Order initials ct     Administered initials ct     GLSCOM Comments     GLUCOSE, POC    Collection Time: 08/06/20  4:55 PM   Result Value Ref Range    Glucose (POC) 83 65 - 100 mg/dL    Performed by Addy Childsh    Collection Time: 08/06/20  4:56 PM   Result Value Ref Range    Glucose 83 mg/dL    Insulin order 0.9 units/hour    Insulin adminstered 0.9 units/hour    Multiplier 0.040     Low target 100 mg/dL    High target 140 mg/dL    D50 order 0.0 ml    D50 administered 0.00 ml    Minutes until next BG 60 min    Order initials ct     Administered initials ct     GLSCOM Comments     POC EG7    Collection Time: 08/06/20  5:31 PM   Result Value Ref Range    Calcium, ionized (POC) 1.35 (H) 1.12 - 1.32 mmol/L    FIO2 (POC) 50 %    pH (POC) 7.34 (L) 7.35 - 7.45      pCO2 (POC) 40.8 35.0 - 45.0 MMHG    pO2 (POC) 147 (H) 80 - 100 MMHG    HCO3 (POC) 22.2 22 - 26 MMOL/L    Base deficit (POC) 4 mmol/L    sO2 (POC) 99 (H) 92 - 97 %    Site DRAWN FROM ARTERIAL LINE      Device: VENT      Mode CPAP/SPON      PEEP/CPAP (POC) 6 cmH2O    Pressure support 6 cmH2O    Allens test (POC) N/A      Specimen type (POC) ARTERIAL      Total resp.  rate 15     GLUCOSE, POC    Collection Time: 08/06/20  6:06 PM   Result Value Ref Range    Glucose (POC) 79 65 - 100 mg/dL    Performed by Nancy Chirinos    Collection Time: 08/06/20  6:11 PM   Result Value Ref Range    Glucose 79 mg/dL    Insulin order 0.0 units/hour    Insulin adminstered 0.0 units/hour    Multiplier 0.030     Low target 100 mg/dL    High target 140 mg/dL    D50 order 8.0 ml    D50 administered 8.00 ml    Minutes until next BG 15 min    Order initials ct     Administered initials ct     GLSCOM Comments     HGB & HCT    Collection Time: 08/06/20  6:25 PM   Result Value Ref Range    HGB 12.2 12.1 - 17.0 g/dL    HCT 36.5 (L) 36.6 - 50.3 %   GLUCOSE, POC    Collection Time: 08/06/20  6:28 PM   Result Value Ref Range    Glucose (POC) 104 (H) 65 - 100 mg/dL    Performed by Nancy Chirinos    Collection Time: 08/06/20  6:29 PM   Result Value Ref Range    Glucose 104 mg/dL    Insulin order 0.9 units/hour    Insulin adminstered 0.9 units/hour    Multiplier 0.030     Low target 100 mg/dL    High target 140 mg/dL    D50 order 0.0 ml    D50 administered 0.00 ml    Minutes until next BG 60 min    Order initials ct     Administered initials ct     GLSCOM Comments     METABOLIC PANEL, BASIC    Collection Time: 08/06/20  6:35 PM   Result Value Ref Range    Sodium 144 136 - 145 mmol/L    Potassium 4.5 3.5 - 5.1 mmol/L    Chloride 114 (H) 97 - 108 mmol/L    CO2 29 21 - 32 mmol/L    Anion gap 1 (L) 5 - 15 mmol/L    Glucose 117 (H) 65 - 100 mg/dL    BUN 17 6 - 20 MG/DL    Creatinine 1.02 0.70 - 1.30 MG/DL    BUN/Creatinine ratio 17 12 - 20      GFR est AA >60 >60 ml/min/1.73m2    GFR est non-AA >60 >60 ml/min/1.73m2    Calcium 8.2 (L) 8.5 - 10.1 MG/DL   MAGNESIUM    Collection Time: 08/06/20  6:35 PM   Result Value Ref Range    Magnesium 2.3 1.6 - 2.4 mg/dL   GLUCOSE, POC    Collection Time: 08/06/20  7:32 PM   Result Value Ref Range    Glucose (POC) 118 (H) 65 - 100 mg/dL    Performed by Alaina Campbell    Collection Time: 08/06/20  7:33 PM   Result Value Ref Range    Glucose 118 mg/dL    Insulin order 1.7 units/hour    Insulin adminstered 1.7 units/hour    Multiplier 0.030     Low target 100 mg/dL    High target 140 mg/dL    D50 order 0.0 ml    D50 administered 0.00 ml    Minutes until next BG 60 min    Order initials gersone     Administered initials gerson     GLSCOM Comments     GLUCOSE, POC    Collection Time: 08/06/20  8:35 PM   Result Value Ref Range    Glucose (POC) 113 (H) 65 - 100 mg/dL    Performed by Johnny Mason RN    GLUCOSTABILIZER    Collection Time: 08/06/20  8:36 PM   Result Value Ref Range    Glucose 113 mg/dL    Insulin order 1.6 units/hour    Insulin adminstered 1.6 units/hour    Multiplier 0.030     Low target 100 mg/dL    High target 140 mg/dL    D50 order 0.0 ml    D50 administered 0.00 ml    Minutes until next BG 60 min    Order initials mw     Administered initials josé     GLSCOM Comments     GLUCOSE, POC    Collection Time: 08/06/20  9:45 PM   Result Value Ref Range    Glucose (POC) 112 (H) 65 - 100 mg/dL    Performed by Johnny Mason RN    GLUCOSTABILIZER    Collection Time: 08/06/20  9:45 PM   Result Value Ref Range    Glucose 112 mg/dL    Insulin order 1.6 units/hour    Insulin adminstered 1.6 units/hour    Multiplier 0.030     Low target 100 mg/dL    High target 140 mg/dL    D50 order 0.0 ml    D50 administered 0.00 ml    Minutes until next BG 60 min    Order initials josé     Administered initials josé     GLSCOM Comments     GLUCOSE, POC    Collection Time: 08/06/20 10:48 PM   Result Value Ref Range    Glucose (POC) 99 65 - 100 mg/dL    Performed by Alaina Campbell    Collection Time: 08/06/20 10:48 PM   Result Value Ref Range    Glucose 99 mg/dL Insulin order 0.8 units/hour    Insulin adminstered 0.8 units/hour    Multiplier 0.020     Low target 100 mg/dL    High target 140 mg/dL    D50 order 0.0 ml    D50 administered 0.00 ml    Minutes until next BG 60 min    Order initials gerson     Administered initials gerson     GLSCOM Comments     CBC WITH AUTOMATED DIFF    Collection Time: 08/06/20 11:29 PM   Result Value Ref Range    WBC 14.7 (H) 4.1 - 11.1 K/uL    RBC 3.47 (L) 4.10 - 5.70 M/uL    HGB 10.8 (L) 12.1 - 17.0 g/dL    HCT 32.4 (L) 36.6 - 50.3 %    MCV 93.4 80.0 - 99.0 FL    MCH 31.1 26.0 - 34.0 PG    MCHC 33.3 30.0 - 36.5 g/dL    RDW 14.0 11.5 - 14.5 %    PLATELET 90 (L) 355 - 400 K/uL    MPV 11.7 8.9 - 12.9 FL    NRBC 0.0 0  WBC    ABSOLUTE NRBC 0.00 0.00 - 0.01 K/uL    NEUTROPHILS 92 (H) 32 - 75 %    BAND NEUTROPHILS 2 %    LYMPHOCYTES 3 (L) 12 - 49 %    MONOCYTES 3 (L) 5 - 13 %    EOSINOPHILS 0 0 - 7 %    BASOPHILS 0 0 - 1 %    IMMATURE GRANULOCYTES 0 0.0 - 0.5 %    ABS. NEUTROPHILS 13.9 (H) 1.8 - 8.0 K/UL    ABS. LYMPHOCYTES 0.4 (L) 0.8 - 3.5 K/UL    ABS. MONOCYTES 0.4 0.0 - 1.0 K/UL    ABS. EOSINOPHILS 0.0 0.0 - 0.4 K/UL    ABS. BASOPHILS 0.0 0.0 - 0.1 K/UL    ABS. IMM.  GRANS. 0.0 0.00 - 0.04 K/UL    DF MANUAL      RBC COMMENTS ANISOCYTOSIS  1+       GLUCOSE, POC    Collection Time: 08/06/20 11:51 PM   Result Value Ref Range    Glucose (POC) 101 (H) 65 - 100 mg/dL    Performed by Elliott Pretty    Collection Time: 08/06/20 11:51 PM   Result Value Ref Range    Glucose 101 mg/dL    Insulin order 0.8 units/hour    Insulin adminstered 0.8 units/hour    Multiplier 0.020     Low target 100 mg/dL    High target 140 mg/dL    D50 order 0.0 ml    D50 administered 0.00 ml    Minutes until next BG 60 min    Order initials mw     Administered initials mw     GLSCOM Comments     GLUCOSE, POC    Collection Time: 08/07/20 12:53 AM   Result Value Ref Range    Glucose (POC) 108 (H) 65 - 100 mg/dL    Performed by Elliott Pretty Collection Time: 08/07/20 12:53 AM   Result Value Ref Range    Glucose 108 mg/dL    Insulin order 1.0 units/hour    Insulin adminstered 1.0 units/hour    Multiplier 0.020     Low target 100 mg/dL    High target 140 mg/dL    D50 order 0.0 ml    D50 administered 0.00 ml    Minutes until next BG 60 min    Order initials gerson     Administered initials gerson     GLSCOM Comments     GLUCOSE, POC    Collection Time: 08/07/20  1:56 AM   Result Value Ref Range    Glucose (POC) 110 (H) 65 - 100 mg/dL    Performed by Jose R Rosario RN    GLUCOSTABILIZER    Collection Time: 08/07/20  1:56 AM   Result Value Ref Range    Glucose 110 mg/dL    Insulin order 1.0 units/hour    Insulin adminstered 1.0 units/hour    Multiplier 0.020     Low target 100 mg/dL    High target 140 mg/dL    D50 order 0.0 ml    D50 administered 0.00 ml    Minutes until next BG 60 min    Order initials mw     Administered initials mw     GLSCOM Comments     GLUCOSE, POC    Collection Time: 08/07/20  2:57 AM   Result Value Ref Range    Glucose (POC) 95 65 - 100 mg/dL    Performed by Jose R Rosario RN    GLUCOSTABILIZER    Collection Time: 08/07/20  2:57 AM   Result Value Ref Range    Glucose 95 mg/dL    Insulin order 0.4 units/hour    Insulin adminstered 0.4 units/hour    Multiplier 0.010     Low target 100 mg/dL    High target 140 mg/dL    D50 order 0.0 ml    D50 administered 0.00 ml    Minutes until next BG 60 min    Order initials mw     Administered initials mw     GLSCOM Comments     METABOLIC PANEL, COMPREHENSIVE    Collection Time: 08/07/20  3:11 AM   Result Value Ref Range    Sodium 142 136 - 145 mmol/L    Potassium 4.2 3.5 - 5.1 mmol/L    Chloride 113 (H) 97 - 108 mmol/L    CO2 29 21 - 32 mmol/L    Anion gap 0 (L) 5 - 15 mmol/L    Glucose 91 65 - 100 mg/dL    BUN 16 6 - 20 MG/DL    Creatinine 1.04 0.70 - 1.30 MG/DL    BUN/Creatinine ratio 15 12 - 20      GFR est AA >60 >60 ml/min/1.73m2    GFR est non-AA >60 >60 ml/min/1.73m2    Calcium 7.8 (L) 8.5 - 10.1 MG/DL    Bilirubin, total 1.4 (H) 0.2 - 1.0 MG/DL    ALT (SGPT) 37 12 - 78 U/L    AST (SGOT) 52 (H) 15 - 37 U/L    Alk.  phosphatase 37 (L) 45 - 117 U/L    Protein, total 4.9 (L) 6.4 - 8.2 g/dL    Albumin 3.2 (L) 3.5 - 5.0 g/dL    Globulin 1.7 (L) 2.0 - 4.0 g/dL    A-G Ratio 1.9 1.1 - 2.2     MAGNESIUM    Collection Time: 08/07/20  3:11 AM   Result Value Ref Range    Magnesium 2.2 1.6 - 2.4 mg/dL   CBC W/O DIFF    Collection Time: 08/07/20  3:11 AM   Result Value Ref Range    WBC 12.6 (H) 4.1 - 11.1 K/uL    RBC 3.30 (L) 4.10 - 5.70 M/uL    HGB 10.5 (L) 12.1 - 17.0 g/dL    HCT 31.2 (L) 36.6 - 50.3 %    MCV 94.5 80.0 - 99.0 FL    MCH 31.8 26.0 - 34.0 PG    MCHC 33.7 30.0 - 36.5 g/dL    RDW 14.1 11.5 - 14.5 %    PLATELET 83 (L) 028 - 400 K/uL    MPV 12.2 8.9 - 12.9 FL    NRBC 0.0 0  WBC    ABSOLUTE NRBC 0.00 0.00 - 0.01 K/uL   GLUCOSE, POC    Collection Time: 08/07/20  3:56 AM   Result Value Ref Range    Glucose (POC) 96 65 - 100 mg/dL    Performed by Venkatesh Rasmussen    Collection Time: 08/07/20  3:57 AM   Result Value Ref Range    Glucose 96 mg/dL    Insulin order 0.0 units/hour    Insulin adminstered 0.0 units/hour    Multiplier 0.000     Low target 100 mg/dL    High target 140 mg/dL    D50 order 0.0 ml    D50 administered 0.00 ml    Minutes until next BG 60 min    Order initials gerson     Administered initials gerson     GLSCOM Comments     EKG, 12 LEAD, INITIAL    Collection Time: 08/07/20  3:57 AM   Result Value Ref Range    Ventricular Rate 101 BPM    Atrial Rate 101 BPM    P-R Interval 160 ms    QRS Duration 130 ms    Q-T Interval 406 ms    QTC Calculation (Bezet) 526 ms    Calculated P Axis 64 degrees    Calculated R Axis -11 degrees    Calculated T Axis 30 degrees    Diagnosis       Sinus tachycardia  Right bundle branch block  Abnormal ECG  When compared with ECG of 06-AUG-2020 13:52,  No significant change was found  Confirmed by Debra Andujar P.VRitesh ([id number]) on 8/7/2020 9:35:02 AM     GLUCOSE, POC    Collection Time: 08/07/20  5:02 AM   Result Value Ref Range    Glucose (POC) 112 (H) 65 - 100 mg/dL    Performed by Jerrold Dance RN    GLUCOSTABILIZER    Collection Time: 08/07/20  5:03 AM   Result Value Ref Range    Glucose 112 mg/dL    Insulin order 0.0 units/hour    Insulin adminstered 0.0 units/hour    Multiplier 0.000     Low target 100 mg/dL    High target 140 mg/dL    D50 order 0.0 ml    D50 administered 0.00 ml    Minutes until next BG 60 min    Order initials mw     Administered initials josé     GLSCCOURTNEY Comments     GLUCOSE, POC    Collection Time: 08/07/20  6:06 AM   Result Value Ref Range    Glucose (POC) 106 (H) 65 - 100 mg/dL    Performed by Jerrold Dance RN    GLUCOSTABILIZER    Collection Time: 08/07/20  6:06 AM   Result Value Ref Range    Glucose 106 mg/dL    Insulin order 0.0 units/hour    Insulin adminstered 0.0 units/hour    Multiplier 0.000     Low target 100 mg/dL    High target 140 mg/dL    D50 order 0.0 ml    D50 administered 0.00 ml    Minutes until next BG 60 min    Order initials mw     Administered initials josé     GLSCCOURTNEY Comments     GLUCOSE, POC    Collection Time: 08/07/20  6:56 AM   Result Value Ref Range    Glucose (POC) 107 (H) 65 - 100 mg/dL    Performed by Jerrold Dance RN    GLUCOSTABILIZER    Collection Time: 08/07/20  6:56 AM   Result Value Ref Range    Glucose 107 mg/dL    Insulin order 0.0 units/hour    Insulin adminstered 0.0 units/hour    Multiplier 0.000     Low target 100 mg/dL    High target 140 mg/dL    D50 order 0.0 ml    D50 administered 0.00 ml    Minutes until next BG 60 min    Order initials mw     Administered initials mw     GLSCOM Comments     GLUCOSE, POC    Collection Time: 08/07/20  7:52 AM   Result Value Ref Range    Glucose (POC) 126 (H) 65 - 100 mg/dL    Performed by Danielito Laws    Collection Time: 08/07/20  7:53 AM   Result Value Ref Range    Glucose 126 mg/dL    Insulin order 0.0 units/hour    Insulin adminstered 0.0 units/hour Multiplier 0.000     Low target 100 mg/dL    High target 140 mg/dL    D50 order 0.0 ml    D50 administered 0.00 ml    Minutes until next BG 60 min    Order initials ttb     Administered initials ttb     GLSCOM Comments         Telemetry: SR      Assessment:     Principal Problem:    Ischemic cardiomyopathy (7/31/2020)      Overview: Added automatically from request for surgery [id number]    Active Problems:    CAD (coronary artery disease), native coronary artery (9/21/2010)      Hyperlipidemia (9/21/2010)      Pleural effusion, bilateral (7/31/2020)      CHF, acute on chronic ([facility] [address] (7/31/2020)      Pulmonary embolism ([facility] [address] (7/31/2020)      CAD (coronary artery disease) (8/6/2020)      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 Doing very well. Continue current management. Increase activity.  Pain controlled.      HLD:  -Continue statin    Family

## 2020-08-07 NOTE — PROGRESS NOTES
Comprehensive Nutrition Assessment    Type and Reason for Visit: Initial, RD nutrition re-screen/LOS    Nutrition Recommendations/Plan: Advance to Cardiac diet as medically able    Nutrition Assessment:   Pt admitted with ischemic cardiomyopathy. PMH: CAD, HTN, CHF. Chart reviewed for LOS, case discussed during CCU rounds. Pt is s/p CABG x 4. MST negative for previous nutritional risk factors. Appetite great preop. Labs reviewed, WNL. Will monitor PO intake as diet advances. Malnutrition Assessment:  Malnutrition Status:   No        Estimated Daily Nutrient Needs:  Energy (kcal):  1850 (1412 x 1.3)  Protein (g):  73-88 (1-1.2gPro/kg)       Fluid (ml/day):  1800mL    Nutrition Related Findings:  Meds: pericolace, pepcid, humalog, magox, miralax, KCl. BM 8/6. Wounds:    Surgical wound       Current Nutrition Therapies:   DIET ONE TIME MESSAGE  DIET DIABETIC CLEAR LIQUID    Anthropometric Measures:  · Height:  5' 6\" (167.6 cm)  · Current Body Wt:  73.4 kg (161 lb 13.1 oz)   · Ideal Body Wt:  142 lbs:  114 %   · BMI Category: Overweight (BMI 25.0-29. 9)       Nutrition Diagnosis:   · No nutrition diagnosis at this time related to   as evidenced by        Nutrition Interventions:   Food and/or Nutrient Delivery: Modify current diet  Nutrition Education and Counseling: No recommendations at this time  Coordination of Nutrition Care: No recommendation at this time    Goals:  Pt will advance to solid diet and consume >70% of meals in 3-5 days. Nutrition Monitoring and Evaluation:   Food/Nutrient Intake Outcomes: Diet advancement/tolerance  Physical Signs/Symptoms Outcomes: Biochemical data, Fluid status or edema, Weight, GI status    Discharge Planning:     Too soon to determine     Electronically signed by Rebeka Gupta RD, [address]  on 8/7/2020 at 12:52 PM    Contact: [phone]

## 2020-08-07 NOTE — OP NOTES
[address]  OPERATIVE REPORT    Name:  Nathaniel Valentin  MR#:  [id number]  :  1945  ACCOUNT #:  [de-identified]  DATE OF SERVICE:  2020    PREOPERATIVE DIAGNOSES:  1. Unstable angina. 2.  New York Heart Association class IV systolic heart failure (left ventricular ejection fraction of 20%). 3.  Right ventricle systolic dysfunction and right ventricular ejection fraction of 35%. 4.  Hypertension. 5.  Severe multivessel coronary artery disease. 6.  Mild mitral regurgitation. 7.  History of anterior wall myocardial infarction. POSTOPERATIVE DIAGNOSES:  1. Unstable angina. 2.  New York Heart Association class IV systolic heart failure (left ventricular ejection fraction of 20%). 3.  Right ventricle systolic dysfunction and right ventricular ejection fraction of 35%. 4.  Hypertension. 5.  Severe multivessel coronary artery disease. 6.  Mild mitral regurgitation. 7.  History of anterior wall myocardial infarction. PROCEDURE PERFORMED:  1. Coronary artery bypass grafting x4:  Reverse saphenous vein graft to posterior descending artery, reverse saphenous vein graft to OM2, reverse saphenous vein graft sequenced from OM2 to diagonal, left internal mammary artery to left anterior descending. 2.  Endoscopic harvest of saphenous vein. 3.  Epiaortic ultrasound. 4.  Transesophageal echocardiography. 5.  Insertion of left femoral arterial line. SURGEON:  Yeison Reyes MD    ASSISTANT:  TONY Mas. The assistance of a PA was required due to the complicated nature of the surgery and the skill set for endoscopic vein harvest.    ANESTHESIA:  General endotracheal.    ANESTHESIOLOGIST:  Isabella Kelly DO    COMPLICATIONS:  None. SPECIMENS REMOVED:  None. IMPLANTS:  None. ESTIMATED BLOOD LOSS:  500 mL. DETAILS:  The patient is a 42-year-old gentleman who presented with worsening dyspnea on exertion.   He was found on catheterization to have a severe multivessel coronary artery disease. An MRI revealed that his myocardium was viable and he was referred for CABG. PROCEDURE:  He was prepped and draped in a sterile fashion. A timeout was performed. An incision was made over the sternum. A median sternotomy was performed. The left hemisternum was elevated. The left internal mammary artery was dissected free from left chest wall. It was ligated and divided and wrapped in papaverine-soaked sponge. Simultaneously, the PA began endoscopic harvest of the saphenous vein. A sternal retractor was then placed. Heparin was administered for cardiopulmonary bypass. The pericardium was opened widely. The ascending aorta was inspected with my finger as well as an epiaortic ultrasound probe. It was free and clear of atherosclerotic disease. We proceeded with aortic and right atrial cannulation. We also placed an antegrade and retrograde cardioplegia catheter. We also placed a left ventricular vent via the right superior pulmonary vein. When we had a  therapeutic ACT, we went on cardiopulmonary bypass. We then applied the aortic cross-clamp. We had a prompt diastolic arrest.  We administered antegrade as well as retrograde cardioplegia. We first turned our attention to the PDA, this was a moderate size vessel. We had no issues with this anastomosis. We then turned our attention to the OM2, which was distal to chronic total occlusion. We had no issues with this anastomosis. We then sequenced this vein graft to the diagonal artery. We then turned our attention to the left internal mammary to LAD anastomosis. The distal LAD had a healthy place to perform this anastomosis. We had no complications with this. We then performed our proximal anastomoses on the ascending aorta. We next removed the aortic cross clamp. We weaned from cardiopulmonary bypass using ionotropic support. We began to ventilate. We administered protamine.   We dried up our surgical sites, placed two mediastinal chest tubes and a left pleural chest tubes. We then closed the chest with 8 stainless steel wires. We then closed the soft tissue in three layers.         MD JUSTA Lerma/VINAYAK_[email or web address]_TRISTIAN/BC_GKS  D:  08/06/2020 12:20  T:  08/06/2020 22:54  JOB #:  [id number]

## 2020-08-07 NOTE — DIABETES MGMT
[facility]  CLINICAL NURSE SPECIALIST CONSULT  [facility]    INITIAL NOTE    Presentation   Rita Zhang is a 76 y.o. male admitted on 7/31/2020 with worsening SOB and leg swelling over the last 3 weeks. His PCP referred him to the emergency room. Shortness of breath was occurring at rest and with exertion. He was never a smoker and complained of +1 swelling in both ankles. Current clinical course has been uncomplicated following cardiac bypass x4. DX: Pleural effusion, acute on chronic heart failure, CABG x4 on 8/6  HX: CAD, CHF, HLD, MI with ALISON stent 2010 to the LAD   TX: ABX, GI prevention, PO amio, dobutamine gtt    Had a cardiac cath on 8/3 that showed multivessel disease and ischemic cardiomyopathy so CTS was consulted. EF 20-25%    Diabetes: Patient does not have Diabetes PTA. No family history of Diabetes. Consulted by Provider for advanced diabetes nursing assessment and care, specifically related to   [x] Transitioning off Glucostabilizer     Subjective   I can't lay down flat to sleep, I have a lot of mucous that I cough up.          Objective   Physical exam  General Alert, oriented and in no acute distress. Conversant and cooperative. Vital Signs Afebrile.  NSR   Visit Vitals  /69 (BP 1 Location: Left arm, BP Patient Position: At rest;Sitting)   Pulse 99   Temp 98.1 °F (36.7 °C)   Resp (!) 32   Ht 5' 6\" (1.676 m)   Wt 73.4 kg (161 lb 13.1 oz)   SpO2 98%   BMI 26.12 kg/m²       Laboratory  Lab Results   Component Value Date/Time    Hemoglobin A1c 5.5 08/01/2020 06:26 AM     Lab Results   Component Value Date/Time    LDL, calculated 128 (H) 08/02/2020 05:37 AM     Lab Results   Component Value Date/Time    Creatinine 1.04 08/07/2020 03:11 AM     Lab Results   Component Value Date/Time    Sodium 142 08/07/2020 03:11 AM    Potassium 4.2 08/07/2020 03:11 AM    Chloride 113 (H) 08/07/2020 03:11 AM    CO2 29 08/07/2020 03:11 AM    Anion gap 0 (L) 08/07/2020 03:11 AM Glucose 91 08/07/2020 03:11 AM    BUN 16 08/07/2020 03:11 AM    Creatinine 1.04 08/07/2020 03:11 AM    BUN/Creatinine ratio 15 08/07/2020 03:11 AM    GFR est AA >60 08/07/2020 03:11 AM    GFR est non-AA >60 08/07/2020 03:11 AM    Calcium 7.8 (L) 08/07/2020 03:11 AM    Bilirubin, total 1.4 (H) 08/07/2020 03:11 AM    Alk. phosphatase 37 (L) 08/07/2020 03:11 AM    Protein, total 4.9 (L) 08/07/2020 03:11 AM    Albumin 3.2 (L) 08/07/2020 03:11 AM    Globulin 1.7 (L) 08/07/2020 03:11 AM    A-G Ratio 1.9 08/07/2020 03:11 AM    ALT (SGPT) 37 08/07/2020 03:11 AM     Lab Results   Component Value Date/Time    ALT (SGPT) 37 08/07/2020 03:11 AM       Factors affecting BG pattern  Factor Dose Comments   Nutrition:  Cardiac    Eating 100%   Pain Fentanyl and Dilaudid ordered PRN Patient stated no pain    Infection Ancef 2g WBC 12.6     Blood glucose pattern        Assessment and Plan   Nursing Diagnosis Risk for unstable blood glucose pattern   Nursing Intervention Domain 5250 Decision-making Support   Nursing Interventions Examined current inpatient diabetes control   Explored factors facilitating and impeding inpatient management  Identified self-management practices impeding diabetes control  Informed patient of rational for insulin strategy while hospitalized     Evaluation   This gentleman, who did not have diabetes PTA has achieved inpatient blood glucose target of 100-180mg/dl. Was transitioned off Deepa Newcomb this morning with no basal insulin given. FBS was 91 and BG today have been 120-160s. Since patient did not have diabetes PTA doubt he will require any medications or insulin for discharge. Recommendations   Recommend:    Use correction as needed    Billing Code(s)   I personally reviewed chart, notes, data and current medications in the medical record, and examined the patient at bedside before making care recommendations.      [x] [id number] IP subsequent hospital care - 30 minutes      Ruchi Whitley CNS  [facility]  Access via [employer]

## 2020-08-07 NOTE — PROGRESS NOTES
1900: Bedside and Verbal shift change report given to Nubia Gomez and Sj Mercado (oncoming nurse) by Marta Randall RN (offgoing nurse). Report included the following information SBAR, Kardex, OR Summary, Procedure Summary, Intake/Output, MAR, Recent Results and Cardiac Rhythm APaced/NSR.   2000: Assessment as noted. Pt able to follow commands and verbalize needs. Pain med given per order. Restraints do not apply to this patient. 0700: Bedside and Verbal shift change report given to Russ Awan RN (oncoming nurse) by Nubia Gomez and Glory Merrill (offgoing nurse).  Report included the following information SBAR, Kardex, OR Summary, Procedure Summary, Intake/Output, Accordion, Recent Results and Cardiac Rhythm SR.

## 2020-08-07 NOTE — PROGRESS NOTES
Problem: Self Care Deficits Care Plan (Adult)  Goal: *Acute Goals and Plan of Care (Insert Text)  Description: FUNCTIONAL STATUS PRIOR TO ADMISSION: Patient was independent and active without use of DME. Patient was independent for basic and instrumental ADLs. HOME SUPPORT: The patient lived with wife but did not require assist.    Occupational Therapy Goals  Initiated 8/7/2020  1. Patient will perform ADLs standing 5 mins without fatigue or LOB with supervision/set-up within 7 day(s). 2.  Patient will perform lower body ADLs with supervision/set-up within 7 day(s). 3.  Patient will perform gathering ADL items high and low 2/2 with supervision/set-up within 7 day(s). 4.  Patient will perform toilet transfers with supervision/set-up within 7 day(s). 5.  Patient will perform all aspects of toileting with supervision/set-up within 7 day(s). 6.  Patient will participate in cardiac/sternal upper extremity therapeutic exercise/activities to increase independence with ADLs with supervision/set-up for 5 minutes within 7 day(s). Outcome: Progressing Towards Goal     OCCUPATIONAL THERAPY EVALUATION  Patient: Roldan Flaherty (24 y.o. male)  Date: 8/7/2020  Primary Diagnosis: Pleural effusion, bilateral [J90]  CHF, acute on chronic (HCC) [I50.9]  CAD (coronary artery disease) [I25.10]  Procedure(s) (LRB):  JUN AND EPIAORTIC ULTRASOUND BY DR Herlinda Francisco - ON PUMP CORONARY ARTERY BYPASS GRAFT X 4 WITH GREATER RIGHT SAPHENOUS VEIN AND LEFT INTERNAL MAMMARY ARTERY GRAFTING - EVH (N/A) 1 Day Post-Op   Precautions: Other (comment)(move in the tube)    ASSESSMENT  Based on the objective data described below, the patient presents with impaired balance, decreased activity tolerance, and decreased ADL performance and mobility following admission. Pt is POD 1 s/p CABG x4. She is received up in chair following PT session and is agreeable to participate.  Pt is receptive to education regarding \"move in the tube\" precautions, home safety, ADL adaptations, and energy conservation. Pt is able to demonstrate tailor sit technique to access distal LEs during lower body dressing and is able to stand up with good to fair balance. Anticipate continued progress with acute OT services with goal of returning home with wife. Patient is verbalizing understanding of mindful-based movements (\"move in the tube\") principles of keeping UEs proximal to ribcage to prevent lateral pull on the sternum during load-bearing activities with verbal cues required for compliance. Current Level of Function Impacting Discharge (ADLs/self-care): overall CGA to min A ADLs    Functional Outcome Measure: The patient scored 55/100 on the Barthel outcome measure which is indicative of minimal functional impairment. Other factors to consider for discharge: good safety awareness; supportive family; high PLOF     Patient will benefit from skilled therapy intervention to address the above noted impairments. PLAN :  Recommendations and Planned Interventions: self care training, functional mobility training, therapeutic exercise, balance training, therapeutic activities, endurance activities, patient education, home safety training and family training/education    Frequency/Duration: Patient will be followed by occupational therapy 5 times a week to address goals. Recommendation for discharge: (in order for the patient to meet his/her long term goals)  Occupational therapy at least 2 days/week in the home vs. none    This discharge recommendation:  Has not yet been discussed the attending provider and/or case management    IF patient discharges home will need the following DME: anticipate none       SUBJECTIVE:   Patient stated I feel a little sick to my stomach, but I think I just need to eat.     OBJECTIVE DATA SUMMARY:   HISTORY:   Past Medical History:   Diagnosis Date    Acute MI anterior wall first episode care ([facility] [address] 9/21/2010    CAD (coronary artery disease), native coronary artery 9/21/2010    Coronary stent 9/21/2010    Hyperlipidemia 9/21/2010     Past Surgical History:   Procedure Laterality Date    HX CORONARY ARTERY BYPASS GRAFT  08/06/2020    x 4, LIMA to LAD, RSVG to Diag-OM2, RSVG to PDA       Expanded or extensive additional review of patient history:     Home Situation  Home Environment: Private residence  # Steps to Enter: 4  Rails to Enter: Yes  Hand Rails : Bilateral  One/Two Story Residence: One story  Living Alone: Yes  Support Systems: None  Patient Expects to be Discharged to[de-identified] Private residence  Current DME Used/Available at Home: None  Tub or Shower Type: Tub    Hand dominance: Right    EXAMINATION OF PERFORMANCE DEFICITS:  Cognitive/Behavioral Status:  Neurologic State: Alert  Orientation Level: Oriented X4  Cognition: Follows commands  Perception: Appears intact  Perseveration: No perseveration noted  Safety/Judgement: Fall prevention    Hearing: Auditory  Auditory Impairment: None    Range of Motion:  AROM: Within functional limits    Strength:  Strength: Generally decreased, functional    Coordination:  Coordination: Within functional limits  Fine Motor Skills-Upper: Left Intact; Right Intact    Gross Motor Skills-Upper: Left Intact; Right Intact    Tone & Sensation:  Tone: Normal  Sensation: Intact    Balance:  Sitting: Intact  Standing: Impaired; Without support  Standing - Static: Fair  Standing - Dynamic : Fair;Constant support    Functional Mobility and Transfers for ADLs:  Bed Mobility:  Supine to Sit: (Pt received sitting OOB post PT session)  Scooting: Stand-by assistance    Transfers:  Sit to Stand: Contact guard assistance  Stand to Sit: Contact guard assistance  Toilet Transfer : Contact guard assistance(infer based on observations)    ADL Assessment:  Feeding: Independent    Oral Facial Hygiene/Grooming: Setup;Supervision(seated; infer up to CGA for standing tasks at sink)    Bathing: Contact guard assistance;Minimum assistance(infer for standing bathing tasks)    Upper Body Dressing: Setup;Minimum assistance    Lower Body Dressing: Contact guard assistance(for standing portions; good access to distal LEs via tailor )    Toileting: Contact guard assistance(infer for standing portions of tasks)    ADL Intervention and task modifications:    Lower Body Dressing Assistance  Socks: Supervision(pt with excellent tailor sit)  Leg Crossed Method Used: Yes  Position Performed: Seated in chair  Cues: Don;Verbal cues provided;Visual cues provided    Cognitive Retraining  Safety/Judgement: Fall prevention    Patient instructed and educated on mindful movement principles based on Move in The Tube concept to include maintaining bilateral elbows close to rib cage when performing any load-bearing activity such as getting in/out of bed, pushing up from a chair, opening a door, or lifting a box. Patient was given a handout with diagrams of each correct/incorrect method of performing each of the above tasks. Patient instructed on the ability to utilize upper extremities outside the tube when doing any non-load bearing activity such as washing hair/body, brushing teeth, retrieving clothing items, or scratching your back. Patient encouraged to also perform upper extremity exercises \"outside of the tube\" to prevent scar tissue formation around sternal incision site. Patient instructed in detail about activities to heed with caution, allowing pain to be the guide. These activities include but are not limited to: mowing the lawn, riding a bike, walking a dog, lifting a child, workshop hobbies, golfing, sexual activity, vacuuming, fishing, scrubbing the floors, and moving furniture. Patient was given the [address] in the [facility] handout to describe each of these activities in detail. Patient instructed no asymmetrical reaching over head to ensure B UEs when shoulders >90* i.e. reaching in cabinets and dressing. Instruction on upper body dressing techniques of over head, then arms through to decrease pain and unilateral shoulder flexion >90*. Instruction on the benefits of utilizing B UEs during functional tasks i.e. opening the fridge, stepping into the tub. May have to adjust home setup to increase ease with items closer to waist height to prevent deep bending and the automatic  of asymmetrical UE WB/pushing for stabilization during bending. Benefit to don clothing tailor sitting and don all clothing while sitting prior to standing. Patient demonstrated sock mgmt with Supervision. Instruction and indicated understanding on the benefits of loose clothing throughout to accommodate for post surgical swelling, decreased ROM and increased pain. Instruction and indicated understanding the technique of pull over shirt versus front open clothing. Increase activity tolerance for home, work, and sexual intercourse by pacing self with increasing the arm exercises, sitting duration, frequency OOB, walking, standing, and ADLs. Instructed and indicated understanding of s/s of too much activity, how to respond to s/s safely. Functional Measure:  Barthel Index:    Bathin  Bladder: 0(renner)  Bowels: 10  Groomin  Dressin  Feeding: 10  Mobility: 10  Stairs: 0  Toilet Use: 5  Transfer (Bed to Chair and Back): 10  Total: 55/100        The Barthel ADL Index: Guidelines  1. The index should be used as a record of what a patient does, not as a record of what a patient could do. 2. The main aim is to establish degree of independence from any help, physical or verbal, however minor and for whatever reason. 3. The need for supervision renders the patient not independent. 4. A patient's performance should be established using the best available evidence. Asking the patient, friends/relatives and nurses are the usual sources, but direct observation and common sense are also important.  However direct testing is not needed. 5. Usually the patient's performance over the preceding 24-48 hours is important, but occasionally longer periods will be relevant. 6. Middle categories imply that the patient supplies over 50 per cent of the effort. 7. Use of aids to be independent is allowed. Randee Pizarro., Barthel, D.W. ([id number]). Functional evaluation: the Barthel Index. [address] [facility] (14)2. BREANA Rollins, Hue Denis, Neeraj Rose., [place], [address] (1999). Measuring the change indisability after inpatient rehabilitation; comparison of the responsiveness of the Barthel Index and Functional [place] Measure. Journal of Neurology, Neurosurgery, and Psychiatry, 66(4), 944-491. Shirlene Galdamez, [place]AD[place], ZHENG Johnson, & Karlene Bowens MRiteshA. (2004.) Assessment of post-stroke quality of life in cost-effectiveness studies: The usefulness of the Barthel Index and the EuroQoL-5D. Quality of Life Research, 15, 386-95     Occupational Therapy Evaluation Charge Determination   History Examination Decision-Making   LOW Complexity : Brief history review  MEDIUM Complexity : 3-5 performance deficits relating to physical, cognitive , or psychosocial skils that result in activity limitations and / or participation restrictions LOW Complexity : No comorbidities that affect functional and no verbal or physical assistance needed to complete eval tasks       Based on the above components, the patient evaluation is determined to be of the following complexity level: LOW   Pain Rating:  Pt reporting minimal to moderate pain; c/c of nausea, but improved once he began to eat    Activity Tolerance:   Fair and requires rest breaks  Please refer to the flowsheet for vital signs taken during this treatment. After treatment patient left in no apparent distress:    Sitting in chair and Call bell within reach    COMMUNICATION/EDUCATION:   The patients plan of care was discussed with: Physical therapist and Registered nurse.      Home safety education was provided and the patient/caregiver indicated understanding., Patient/family have participated as able in goal setting and plan of care. and Patient/family agree to work toward stated goals and plan of care. This patients plan of care is appropriate for delegation to [facility].     Thank you for this referral.  Daniel Lui OT  Time Calculation: 26 mins

## 2020-08-07 NOTE — PROGRESS NOTES
21:30  Dr Leslie Catalan call unit for patient update. Plan to wean Epi if CI > 2.5. Dobutamine to remain @ 5 mcg overnight. If able to keep Epi off and index remains >2.5 then able to deline and remove art line in am.      22:00 CVP 4  And PAD 8. Albumin 500 cc given    23:00 CI 3.7. Epi decreased to 1 mcg    23:15  Startles awake. CVP jumps to 18. . Holding breath, tense muscles. Repositioned and pain medicine given. 23:30  Pt relaxed, no longer holding breath no tensing up. CVP return to 7 and . CI remains 3.6    01:00 Epi stopped    0130  CVP 8.  CI 4.     04:00  Pt awakened in severe pain and extremely anxious. RR 44. . CI drops to 2.2 (from 3) and CVP increases to 25. PAP 60/40. Pain medicine given. 04:30  Lt groin sheath removed per protocol  Pain improved. Hemodynamics returning to baseline. 06:30 Pulmonary artery catheter removed per protocol. Patient hemodynamically stable off vasopressors for four or more hours with the current values:  SVO2 72  PAD 18  CVP 8  CI 3.0  Systolic arterial blood pressure 138  Chest tube drainage remains <50 ml per hour and urine output remains ? 30 ml per hour. Patient maintaining SpO2 > 94% on 4L BNC . Patient has underlying cardiac rhythm and is not pacer dependent. 06:45  Telemetry SR .  5 mcg.  sanguinous drainage and  for shift. Poor pain control and high anxiety, requiring multiple doses of fentanyl for relief. 07:00 Up to chair. CT with only 20 cc  When standing and return to chair.

## 2020-08-07 NOTE — PROGRESS NOTES
ANNA Plan:  Home  Follow up appointments  2nd IM Letter at d/c  Patient is looking for a new PCP. He will establish appt with new PCP. CM reviewed pt chart. Pt POD1-  CABG x4. On RA , O2 sats 95%, OOB in chair. CM will continue to follow pt for D/C planning and arrange services as deemed necessary.     [address] [facility]  Phone: [phone]

## 2020-08-07 NOTE — PROGRESS NOTES
Problem: Mobility Impaired (Adult and Pediatric)  Goal: *Acute Goals and Plan of Care (Insert Text)  Description: FUNCTIONAL STATUS PRIOR TO ADMISSION: Patient was independent and active without use of DME.     HOME SUPPORT PRIOR TO ADMISSION: The patient lived alone with no local support. Physical Therapy Goals  Initiated 8/7/2020  1. Patient will move from supine to sit and sit to supine  in bed with independence within 5 days. 2.  Patient will perform sit to/from stand with independence within 5 days. 3.  Patient will ambulate 400 feet with least restrictive assistive device and independence within 5 days. 4.  Patient will ascend/descend 4 stairs with bilateral handrail(s) with modified independence within 5 days. 5.  Patient will perform cardiac exercises per protocol with independence within 5 days. 6.  Patient will verbally recall and functionally demonstrate mindful-based movements (\"move in the tube\") principles without cues within 5 days. Outcome: Progressing Towards Goal  PHYSICAL THERAPY EVALUATION  Patient: Valrie Spurling (16 y.o. male)  Date: 8/7/2020  Primary Diagnosis: Pleural effusion, bilateral [J90]  CHF, acute on chronic (HCC) [I50.9]  CAD (coronary artery disease) [I25.10]  Procedure(s) (LRB):  JUN AND EPIAORTIC ULTRASOUND BY DR Chu Mansfield - ON PUMP CORONARY ARTERY BYPASS GRAFT X 4 WITH GREATER RIGHT SAPHENOUS VEIN AND LEFT INTERNAL MAMMARY ARTERY GRAFTING - EVH (N/A) 1 Day Post-Op   Precautions: Other (comment)(move in the tube)      ASSESSMENT  Based on the objective data described below, the patient presents with good mobility but decreased activity tolerance limiting independence. Patient was received in sitting in NAD on RA. Patient transferred sit > stand and ambulated CGA with cardiac cart and VSS. Patient's respiratory rate increased to 38 but O2 sats remained stable above 90% during gait training. Performed pursed lip breathing to decrease RR.  He transferred stand > sit CGA and completed cardiac UE exercises in sitting with VSS. Patient was educated on \"move in the tube\"  and able to verbalize precautions. Patient was left in sitting with all needs in reach and will continue to benefit from skilled therapy. Patient is verbalizing and is demonstrating understanding of mindful-based movements (\"move in the tube\") principles of keeping UEs proximal to ribcage to prevent lateral pull on the sternum during load-bearing activities with verbal cues required for compliance. Current Level of Function Impacting Discharge (mobility/balance): CGA    Functional Outcome Measure: The patient scored 55/100 on the Barthel outcome measure which is indicative of 45% impairment. Other factors to consider for discharge: Patient lives alone and verbalized he has minimal help at home. Patient will benefit from skilled therapy intervention to address the above noted impairments. PLAN :  Recommendations and Planned Interventions: bed mobility training, transfer training, gait training, therapeutic exercises, and therapeutic activities      Frequency/Duration: Patient will be followed by physical therapy:  Daily to address goals. Recommendation for discharge: (in order for the patient to meet his/her long term goals)  To be determined: Home health vs outpatient cardiac when cleared by MD pending patient progress     This discharge recommendation:  Has not yet been discussed the attending provider and/or case management    IF patient discharges home will need the following DME: none         SUBJECTIVE:   Patient stated I live by myself.     OBJECTIVE DATA SUMMARY:   Patient mobilized on continuous portable monitor/telemetry.   HISTORY:    Past Medical History:   Diagnosis Date    Acute MI anterior wall first episode care [facility]) 9/21/2010    CAD (coronary artery disease), native coronary artery 9/21/2010    Coronary stent 9/21/2010    Hyperlipidemia 9/21/2010     Past Surgical History:   Procedure Laterality Date    HX CORONARY ARTERY BYPASS GRAFT  08/06/2020    x 4, LIMA to LAD, RSVG to Diag-OM2, RSVG to PDA       Personal factors and/or comorbidities impacting plan of care: Patient lives alone and very active at [address]: Private residence  # Steps to Enter: 4  Rails to Enter: Yes  Hand Rails : Bilateral  One/Two Story Residence: One story  Living Alone: Yes  Support Systems: None  Patient Expects to be Discharged to[de-identified] Private residence  Current DME Used/Available at Home: None  Tub or Shower Type: Tub    EXAMINATION/PRESENTATION/DECISION MAKING:     Critical Behavior:  Neurologic State: Alert  Orientation Level: Oriented X4  Cognition: Follows commands  Safety/Judgement: Fall prevention  Hearing:   Auditory  Auditory Impairment: None  Skin:  Dressing intact   Edema: None   Range Of Motion:  AROM: Within functional limits                       Strength:    Strength: Generally decreased, functional                    Tone & Sensation:   Tone: Normal              Sensation: Intact               Coordination:  Coordination: Within functional limits  Vision:      Functional Mobility:  Bed Mobility:     Supine to Sit: (Pt received sitting OOB post PT session)        Transfers:  Sit to Stand: Contact guard assistance  Stand to Sit: Contact guard assistance                       Balance:   Sitting: Intact  Standing: Impaired  Standing - Static: Fair  Standing - Dynamic : Fair;Constant support  Ambulation/Gait Training:  Distance (ft): 300 Feet (ft)  Assistive Device: (cardiac cart)  Ambulation - Level of Assistance: Contact guard assistance        Gait Abnormalities: Decreased step clearance        Base of Support: Widened     Speed/Elisha: Slow           Cardiac diagnosis intervention:  Patient instructed and educated on mindful movement principles based on Move in The Tube concept to include maintaining bilateral elbows close to rib cage when performing any load-bearing activity such as getting in/out of bed, pushing up from a chair, opening a door, or lifting a box. Patient was given a handout with diagrams of each correct/incorrect method of performing each of the above tasks. Therapeutic Exercises:   Patient instructed on the benefits and demonstrated cardiac exercises while in sitting with Supervision. Instructed and indicated understanding on how to progress reps, sets against gravity, pacing through progressive muscle strengthening standing based on surgeon clearance for more weight in prep for basic and instrumental ADLs. Instruction on the use of household items in place of weights as needed. CARDIAC   EXERCISE    Sets    Reps    Active  Active Assist    Passive  Self ROM    Comments    Shoulder flexion  1  10  [x]                            []                             []                             []                                Shoulder abduction  1  10 [x]                             []                             []                             []                                Scapular elevation  1  10 [x]                             []                              []                             []                                Scapular retraction  1  10 [x]                             []                             []                             []                                Trunk rotation  1  10 [x]                             []                             []                             []                                Trunk sidebending  1  10 [x]                             []                              []                             []                                          Functional Measure:  Barthel Index:    Bathin  Bladder: 0  Bowels: 10  Groomin  Dressin  Feeding: 10  Mobility: 10  Stairs: 0  Toilet Use: 5  Transfer (Bed to Chair and Back): 10  Total: 55/100       The Barthel ADL Index: Guidelines  1.  The index should be used as a record of what a patient does, not as a record of what a patient could do. 2. The main aim is to establish degree of independence from any help, physical or verbal, however minor and for whatever reason. 3. The need for supervision renders the patient not independent. 4. A patient's performance should be established using the best available evidence. Asking the patient, friends/relatives and nurses are the usual sources, but direct observation and common sense are also important. However direct testing is not needed. 5. Usually the patient's performance over the preceding 24-48 hours is important, but occasionally longer periods will be relevant. 6. Middle categories imply that the patient supplies over 50 per cent of the effort. 7. Use of aids to be independent is allowed. Randy Guadalupe., Barthel, D.W. (6090). Functional evaluation: the Barthel Index. [facility] (14)2. BREANA Felix, Regino Fairbanks., Lisha Neves., [place], [address] [place] [address] (1999). Measuring the change indisability after inpatient rehabilitation; comparison of the responsiveness of the Barthel Index and Functional [place] Measure. Journal of Neurology, Neurosurgery, and Psychiatry, 66(4), 977-112. Reba Liu, N.J.A, ZHENG Johnson, & Eula Hill MELLIE. (2004.) Assessment of post-stroke quality of life in cost-effectiveness studies: The usefulness of the Barthel Index and the EuroQoL-5D.  Quality of Life Research, 15, 813-23            Physical Therapy Evaluation Charge Determination   History Examination Presentation Decision-Making   MEDIUM  Complexity : 1-2 comorbidities / personal factors will impact the outcome/ POC  LOW Complexity : 1-2 Standardized tests and measures addressing body structure, function, activity limitation and / or participation in recreation  LOW Complexity : Stable, uncomplicated  LOW Complexity : FOTO score of       Based on the above components, the patient evaluation is determined to be of the following complexity level: LOW     Pain Rating:  No complaints     Activity Tolerance:   Good  Please refer to the flowsheet for vital signs taken during this treatment. After treatment patient left in no apparent distress:   Sitting in chair and Call bell within reach    COMMUNICATION/EDUCATION:   The patients plan of care was discussed with: Occupational therapist and Registered nurse. Fall prevention education was provided and the patient/caregiver indicated understanding. and Patient/family agree to work toward stated goals and plan of care.     Thank you for this referral.  Cinthia Smith PT, DPT   Time Calculation: 28 mins

## 2020-08-07 NOTE — PROGRESS NOTES
[facility] ICU Progress Note    Admit Date: 2020  POD:  1 Day Post-Op    Procedure:  Procedure(s):  JUN AND EPIAORTIC ULTRASOUND BY DR Terra Alston - ON PUMP CORONARY ARTERY BYPASS GRAFT X 4 WITH GREATER RIGHT SAPHENOUS VEIN AND LEFT INTERNAL MAMMARY ARTERY GRAFTING - EVH        Subjective:   [facility] On dobut 5. Tmax 99.9. Objective:   Vitals:  Blood pressure 108/59, pulse 100, temperature 97.7 °F (36.5 °C), resp. rate (!) 36, height 5' 6\" (1.676 m), weight 161 lb 13.1 oz (73.4 kg), SpO2 95 %. Temp (24hrs), Av.1 °F (37.3 °C), Min:97.6 °F (36.4 °C), Max:99.9 °F (37.7 °C)    EKG/Rhythm:  Sinus tach    CT Output:  990cc/24hr    Oxygen Therapy:  Oxygen Therapy  O2 Sat (%): 95 % (20 0800)  Pulse via Oximetry: 100 beats per minute (20 0800)  O2 Device: Room air (20 0800)  O2 Flow Rate (L/min): 1 l/min (20 0400)  FIO2 (%): 50 % (20 1655)    CXR: improved aeration    Admission Weight: Last Weight   Weight: 163 lb 12.8 oz (74.3 kg) Weight: 161 lb 13.1 oz (73.4 kg)     Intake / Output / Drain:  Current Shift:  0701 -  1900  In: 240 [P.O.:240]  Out: 250 [Urine:150; Drains:100]  Last 24 hrs.:     Intake/Output Summary (Last 24 hours) at 2020 0911  Last data filed at 2020 0843  Gross per 24 hour   Intake 6097.66 ml   Output 4950 ml   Net 1147.66 ml       EXAM:  General:    NAD                                                                                          Lungs:   Clear to auscultation bilaterally. Incision:  No erythema, drainage or swelling. Heart:  Regular rate and rhythm   Abdomen:   Soft, non-tender. Bowel sounds normal. No masses,  No organomegaly. Extremities:  No edema. PPP. Neurologic:  Gross motor and sensory apparatus intact.      Labs:   Recent Labs     20  0752  20  0311  20  1322   WBC  --   --  12.6*   < > 13.7*   HGB  --   --  10.5*   < > 13.8   HCT  --   --  31.2*   < > 41.5   PLT  --   -- 83*   < > 108*   NA  --   --  142   < >  --    K  --   --  4.2   < >  --    BUN  --   --  16   < >  --    CREA  --   --  1.04   < >  --    GLU  --   --  91   < >  --    GLUCPOC 126*   < >  --    < >  --    INR  --   --   --   --  1.2*    < > = values in this interval not displayed. Assessment:     Principal Problem:    Ischemic cardiomyopathy (2020)      Overview: Added automatically from request for surgery [id number]    Active Problems:    CAD (coronary artery disease), native coronary artery (2010)      Hyperlipidemia (2010)      Pleural effusion, bilateral (2020)      CHF, acute on chronic ([facility] [address] (2020)      Pulmonary embolism ([facility] (2020)      CAD (coronary artery disease) (2020)      S/P CABG x 4 (2020)      Overview: x 4, LIMA to LAD, RSVG to Diag-OM2, RSVG to PDA         Plan/Recommendations/Medical Decision Makin. CAD s/p CABG: On Dobutamine 5, will wean down 1mcg/day. On ASA, statin, will start BB once off dobut  2. Acute on chronic systolic heart failure: EF 20-25% on TTE preop. Will resume home HF meds as able once off dobut  3. HLD: on statin  4. Thrombocytopenia: Plts 83K, follow  5.  Dispo: PT/OT, keep in unit today, wean dobut to 4mcg      Signed By: Huyen Manriquez PA-C

## 2020-08-07 NOTE — PROGRESS NOTES
8/7/2020    INTENSIVIST PROGRESS NOTE:     Patient seen and evaluated, chart reviewed   75 yo male with severe CMP, CAD, today s/p CABG x 4 on 8/6      ROS: unable to obtain    Visit Vitals  /59 (BP 1 Location: Left arm, BP Patient Position: At rest;Sitting)   Pulse 100   Temp 97.7 °F (36.5 °C)   Resp (!) 36   Ht 5' 6\" (1.676 m)   Wt 73.4 kg (161 lb 13.1 oz)   SpO2 95%   BMI 26.12 kg/m²       General: awake in chair . ra sat 97 %  Eating jello   Eyes: anicteric  HEENT:eating   Neck: FROM  CV: RRR  Lungs:  Decreased bs due to splinting   Abd: soft  : no flank pain  Ext: no edema  Skin: no rash  Musculoskeletal: normal inspection  Neuro:   A and O  Nl     CXR:  Report says bilateral pleural effusions//  Not able to view image ( IT working on it )     Labs reviewed    A/P:  - post op  Vent mgt - weaned per protocol  - splinting - pain control and is and mobilization   - CAD: s/p CABG  - severe CMP: diurese as needed as directed by cvsurg   - wean pressors - on low dose dlbuta  - chest tubes in place  - glycemic control  - pain control  - PUD/DVT prophylaxis  -  Gerber renner when able   - Will assist on disposition planning when stable for transfer  - par will sign off on tranfer out. Please call if needed .    - he denies pulm hx of copd or asthma and does not smoke or William MD [place]

## 2020-08-08 ENCOUNTER — APPOINTMENT (OUTPATIENT)
Dept: GENERAL RADIOLOGY | Age: 75
DRG: 233 | End: 2020-08-08
Attending: PHYSICIAN ASSISTANT
Payer: MEDICARE

## 2020-08-08 LAB
ALBUMIN SERPL-MCNC: 3.9 G/DL (ref 3.5–5)
ALBUMIN/GLOB SERPL: 1.6 {RATIO} (ref 1.1–2.2)
ALP SERPL-CCNC: 46 U/L (ref 45–117)
ALT SERPL-CCNC: 24 U/L (ref 12–78)
ANION GAP SERPL CALC-SCNC: 4 MMOL/L (ref 5–15)
ANION GAP SERPL CALC-SCNC: 8 MMOL/L (ref 5–15)
AST SERPL-CCNC: 36 U/L (ref 15–37)
BILIRUB SERPL-MCNC: 0.8 MG/DL (ref 0.2–1)
BUN SERPL-MCNC: 19 MG/DL (ref 6–20)
BUN SERPL-MCNC: 21 MG/DL (ref 6–20)
BUN/CREAT SERPL: 16 (ref 12–20)
BUN/CREAT SERPL: 16 (ref 12–20)
CALCIUM SERPL-MCNC: 8.3 MG/DL (ref 8.5–10.1)
CALCIUM SERPL-MCNC: 8.4 MG/DL (ref 8.5–10.1)
CHLORIDE SERPL-SCNC: 100 MMOL/L (ref 97–108)
CHLORIDE SERPL-SCNC: 100 MMOL/L (ref 97–108)
CO2 SERPL-SCNC: 24 MMOL/L (ref 21–32)
CO2 SERPL-SCNC: 28 MMOL/L (ref 21–32)
CREAT SERPL-MCNC: 1.17 MG/DL (ref 0.7–1.3)
CREAT SERPL-MCNC: 1.3 MG/DL (ref 0.7–1.3)
ERYTHROCYTE [DISTWIDTH] IN BLOOD BY AUTOMATED COUNT: 14.6 % (ref 11.5–14.5)
GLOBULIN SER CALC-MCNC: 2.5 G/DL (ref 2–4)
GLUCOSE BLD STRIP.AUTO-MCNC: 136 MG/DL (ref 65–100)
GLUCOSE BLD STRIP.AUTO-MCNC: 136 MG/DL (ref 65–100)
GLUCOSE BLD STRIP.AUTO-MCNC: 159 MG/DL (ref 65–100)
GLUCOSE BLD STRIP.AUTO-MCNC: 166 MG/DL (ref 65–100)
GLUCOSE SERPL-MCNC: 147 MG/DL (ref 65–100)
GLUCOSE SERPL-MCNC: 179 MG/DL (ref 65–100)
HCT VFR BLD AUTO: 34 % (ref 36.6–50.3)
HGB BLD-MCNC: 11 G/DL (ref 12.1–17)
MAGNESIUM SERPL-MCNC: 2.5 MG/DL (ref 1.6–2.4)
MCH RBC QN AUTO: 31.3 PG (ref 26–34)
MCHC RBC AUTO-ENTMCNC: 32.4 G/DL (ref 30–36.5)
MCV RBC AUTO: 96.9 FL (ref 80–99)
NRBC # BLD: 0 K/UL (ref 0–0.01)
NRBC BLD-RTO: 0 PER 100 WBC
PLATELET # BLD AUTO: 94 K/UL (ref 150–400)
PMV BLD AUTO: 12 FL (ref 8.9–12.9)
POTASSIUM SERPL-SCNC: 4 MMOL/L (ref 3.5–5.1)
POTASSIUM SERPL-SCNC: 4.6 MMOL/L (ref 3.5–5.1)
PROT SERPL-MCNC: 6.4 G/DL (ref 6.4–8.2)
RBC # BLD AUTO: 3.51 M/UL (ref 4.1–5.7)
SERVICE CMNT-IMP: ABNORMAL
SODIUM SERPL-SCNC: 132 MMOL/L (ref 136–145)
SODIUM SERPL-SCNC: 132 MMOL/L (ref 136–145)
WBC # BLD AUTO: 17.3 K/UL (ref 4.1–11.1)

## 2020-08-08 PROCEDURE — 74011636637 HC RX REV CODE- 636/637: Performed by: THORACIC SURGERY (CARDIOTHORACIC VASCULAR SURGERY)

## 2020-08-08 PROCEDURE — 74011250637 HC RX REV CODE- 250/637: Performed by: PHYSICIAN ASSISTANT

## 2020-08-08 PROCEDURE — 80053 COMPREHEN METABOLIC PANEL: CPT

## 2020-08-08 PROCEDURE — 74011000250 HC RX REV CODE- 250: Performed by: PHYSICIAN ASSISTANT

## 2020-08-08 PROCEDURE — 74011000258 HC RX REV CODE- 258: Performed by: THORACIC SURGERY (CARDIOTHORACIC VASCULAR SURGERY)

## 2020-08-08 PROCEDURE — 85027 COMPLETE CBC AUTOMATED: CPT

## 2020-08-08 PROCEDURE — 65620000000 HC RM CCU GENERAL

## 2020-08-08 PROCEDURE — 77030012390 HC DRN CHST BTL GTNG -B

## 2020-08-08 PROCEDURE — 82962 GLUCOSE BLOOD TEST: CPT

## 2020-08-08 PROCEDURE — 36415 COLL VENOUS BLD VENIPUNCTURE: CPT

## 2020-08-08 PROCEDURE — 97110 THERAPEUTIC EXERCISES: CPT

## 2020-08-08 PROCEDURE — 97530 THERAPEUTIC ACTIVITIES: CPT

## 2020-08-08 PROCEDURE — 97116 GAIT TRAINING THERAPY: CPT

## 2020-08-08 PROCEDURE — 74011250636 HC RX REV CODE- 250/636: Performed by: THORACIC SURGERY (CARDIOTHORACIC VASCULAR SURGERY)

## 2020-08-08 PROCEDURE — 83735 ASSAY OF MAGNESIUM: CPT

## 2020-08-08 PROCEDURE — 74011250636 HC RX REV CODE- 250/636: Performed by: PHYSICIAN ASSISTANT

## 2020-08-08 PROCEDURE — 71045 X-RAY EXAM CHEST 1 VIEW: CPT

## 2020-08-08 PROCEDURE — 74011250637 HC RX REV CODE- 250/637: Performed by: THORACIC SURGERY (CARDIOTHORACIC VASCULAR SURGERY)

## 2020-08-08 RX ORDER — FAMOTIDINE 20 MG/1
20 TABLET, FILM COATED ORAL
Status: DISCONTINUED | OUTPATIENT
Start: 2020-08-09 | End: 2020-08-10

## 2020-08-08 RX ORDER — FUROSEMIDE 40 MG/1
40 TABLET ORAL ONCE
Status: ACTIVE | OUTPATIENT
Start: 2020-08-08 | End: 2020-08-08

## 2020-08-08 RX ADMIN — DEXTROSE MONOHYDRATE 1 MG/MIN: 50 INJECTION, SOLUTION INTRAVENOUS at 00:09

## 2020-08-08 RX ADMIN — CHLORHEXIDINE GLUCONATE 0.12% ORAL RINSE 10 ML: 1.2 LIQUID ORAL at 09:00

## 2020-08-08 RX ADMIN — Medication 10 ML: at 21:48

## 2020-08-08 RX ADMIN — OXYCODONE 5 MG: 5 TABLET ORAL at 17:40

## 2020-08-08 RX ADMIN — ATORVASTATIN CALCIUM 20 MG: 20 TABLET, FILM COATED ORAL at 21:48

## 2020-08-08 RX ADMIN — OXYCODONE 5 MG: 5 TABLET ORAL at 07:53

## 2020-08-08 RX ADMIN — DOCUSATE SODIUM - SENNOSIDES 1 TABLET: 50; 8.6 TABLET, FILM COATED ORAL at 17:40

## 2020-08-08 RX ADMIN — OXYCODONE 10 MG: 5 TABLET ORAL at 00:11

## 2020-08-08 RX ADMIN — INSULIN LISPRO 2 UNITS: 100 INJECTION, SOLUTION INTRAVENOUS; SUBCUTANEOUS at 17:40

## 2020-08-08 RX ADMIN — OXYCODONE 10 MG: 5 TABLET ORAL at 04:07

## 2020-08-08 RX ADMIN — MUPIROCIN: 20 OINTMENT TOPICAL at 11:16

## 2020-08-08 RX ADMIN — Medication 10 ML: at 14:10

## 2020-08-08 RX ADMIN — CHLORHEXIDINE GLUCONATE 0.12% ORAL RINSE 10 ML: 1.2 LIQUID ORAL at 21:49

## 2020-08-08 RX ADMIN — INSULIN LISPRO 2 UNITS: 100 INJECTION, SOLUTION INTRAVENOUS; SUBCUTANEOUS at 13:03

## 2020-08-08 RX ADMIN — MAGNESIUM OXIDE 400 MG (241.3 MG MAGNESIUM) TABLET 400 MG: TABLET at 17:40

## 2020-08-08 RX ADMIN — ONDANSETRON 4 MG: 2 INJECTION INTRAMUSCULAR; INTRAVENOUS at 13:03

## 2020-08-08 RX ADMIN — DEXTROSE MONOHYDRATE 0.5 MG/MIN: 50 INJECTION, SOLUTION INTRAVENOUS at 06:08

## 2020-08-08 RX ADMIN — ONDANSETRON 4 MG: 2 INJECTION INTRAMUSCULAR; INTRAVENOUS at 08:45

## 2020-08-08 RX ADMIN — Medication 10 ML: at 05:14

## 2020-08-08 RX ADMIN — MUPIROCIN: 20 OINTMENT TOPICAL at 17:40

## 2020-08-08 RX ADMIN — ONDANSETRON 4 MG: 2 INJECTION INTRAMUSCULAR; INTRAVENOUS at 04:07

## 2020-08-08 NOTE — PROGRESS NOTES
Cardiology Progress Note              [address] [facility], [place], [address] [facility]  [phone]    8/8/2020 9:24 AM    Admit Date: 7/31/2020    Admit Diagnosis: Pleural effusion, bilateral [J90];CHF, acute on chronic (HCC) [I50.9];CAD (coronary artery disease) [I25.10]    Subjective: Nati Perez  Had nausea and emesis overnight.  Having pvcs    Visit Vitals  BP 96/72   Pulse 84   Temp 97.7 °F (36.5 °C)   Resp 22   Ht 5' 6\" (1.676 m)   Wt 161 lb 13.1 oz (73.4 kg)   SpO2 94%   BMI 26.12 kg/m²     Current Facility-Administered Medications   Medication Dose Route Frequency    amiodarone (CORDARONE) 375 mg/250 mL D5W infusion  1 mg/min IntraVENous TITRATE    amiodarone (CORDARONE) tablet 400 mg  400 mg Oral BID    alteplase (CATHFLO) 1 mg in sterile water (preservative free) 1 mL injection  1 mg InterCATHeter PRN    bacitracin 500 unit/gram packet 1 Packet  1 Packet Topical PRN    sodium chloride (NS) flush 5-40 mL  5-40 mL IntraVENous Q8H    sodium chloride (NS) flush 5-40 mL  5-40 mL IntraVENous PRN    0.45% sodium chloride infusion  10 mL/hr IntraVENous CONTINUOUS    0.9% sodium chloride infusion  9 mL/hr IntraVENous CONTINUOUS    oxyCODONE IR (ROXICODONE) tablet 5 mg  5 mg Oral Q4H PRN    oxyCODONE IR (ROXICODONE) tablet 10 mg  10 mg Oral Q4H PRN    naloxone (NARCAN) injection 0.4 mg  0.4 mg IntraVENous PRN    mupirocin (BACTROBAN) 2 % ointment   Both Nostrils BID    amiodarone (CORDARONE) tablet 400 mg  400 mg Oral Q12H    ondansetron (ZOFRAN) injection 4 mg  4 mg IntraVENous Q4H PRN    albuterol (PROVENTIL VENTOLIN) nebulizer solution 2.5 mg  2.5 mg Nebulization Q4H PRN    aspirin chewable tablet 81 mg  81 mg Oral DAILY    midazolam (VERSED) injection 1 mg  1 mg IntraVENous Q1H PRN    chlorhexidine (PERIDEX) 0.12 % mouthwash 10 mL  10 mL Oral Q12H    famotidine (PEPCID) tablet 20 mg  20 mg Oral Q12H    magnesium oxide (MAG-OX) tablet 400 mg  400 mg Oral BID    calcium chloride 1 g in 0.9% sodium chloride 250 mL IVPB  1 g IntraVENous PRN    bisacodyL (DULCOLAX) suppository 10 mg  10 mg Rectal DAILY PRN    senna-docusate (PERICOLACE) 8.6-50 mg per tablet 1 Tab  1 Tab Oral BID    polyethylene glycol (MIRALAX) packet 17 g  17 g Oral DAILY    ELECTROLYTE REPLACEMENT NOTE: Nurse to review Serum Potassium and Magnesuim levels and Initiate Electrolyte Replacement Protocol as needed  1 Each Other PRN    magnesium sulfate 1 g/100 ml IVPB (premix or compounded)  1 g IntraVENous PRN    glucose chewable tablet 16 g  4 Tab Oral PRN    dextrose (D50W) injection syrg 12.5-25 g  12.5-25 g IntraVENous PRN    glucagon (GLUCAGEN) injection 1 mg  1 mg IntraMUSCular PRN    diphenhydrAMINE (BENADRYL) capsule 25 mg  25 mg Oral Q6H PRN    diphenhydrAMINE (BENADRYL) injection 25 mg  25 mg IntraVENous Q6H PRN    HYDROmorphone (PF) (DILAUDID) injection 0.5 mg  0.5 mg IntraVENous Q2H PRN    HYDROmorphone (PF) (DILAUDID) injection 1 mg  1 mg IntraVENous Q2H PRN    melatonin tablet 3 mg  3 mg Oral QHS PRN    fentaNYL citrate (PF) injection 25 mcg  25 mcg IntraVENous Q2H PRN    0.9% sodium chloride infusion  50 mL/hr IntraVENous CONTINUOUS    albumin human 5% (BUMINATE) solution 12.5 g  12.5 g IntraVENous PRN    insulin lispro (HUMALOG) injection   SubCUTAneous AC&HS    DOBUTamine (DOBUTREX) 500 mg/250 mL (2,000 mcg/mL) infusion  2 mcg/kg/min IntraVENous TITRATE    atorvastatin (LIPITOR) tablet 20 mg  20 mg Oral QHS         Objective:      Visit Vitals  BP 96/72   Pulse 84   Temp 97.7 °F (36.5 °C)   Resp 22   Ht 5' 6\" (1.676 m)   Wt 161 lb 13.1 oz (73.4 kg)   SpO2 94%   BMI 26.12 kg/m²       Physical Exam:  Abdomen: soft, non-tender  Extremities: extremities normal  Heart: regular rate and rhythm  Lungs: clear to auscultation bilaterally  Pulses: 2+ and symmetric    Data Review:   Labs:    Recent Labs     08/08/20  0354 08/07/20  0311 08/06/20  2329   WBC 17.3* 12.6* 14.7*   HGB 11.0* 10.5* 10.8*   HCT 34.0* 31.2* 32.4*   PLT 94* 83* 90*     Recent Labs     08/08/20  0354 08/07/20  1947 08/07/20  0311 08/06/20  1835 08/06/20  1323  08/06/20  1322   * 134* 142 144 144  --   --    K 4.0 4.3 4.2 4.5 3.4*  --   --     104 113* 114* 110*  --   --    CO2 24 26 29 29 25  --   --    * 185* 91 117* 173*  --   --    BUN 21* 19 16 17 16  --   --    CREA 1.30 1.26 1.04 1.02 1.00  --   --    CA 8.3* 7.9* 7.8* 8.2* 8.8  --   --    MG 2.5*  --  2.2 2.3 2.6*   < >  --    ALB 3.9  --  3.2*  --  2.9*  --   --    TBILI 0.8  --  1.4*  --  1.2*  --   --    ALT 24  --  37  --  55  --   --    INR  --   --   --   --   --   --  1.2*    < > = values in this interval not displayed. No results for input(s): TROIQ, CPK, CKMB in the last 72 hours. Intake/Output Summary (Last 24 hours) at 8/8/2020 1505  Last data filed at 8/8/2020 0900  Gross per 24 hour   Intake 3335.68 ml   Output 2175 ml   Net 1160.68 ml        Telemetry: nsr, pvcs    Assessment:     Principal Problem:    Ischemic cardiomyopathy (7/31/2020)      Overview: Added automatically from request for surgery [id number]    Active Problems:    CAD (coronary artery disease), native coronary artery (9/21/2010)      Hyperlipidemia (9/21/2010)      Pleural effusion, bilateral (7/31/2020)      CHF, acute on chronic ([facility] [address] (7/31/2020)      Pulmonary embolism ([facility] [address] (7/31/2020)      CAD (coronary artery disease) (8/6/2020)      S/P CABG x 4 (8/6/2020)      Overview: x 4, LIMA to LAD, RSVG to Diag-OM2, RSVG to PDA        Plan:     Renny Pearce is having gi issues and hyponatremia. k and mg are wnl. Cont med rx for cad. Iv amio for paf. Transition to oral when GI issues have resolved. Cont management per cardiac surgery.     Jessi Caraballo MD, [facility]    8/8/2020

## 2020-08-08 NOTE — PROGRESS NOTES
Pt seen and examined    Scholastica x3 yesterday on POD1. Recovering well. Ectopy and HR calmed down with amio infusion overnight but significant nausea. Treating now with zofran. Will keep Dobut @2 today. Had good response to 40 IV lasix. This AM uop is 30-50 per hour and anali; Cr slightly up to 1.30. Will hold diuretic today and encourage PO. Will keep renner for nwo and monitor UOP hourly for this AM    Tubes with clear drainage but too much output to pull    Keep in ICU today. Floor tmrw.

## 2020-08-08 NOTE — PROGRESS NOTES
Patient laying in bed, reported up in chair all night. Patient not feeling well, warm feeling, nausea vomiting. Less than ten minutes after receiving lasix and oxycodone. Patient vomited 200ml. Given zofran. Will repeat dose later. 1100 Keep lines and renner today. If urine output 30-40ml per hour can remove renner this afternoon. Patient to get up out of bed once feeling better. BMP at 1400. Notify Dr. Marshell Hashimoto of any changes

## 2020-08-08 NOTE — PROGRESS NOTES
Physical Therapy    Chart reviewed and discussed with RN, patient with nausea and vomiting this morning, has been medicated. Will defer PT  and check back after lunch.     Jerrod Clements

## 2020-08-08 NOTE — PROGRESS NOTES
1130: Bedside shift report given to Edgarkaila Simpson by Manolo Wright RN. Report included SBAR, Kardex, I&O'S, and Recent Results. 1230: Shift assessment completed; chest tube drain changed. Patient complains of persistent nausea, saltines and ginger ale supplied. 1303: PRN Zofran administered for nausea. 1330: Central line dressing changed due to old dressing peeling back from patient's neck. Sterile technique used, no complications. 1430: PT at bedside to evaluate patient. Patient states he is \"up to walking around a little today. \"    [phone]: Patient care tech at bedside to assist patient with bath. 1630: Reassessment completed, patient states nausea has subsided. Sitting in chair at this time. 1755: Mahajan removed. 1900: Bedside shift change report given to [address] [place] [address] Ave (oncoming nurse) by Phoebe Lazo RN and Nain Acevedo RN (offgoing nurse). Report included the following information SBAR, Kardex, Intake/Output and Recent Results.

## 2020-08-08 NOTE — PROGRESS NOTES
Problem: Mobility Impaired (Adult and Pediatric)  Goal: *Acute Goals and Plan of Care (Insert Text)  Description: FUNCTIONAL STATUS PRIOR TO ADMISSION: Patient was independent and active without use of DME.     HOME SUPPORT PRIOR TO ADMISSION: The patient lived alone with no local support. Physical Therapy Goals  Initiated 8/7/2020  1. Patient will move from supine to sit and sit to supine  in bed with independence within 5 days. 2.  Patient will perform sit to/from stand with independence within 5 days. 3.  Patient will ambulate 400 feet with least restrictive assistive device and independence within 5 days. 4.  Patient will ascend/descend 4 stairs with bilateral handrail(s) with modified independence within 5 days. 5.  Patient will perform cardiac exercises per protocol with independence within 5 days. 6.  Patient will verbally recall and functionally demonstrate mindful-based movements (\"move in the tube\") principles without cues within 5 days. Outcome: Progressing Towards Goal   PHYSICAL THERAPY TREATMENT  Patient: Celio Handy (46 y.o. male)  Date: 8/8/2020  Diagnosis: Pleural effusion, bilateral [J90]  CHF, acute on chronic (HCC) [I50.9]  CAD (coronary artery disease) [I25.10]   Ischemic cardiomyopathy  Procedure(s) (LRB):  JUN AND EPIAORTIC ULTRASOUND BY DR Velia Coburn - ON PUMP CORONARY ARTERY BYPASS GRAFT X 4 WITH GREATER RIGHT SAPHENOUS VEIN AND LEFT INTERNAL MAMMARY ARTERY GRAFTING - EVH (N/A) 2 Days Post-Op  Precautions: Other (comment)(move in the tube)  Chart, physical therapy assessment, plan of care and goals were reviewed. ASSESSMENT  Patient continues with skilled PT services and is progressing towards goals. Patient received in bed, has been nauseous most of the day but is agreeable to participate (received zofran approx an hour ago). Patient able to come to EOB with verbal cuing and CGA and sitting balance is intact.   Patient ambulated x approx 200 feet with cardiac cart and steady gait on RA, then returned to sit up in chair. VSS during visit with sats in mid 90s during activity, patient with flat affect but follows direction well. Patient performed cardiac exercises with cuing and good tolerance and left up with call bell in reach. Patient is demonstrating understanding of mindful-based movements (\"move in the tube\") principles of keeping UEs proximal to ribcage to prevent lateral pull on the sternum during load-bearing activities with verbal cues required for compliance. Current Level of Function Impacting Discharge (mobility/balance): CGA    Other factors to consider for discharge: below baseline, lives alone with no local support         PLAN :  Patient continues to benefit from skilled intervention to address the above impairments. Continue treatment per established plan of care. to address goals. Recommendation for discharge: (in order for the patient to meet his/her long term goals)  Physical therapy at least 2 days/week in the home     This discharge recommendation:  A follow-up discussion with the attending provider and/or case management is planned    IF patient discharges home will need the following DME: to be determined (TBD)       SUBJECTIVE:   Patient stated I'll try.     OBJECTIVE DATA SUMMARY:   Patient mobilized on continuous portable monitor/telemetry.   Critical Behavior:  Neurologic State: Alert  Orientation Level: Oriented X4  Cognition: Appropriate decision making, Follows commands  Safety/Judgement: Fall prevention    Functional Mobility Training:  Bed Mobility:     Supine to Sit: Contact guard assistance     Scooting: Contact guard assistance     Interventions: Verbal cues    Transfers:  Sit to Stand: Contact guard assistance  Stand to Sit: Contact guard assistance                               Balance:  Sitting: Intact  Standing: Impaired  Standing - Static: Constant support;Good  Standing - Dynamic : Constant support;Good    Ambulation/Gait Training:  Distance (ft): 200 Feet (ft)  Assistive Device: Other (comment)(cardiac cart)  Ambulation - Level of Assistance: Contact guard assistance        Gait Abnormalities: Decreased step clearance        Base of Support: Widened     Speed/Elisha: Pace decreased (<100 feet/min)                      Stairs:             Cardiac diagnosis intervention:  Patient instructed and educated on mindful movement principles based on Move in The Tube concept to include maintaining bilateral elbows close to rib cage when performing any load-bearing activity such as getting in/out of bed, pushing up from a chair, opening a door, or lifting a box. Patient was given a handout with diagrams of each correct/incorrect method of performing each of the above tasks. Therapeutic Exercises:   Patient instructed on the benefits and demonstrated cardiac exercises while sitting with Contact guard assistance. Instructed and indicated understanding on how to progress reps, sets against gravity, pacing through progressive muscle strengthening standing based on surgeon clearance for more weight in prep for basic and instrumental ADLs. Instruction on the use of household items in place of weights as needed.      CARDIAC  EXERCISE   Sets   Reps   Active Active Assist   Passive Self ROM   Comments   Shoulder flexion 1 5 [x]                                            []                                            []                                            []                                               Shoulder abduction 1      5 [x]                                            []                                            []                                            []                                               Scapular elevation 1 5 [x]                                            []                                            []                                            [] Scapular retraction 1 5 [x]                                            []                                            []                                            []                                               Trunk rotation 1 5 [x]                                            []                                            []                                            []                                               Trunk sidebending 1 5 [x]                                            []                                            []                                            []                                                  []                                            []                                            []                                            []                                                   Pain Rating:      Activity Tolerance:   Fair  Please refer to the flowsheet for vital signs taken during this treatment. After treatment patient left in no apparent distress:   Sitting in chair and Call bell within reach    COMMUNICATION/COLLABORATION:   The patients plan of care was discussed with: Registered nurse.      Keshia Katz, PT   Time Calculation: 38 mins

## 2020-08-08 NOTE — PROGRESS NOTES
Brief Pul/CC Note:    For transfer tomorrow per CTS   Sitting up in bed  No distress   VSS     A/P:  - post op  Vent mgt - weaned per protocol  - splinting - pain control and is and mobilization   - CAD: s/p CABG  - severe CMP: diurese as needed as directed by cvsurg   - wean pressors - on low dose dlbuta  - chest tubes per CTS  - glycemic control  - pain control  - PUD/DVT prophylaxis  -  Dc zurdo when able   - Will assist on disposition planning when stable for transfer  - par will sign off on tranfer out. Please call if needed .    - he denies pulm hx of copd or asthma and does not smoke or Kosta Handy MD

## 2020-08-09 ENCOUNTER — APPOINTMENT (OUTPATIENT)
Dept: GENERAL RADIOLOGY | Age: 75
DRG: 233 | End: 2020-08-09
Attending: PHYSICIAN ASSISTANT
Payer: MEDICARE

## 2020-08-09 PROBLEM — I48.0 PAROXYSMAL ATRIAL FIBRILLATION (HCC): Status: ACTIVE | Noted: 2020-08-09

## 2020-08-09 LAB
ABO + RH BLD: NORMAL
ALBUMIN SERPL-MCNC: 3.1 G/DL (ref 3.5–5)
ALBUMIN/GLOB SERPL: 1.1 {RATIO} (ref 1.1–2.2)
ALP SERPL-CCNC: 66 U/L (ref 45–117)
ALT SERPL-CCNC: 15 U/L (ref 12–78)
ANION GAP SERPL CALC-SCNC: 3 MMOL/L (ref 5–15)
AST SERPL-CCNC: 19 U/L (ref 15–37)
BILIRUB SERPL-MCNC: 0.9 MG/DL (ref 0.2–1)
BLD PROD TYP BPU: NORMAL
BLD PROD TYP BPU: NORMAL
BLOOD GROUP ANTIBODIES SERPL: NORMAL
BPU ID: NORMAL
BPU ID: NORMAL
BUN SERPL-MCNC: 19 MG/DL (ref 6–20)
BUN/CREAT SERPL: 18 (ref 12–20)
CALCIUM SERPL-MCNC: 8.4 MG/DL (ref 8.5–10.1)
CHLORIDE SERPL-SCNC: 98 MMOL/L (ref 97–108)
CO2 SERPL-SCNC: 28 MMOL/L (ref 21–32)
CREAT SERPL-MCNC: 1.06 MG/DL (ref 0.7–1.3)
CROSSMATCH RESULT,%XM: NORMAL
CROSSMATCH RESULT,%XM: NORMAL
ERYTHROCYTE [DISTWIDTH] IN BLOOD BY AUTOMATED COUNT: 14.2 % (ref 11.5–14.5)
GLOBULIN SER CALC-MCNC: 2.9 G/DL (ref 2–4)
GLUCOSE BLD STRIP.AUTO-MCNC: 114 MG/DL (ref 65–100)
GLUCOSE BLD STRIP.AUTO-MCNC: 152 MG/DL (ref 65–100)
GLUCOSE BLD STRIP.AUTO-MCNC: 159 MG/DL (ref 65–100)
GLUCOSE BLD STRIP.AUTO-MCNC: 166 MG/DL (ref 65–100)
GLUCOSE SERPL-MCNC: 155 MG/DL (ref 65–100)
HCT VFR BLD AUTO: 39.5 % (ref 36.6–50.3)
HGB BLD-MCNC: 12.9 G/DL (ref 12.1–17)
MAGNESIUM SERPL-MCNC: 2.3 MG/DL (ref 1.6–2.4)
MCH RBC QN AUTO: 31.1 PG (ref 26–34)
MCHC RBC AUTO-ENTMCNC: 32.7 G/DL (ref 30–36.5)
MCV RBC AUTO: 95.2 FL (ref 80–99)
NRBC # BLD: 0 K/UL (ref 0–0.01)
NRBC BLD-RTO: 0 PER 100 WBC
PLATELET # BLD AUTO: 86 K/UL (ref 150–400)
PMV BLD AUTO: 11.9 FL (ref 8.9–12.9)
POTASSIUM SERPL-SCNC: 4.3 MMOL/L (ref 3.5–5.1)
PROT SERPL-MCNC: 6 G/DL (ref 6.4–8.2)
RBC # BLD AUTO: 4.15 M/UL (ref 4.1–5.7)
SERVICE CMNT-IMP: ABNORMAL
SODIUM SERPL-SCNC: 129 MMOL/L (ref 136–145)
SPECIMEN EXP DATE BLD: NORMAL
STATUS OF UNIT,%ST: NORMAL
STATUS OF UNIT,%ST: NORMAL
UNIT DIVISION, %UDIV: 0
UNIT DIVISION, %UDIV: 0
WBC # BLD AUTO: 11.6 K/UL (ref 4.1–11.1)

## 2020-08-09 PROCEDURE — 97116 GAIT TRAINING THERAPY: CPT

## 2020-08-09 PROCEDURE — 74011000258 HC RX REV CODE- 258: Performed by: THORACIC SURGERY (CARDIOTHORACIC VASCULAR SURGERY)

## 2020-08-09 PROCEDURE — 74011250636 HC RX REV CODE- 250/636: Performed by: THORACIC SURGERY (CARDIOTHORACIC VASCULAR SURGERY)

## 2020-08-09 PROCEDURE — 82962 GLUCOSE BLOOD TEST: CPT

## 2020-08-09 PROCEDURE — 74011250637 HC RX REV CODE- 250/637: Performed by: PHYSICIAN ASSISTANT

## 2020-08-09 PROCEDURE — 71045 X-RAY EXAM CHEST 1 VIEW: CPT

## 2020-08-09 PROCEDURE — 74011000250 HC RX REV CODE- 250: Performed by: PHYSICIAN ASSISTANT

## 2020-08-09 PROCEDURE — 36415 COLL VENOUS BLD VENIPUNCTURE: CPT

## 2020-08-09 PROCEDURE — 74011250636 HC RX REV CODE- 250/636: Performed by: PHYSICIAN ASSISTANT

## 2020-08-09 PROCEDURE — 65660000000 HC RM CCU STEPDOWN

## 2020-08-09 PROCEDURE — 51798 US URINE CAPACITY MEASURE: CPT

## 2020-08-09 PROCEDURE — 74011250637 HC RX REV CODE- 250/637: Performed by: THORACIC SURGERY (CARDIOTHORACIC VASCULAR SURGERY)

## 2020-08-09 PROCEDURE — 80053 COMPREHEN METABOLIC PANEL: CPT

## 2020-08-09 PROCEDURE — 99232 SBSQ HOSP IP/OBS MODERATE 35: CPT | Performed by: INTERNAL MEDICINE

## 2020-08-09 PROCEDURE — 83735 ASSAY OF MAGNESIUM: CPT

## 2020-08-09 PROCEDURE — 85027 COMPLETE CBC AUTOMATED: CPT

## 2020-08-09 PROCEDURE — 74011636637 HC RX REV CODE- 636/637: Performed by: THORACIC SURGERY (CARDIOTHORACIC VASCULAR SURGERY)

## 2020-08-09 RX ORDER — AMIODARONE HYDROCHLORIDE 200 MG/1
400 TABLET ORAL 2 TIMES DAILY
Status: DISCONTINUED | OUTPATIENT
Start: 2020-08-09 | End: 2020-08-11

## 2020-08-09 RX ORDER — SODIUM CHLORIDE 9 MG/ML
10 INJECTION, SOLUTION INTRAVENOUS CONTINUOUS
Status: DISCONTINUED | OUTPATIENT
Start: 2020-08-09 | End: 2020-08-10

## 2020-08-09 RX ADMIN — SODIUM CHLORIDE 10 ML/HR: 900 INJECTION, SOLUTION INTRAVENOUS at 20:00

## 2020-08-09 RX ADMIN — Medication 10 ML: at 03:07

## 2020-08-09 RX ADMIN — ONDANSETRON 4 MG: 2 INJECTION INTRAMUSCULAR; INTRAVENOUS at 16:13

## 2020-08-09 RX ADMIN — MUPIROCIN: 20 OINTMENT TOPICAL at 08:04

## 2020-08-09 RX ADMIN — MAGNESIUM OXIDE 400 MG (241.3 MG MAGNESIUM) TABLET 400 MG: TABLET at 08:03

## 2020-08-09 RX ADMIN — Medication 10 ML: at 06:32

## 2020-08-09 RX ADMIN — POLYETHYLENE GLYCOL 3350 17 G: 17 POWDER, FOR SOLUTION ORAL at 09:00

## 2020-08-09 RX ADMIN — OXYCODONE 5 MG: 5 TABLET ORAL at 16:11

## 2020-08-09 RX ADMIN — CHLORHEXIDINE GLUCONATE 0.12% ORAL RINSE 10 ML: 1.2 LIQUID ORAL at 21:58

## 2020-08-09 RX ADMIN — ONDANSETRON 4 MG: 2 INJECTION INTRAMUSCULAR; INTRAVENOUS at 09:32

## 2020-08-09 RX ADMIN — ONDANSETRON 4 MG: 2 INJECTION INTRAMUSCULAR; INTRAVENOUS at 09:33

## 2020-08-09 RX ADMIN — OXYCODONE 5 MG: 5 TABLET ORAL at 00:26

## 2020-08-09 RX ADMIN — CHLORHEXIDINE GLUCONATE 0.12% ORAL RINSE 10 ML: 1.2 LIQUID ORAL at 08:03

## 2020-08-09 RX ADMIN — ATORVASTATIN CALCIUM 20 MG: 20 TABLET, FILM COATED ORAL at 21:59

## 2020-08-09 RX ADMIN — AMIODARONE HYDROCHLORIDE 150 MG: 1.5 INJECTION, SOLUTION INTRAVENOUS at 03:04

## 2020-08-09 RX ADMIN — FAMOTIDINE 20 MG: 20 TABLET, FILM COATED ORAL at 08:03

## 2020-08-09 RX ADMIN — INSULIN LISPRO 2 UNITS: 100 INJECTION, SOLUTION INTRAVENOUS; SUBCUTANEOUS at 11:46

## 2020-08-09 RX ADMIN — ONDANSETRON 4 MG: 2 INJECTION INTRAMUSCULAR; INTRAVENOUS at 16:12

## 2020-08-09 RX ADMIN — DEXTROSE MONOHYDRATE 1 MG/MIN: 50 INJECTION, SOLUTION INTRAVENOUS at 04:34

## 2020-08-09 RX ADMIN — AMIODARONE HYDROCHLORIDE 150 MG: 1.5 INJECTION, SOLUTION INTRAVENOUS at 04:15

## 2020-08-09 RX ADMIN — DOCUSATE SODIUM - SENNOSIDES 1 TABLET: 50; 8.6 TABLET, FILM COATED ORAL at 08:03

## 2020-08-09 RX ADMIN — MUPIROCIN: 20 OINTMENT TOPICAL at 18:04

## 2020-08-09 RX ADMIN — MAGNESIUM OXIDE 400 MG (241.3 MG MAGNESIUM) TABLET 400 MG: TABLET at 17:59

## 2020-08-09 RX ADMIN — AMIODARONE HYDROCHLORIDE 400 MG: 200 TABLET ORAL at 16:10

## 2020-08-09 RX ADMIN — Medication 10 ML: at 21:59

## 2020-08-09 RX ADMIN — ONDANSETRON 4 MG: 2 INJECTION INTRAMUSCULAR; INTRAVENOUS at 03:02

## 2020-08-09 RX ADMIN — DOCUSATE SODIUM - SENNOSIDES 1 TABLET: 50; 8.6 TABLET, FILM COATED ORAL at 17:59

## 2020-08-09 RX ADMIN — INSULIN LISPRO 2 UNITS: 100 INJECTION, SOLUTION INTRAVENOUS; SUBCUTANEOUS at 17:59

## 2020-08-09 RX ADMIN — ASPIRIN 81 MG CHEWABLE TABLET 81 MG: 81 TABLET CHEWABLE at 08:03

## 2020-08-09 NOTE — PROGRESS NOTES
Problem: Mobility Impaired (Adult and Pediatric)  Goal: *Acute Goals and Plan of Care (Insert Text)  Description: FUNCTIONAL STATUS PRIOR TO ADMISSION: Patient was independent and active without use of DME.     HOME SUPPORT PRIOR TO ADMISSION: The patient lived alone with no local support. Physical Therapy Goals  Initiated 8/7/2020  1. Patient will move from supine to sit and sit to supine  in bed with independence within 5 days. 2.  Patient will perform sit to/from stand with independence within 5 days. 3.  Patient will ambulate 400 feet with least restrictive assistive device and independence within 5 days. 4.  Patient will ascend/descend 4 stairs with bilateral handrail(s) with modified independence within 5 days. 5.  Patient will perform cardiac exercises per protocol with independence within 5 days. 6.  Patient will verbally recall and functionally demonstrate mindful-based movements (\"move in the tube\") principles without cues within 5 days. Outcome: Progressing Towards Goal   PHYSICAL THERAPY TREATMENT  Patient: Laquita Barth (37 y.o. male)  Date: 8/9/2020  Diagnosis: Pleural effusion, bilateral [J90]  CHF, acute on chronic (HCC) [I50.9]  CAD (coronary artery disease) [I25.10]   Ischemic cardiomyopathy  Procedure(s) (LRB):  JUN AND EPIAORTIC ULTRASOUND BY DR Bean Catchnéstor - ON PUMP CORONARY ARTERY BYPASS GRAFT X 4 WITH GREATER RIGHT SAPHENOUS VEIN AND LEFT INTERNAL MAMMARY ARTERY GRAFTING - EVH (N/A) 3 Days Post-Op  Precautions: Other (comment)(move in the tube)  Chart, physical therapy assessment, plan of care and goals were reviewed. ASSESSMENT  Patient continues with skilled PT services and is progressing towards goals. Patient continues to demonstrate limited functional mobility and decreased independence secondary to impaired standing balance, generalized weakness, impaired gait mechanics, limited endurance, and decreased activity tolerance.  VSS on RA at rest and with activity, however patient did require one brief standing rest break during gait training due to increased SOB. Requires only SBA for transfers, however did need min VCs for hand placement. Gait distance limited by patient due to increased fatigue and limited endurance. Completed cardiac exercises as noted below. He did require min A x 1 for sit>supine transfer to bring BLEs into bed. Pt was left supine in bed with all needs met, RN aware, and VSS following session. Patient is verbalizing and is demonstrating understanding of mindful-based movements (\"move in the tube\") principles of keeping UEs proximal to ribcage to prevent lateral pull on the sternum during load-bearing activities with verbal cues required for compliance. Current Level of Function Impacting Discharge (mobility/balance): SBA/CGA    Other factors to consider for discharge: decreased endurance         PLAN :  Patient continues to benefit from skilled intervention to address the above impairments. Continue treatment per established plan of care. to address goals. Recommendation for discharge: (in order for the patient to meet his/her long term goals)  Physical therapy at least 2 days/week in the home     This discharge recommendation:  Has been made in collaboration with the attending provider and/or case management    IF patient discharges home will need the following DME: to be determined (TBD)       SUBJECTIVE:   Patient stated Can you move the fan?     OBJECTIVE DATA SUMMARY:   Patient mobilized on continuous portable monitor/telemetry.   Critical Behavior:  Neurologic State: Alert  Orientation Level: Oriented X4  Cognition: Appropriate decision making  Safety/Judgement: Fall prevention    Functional Mobility Training:  Bed Mobility:  Rolling: Contact guard assistance     Sit to Supine: Minimum assistance;Assist x1(for BLEs)  Scooting: Stand-by assistance          Transfers:  Sit to Stand: Stand-by assistance  Stand to Sit: Stand-by assistance Balance:  Sitting: Intact  Standing: Intact; With support  Standing - Static: Good;Constant support  Standing - Dynamic : Good;Constant support    Ambulation/Gait Training:  Distance (ft): 210 Feet (ft)  Assistive Device: Other (comment)(cardiac cart)  Ambulation - Level of Assistance: Contact guard assistance; Additional time        Gait Abnormalities: Decreased step clearance        Base of Support: Narrowed     Speed/Elisha: Slow  Step Length: Left shortened;Right shortened                 Stairs:             Cardiac diagnosis intervention:  Patient instructed and educated on mindful movement principles based on Move in The Tube concept to include maintaining bilateral elbows close to rib cage when performing any load-bearing activity such as getting in/out of bed, pushing up from a chair, opening a door, or lifting a box. Patient was given a handout with diagrams of each correct/incorrect method of performing each of the above tasks. Therapeutic Exercises:   Patient instructed on the benefits and demonstrated cardiac exercises while seated with Moderate Assistance. Instructed and indicated understanding on how to progress reps, sets against gravity, pacing through progressive muscle strengthening standing based on surgeon clearance for more weight in prep for basic and instrumental ADLs. Instruction on the use of household items in place of weights as needed.      CARDIAC  EXERCISE   Sets   Reps   Active Active Assist   Passive Self ROM   Comments   Shoulder flexion 1 10 [x]                                            []                                            []                                            []                                               Shoulder abduction 1      10 [x]                                            []                                            []                                            []                                               Scapular elevation 1 10 [x]                                            []                                            []                                            []                                               Scapular retraction 1 10 [x]                                            []                                            []                                            []                                               Trunk rotation 1 10 [x]                                            []                                            []                                            []                                               Trunk sidebending 1 10 [x]                                            []                                            []                                            []                                                  []                                            []                                            []                                            []                                                   Pain Ratin/10    Activity Tolerance:   Fair, SpO2 stable on RA, and requires rest breaks  Please refer to the flowsheet for vital signs taken during this treatment. After treatment patient left in no apparent distress:   Supine in bed, Call bell within reach, and Side rails x 3    COMMUNICATION/COLLABORATION:   The patients plan of care was discussed with: Physical therapist and Registered nurse.      Carlos Jackson PT, DPT   Time Calculation: 19 mins

## 2020-08-09 NOTE — PROGRESS NOTES
Doing well. SVT overnight - restarted amio gtt and gave zofran.   Will wean dobut to 1 today  Cr came down nicely - let continue to auto-diurese - hold diuretic   Free water restriction for Na 129  CT with 220 over night, 160 already this AM  DC central line    Transfer to floor today

## 2020-08-09 NOTE — PROGRESS NOTES
Brief Pul/CC Note:    Events noted >>SVT >> on Amiodarone gtt  Dobutamine wean per CTS    For transfer to floor per CTS   No distress   VSS     A/P:  - post op  Vent mgt - weaned per protocol  - splinting - pain control and is and mobilization   - CAD: s/p CABG  - severe CMP: diurese as needed as directed by cvsurg   - Amio gtt and wean dobutamine   - chest tubes per CTS  - glycemic control  - pain control  - PUD/DVT prophylaxis  -  Dc zurdo when able   - Will assist on disposition planning when stable for transfer  - par will sign off on tranfer out. Please call if needed .    - he denies pulm hx of copd or asthma and does not smoke or Shar Duran MD

## 2020-08-09 NOTE — PROGRESS NOTES
Cardiology Progress Note              [address]  [phone]    8/9/2020 8:21 AM    Admit Date: 7/31/2020    Admit Diagnosis: Pleural effusion, bilateral [J90];CHF, acute on chronic (HCC) [I50.9];CAD (coronary artery disease) [I25.10]    Subjective:      Kati Tillman   Had AF overnight - converted to sinus this am - on amio gtt    Visit Vitals  /74   Pulse (!) 120   Temp 98.7 °F (37.1 °C)   Resp 18   Ht 5' 6\" (1.676 m)   Wt 167 lb 4.8 oz (75.9 kg)   SpO2 94%   BMI 27.00 kg/m²     Current Facility-Administered Medications   Medication Dose Route Frequency    famotidine (PEPCID) tablet 20 mg  20 mg Oral ACB    amiodarone (CORDARONE) 375 mg/250 mL D5W infusion  1 mg/min IntraVENous TITRATE    alteplase (CATHFLO) 1 mg in sterile water (preservative free) 1 mL injection  1 mg InterCATHeter PRN    bacitracin 500 unit/gram packet 1 Packet  1 Packet Topical PRN    sodium chloride (NS) flush 5-40 mL  5-40 mL IntraVENous Q8H    sodium chloride (NS) flush 5-40 mL  5-40 mL IntraVENous PRN    0.45% sodium chloride infusion  10 mL/hr IntraVENous CONTINUOUS    0.9% sodium chloride infusion  9 mL/hr IntraVENous CONTINUOUS    oxyCODONE IR (ROXICODONE) tablet 5 mg  5 mg Oral Q4H PRN    oxyCODONE IR (ROXICODONE) tablet 10 mg  10 mg Oral Q4H PRN    naloxone (NARCAN) injection 0.4 mg  0.4 mg IntraVENous PRN    mupirocin (BACTROBAN) 2 % ointment   Both Nostrils BID    ondansetron (ZOFRAN) injection 4 mg  4 mg IntraVENous Q4H PRN    albuterol (PROVENTIL VENTOLIN) nebulizer solution 2.5 mg  2.5 mg Nebulization Q4H PRN    aspirin chewable tablet 81 mg  81 mg Oral DAILY    midazolam (VERSED) injection 1 mg  1 mg IntraVENous Q1H PRN    chlorhexidine (PERIDEX) 0.12 % mouthwash 10 mL  10 mL Oral Q12H    magnesium oxide (MAG-OX) tablet 400 mg  400 mg Oral BID    calcium chloride 1 g in 0.9% sodium chloride 250 mL IVPB  1 g IntraVENous PRN    bisacodyL (DULCOLAX) suppository 10 mg  10 mg Rectal DAILY PRN    senna-docusate (PERICOLACE) 8.6-50 mg per tablet 1 Tab  1 Tab Oral BID    polyethylene glycol (MIRALAX) packet 17 g  17 g Oral DAILY    ELECTROLYTE REPLACEMENT NOTE: Nurse to review Serum Potassium and Magnesuim levels and Initiate Electrolyte Replacement Protocol as needed  1 Each Other PRN    magnesium sulfate 1 g/100 ml IVPB (premix or compounded)  1 g IntraVENous PRN    glucose chewable tablet 16 g  4 Tab Oral PRN    dextrose (D50W) injection syrg 12.5-25 g  12.5-25 g IntraVENous PRN    glucagon (GLUCAGEN) injection 1 mg  1 mg IntraMUSCular PRN    diphenhydrAMINE (BENADRYL) capsule 25 mg  25 mg Oral Q6H PRN    diphenhydrAMINE (BENADRYL) injection 25 mg  25 mg IntraVENous Q6H PRN    HYDROmorphone (PF) (DILAUDID) injection 0.5 mg  0.5 mg IntraVENous Q2H PRN    HYDROmorphone (PF) (DILAUDID) injection 1 mg  1 mg IntraVENous Q2H PRN    melatonin tablet 3 mg  3 mg Oral QHS PRN    fentaNYL citrate (PF) injection 25 mcg  25 mcg IntraVENous Q2H PRN    0.9% sodium chloride infusion  50 mL/hr IntraVENous CONTINUOUS    albumin human 5% (BUMINATE) solution 12.5 g  12.5 g IntraVENous PRN    insulin lispro (HUMALOG) injection   SubCUTAneous AC&HS    DOBUTamine (DOBUTREX) 500 mg/250 mL (2,000 mcg/mL) infusion  2 mcg/kg/min IntraVENous TITRATE    atorvastatin (LIPITOR) tablet 20 mg  20 mg Oral QHS         Objective:      Visit Vitals  /74   Pulse (!) 120   Temp 98.7 °F (37.1 °C)   Resp 18   Ht 5' 6\" (1.676 m)   Wt 167 lb 4.8 oz (75.9 kg)   SpO2 94%   BMI 27.00 kg/m²       Physical Exam:  Abdomen: soft, non-tender  Extremities: extremities normal  Heart: regular rate and rhythm  Lungs: clear to auscultation bilaterally  Pulses: 2+ and symmetric    Data Review:   Labs:    Recent Labs     08/09/20  0541 08/08/20  0354 08/07/20  0311   WBC 11.6* 17.3* 12.6*   HGB 12.9 11.0* 10.5*   HCT 39.5 34.0* 31.2*   PLT 86* 94* 83*     Recent Labs     08/09/20  0590 08/08/20  1916 08/08/20  0354  08/07/20  0311  08/06/20  1322   * 132* 132*   < > 142   < >  --    K 4.3 4.6 4.0   < > 4.2   < >  --    CL 98 100 100   < > 113*   < >  --    CO2 28 28 24   < > 29   < >  --    * 147* 179*   < > 91   < >  --    BUN 19 19 21*   < > 16   < >  --    CREA 1.06 1.17 1.30   < > 1.04   < >  --    CA 8.4* 8.4* 8.3*   < > 7.8*   < >  --    MG 2.3  --  2.5*  --  2.2   < >  --    ALB 3.1*  --  3.9  --  3.2*   < >  --    TBILI 0.9  --  0.8  --  1.4*   < >  --    ALT 15  --  24  --  37   < >  --    INR  --   --   --   --   --   --  1.2*    < > = values in this interval not displayed. No results for input(s): TROIQ, CPK, CKMB in the last 72 hours. Intake/Output Summary (Last 24 hours) at 8/9/2020 6846  Last data filed at 8/9/2020 0600  Gross per 24 hour   Intake 1297.97 ml   Output 820 ml   Net 477.97 ml        Telemetry: nsr    Assessment:     Principal Problem:    Ischemic cardiomyopathy (7/31/2020)      Overview: Added automatically from request for surgery [id number]    Active Problems:    CAD (coronary artery disease), native coronary artery (9/21/2010)      Hyperlipidemia (9/21/2010)      Pleural effusion, bilateral (7/31/2020)      CHF, acute on chronic ([address] (7/31/2020)      Pulmonary embolism ([facility] [address] (7/31/2020)      CAD (coronary artery disease) (8/6/2020)      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 Currently in sinus on amio gtt. Cont amio gtt. ionotrope support per cts. Cont statin. Start bb once off dobutamine.      Rodrigue Mcleod MD, [facility]    8/9/2020

## 2020-08-09 NOTE — PROGRESS NOTES
Dr. Franci Heath at bedside. Received orders: Dobutamine titrate 1, Amio titrate 0.5 then off. Given zofran 8mg now then at 1400. Sodium level 129 1L fluid restriction, limit freewater. Plan to ambulate. remove line, tirate amio, Monitor output, and transfer to PCU.

## 2020-08-09 NOTE — PROGRESS NOTES
Received patient sitting in chair. Patient reports having a hard night with N/V but feeling a bit better. Patient looks tired, VSS, SR. Amio and Dobutamin gtt.  Na 129

## 2020-08-09 NOTE — PROGRESS NOTES
7399 Dr. Suzie Kelsey notified of A-FIB rate 150's-160's. Orders received to give Zofran 4 mg IV followed by Amiodarone 150 mg IV bolus. 0559 Dr. Suzie Kelsey updated on heart rhythm still in A-FIB after amiodarone bolus. Rate 120's-130's. Order received to re-bolus with amiodarone 150 mg IV followed by Amiodarone gtt at 1 mg/min.

## 2020-08-10 ENCOUNTER — APPOINTMENT (OUTPATIENT)
Dept: NON INVASIVE DIAGNOSTICS | Age: 75
DRG: 233 | End: 2020-08-10
Attending: PHYSICIAN ASSISTANT
Payer: MEDICARE

## 2020-08-10 LAB
ANION GAP SERPL CALC-SCNC: 4 MMOL/L (ref 5–15)
BUN SERPL-MCNC: 23 MG/DL (ref 6–20)
BUN/CREAT SERPL: 23 (ref 12–20)
CALCIUM SERPL-MCNC: 8.5 MG/DL (ref 8.5–10.1)
CHLORIDE SERPL-SCNC: 99 MMOL/L (ref 97–108)
CO2 SERPL-SCNC: 25 MMOL/L (ref 21–32)
CREAT SERPL-MCNC: 1 MG/DL (ref 0.7–1.3)
ECHO AO ROOT DIAM: 3.48 CM
ECHO AV AREA PEAK VELOCITY: 1.87 CM2
ECHO AV AREA/BSA PEAK VELOCITY: 1 CM2/M2
ECHO AV CUSP MM: 1.98 CM
ECHO AV PEAK GRADIENT: 4.41 MMHG
ECHO AV PEAK VELOCITY: 105.01 CM/S
ECHO LA AREA 4C: 28.61 CM2
ECHO LA MAJOR AXIS: 3.2 CM
ECHO LA MINOR AXIS: 1.72 CM
ECHO LA TO AORTIC ROOT RATIO: 1.11
ECHO LA TO AORTIC ROOT RATIO: 1.11
ECHO LA VOL 2C: 93.48 ML (ref 18–58)
ECHO LA VOL 4C: 89.9 ML (ref 18–58)
ECHO LA VOL BP: 112.54 ML (ref 18–58)
ECHO LA VOL/BSA BIPLANE: 60.49 ML/M2 (ref 16–28)
ECHO LA VOLUME INDEX A2C: 50.25 ML/M2 (ref 16–28)
ECHO LA VOLUME INDEX A4C: 48.32 ML/M2 (ref 16–28)
ECHO LV E' LATERAL VELOCITY: 5 CM/S
ECHO LV E' SEPTAL VELOCITY: 4.51 CM/S
ECHO LV INTERNAL DIMENSION DIASTOLIC: 5.48 CM (ref 4.2–5.9)
ECHO LV INTERNAL DIMENSION SYSTOLIC: 5.16 CM
ECHO LV IVSD: 1.21 CM (ref 0.6–1)
ECHO LV IVSS: 1.65 CM
ECHO LV MASS 2D: 281.7 G (ref 88–224)
ECHO LV MASS INDEX 2D: 151.4 G/M2 (ref 49–115)
ECHO LV POSTERIOR WALL DIASTOLIC: 1.26 CM (ref 0.6–1)
ECHO LV POSTERIOR WALL SYSTOLIC: 1.37 CM
ECHO LVOT DIAM: 2.06 CM
ECHO LVOT PEAK GRADIENT: 1.4 MMHG
ECHO LVOT PEAK VELOCITY: 59.15 CM/S
ECHO MV A VELOCITY: 43.04 CM/S
ECHO MV E DECELERATION TIME (DT): 0.09 S
ECHO MV E VELOCITY: 77.49 CM/S
ECHO MV E/A RATIO: 1.8
ECHO MV E/E' LATERAL: 15.5
ECHO MV E/E' RATIO (AVERAGED): 16.34
ECHO MV E/E' SEPTAL: 17.18
ECHO PV MAX VELOCITY: 91.35 CM/S
ECHO PV PEAK INSTANTANEOUS GRADIENT SYSTOLIC: 3.36 MMHG
ECHO RA AREA 4C: 19.01 CM2
ECHO RV INTERNAL DIMENSION: 2.73 CM
ECHO TV REGURGITANT MAX VELOCITY: 260.8 CM/S
ECHO TV REGURGITANT PEAK GRADIENT: 27.38 MMHG
ERYTHROCYTE [DISTWIDTH] IN BLOOD BY AUTOMATED COUNT: 13.7 % (ref 11.5–14.5)
GLUCOSE BLD STRIP.AUTO-MCNC: 108 MG/DL (ref 65–100)
GLUCOSE BLD STRIP.AUTO-MCNC: 127 MG/DL (ref 65–100)
GLUCOSE BLD STRIP.AUTO-MCNC: 140 MG/DL (ref 65–100)
GLUCOSE SERPL-MCNC: 112 MG/DL (ref 65–100)
HCT VFR BLD AUTO: 36.4 % (ref 36.6–50.3)
HGB BLD-MCNC: 12.4 G/DL (ref 12.1–17)
MCH RBC QN AUTO: 31.5 PG (ref 26–34)
MCHC RBC AUTO-ENTMCNC: 34.1 G/DL (ref 30–36.5)
MCV RBC AUTO: 92.4 FL (ref 80–99)
NRBC # BLD: 0 K/UL (ref 0–0.01)
NRBC BLD-RTO: 0 PER 100 WBC
PLATELET # BLD AUTO: 216 K/UL (ref 150–400)
PMV BLD AUTO: 11.6 FL (ref 8.9–12.9)
POTASSIUM SERPL-SCNC: 4.6 MMOL/L (ref 3.5–5.1)
RBC # BLD AUTO: 3.94 M/UL (ref 4.1–5.7)
SERVICE CMNT-IMP: ABNORMAL
SODIUM SERPL-SCNC: 128 MMOL/L (ref 136–145)
WBC # BLD AUTO: 15.7 K/UL (ref 4.1–11.1)

## 2020-08-10 PROCEDURE — 74011250637 HC RX REV CODE- 250/637: Performed by: THORACIC SURGERY (CARDIOTHORACIC VASCULAR SURGERY)

## 2020-08-10 PROCEDURE — 80048 BASIC METABOLIC PNL TOTAL CA: CPT

## 2020-08-10 PROCEDURE — 36415 COLL VENOUS BLD VENIPUNCTURE: CPT

## 2020-08-10 PROCEDURE — 97535 SELF CARE MNGMENT TRAINING: CPT

## 2020-08-10 PROCEDURE — 97116 GAIT TRAINING THERAPY: CPT

## 2020-08-10 PROCEDURE — 65660000000 HC RM CCU STEPDOWN

## 2020-08-10 PROCEDURE — 74011000250 HC RX REV CODE- 250: Performed by: PHYSICIAN ASSISTANT

## 2020-08-10 PROCEDURE — 51798 US URINE CAPACITY MEASURE: CPT

## 2020-08-10 PROCEDURE — 93306 TTE W/DOPPLER COMPLETE: CPT

## 2020-08-10 PROCEDURE — 74011250637 HC RX REV CODE- 250/637: Performed by: PHYSICIAN ASSISTANT

## 2020-08-10 PROCEDURE — 74011250636 HC RX REV CODE- 250/636: Performed by: PHYSICIAN ASSISTANT

## 2020-08-10 PROCEDURE — 74011636637 HC RX REV CODE- 636/637: Performed by: THORACIC SURGERY (CARDIOTHORACIC VASCULAR SURGERY)

## 2020-08-10 PROCEDURE — 82962 GLUCOSE BLOOD TEST: CPT

## 2020-08-10 PROCEDURE — 85027 COMPLETE CBC AUTOMATED: CPT

## 2020-08-10 PROCEDURE — 77030019905 HC CATH URETH INTMIT MDII -A

## 2020-08-10 PROCEDURE — 97110 THERAPEUTIC EXERCISES: CPT

## 2020-08-10 RX ORDER — PANTOPRAZOLE SODIUM 40 MG/1
40 TABLET, DELAYED RELEASE ORAL
Status: DISCONTINUED | OUTPATIENT
Start: 2020-08-11 | End: 2020-08-14 | Stop reason: HOSPADM

## 2020-08-10 RX ORDER — SODIUM CHLORIDE 0.9 % (FLUSH) 0.9 %
5-40 SYRINGE (ML) INJECTION EVERY 8 HOURS
Status: DISCONTINUED | OUTPATIENT
Start: 2020-08-10 | End: 2020-08-14 | Stop reason: HOSPADM

## 2020-08-10 RX ORDER — TAMSULOSIN HYDROCHLORIDE 0.4 MG/1
0.4 CAPSULE ORAL DAILY
Status: DISCONTINUED | OUTPATIENT
Start: 2020-08-10 | End: 2020-08-14 | Stop reason: HOSPADM

## 2020-08-10 RX ORDER — SODIUM CHLORIDE 0.9 % (FLUSH) 0.9 %
5-40 SYRINGE (ML) INJECTION AS NEEDED
Status: DISCONTINUED | OUTPATIENT
Start: 2020-08-10 | End: 2020-08-14 | Stop reason: HOSPADM

## 2020-08-10 RX ORDER — FUROSEMIDE 20 MG/1
20 TABLET ORAL DAILY
Status: DISCONTINUED | OUTPATIENT
Start: 2020-08-10 | End: 2020-08-10

## 2020-08-10 RX ORDER — BUMETANIDE 1 MG/1
2 TABLET ORAL DAILY
Status: DISCONTINUED | OUTPATIENT
Start: 2020-08-10 | End: 2020-08-11

## 2020-08-10 RX ORDER — POTASSIUM CHLORIDE 750 MG/1
20 TABLET, FILM COATED, EXTENDED RELEASE ORAL DAILY
Status: DISCONTINUED | OUTPATIENT
Start: 2020-08-10 | End: 2020-08-11

## 2020-08-10 RX ADMIN — MUPIROCIN: 20 OINTMENT TOPICAL at 17:05

## 2020-08-10 RX ADMIN — DOCUSATE SODIUM - SENNOSIDES 1 TABLET: 50; 8.6 TABLET, FILM COATED ORAL at 08:16

## 2020-08-10 RX ADMIN — Medication 10 ML: at 22:58

## 2020-08-10 RX ADMIN — ASPIRIN 81 MG CHEWABLE TABLET 81 MG: 81 TABLET CHEWABLE at 08:15

## 2020-08-10 RX ADMIN — ONDANSETRON 4 MG: 2 INJECTION INTRAMUSCULAR; INTRAVENOUS at 20:53

## 2020-08-10 RX ADMIN — TAMSULOSIN HYDROCHLORIDE 0.4 MG: 0.4 CAPSULE ORAL at 08:15

## 2020-08-10 RX ADMIN — POLYETHYLENE GLYCOL 3350 17 G: 17 POWDER, FOR SOLUTION ORAL at 08:15

## 2020-08-10 RX ADMIN — CHLORHEXIDINE GLUCONATE 0.12% ORAL RINSE 10 ML: 1.2 LIQUID ORAL at 08:16

## 2020-08-10 RX ADMIN — MAGNESIUM OXIDE 400 MG (241.3 MG MAGNESIUM) TABLET 400 MG: TABLET at 17:05

## 2020-08-10 RX ADMIN — INSULIN LISPRO 2 UNITS: 100 INJECTION, SOLUTION INTRAVENOUS; SUBCUTANEOUS at 17:05

## 2020-08-10 RX ADMIN — POTASSIUM CHLORIDE 20 MEQ: 750 TABLET, FILM COATED, EXTENDED RELEASE ORAL at 10:26

## 2020-08-10 RX ADMIN — FAMOTIDINE 20 MG: 20 TABLET, FILM COATED ORAL at 08:16

## 2020-08-10 RX ADMIN — OXYCODONE 5 MG: 5 TABLET ORAL at 08:08

## 2020-08-10 RX ADMIN — MUPIROCIN: 20 OINTMENT TOPICAL at 08:16

## 2020-08-10 RX ADMIN — ATORVASTATIN CALCIUM 20 MG: 20 TABLET, FILM COATED ORAL at 22:00

## 2020-08-10 RX ADMIN — Medication 10 ML: at 14:54

## 2020-08-10 RX ADMIN — OXYCODONE 5 MG: 5 TABLET ORAL at 18:22

## 2020-08-10 RX ADMIN — BUMETANIDE 2 MG: 1 TABLET ORAL at 10:26

## 2020-08-10 RX ADMIN — ONDANSETRON 4 MG: 2 INJECTION INTRAMUSCULAR; INTRAVENOUS at 08:16

## 2020-08-10 RX ADMIN — MAGNESIUM OXIDE 400 MG (241.3 MG MAGNESIUM) TABLET 400 MG: TABLET at 08:15

## 2020-08-10 RX ADMIN — OXYCODONE 5 MG: 5 TABLET ORAL at 00:39

## 2020-08-10 RX ADMIN — OXYCODONE 5 MG: 5 TABLET ORAL at 22:57

## 2020-08-10 RX ADMIN — AMIODARONE HYDROCHLORIDE 400 MG: 200 TABLET ORAL at 08:16

## 2020-08-10 RX ADMIN — Medication 10 ML: at 06:17

## 2020-08-10 RX ADMIN — Medication 10 ML: at 20:54

## 2020-08-10 RX ADMIN — DOCUSATE SODIUM - SENNOSIDES 1 TABLET: 50; 8.6 TABLET, FILM COATED ORAL at 17:05

## 2020-08-10 RX ADMIN — AMIODARONE HYDROCHLORIDE 400 MG: 200 TABLET ORAL at 17:05

## 2020-08-10 RX ADMIN — CHLORHEXIDINE GLUCONATE 0.12% ORAL RINSE 10 ML: 1.2 LIQUID ORAL at 20:53

## 2020-08-10 NOTE — PROGRESS NOTES
Bedside shift change report given to [address] (oncoming nurse) by Lili Martell (offgoing nurse). Report included the following information Kimberlee Kumar - Dr. Tali Cabezas aware pt has not voided since renner was removed, pt was straight cath'd x2. Given orders for 1.5L FR, sodium level with AM labs was 128. Given orders to discontinue dobutamine gtt, remove chest tubes, and transfer to PCU. Dr. Snider Knows also notified of patient's supplements at bedside, pt requesting to take them to help him void. IS only 375cc. 5714 - CT and epicardial pacer wires removed. Dry dressing over site. Cycling VS q15 min and pt will remain in bed for 1 hour. IS still only 375cc    1000 - CBC sent to lab. Coached pt on incentive spirometer. Achieved 750cc. 1030 - Per TONY Lyn - RN is to straight cath pt if unable to void and bladder scan > 300mL    1100 - Pt voided 100 mL     1530 - Called echo to verify test will be done today, left voicemail. 1630 - Echo being done at bedside. Pt has been voiding consistently.

## 2020-08-10 NOTE — DIABETES MGMT
Patient does not have diabetes. Did not require transition dose of insulin coming off [address] Signing off.     Yahaira Ac DNP, RN, ACNS-BC, BC-ADM, CDE  Clinical Nurse Specialist-Diabetes & Endocrine disorders  [facility] (Ambulatory \"Specialty\" practice)  (G)  [phone]  (FG)  [phone]

## 2020-08-10 NOTE — PROGRESS NOTES
Cardiac Surgery Care Coordinator- Met with Kati Tillman, reviewed plan of care and discussed potential discharge date. Reinforced sternal precautions and encouraged continued use of the incentive spirometer. Reviewed goals for the day and emphasized the importance of increased activity to meet discharge goals. Will continue to follow for educational and emotional needs.  Mello Tavarez RN

## 2020-08-10 NOTE — PROGRESS NOTES
TRANSFER - OUT REPORT:    Verbal report given to Júnior (name) on Taurus Benavides  being transferred to PCU (unit) for routine progression of care       Report consisted of patients Situation, Background, Assessment and   Recommendations(SBAR). Information from the following report(s) SBAR was reviewed with the receiving nurse. Lines:   Peripheral IV 08/09/20 Posterior;Proximal;Right Forearm (Active)   Site Assessment Clean, dry, & intact 08/10/20 1623   Phlebitis Assessment 0 08/10/20 1623   Infiltration Assessment 0 08/10/20 1623   Dressing Status Clean, dry, & intact 08/10/20 1623   Dressing Type Transparent;Tape 08/10/20 1623   Hub Color/Line Status Pink;Flushed 08/10/20 1623   Alcohol Cap Used Yes 08/10/20 0805       Peripheral IV 08/09/20 Distal;Posterior;Right Forearm (Active)   Site Assessment Clean, dry, & intact 08/10/20 1623   Phlebitis Assessment 0 08/10/20 1623   Infiltration Assessment 0 08/10/20 1623   Dressing Status Clean, dry, & intact 08/10/20 1623   Dressing Type Transparent;Tape 08/10/20 1623   Hub Color/Line Status Pink;Flushed 08/10/20 1623   Alcohol Cap Used Yes 08/10/20 0805        Opportunity for questions and clarification was provided.       Patient transported with:   Registered Nurse

## 2020-08-10 NOTE — PROCEDURES
CTs x 4 discontinued without issues. Xeroform & 4 x 4 applied with good seal. Patient tolerated well. PA/Lat in AM.    Pacing wires cut at the skin after gentle traction. Bedrest x 1 hour. RN notified.

## 2020-08-10 NOTE — PROGRESS NOTES
Spiritual Care Assessment/Progress Note  [facility]      NAME: Julianne Gleason      MRN: [id number]  AGE: 76 y.o.  SEX: male  Hinduism Affiliation: No Taoism   Language: English     8/10/2020     Total Time (in minutes): 12     Spiritual Assessment begun in [facility] CRITICAL CARE 1 through conversation with:         [x]Patient        [] Family    [] Friend(s)        Reason for Consult: Initial/Spiritual assessment, critical care     Spiritual beliefs: (Please include comment if needed)     [x] Identifies with a shira tradition:         [x] Supported by a shira community:   Did not indicate name of Buddhism         [] Claims no spiritual orientation:           [] Seeking spiritual identity:                [] Adheres to an individual form of spirituality:           [] Not able to assess:                           Identified resources for coping:      [x] Prayer                               [] Music                  [] Guided Imagery     [x] Family/friends                 [] Pet visits     [] Devotional reading                         [] Unknown     [] Other:                                            Interventions offered during this visit: (See comments for more details)    Patient Interventions: Affirmation of emotions/emotional suffering, Affirmation of shira, Coping skills reviewed/reinforced, Iconic (affirming the presence of God/Higher Power), Initial/Spiritual assessment, Critical care, Normalization of emotional/spiritual concerns, Prayer (assurance of), Hinduism beliefs/image of God discussed           Plan of Care:     [x] Support spiritual and/or cultural needs    [] Support AMD and/or advance care planning process      [] Support grieving process   [] Coordinate Rites and/or Rituals    [] Coordination with community clergy   [x] No spiritual needs identified at this time   [] Detailed Plan of Care below (See Comments)  [] Make referral to Music Therapy  [] Make referral to Pet Therapy     [] Make referral to Addiction services  [] Make referral to [facility]  [] Make referral to Spiritual Care Partner  [] No future visits requested        [x] Follow up visits as needed     Comments:   Initial visit in CCU for spiritual assessment. No family/friends present. Patient was seated in a chair at bedside. He appeared tired and did not seem to want to engage in conversation. 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 He did not disclose name of [employer] or Denominational affiliation. Provided presence and assurance of prayer. Advised patient of  availability.      MAYCO Lucas, [facility], Staff [address] [facility] [employer] Paging Service  [phone])

## 2020-08-10 NOTE — PROGRESS NOTES
Problem: Falls - Risk of  Goal: *Absence of Falls  Description: Document Kati Mandujano Fall Risk and appropriate interventions in the flowsheet. Outcome: Progressing Towards Goal  Note: Fall Risk Interventions:  Mobility Interventions: Assess mobility with egress test, Bed/chair exit alarm, Communicate number of staff needed for ambulation/transfer, OT consult for ADLs, Patient to call before getting OOB, PT Consult for mobility concerns         Medication Interventions: Assess postural VS orthostatic hypotension, Bed/chair exit alarm, Patient to call before getting OOB, Evaluate medications/consider consulting pharmacy, Teach patient to arise slowly, Utilize gait belt for transfers/ambulation    Elimination Interventions: Call light in reach, Bed/chair exit alarm, Urinal in reach    History of Falls Interventions: Bed/chair exit alarm, Consult care management for discharge planning, Vital signs minimum Q4HRs X 24 hrs (comment for end date)         Problem: Patient Education: Go to Patient Education Activity  Goal: Patient/Family Education  Outcome: Progressing Towards Goal     Problem: Pressure Injury - Risk of  Goal: *Prevention of pressure injury  Description: Document Lars Scale and appropriate interventions in the flowsheet.   Outcome: Progressing Towards Goal  Note: Pressure Injury Interventions:  Sensory Interventions: Assess need for specialty bed, Assess changes in LOC    Moisture Interventions: Offer toileting Q_hr    Activity Interventions: Increase time out of bed, PT/OT evaluation    Mobility Interventions: Assess need for specialty bed    Nutrition Interventions: Document food/fluid/supplement intake    Friction and Shear Interventions: Apply protective barrier, creams and emollients                Problem: Patient Education: Go to Patient Education Activity  Goal: Patient/Family Education  Outcome: Progressing Towards Goal

## 2020-08-10 NOTE — PROGRESS NOTES
ANNA Plan:  Dariel Pascual is anticipated and [facility] for [address] placed on chart. Referral sent to [address] via Mohit Kimble. [facility] [place] has accepted)   Follow up appointments  2nd IM Letter at d/c  Patient is looking for a new PCP. [facility] will establish appt with new PCP.     CM reviewed pt chart. Pt POD1  CABG x4. , OOB in chair. Working with PT and they are recommending home with home PT. I spoke with patient and he lives alone but wants to return home at discharge. He is not interested in SNF . He has friends and [employer] people who will check on him and assist with transportation.        Emi Crow RN ACM [phone]

## 2020-08-10 NOTE — PROGRESS NOTES
Primary Nurse Natividad Zendejas RN and Viry Vega RN performed a dual skin assessment on this patient Impairment noted- see wound doc flow sheet  Lars score is 20

## 2020-08-10 NOTE — PROGRESS NOTES
8/10/2020    INTENSIVIST PROGRESS NOTE:     Patient seen and evaluated, chart reviewed. No overnight events. Denies complaint. Awaiting floor bed.        Visit Vitals  /72 (BP 1 Location: Left arm, BP Patient Position: Lying right side)   Pulse 78   Temp 99 °F (37.2 °C)   Resp 13   Ht 5' 6\" (1.676 m)   Wt 76.5 kg (168 lb 11.2 oz)   SpO2 98%   BMI 27.23 kg/m²       General: alert, NAD  Eyes: anicteric  HEENT: NC/AT  CV: RRR  Lungs: CTA  Abd: soft, +BS  Ext: no cyanosis, no clubbing  Skin: warm and dry    CXR: none today  Labs reviewed    A/P:  - post op  Vent mgt - weaned per protocol, no longer on support  - splinting - pain control, IS, and mobilization   - CAD: s/p CABG  - hyponatremia - fluid restriction  - severe CMP: diurese as needed, on inotropic support per surgery  - chest tubes per CTS  - glycemic control  - pain control  - he denies pulm hx of copd or asthma and does not smoke or vape   - DVT prophylaxis  - day #2 awaiting transfer to floor  Cha Franco MD

## 2020-08-10 NOTE — INTERDISCIPLINARY ROUNDS
Critical care interdisciplinary rounds held on 08/10/2020. Following members present, Pharmacy, Diabetes Treatment, Case Management, Respiratory Therapy, Physical Therapy and Nutrition. Led by FLORY Estrada RN and Dr. Ariane Morrell. Plan of care discussed. See clinical pathway for plan of care and interventions and desired outcomes.

## 2020-08-10 NOTE — PROGRESS NOTES
8/10/2020 8:25 AM    Admit Date: 7/31/2020    Admit Diagnosis: Pleural effusion, bilateral [J90];CHF, acute on chronic (HCC) [I50.9];CAD (coronary artery disease) [I25.10]    Subjective: Carlos Chand   denies chest pain, chest pressure/discomfort, dyspnea, palpitations, irregular heart beats, near-syncope, syncope, fatigue, orthopnea, paroxysmal nocturnal dyspnea, exertional chest pressure/discomfort.     Visit Vitals  /69   Pulse 87   Temp 98.9 °F (37.2 °C)   Resp 22   Ht 5' 6\" (1.676 m)   Wt 168 lb 11.2 oz (76.5 kg)   SpO2 96%   BMI 27.23 kg/m²     Current Facility-Administered Medications   Medication Dose Route Frequency    tamsulosin (FLOMAX) capsule 0.4 mg  0.4 mg Oral DAILY    amiodarone (CORDARONE) tablet 400 mg  400 mg Oral BID    0.9% sodium chloride infusion  10 mL/hr IntraVENous CONTINUOUS    famotidine (PEPCID) tablet 20 mg  20 mg Oral ACB    amiodarone (CORDARONE) 375 mg/250 mL D5W infusion  1 mg/min IntraVENous TITRATE    alteplase (CATHFLO) 1 mg in sterile water (preservative free) 1 mL injection  1 mg InterCATHeter PRN    bacitracin 500 unit/gram packet 1 Packet  1 Packet Topical PRN    sodium chloride (NS) flush 5-40 mL  5-40 mL IntraVENous Q8H    sodium chloride (NS) flush 5-40 mL  5-40 mL IntraVENous PRN    oxyCODONE IR (ROXICODONE) tablet 5 mg  5 mg Oral Q4H PRN    oxyCODONE IR (ROXICODONE) tablet 10 mg  10 mg Oral Q4H PRN    naloxone (NARCAN) injection 0.4 mg  0.4 mg IntraVENous PRN    mupirocin (BACTROBAN) 2 % ointment   Both Nostrils BID    ondansetron (ZOFRAN) injection 4 mg  4 mg IntraVENous Q4H PRN    albuterol (PROVENTIL VENTOLIN) nebulizer solution 2.5 mg  2.5 mg Nebulization Q4H PRN    aspirin chewable tablet 81 mg  81 mg Oral DAILY    midazolam (VERSED) injection 1 mg  1 mg IntraVENous Q1H PRN    chlorhexidine (PERIDEX) 0.12 % mouthwash 10 mL  10 mL Oral Q12H    magnesium oxide (MAG-OX) tablet 400 mg  400 mg Oral BID    calcium chloride 1 g in 0.9% sodium chloride 250 mL IVPB  1 g IntraVENous PRN    bisacodyL (DULCOLAX) suppository 10 mg  10 mg Rectal DAILY PRN    senna-docusate (PERICOLACE) 8.6-50 mg per tablet 1 Tab  1 Tab Oral BID    polyethylene glycol (MIRALAX) packet 17 g  17 g Oral DAILY    magnesium sulfate 1 g/100 ml IVPB (premix or compounded)  1 g IntraVENous PRN    glucose chewable tablet 16 g  4 Tab Oral PRN    dextrose (D50W) injection syrg 12.5-25 g  12.5-25 g IntraVENous PRN    glucagon (GLUCAGEN) injection 1 mg  1 mg IntraMUSCular PRN    diphenhydrAMINE (BENADRYL) capsule 25 mg  25 mg Oral Q6H PRN    diphenhydrAMINE (BENADRYL) injection 25 mg  25 mg IntraVENous Q6H PRN    HYDROmorphone (PF) (DILAUDID) injection 0.5 mg  0.5 mg IntraVENous Q2H PRN    HYDROmorphone (PF) (DILAUDID) injection 1 mg  1 mg IntraVENous Q2H PRN    melatonin tablet 3 mg  3 mg Oral QHS PRN    fentaNYL citrate (PF) injection 25 mcg  25 mcg IntraVENous Q2H PRN    albumin human 5% (BUMINATE) solution 12.5 g  12.5 g IntraVENous PRN    insulin lispro (HUMALOG) injection   SubCUTAneous AC&HS    DOBUTamine (DOBUTREX) 500 mg/250 mL (2,000 mcg/mL) infusion  2 mcg/kg/min IntraVENous TITRATE    atorvastatin (LIPITOR) tablet 20 mg  20 mg Oral QHS         Objective:      Visit Vitals  /69   Pulse 87   Temp 98.9 °F (37.2 °C)   Resp 22   Ht 5' 6\" (1.676 m)   Wt 168 lb 11.2 oz (76.5 kg)   SpO2 96%   BMI 27.23 kg/m²       Physical Exam:  Resting comfortably. No resp distress.    Extremities: extremities normal, atraumatic, no cyanosis or edema  Heart: regular rate and rhythm, S1, S2 normal, no murmur, click, rub or gallop  Lungs: clear to auscultation bilaterally  Neurologic: Grossly normal    Data Review:   Labs:    Recent Results (from the past 24 hour(s))   GLUCOSE, POC    Collection Time: 08/09/20 11:35 AM   Result Value Ref Range    Glucose (POC) 166 (H) 65 - 100 mg/dL    Performed by [address] [place] [address], POC    Collection Time: 08/09/20  4:20 PM   Result Value Ref Range    Glucose (POC) 159 (H) 65 - 100 mg/dL    Performed by Ramila Vance    GLUCOSE, POC    Collection Time: 08/09/20  9:26 PM   Result Value Ref Range    Glucose (POC) 114 (H) 65 - 100 mg/dL    Performed by [facility] [address], BASIC    Collection Time: 08/10/20  2:54 AM   Result Value Ref Range    Sodium 128 (L) 136 - 145 mmol/L    Potassium 4.6 3.5 - 5.1 mmol/L    Chloride 99 97 - 108 mmol/L    CO2 25 21 - 32 mmol/L    Anion gap 4 (L) 5 - 15 mmol/L    Glucose 112 (H) 65 - 100 mg/dL    BUN 23 (H) 6 - 20 MG/DL    Creatinine 1.00 0.70 - 1.30 MG/DL    BUN/Creatinine ratio 23 (H) 12 - 20      GFR est AA >60 >60 ml/min/1.73m2    GFR est non-AA >60 >60 ml/min/1.73m2    Calcium 8.5 8.5 - 10.1 MG/DL   GLUCOSE, POC    Collection Time: 08/10/20  7:38 AM   Result Value Ref Range    Glucose (POC) 108 (H) 65 - 100 mg/dL    Performed by Oziel Medina RN        Telemetry: SR, PAF      Assessment:     Principal Problem:    Ischemic cardiomyopathy (7/31/2020)      Overview: Added automatically from request for surgery [id number]    Active Problems:    CAD (coronary artery disease), native coronary artery (9/21/2010)      Hyperlipidemia (9/21/2010)      Pleural effusion, bilateral (7/31/2020)      CHF, acute on chronic ([address] (7/31/2020)      Pulmonary embolism (HCC) (7/31/2020)      CAD (coronary artery disease) (8/6/2020)      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 Doing very well. Continue current management. Increase activity. Pain controlled.      HLD:  -Continue statin    Episodes of PAF overnight. Now back in SR. Off dobutamine now. On PO amio.

## 2020-08-10 NOTE — PROGRESS NOTES
Problem: Self Care Deficits Care Plan (Adult)  Goal: *Acute Goals and Plan of Care (Insert Text)  Description: FUNCTIONAL STATUS PRIOR TO ADMISSION: Patient was independent and active without use of DME. Patient was independent for basic and instrumental ADLs. HOME SUPPORT: The patient lived with wife but did not require assist.    Occupational Therapy Goals  Initiated 8/7/2020  1. Patient will perform ADLs standing 5 mins without fatigue or LOB with supervision/set-up within 7 day(s). 2.  Patient will perform lower body ADLs with supervision/set-up within 7 day(s). 3.  Patient will perform gathering ADL items high and low 2/2 with supervision/set-up within 7 day(s). 4.  Patient will perform toilet transfers with supervision/set-up within 7 day(s). 5.  Patient will perform all aspects of toileting with supervision/set-up within 7 day(s). 6.  Patient will participate in cardiac/sternal upper extremity therapeutic exercise/activities to increase independence with ADLs with supervision/set-up for 5 minutes within 7 day(s). Outcome: Progressing Towards Goal   OCCUPATIONAL THERAPY TREATMENT  Patient: Sanna Burgos (21 y.o. male)  Date: 8/10/2020  Diagnosis: Pleural effusion, bilateral [J90]  CHF, acute on chronic (HCC) [I50.9]  CAD (coronary artery disease) [I25.10]   Ischemic cardiomyopathy  Procedure(s) (LRB):  JUN AND EPIAORTIC ULTRASOUND BY DR Griggs Ala - ON PUMP CORONARY ARTERY BYPASS GRAFT X 4 WITH GREATER RIGHT SAPHENOUS VEIN AND LEFT INTERNAL MAMMARY ARTERY GRAFTING - [facility] (N/A) 4 Days Post-Op  Precautions: Other (comment)(move in the tube)  Chart, occupational therapy assessment, plan of care, and goals were reviewed. ASSESSMENT  Patient continues with skilled OT services and is progressing towards goals. Pt received seated in chair, reported he needed to have a BM.  Performed mobility and toilet transfer with SBA, no LOB observed, demonstrating good understanding of safe transfer technique. Discussed compensatory techniques for toilet hygiene and pt performed with SBA. After brief standing grooming ADL pt returned to chair and participated in ADL training for UB and LB dressing, able to easily tailor sit to reach distal LEs. He had recently completed cardiac exercises, reports no difficulty. Pt in chair at end of session, VSS throughout on RA. Pt is progressing well towards OT goals. Patient is demonstrating understanding of mindful-based movements (\"move in the tube\") principles of keeping UEs proximal to ribcage to prevent lateral pull on the sternum during load-bearing activities with verbal cues required for compliance. Current Level of Function Impacting Discharge (ADLs): SBA transfers, CGA-mod I ADLs    Other factors to consider for discharge: Pt lives alone         PLAN :  Patient continues to benefit from skilled intervention to address the above impairments. Continue treatment per established plan of care. to address goals. Recommendation for discharge: (in order for the patient to meet his/her long term goals)  HH therapy and assistance for IADLs    This discharge recommendation:  Has been made in collaboration with the attending provider and/or case management    IF patient discharges home will need the following DME: none       SUBJECTIVE:   Patient stated I don't have any pain right now.     OBJECTIVE DATA SUMMARY:   Cognitive/Behavioral Status:  Neurologic State: Alert  Orientation Level: Oriented X4  Cognition: Follows commands  Perception: Appears intact  Perseveration: No perseveration noted  Safety/Judgement: Awareness of environment; Fall prevention;Good awareness of safety precautions    Functional Mobility and Transfers for ADLs:  Bed Mobility:  Rolling: Stand-by assistance  Supine to Sit: Stand-by assistance  Scooting: Stand-by assistance    Transfers:  Sit to Stand: Supervision  Functional Transfers  Toilet Transfer : Stand-by assistance; Additional time Balance:  Sitting: Intact  Standing: Intact    ADL Intervention:       Grooming  Position Performed: Standing  Washing Hands: Stand-by assistance    Upper Body Dressing Assistance  Pullover Shirt: Compensatory technique training    Lower Body Dressing Assistance  Socks: Supervision  Leg Crossed Method Used: Yes  Position Performed: Seated in chair  Cues: Don;Doff;Verbal cues provided    Toileting  Bowel Hygiene: Stand-by assistance;Supervision  Cues: Verbal cues provided(for safe technique)    Cognitive Retraining  Safety/Judgement: Awareness of environment; Fall prevention;Good awareness of safety precautions    Patient instructed and educated on mindful movement principles based on Move in The Tube concept to include maintaining bilateral elbows close to rib cage when performing any load-bearing activity such as getting in/out of bed, pushing up from a chair, opening a door, or lifting a box. Patient was given a handout with diagrams of each correct/incorrect method of performing each of the above tasks. Patient instructed on the ability to utilize upper extremities outside the tube when doing any non-load bearing activity such as washing hair/body, brushing teeth, retrieving clothing items, or scratching your back. Patient encouraged to also perform upper extremity exercises \"outside of the tube\" to prevent scar tissue formation around sternal incision site. Patient instructed in detail about activities to heed with caution, allowing pain to be the guide. These activities include but are not limited to: mowing the lawn, riding a bike, walking a dog, lifting a child, workshop hobbies, golfing, sexual activity, vacuuming, fishing, scrubbing the floors, and moving furniture. Patient was given the [address] in the [place] handout to describe each of these activities in detail.    Patient instructed no asymmetrical reaching over head to ensure B UEs when shoulders >90* i.e. reaching in cabinets and dressing. Instruction on upper body dressing techniques of over head, then arms through to decrease pain and unilateral shoulder flexion >90*. Instruction on the benefits of utilizing B UEs during functional tasks i.e. opening the fridge, stepping into the tub. Instruction if continued pain at home with shoulder IR for BM hygiene can use wet wipes and toilet tongs PRN. Avoid valsalva maneuvers. May have to adjust home setup to increase ease with items closer to waist height to prevent deep bending and the automatic  of asymmetrical UE WB/pushing for stabilization during bending. Benefit to don clothing tailor sitting and don all clothing while sitting prior to standing. Patient demonstrated lower body dressing with Supervision. Instruction and indicated understanding on the benefits of loose clothing throughout to accommodate for post surgical swelling, decreased ROM and increased pain. Instruction and indicated understanding the technique of pull over shirt versus front open clothing. Increase activity tolerance for home, work, and sexual intercourse by pacing self with increasing the arm exercises, sitting duration, frequency OOB, walking, standing, and ADLs. Instructed and indicated understanding of s/s of too much activity, how to respond to s/s safely. Therapeutic Exercises:   Patient instructed on the benefits and demonstrated cardiac exercises while seated with Supervision. Instructed and indicated understanding on how to progress reps, sets against gravity, pacing through progressive muscle strengthening standing based on surgeon clearance for more weight in prep for basic and instrumental ADLs. Instruction on the use of household items in place of weights as needed. Pt had recently completed cardiac exercises but demonstrated 1 rep of each to ensure understanding.   CARDIAC   EXERCISE    Sets    Reps    Active  Active Assist    Passive  Self ROM    Comments    Shoulder flexion  1  1   [x] []                             []                             []                                Shoulder abduction  1  1  [x]                             []                             []                             []                                Scapular elevation  1  1  [x]                             []                              []                             []                                Scapular retraction  1  1  [x]                             []                             []                             []                                Trunk rotation  1  1  [x]                             []                             []                             []                                Trunk sidebending  1  1  [x]                             []                              []                             []                                          Pain:  0/10    Activity Tolerance:   Good  Please refer to the flowsheet for vital signs taken during this treatment. After treatment patient left in no apparent distress:   Sitting in chair and Call bell within reach    COMMUNICATION/COLLABORATION:   The patients plan of care was discussed with: Physical therapist and Registered nurse.      Lamont Bolivar OT  Time Calculation: 23 mins

## 2020-08-10 NOTE — PROGRESS NOTES
[facility] ICU Progress Note    Admit Date: 2020  POD: 4 Day Post-Op    Procedure:  Procedure(s):  JUN AND EPIAORTIC ULTRASOUND BY DR Terra Alston - ON PUMP CORONARY ARTERY BYPASS GRAFT X 4 WITH GREATER RIGHT SAPHENOUS VEIN AND LEFT INTERNAL MAMMARY ARTERY GRAFTING - [facility]        Subjective:   Pt seen with Dr. Yony Hammonds. Off all gtts. Tmax 99. F. On RA. NSR 70s     Objective:   Vitals:  Blood pressure 114/68, pulse 77, temperature 98.9 °F (37.2 °C), resp. rate 14, height 5' 6\" (1.676 m), weight 168 lb 11.2 oz (76.5 kg), SpO2 96 %. Temp (24hrs), Av.6 °F (37 °C), Min:97.6 °F (36.4 °C), Max:99 °F (37.2 °C)    EKG/Rhythm:  NSR 70s    CT Output:  675 mL (140 mL in last 8h)    Oxygen Therapy: RA    CXR:   CXR Results  (Last 48 hours)               20 0506  XR CHEST PORT Final result    Impression:  IMPRESSION: No significant change       Narrative:  EXAM: XR CHEST PORT       INDICATION: postop heart       COMPARISON: 2020       FINDINGS: A portable AP radiograph of the chest was obtained at 437 hours. Tubes   and lines are in stable position. . Lung volumes remain low with small bilateral   pleural effusions and bibasilar atelectasis. . The cardiac and mediastinal   contours and pulmonary vascularity are normal.  The bones and soft tissues are   grossly within normal limits. Admission Weight: Last Weight   Weight: 163 lb 12.8 oz (74.3 kg) Weight: 168 lb 11.2 oz (76.5 kg)     Intake / Output / Drain:  Current Shift: 08/10 0701 - 08/10 1900  In: 8.4 [I.V.:8.4]  Out: -   Last 24 hrs.:     Intake/Output Summary (Last 24 hours) at 8/10/2020 0916  Last data filed at 8/10/2020 0801  Gross per 24 hour   Intake 841.54 ml   Output 1680 ml   Net -838.46 ml       EXAM:  General:    NAD                                                                                          Lungs:   Clear to auscultation bilaterally. Incision:  No erythema, drainage or swelling.    Heart:  Regular rate and rhythm   Abdomen:   Soft, non-tender. Bowel sounds normal. No masses,  No organomegaly. Extremities:  No edema. PPP. Neurologic:  Gross motor and sensory apparatus intact. Labs:   Recent Labs     08/10/20  0738 08/10/20  0254  20  0541   WBC  --   --   --  11.6*   HGB  --   --   --  12.9   HCT  --   --   --  39.5   PLT  --   --   --  86*   NA  --  128*  --  129*   K  --  4.6  --  4.3   BUN  --  23*  --  19   CREA  --  1.00  --  1.06   GLU  --  112*  --  155*   GLUCPOC 108*  --    < >  --     < > = values in this interval not displayed. Assessment:     Principal Problem:    Ischemic cardiomyopathy (2020)      Overview: Added automatically from request for surgery     Active Problems:    CAD (coronary artery disease), native coronary artery (2010)      Hyperlipidemia (2010)      Pleural effusion, bilateral (2020)      CHF, acute on chronic ([facility] [address] (2020)      Pulmonary embolism ([facility] [address] (2020)      CAD (coronary artery disease) (2020)      S/P CABG x 4 (2020)      Overview: x 4, LIMA to LAD, RSVG to Diag-OM2, RSVG to PDA      Paroxysmal atrial fibrillation ([facility] [address] (2020)         Plan/Recommendations/Medical Decision Makin. CAD s/p CABG: On ASA, statin, will start BB when appropriate. 2. Acute on chronic systolic heart failure: EF 20-25% on TTE preop. Was on lasix PTA. Will start 2 mg bumex per Dr. Jaki Hughes. 3. HLD: on statin  4. Thrombocytopenia: Plts 86K yesterday, no CBC drawn this morning. On ASA. Switch pepcid to protonix. 5. Hyponatremia: Free water restriction per Dr. Jaki Hughes. Is up 7 lbs since surgery. Will resume home lasix  6. Urinary retention: Start flomax. Start bumex today. Straight cath PRN. Dispo: PT/OT, CT/AV pacing wires removed this AM. Transfer to floor.     Signed By: TONY De La Cruz

## 2020-08-10 NOTE — PROGRESS NOTES
Problem: Mobility Impaired (Adult and Pediatric)  Goal: *Acute Goals and Plan of Care (Insert Text)  Description: FUNCTIONAL STATUS PRIOR TO ADMISSION: Patient was independent and active without use of DME.     HOME SUPPORT PRIOR TO ADMISSION: The patient lived alone with no local support. Physical Therapy Goals  Initiated 8/7/2020  1. Patient will move from supine to sit and sit to supine  in bed with independence within 5 days. 2.  Patient will perform sit to/from stand with independence within 5 days. 3.  Patient will ambulate 400 feet with least restrictive assistive device and independence within 5 days. 4.  Patient will ascend/descend 4 stairs with bilateral handrail(s) with modified independence within 5 days. 5.  Patient will perform cardiac exercises per protocol with independence within 5 days. 6.  Patient will verbally recall and functionally demonstrate mindful-based movements (\"move in the tube\") principles without cues within 5 days. Outcome: Progressing Towards Goal  PHYSICAL THERAPY TREATMENT  Patient: Maria T Caceres (5 y.o. male)  Date: 8/10/2020  Diagnosis: Pleural effusion, bilateral [J90]  CHF, acute on chronic (HCC) [I50.9]  CAD (coronary artery disease) [I25.10]   Ischemic cardiomyopathy  Procedure(s) (LRB):  JUN AND EPIAORTIC ULTRASOUND BY DR Vitaliy Willingham - ON PUMP CORONARY ARTERY BYPASS GRAFT X 4 WITH GREATER RIGHT SAPHENOUS VEIN AND LEFT INTERNAL MAMMARY ARTERY GRAFTING - [facility] (N/A) 4 Days Post-Op  Precautions: Other (comment)(move in the tube)  Chart, physical therapy assessment, plan of care and goals were reviewed. ASSESSMENT  Patient continues with skilled PT services and is progressing towards goals. Pt was received in supine on RA and cleared by nursing to mobilize. He continues to do well with mobility, but limited in his support at home. He was able to perform all mobility at a SBA or supervision level.  Ambulated into the halls, negotiated steps, and performed exercises in the chair. Making good progress. Patient is demonstrating understanding of mindful-based movements (\"move in the tube\") principles of keeping UEs proximal to ribcage to prevent lateral pull on the sternum during load-bearing activities with verbal cues required for compliance. Current Level of Function Impacting Discharge (mobility/balance): SBA    Other factors to consider for discharge: limited support at home         PLAN :  Patient continues to benefit from skilled intervention to address the above impairments. Continue treatment per established plan of care. to address goals. Recommendation for discharge: (in order for the patient to meet his/her long term goals)  HHPT with addition support at home to assist with things like groceries, cooking and driving to appointments     This discharge recommendation:  Has not yet been discussed the attending provider and/or case management    IF patient discharges home will need the following DME: none       SUBJECTIVE:   Patient stated Edgar Rainier can I drive, I don't have anyone to help me at home.     OBJECTIVE DATA SUMMARY:   Patient mobilized on continuous portable monitor/telemetry.   Critical Behavior:  Neurologic State: Alert, Appropriate for age  Orientation Level: Oriented X4  Cognition: Appropriate for age attention/concentration, Follows commands  Safety/Judgement: Fall prevention    Functional Mobility Training:  Bed Mobility:  Rolling: Stand-by assistance  Supine to Sit: Stand-by assistance     Scooting: Stand-by assistance          Transfers:  Sit to Stand: Supervision  Stand to Sit: Supervision                               Balance:  Sitting: Intact  Standing: Intact    Ambulation/Gait Training:  Distance (ft): 300 Feet (ft)  Assistive Device: (none)  Ambulation - Level of Assistance: Contact guard assistance        Gait Abnormalities: Decreased step clearance        Base of Support: Narrowed     Speed/Elisha: Slow  Step Length: Left shortened;Right shortened          Stairs:  Number of Stairs Trained: 6  Stairs - Level of Assistance: Contact guard assistance  Rail Use: Left     Cardiac diagnosis intervention:  Patient instructed and educated on mindful movement principles based on Move in The Tube concept to include maintaining bilateral elbows close to rib cage when performing any load-bearing activity such as getting in/out of bed, pushing up from a chair, opening a door, or lifting a box. Patient was given a handout with diagrams of each correct/incorrect method of performing each of the above tasks. Therapeutic Exercises:   Patient instructed on the benefits and demonstrated cardiac exercises while sitting with Supervision. Instructed and indicated understanding on how to progress reps, sets against gravity, pacing through progressive muscle strengthening standing based on surgeon clearance for more weight in prep for basic and instrumental ADLs. Instruction on the use of household items in place of weights as needed.      CARDIAC  EXERCISE   Sets   Reps   Active Active Assist   Passive Self ROM   Comments   Shoulder flexion 1 10 [x]                                            []                                            []                                            []                                               Shoulder abduction 1      10 [x]                                            []                                            []                                            []                                               Scapular elevation 1 10 [x]                                            []                                            []                                            []                                               Scapular retraction 1 10 [x]                                            []                                            []                                            [] Trunk rotation 1 10 [x]                                            []                                            []                                            []                                               Trunk sidebending 1 10 [x]                                            []                                            []                                            []                                                  []                                            []                                            []                                            []                                                   Pain Rating:  No complaint     Activity Tolerance:   Good  Please refer to the flowsheet for vital signs taken during this treatment. After treatment patient left in no apparent distress:   Sitting in chair and Call bell within reach    COMMUNICATION/COLLABORATION:   The patients plan of care was discussed with: Registered nurse.      Kristi Ruiz, PT, DPT   Time Calculation: 23 mins

## 2020-08-10 NOTE — PROGRESS NOTES
0700 Bedside and Verbal shift change report given to [facility]. Report included the following information SBAR, Kardex, OR Summary, Procedure Summary, Intake/Output, MAR, Recent Results and Cardiac Rhythm NSR.

## 2020-08-11 ENCOUNTER — APPOINTMENT (OUTPATIENT)
Dept: GENERAL RADIOLOGY | Age: 75
DRG: 233 | End: 2020-08-11
Attending: PHYSICIAN ASSISTANT
Payer: MEDICARE

## 2020-08-11 ENCOUNTER — HOME HEALTH ADMISSION (OUTPATIENT)
Dept: HOME HEALTH SERVICES | Facility: HOME HEALTH | Age: 75
End: 2020-08-11

## 2020-08-11 LAB
ANION GAP SERPL CALC-SCNC: 5 MMOL/L (ref 5–15)
APTT PPP: 25.8 SEC (ref 22.1–32)
BUN SERPL-MCNC: 28 MG/DL (ref 6–20)
BUN/CREAT SERPL: 22 (ref 12–20)
CALCIUM SERPL-MCNC: 9.2 MG/DL (ref 8.5–10.1)
CHLORIDE SERPL-SCNC: 99 MMOL/L (ref 97–108)
CO2 SERPL-SCNC: 28 MMOL/L (ref 21–32)
CREAT SERPL-MCNC: 1.27 MG/DL (ref 0.7–1.3)
ERYTHROCYTE [DISTWIDTH] IN BLOOD BY AUTOMATED COUNT: 13.9 % (ref 11.5–14.5)
GLUCOSE SERPL-MCNC: 104 MG/DL (ref 65–100)
HCT VFR BLD AUTO: 38.2 % (ref 36.6–50.3)
HGB BLD-MCNC: 12.8 G/DL (ref 12.1–17)
INR PPP: 1 (ref 0.9–1.1)
MAGNESIUM SERPL-MCNC: 2.4 MG/DL (ref 1.6–2.4)
MCH RBC QN AUTO: 31.5 PG (ref 26–34)
MCHC RBC AUTO-ENTMCNC: 33.5 G/DL (ref 30–36.5)
MCV RBC AUTO: 94.1 FL (ref 80–99)
NRBC # BLD: 0 K/UL (ref 0–0.01)
NRBC BLD-RTO: 0 PER 100 WBC
PLATELET # BLD AUTO: 278 K/UL (ref 150–400)
PMV BLD AUTO: 10.8 FL (ref 8.9–12.9)
POTASSIUM SERPL-SCNC: 4.7 MMOL/L (ref 3.5–5.1)
PROTHROMBIN TIME: 10.8 SEC (ref 9–11.1)
RBC # BLD AUTO: 4.06 M/UL (ref 4.1–5.7)
SODIUM SERPL-SCNC: 132 MMOL/L (ref 136–145)
THERAPEUTIC RANGE,PTTT: NORMAL SECS (ref 58–77)
WBC # BLD AUTO: 12.8 K/UL (ref 4.1–11.1)

## 2020-08-11 PROCEDURE — 80048 BASIC METABOLIC PNL TOTAL CA: CPT

## 2020-08-11 PROCEDURE — 85730 THROMBOPLASTIN TIME PARTIAL: CPT

## 2020-08-11 PROCEDURE — 97535 SELF CARE MNGMENT TRAINING: CPT

## 2020-08-11 PROCEDURE — 74011250637 HC RX REV CODE- 250/637: Performed by: PHYSICIAN ASSISTANT

## 2020-08-11 PROCEDURE — 71046 X-RAY EXAM CHEST 2 VIEWS: CPT

## 2020-08-11 PROCEDURE — 83735 ASSAY OF MAGNESIUM: CPT

## 2020-08-11 PROCEDURE — 74011250636 HC RX REV CODE- 250/636: Performed by: PHYSICIAN ASSISTANT

## 2020-08-11 PROCEDURE — 74011250637 HC RX REV CODE- 250/637: Performed by: THORACIC SURGERY (CARDIOTHORACIC VASCULAR SURGERY)

## 2020-08-11 PROCEDURE — 97110 THERAPEUTIC EXERCISES: CPT

## 2020-08-11 PROCEDURE — 36415 COLL VENOUS BLD VENIPUNCTURE: CPT

## 2020-08-11 PROCEDURE — 85610 PROTHROMBIN TIME: CPT

## 2020-08-11 PROCEDURE — 97116 GAIT TRAINING THERAPY: CPT

## 2020-08-11 PROCEDURE — 99233 SBSQ HOSP IP/OBS HIGH 50: CPT | Performed by: INTERNAL MEDICINE

## 2020-08-11 PROCEDURE — 85027 COMPLETE CBC AUTOMATED: CPT

## 2020-08-11 PROCEDURE — 65660000000 HC RM CCU STEPDOWN

## 2020-08-11 RX ORDER — BUMETANIDE 1 MG/1
1 TABLET ORAL DAILY
Status: DISCONTINUED | OUTPATIENT
Start: 2020-08-11 | End: 2020-08-13

## 2020-08-11 RX ORDER — CARVEDILOL 6.25 MG/1
6.25 TABLET ORAL 2 TIMES DAILY WITH MEALS
Status: DISCONTINUED | OUTPATIENT
Start: 2020-08-11 | End: 2020-08-12

## 2020-08-11 RX ORDER — POTASSIUM CHLORIDE 750 MG/1
10 TABLET, FILM COATED, EXTENDED RELEASE ORAL DAILY
Status: DISCONTINUED | OUTPATIENT
Start: 2020-08-12 | End: 2020-08-14 | Stop reason: HOSPADM

## 2020-08-11 RX ORDER — AMIODARONE HYDROCHLORIDE 200 MG/1
200 TABLET ORAL 2 TIMES DAILY
Status: DISCONTINUED | OUTPATIENT
Start: 2020-08-11 | End: 2020-08-14 | Stop reason: HOSPADM

## 2020-08-11 RX ADMIN — OXYCODONE 5 MG: 5 TABLET ORAL at 09:29

## 2020-08-11 RX ADMIN — AMIODARONE HYDROCHLORIDE 200 MG: 200 TABLET ORAL at 09:20

## 2020-08-11 RX ADMIN — MAGNESIUM OXIDE 400 MG (241.3 MG MAGNESIUM) TABLET 400 MG: TABLET at 18:17

## 2020-08-11 RX ADMIN — Medication 10 ML: at 14:55

## 2020-08-11 RX ADMIN — AMIODARONE HYDROCHLORIDE 200 MG: 200 TABLET ORAL at 18:17

## 2020-08-11 RX ADMIN — Medication 10 ML: at 05:52

## 2020-08-11 RX ADMIN — ASPIRIN 81 MG CHEWABLE TABLET 81 MG: 81 TABLET CHEWABLE at 09:20

## 2020-08-11 RX ADMIN — MAGNESIUM OXIDE 400 MG (241.3 MG MAGNESIUM) TABLET 400 MG: TABLET at 09:20

## 2020-08-11 RX ADMIN — TAMSULOSIN HYDROCHLORIDE 0.4 MG: 0.4 CAPSULE ORAL at 09:20

## 2020-08-11 RX ADMIN — DOCUSATE SODIUM - SENNOSIDES 1 TABLET: 50; 8.6 TABLET, FILM COATED ORAL at 18:17

## 2020-08-11 RX ADMIN — OXYCODONE 5 MG: 5 TABLET ORAL at 15:07

## 2020-08-11 RX ADMIN — Medication 10 ML: at 22:38

## 2020-08-11 RX ADMIN — CARVEDILOL 6.25 MG: 6.25 TABLET, FILM COATED ORAL at 18:17

## 2020-08-11 RX ADMIN — ONDANSETRON 4 MG: 2 INJECTION INTRAMUSCULAR; INTRAVENOUS at 15:07

## 2020-08-11 RX ADMIN — ATORVASTATIN CALCIUM 20 MG: 20 TABLET, FILM COATED ORAL at 22:38

## 2020-08-11 RX ADMIN — CARVEDILOL 6.25 MG: 6.25 TABLET, FILM COATED ORAL at 09:20

## 2020-08-11 RX ADMIN — DOCUSATE SODIUM - SENNOSIDES 1 TABLET: 50; 8.6 TABLET, FILM COATED ORAL at 09:20

## 2020-08-11 RX ADMIN — POTASSIUM CHLORIDE 20 MEQ: 750 TABLET, FILM COATED, EXTENDED RELEASE ORAL at 09:20

## 2020-08-11 RX ADMIN — ONDANSETRON 4 MG: 2 INJECTION INTRAMUSCULAR; INTRAVENOUS at 05:52

## 2020-08-11 RX ADMIN — PANTOPRAZOLE SODIUM 40 MG: 40 TABLET, DELAYED RELEASE ORAL at 09:20

## 2020-08-11 RX ADMIN — POLYETHYLENE GLYCOL 3350 17 G: 17 POWDER, FOR SOLUTION ORAL at 09:20

## 2020-08-11 RX ADMIN — BUMETANIDE 1 MG: 1 TABLET ORAL at 09:20

## 2020-08-11 NOTE — PROGRESS NOTES
8/11/2020 1:01 PM    Admit Date: 7/31/2020    Admit Diagnosis: Pleural effusion, bilateral [J90];CHF, acute on chronic (HCC) [I50.9];CAD (coronary artery disease) [I25.10]    Subjective: Taurus Benavides   denies chest pain, chest pressure/discomfort, dyspnea, palpitations, irregular heart beats, near-syncope, syncope, fatigue, orthopnea, paroxysmal nocturnal dyspnea, exertional chest pressure/discomfort, claudication.     Visit Vitals  /68 (BP 1 Location: Right arm, BP Patient Position: Sitting)   Pulse 76   Temp 98.3 °F (36.8 °C)   Resp 16   Ht 5' 6\" (1.676 m)   Wt 163 lb 12.8 oz (74.3 kg)   SpO2 97%   BMI 26.44 kg/m²     Current Facility-Administered Medications   Medication Dose Route Frequency    carvediloL (COREG) tablet 6.25 mg  6.25 mg Oral BID WITH MEALS    bumetanide (BUMEX) tablet 1 mg  1 mg Oral DAILY    amiodarone (CORDARONE) tablet 200 mg  200 mg Oral BID    [START ON 8/12/2020] potassium chloride SR (KLOR-CON 10) tablet 10 mEq  10 mEq Oral DAILY    tamsulosin (FLOMAX) capsule 0.4 mg  0.4 mg Oral DAILY    pantoprazole (PROTONIX) tablet 40 mg  40 mg Oral ACB    sodium chloride (NS) flush 5-40 mL  5-40 mL IntraVENous Q8H    sodium chloride (NS) flush 5-40 mL  5-40 mL IntraVENous PRN    sodium chloride (NS) flush 5-40 mL  5-40 mL IntraVENous Q8H    sodium chloride (NS) flush 5-40 mL  5-40 mL IntraVENous PRN    oxyCODONE IR (ROXICODONE) tablet 5 mg  5 mg Oral Q4H PRN    oxyCODONE IR (ROXICODONE) tablet 10 mg  10 mg Oral Q4H PRN    naloxone (NARCAN) injection 0.4 mg  0.4 mg IntraVENous PRN    ondansetron (ZOFRAN) injection 4 mg  4 mg IntraVENous Q4H PRN    albuterol (PROVENTIL VENTOLIN) nebulizer solution 2.5 mg  2.5 mg Nebulization Q4H PRN    aspirin chewable tablet 81 mg  81 mg Oral DAILY    magnesium oxide (MAG-OX) tablet 400 mg  400 mg Oral BID    bisacodyL (DULCOLAX) suppository 10 mg  10 mg Rectal DAILY PRN    senna-docusate (PERICOLACE) 8.6-50 mg per tablet 1 Tab  1 Tab Oral BID    polyethylene glycol (MIRALAX) packet 17 g  17 g Oral DAILY    glucose chewable tablet 16 g  4 Tab Oral PRN    dextrose (D50W) injection syrg 12.5-25 g  12.5-25 g IntraVENous PRN    glucagon (GLUCAGEN) injection 1 mg  1 mg IntraMUSCular PRN    diphenhydrAMINE (BENADRYL) capsule 25 mg  25 mg Oral Q6H PRN    melatonin tablet 3 mg  3 mg Oral QHS PRN    insulin lispro (HUMALOG) injection   SubCUTAneous AC&HS    atorvastatin (LIPITOR) tablet 20 mg  20 mg Oral QHS         Objective:      Visit Vitals  /68 (BP 1 Location: Right arm, BP Patient Position: Sitting)   Pulse 76   Temp 98.3 °F (36.8 °C)   Resp 16   Ht 5' 6\" (1.676 m)   Wt 163 lb 12.8 oz (74.3 kg)   SpO2 97%   BMI 26.44 kg/m²       Physical Exam:  Resting comfortably. No resp distress.    Extremities: extremities normal, atraumatic, no cyanosis or edema  Heart: regular rate and rhythm, S1, S2 normal, no murmur, click, rub or gallop  Lungs: clear to auscultation bilaterally  Neurologic: Grossly normal    Data Review:   Labs:    Recent Results (from the past 24 hour(s))   GLUCOSE, POC    Collection Time: 08/10/20  4:20 PM   Result Value Ref Range    Glucose (POC) 140 (H) 65 - 100 mg/dL    Performed by Waqar Ruano RN    ECHO ADULT COMPLETE    Collection Time: 08/10/20  5:18 PM   Result Value Ref Range    IVSd 1.21 (A) 0.6 - 1.0 cm    IVSs 1.65 cm    LVIDd 5.48 4.2 - 5.9 cm    LVIDs 5.16 cm    LVOT d 2.06 cm    LVPWd 1.26 (A) 0.6 - 1.0 cm    LVPWs 1.37 cm    LVOT Peak Gradient 1.40 mmHg    LVOT Peak Velocity 59.15 cm/s    RVIDd 2.73 cm    Left Atrium Major Axis 3.20 cm    LA Volume 112.54 18 - 58 mL    LA Area 4C 28.61 cm2    LA Vol 2C 93.48 (A) 18 - 58 mL    LA Vol 4C 89.90 (A) 18 - 58 mL    Left Atrium to Aortic Root Ratio 1.11     Left Atrium to Aortic Root Ratio 1.11     Right Atrial Area 4C 19.01 cm2    AV Cusp 1.98 cm    Aortic Valve Area by Continuity of Peak Velocity 1.87 cm2    AoV PG 4.41 mmHg    Aortic Valve Systolic Peak Velocity 538.85 cm/s    MV A Yusef 43.04 cm/s    Mitral Valve E Wave Deceleration Time 0.09 s    MV E Yusef 77.49 cm/s    E/E' ratio (averaged) 16.34     E/E' lateral 15.50     E/E' septal 17.18     LV E' Lateral Velocity 5.00 cm/s    LV E' Septal Velocity 4.51 cm/s    Pulmonic Valve Systolic Peak Instantaneous Gradient 3.36 mmHg    Pulmonic Valve Max Velocity 91.35 cm/s    Triscuspid Valve Regurgitation Peak Gradient 27.38 mmHg    TR Max Velocity 260.80 cm/s    Ao Root D 3.48 cm    MV E/A 1.80     LV Mass .7 88 - 224 g    LV Mass AL Index 151.4 49 - 115 g/m2    Left Atrium Minor Axis 1.72 cm    LA Vol Index 60.49 16 - 28 ml/m2    LA Vol Index 50.25 16 - 28 ml/m2    LA Vol Index 48.32 16 - 28 ml/m2    AMY/BSA Pk Yusef 1.0 cm2/m2   CBC W/O DIFF    Collection Time: 08/11/20  5:25 AM   Result Value Ref Range    WBC 12.8 (H) 4.1 - 11.1 K/uL    RBC 4.06 (L) 4.10 - 5.70 M/uL    HGB 12.8 12.1 - 17.0 g/dL    HCT 38.2 36.6 - 50.3 %    MCV 94.1 80.0 - 99.0 FL    MCH 31.5 26.0 - 34.0 PG    MCHC 33.5 30.0 - 36.5 g/dL    RDW 13.9 11.5 - 14.5 %    PLATELET 479 677 - 457 K/uL    MPV 10.8 8.9 - 12.9 FL    NRBC 0.0 0  WBC    ABSOLUTE NRBC 0.00 0.00 - 8.67 K/uL   METABOLIC PANEL, BASIC    Collection Time: 08/11/20  5:25 AM   Result Value Ref Range    Sodium 132 (L) 136 - 145 mmol/L    Potassium 4.7 3.5 - 5.1 mmol/L    Chloride 99 97 - 108 mmol/L    CO2 28 21 - 32 mmol/L    Anion gap 5 5 - 15 mmol/L    Glucose 104 (H) 65 - 100 mg/dL    BUN 28 (H) 6 - 20 MG/DL    Creatinine 1.27 0.70 - 1.30 MG/DL    BUN/Creatinine ratio 22 (H) 12 - 20      GFR est AA >60 >60 ml/min/1.73m2    GFR est non-AA 55 (L) >60 ml/min/1.73m2    Calcium 9.2 8.5 - 10.1 MG/DL   MAGNESIUM    Collection Time: 08/11/20  5:25 AM   Result Value Ref Range    Magnesium 2.4 1.6 - 2.4 mg/dL   PTT    Collection Time: 08/11/20  5:25 AM   Result Value Ref Range    aPTT 25.8 22.1 - 32.0 sec    aPTT, therapeutic range     58.0 - 77.0 SECS   PROTHROMBIN TIME + INR    Collection Time: 08/11/20  5:25 AM   Result Value Ref Range    INR 1.0 0.9 - 1.1      Prothrombin time 10.8 9.0 - 11.1 sec       Telemetry: SR      Assessment:     Principal Problem:    Ischemic cardiomyopathy (7/31/2020)      Overview: Added automatically from request for surgery [id number]    Active Problems:    CAD (coronary artery disease), native coronary artery (9/21/2010)      Hyperlipidemia (9/21/2010)      Pleural effusion, bilateral (7/31/2020)      CHF, acute on chronic ([address] (7/31/2020)      Pulmonary embolism ([address] (7/31/2020)      CAD (coronary artery disease) (8/6/2020)      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 Doing very well. Continue current management. Increase activity. Pain controlled.      HLD:  -Continue statin     No further episodes of a. Fib. Remains in SR. Hold off AC due to transient nature of episode. Will follow.

## 2020-08-11 NOTE — PROGRESS NOTES
Cardiac Surgery Care Coordinator- Met with Kieran Zuniga. Reviewed plan of care and discussed upcoming discharge date. Reinforced sternal precautions and encouraged continued use of the incentive spirometer. Began discharge teaching and encouraged him to verbalize. Reviewed goals for the day and discussed the  importance of increased activity and continued activity after discharge. Walked with pt the length of the hallway and back to room. Will continue to follow for educational and emotional needs.  Chidi Christensen RN

## 2020-08-11 NOTE — PROGRESS NOTES
[facility] ICU Progress Note    Admit Date: 2020  POD: 5 Day Post-Op    Procedure:  Procedure(s):  JUN AND EPIAORTIC ULTRASOUND BY DR Fabian Baez - ON PUMP CORONARY ARTERY BYPASS GRAFT X 4 WITH GREATER RIGHT SAPHENOUS VEIN AND LEFT INTERNAL MAMMARY ARTERY GRAFTING - EVH        Subjective:   Pt seen with Dr. Michelle Martinez. Sitting the chair, eating breakfast. Feels great. EF 20-25% on echo yesterday, will need lifevest.     Objective:   Vitals:  Blood pressure 110/68, pulse 76, temperature 98.3 °F (36.8 °C), resp. rate 16, height 5' 6\" (1.676 m), weight 163 lb 12.8 oz (74.3 kg), SpO2 97 %. Temp (24hrs), Av.5 °F (36.9 °C), Min:98.2 °F (36.8 °C), Max:98.9 °F (37.2 °C)    EKG/Rhythm:  NSR 70s    Oxygen Therapy: RA    CXR:   CXR Results  (Last 48 hours)               20 0827  XR CHEST PA LAT Final result    Impression:  IMPRESSION: Right basilar opacification. Small bilateral effusions. Elevation of   the right hemidiaphragm. Narrative:  History: Postop. 2 views of the chest were performed and compared to a study from . Patient is status post median sternotomy. There is atherosclerosis of the aorta. Heart size appears unremarkable. There is elevation of the right hemidiaphragm   with a right pleural effusion and atelectasis in the right lower lobe. There is   likely a small left effusion. There are degenerative changes of the spine. Admission Weight: Last Weight   Weight: 163 lb 12.8 oz (74.3 kg) Weight: 163 lb 12.8 oz (74.3 kg)     Intake / Output / Drain:  Current Shift: No intake/output data recorded. Last 24 hrs.:     Intake/Output Summary (Last 24 hours) at 2020 1252  Last data filed at 8/10/2020 1612  Gross per 24 hour   Intake 240 ml   Output 1000 ml   Net -760 ml       EXAM:  General:    NAD                                                                                          Lungs:   Clear to auscultation bilaterally. Incision:  No erythema, drainage or swelling. Heart:  Regular rate and rhythm   Abdomen:   Soft, non-tender. Bowel sounds normal. No masses,  No organomegaly. Extremities:  No edema. PPP. Neurologic:  Gross motor and sensory apparatus intact. Labs:   Recent Labs     20  0525 08/10/20  1620   WBC 12.8*  --    HGB 12.8  --    HCT 38.2  --      --    *  --    K 4.7  --    BUN 28*  --    CREA 1.27  --    *  --    GLUCPOC  --  140*   INR 1.0  --         Assessment:     Principal Problem:    Ischemic cardiomyopathy (2020)      Overview: Added automatically from request for surgery     Active Problems:    CAD (coronary artery disease), native coronary artery (2010)      Hyperlipidemia (2010)      Pleural effusion, bilateral (2020)      CHF, acute on chronic ([address] (2020)      Pulmonary embolism ([facility] [address] (2020)      CAD (coronary artery disease) (2020)      S/P CABG x 4 (2020)      Overview: x 4, LIMA to LAD, RSVG to Diag-OM2, RSVG to PDA      Paroxysmal atrial fibrillation ([facility] [address] (2020)         Plan/Recommendations/Medical Decision Makin. CAD s/p CABG: On ASA, statin, start coreg    2. Acute on chronic systolic heart failure: EF 20-25% on TTE preop. Was on lasix PTA. Reduce bumex to 1 mg daily and monitor renal function. GDMT as tolerated. Hold AA due to hyponatremia. Hold cozaar due to renal function. Will need lifevest, rep contacted. 3. HLD: on statin    4. Thrombocytopenia: Plts 86K yesterday, no CBC drawn this morning. On ASA. Switch pepcid to protonix. 5. Hyponatremia: improved. Stop free water restriction. Cont bumex. Daily labs. Hold AA. 6. Urinary retention: Start flomax. Start bumex today. Straight cath PRN. Dispo: PT/OT, needs lifevest, home next 1-2 days.     Signed By: TONY El

## 2020-08-11 NOTE — PROGRESS NOTES
Bedside shift change report given to Jorgito Bedolla RN (oncoming nurse) by Chevy Lozano (offgoing nurse). Report included the following information SBAR, Kardex, Intake/Output, MAR, Recent Results and Cardiac Rhythm NSR.    2000: Pt resting in bed, A&Ox4. NSR, RA. Lungs diminished c fine crackles bilaterally in lower fields. Active BS. Sternal incision well-approximated, no drainage. Bilateral LE edema 1+. Denies pain. Requesting ice pack for comfort around neck. Call bell within reach. 0600: Uneventful shift. Pt voided without measuring despite requests to use urinal.     0725: Bedside shift change report given to Manisha Fitzpatrick  (oncoming nurse) by Jorgito Bedolla RN (offgoing nurse). Report included the following information SBAR, Kardex, Intake/Output, MAR, Recent Results and Cardiac Rhythm NSR.

## 2020-08-11 NOTE — CARDIO/PULMONARY
Cardiac Rehab Note: chart review Consult has been acknowledged Cath>CTS> CABG scheduled 8/6/20 
  
Smoking history assessed. Patient is a non smoker. Smoking Cessation Program link has not been added to the AVS.  
  
EF 20-25  on 8/1/20 per echo 
  
CABG educational folder with \"Cardiac Surgery Post-Op Instructions\" book, heart healthy eating, warning signs, heart facts, heart aware, resources, and CABG Surgery booklet to Sanna Burgos on 8/5/20 Patient was sound asleep with lights out and door closed. [facility] Rehab will attempt to follow up.

## 2020-08-11 NOTE — PROGRESS NOTES
Problem: Self Care Deficits Care Plan (Adult)  Goal: *Acute Goals and Plan of Care (Insert Text)  Description: FUNCTIONAL STATUS PRIOR TO ADMISSION: Patient was independent and active without use of DME. Patient was independent for basic and instrumental ADLs. HOME SUPPORT: The patient lived with friend but did not require assist.    Occupational Therapy Goals  Initiated 8/7/2020  1. Patient will perform ADLs standing 5 mins without fatigue or LOB with supervision/set-up within 7 day(s). 2.  Patient will perform lower body ADLs with supervision/set-up within 7 day(s). 3.  Patient will perform gathering ADL items high and low 2/2 with supervision/set-up within 7 day(s). 4.  Patient will perform toilet transfers with supervision/set-up within 7 day(s). 5.  Patient will perform all aspects of toileting with supervision/set-up within 7 day(s). 6.  Patient will participate in cardiac/sternal upper extremity therapeutic exercise/activities to increase independence with ADLs with supervision/set-up for 5 minutes within 7 day(s). Outcome: Progressing Towards Goal    OCCUPATIONAL THERAPY TREATMENT  Patient: Celio Handy (61 y.o. male)  Date: 8/11/2020  Diagnosis: Pleural effusion, bilateral [J90]  CHF, acute on chronic (HCC) [I50.9]  CAD (coronary artery disease) [I25.10]   Ischemic cardiomyopathy  Procedure(s) (LRB):  JUN AND EPIAORTIC ULTRASOUND BY DR Velia Coburn - ON PUMP CORONARY ARTERY BYPASS GRAFT X 4 WITH GREATER RIGHT SAPHENOUS VEIN AND LEFT INTERNAL MAMMARY ARTERY GRAFTING - EVH (N/A) 5 Days Post-Op  Precautions: Other (comment)(move in the tube); sternal precautions for 4 weeks: no pushing, no pulling, and no pushing > 5 lbs. Chart, occupational therapy assessment, plan of care, and goals were reviewed. ASSESSMENT  Patient continues with skilled OT services and is progressing towards goals.   This patient completed standing for most of UB/LB bathing tasks and grooming tasks today at sink with S-Set up. Min cue for thinking about ECWS and body mechanics to not throw socks to floor (then he has to bend deeply to retrieve them). Rec use of reacher for item retrieval from floor. Provided armchair for his use as he was sitting in window seat with trunk unsupported in room as he dislikes recliner chair in toom, sit to stand to sit from armchair with S-mod I. He is progressing towards ADL I, requires very minimal cues for cardiac precautions/move in tube. He verbalized understanding of all precautions. Just completed cardiac 6 pack with PT, did not complete this date with patient--has handout in bag in window. Presume patient will not require any HH OT at this time. Current Level of Function Impacting Discharge (ADLs): SBA/S-setup for simple ADl tasks, min A for gown    Other factors to consider for discharge: home \"alone\" with limited support         PLAN :  Patient continues to benefit from skilled intervention to address the above impairments. Continue treatment per established plan of care. to address goals. Recommend with staff: amb to/from sink for ADL, stand for grooming, up to armchair, not window seat when OOB    Recommend next OT session: progress POC    Recommendation for discharge: (in order for the patient to meet his/her long term goals)  No skilled occupational therapy/ follow up rehabilitation needs identified at this time. This discharge recommendation:  Has been made in collaboration with the attending provider and/or case management    IF patient discharges home will need the following DME: rec reacher use for item retrieval from floor, low surfaces       SUBJECTIVE:   Patient stated I don't like that recliner.     OBJECTIVE DATA SUMMARY:   Cognitive/Behavioral Status:                      Functional Mobility and Transfers for ADLs:  Bed Mobility:  Sit to Supine: Supervision    Transfers:  Sit to Stand: Modified independent  Functional Transfers  Bathroom Mobility: Supervision/set up  Toilet Transfer : Supervision  Bed to Chair: Supervision(found and placed armchair in room (he sits in window seat))    Balance:  Sitting: Intact  Standing: Intact    ADL Intervention:       Grooming  Position Performed: Standing  Washing Face: Set-up; Supervision  Washing Hands: Set-up; Supervision  Brushing Teeth: Set-up; Supervision    Upper Body Bathing  Bathing Assistance: Set-up; Supervision  Position Performed: Standing  Cues: Verbal cues provided    Lower Body Bathing  Bathing Assistance: Supervision;Set-up  Perineal  : Supervision  Position Performed: Standing  Cues: Verbal cues provided  Lower Body : Supervision;Set-up  Position Performed: (seated on toilet)  Cues: Verbal cues provided    Upper Body [address]: Minimum  assistance    Lower Body Dressing Assistance  Socks: Supervision;Set-up  Leg Crossed Method Used: Yes  Position Performed: Seated in chair(and on toilet)  Cues: Doff;Don;Verbal cues provided  Adaptive Equipment Used: (rec reacher for item retrieval from low and floor)              The patient instructed and recalled sternal precautions 3/3 without cues. Patient instructed no asymmetrical reaching over head to ensure B UEs when shoulders >90* i.e. reaching in cabinets and dressing. Instruction on upper body dressing techniques of over head, then arms through to decrease pain and unilateral shoulder flexion >90*. Instruction on the benefits of utilizing B UEs during functional tasks i.e. opening the fridge, stepping into the tub. Instruction if continued pain at home with shoulder IR for BM hygiene can use wet wipes and toilet tongs PRN. Avoid valsalva maneuvers. May have to adjust home setup to increase ease with items closer to waist height to prevent deep bending and the automatic  of asymmetrical UE WB/pushing for stabilization during bending. Benefit to don clothing tailor sitting and don all clothing while sitting prior to standing. Patient demonstrated lower body dressing with Supervision and Setup. Instruction and indicated understanding on the benefits of loose clothing throughout to accommodate for post surgical swelling, decreased ROM and increased pain. Instruction and indicated understanding the technique of pull over shirt versus front open clothing. Increase activity tolerance for home, work, and sexual intercourse by pacing self with increasing the arm exercises, sitting duration, frequency OOB, walking, standing, and ADLs. Instructed and indicated understanding of s/s of too much activity, how to respond to s/s safely. Pain:  0/10    Activity Tolerance:   Good and requires rest breaks  Please refer to the flowsheet for vital signs taken during this treatment. After treatment patient left in no apparent distress:   Sitting in chair and Call bell within reach    COMMUNICATION/COLLABORATION:   The patients plan of care was discussed with: Registered nurse.      ROVERTO Cadet/L  Time Calculation: 27 mins

## 2020-08-11 NOTE — PROGRESS NOTES
RAPID RESPONSE TEAM- Follow Up     Rounded on patient due to transfer from CCU today; s/p CABG x4. On po amio for afib; currently in SR 70s. Spoke with primary RN Saul Perez. No acute concerns at this time. VSS. MEWS 1. No RRT interventions currently indicated. Please call back if needed.      Terri Pastor RN  Ext. [phone]    Patient Vitals for the past 8 hrs:   Temp Pulse Resp BP SpO2   08/10/20 1950 98.7 °F (37.1 °C) 83 16 120/73 98 %   08/10/20 1850 98.2 °F (36.8 °C) 89 18 110/79 97 %   08/10/20 1802  89 25 95/74 94 %   08/10/20 1700  86 19 110/64 96 %   08/10/20 1619    122/75    08/10/20 1600 98.6 °F (37 °C) 86 21 122/75 92 %   08/10/20 1500  85 21 122/74 98 %   08/10/20 1400  83 15 95/51 96 %

## 2020-08-11 NOTE — PROGRESS NOTES
RAPID RESPONSE TEAM- Follow Up     Rounded on patient due to recent transfer out of CCU     Discussed with primary nurse. No acute concerns. VSS MEWS 1     No RRT interventions indicated at this time. Please call back if needed.      Jigna William RN  RRT, Ext. [phone]

## 2020-08-11 NOTE — PROGRESS NOTES
ANNA:    Plan is home with home health and [facility] home care has accepted patient;   His discharge address is his home:  [address] [place] [address]    Transportation:  Friends from [employer]    Preferred RX: [facility] on [address] [facility]    2nd IM letter will be needed;    FU appts will be needed    Needs new PCP and I have requested a PCP and appointment for him     Frank Lala, [address] [place] [address].  Ext [phone]

## 2020-08-11 NOTE — PROGRESS NOTES
Problem: Mobility Impaired (Adult and Pediatric)  Goal: *Acute Goals and Plan of Care (Insert Text)  Description: FUNCTIONAL STATUS PRIOR TO ADMISSION: Patient was independent and active without use of DME.     HOME SUPPORT PRIOR TO ADMISSION: The patient lived alone with no local support. Physical Therapy Goals  Initiated 8/7/2020  1. Patient will move from supine to sit and sit to supine  in bed with independence within 5 days. 2.  Patient will perform sit to/from stand with independence within 5 days. 3.  Patient will ambulate 400 feet with least restrictive assistive device and independence within 5 days. 4.  Patient will ascend/descend 4 stairs with bilateral handrail(s) with modified independence within 5 days. 5.  Patient will perform cardiac exercises per protocol with independence within 5 days. 6.  Patient will verbally recall and functionally demonstrate mindful-based movements (\"move in the tube\") principles without cues within 5 days. Outcome: Progressing Towards Goal  PHYSICAL THERAPY TREATMENT  Patient: Rose Mary Denis (68 y.o. male)  Date: 8/11/2020  Diagnosis: Pleural effusion, bilateral [J90]  CHF, acute on chronic (HCC) [I50.9]  CAD (coronary artery disease) [I25.10]   Ischemic cardiomyopathy  Procedure(s) (LRB):  JUN AND EPIAORTIC ULTRASOUND BY DR Garcia Shows - ON PUMP CORONARY ARTERY BYPASS GRAFT X 4 WITH GREATER RIGHT SAPHENOUS VEIN AND LEFT INTERNAL MAMMARY ARTERY GRAFTING - [facility] (N/A) 5 Days Post-Op  Precautions: Other (comment)(move in the tube)  Chart, physical therapy assessment, plan of care and goals were reviewed. ASSESSMENT  Patient continues with skilled PT services and is progressing towards goals. Pt was received standing in the room, he had just returned form chest x-ray. Cleared by nursing to mobilize. He was able to ambulate into the pardo without any LOB during session. He divulged that his air conditioner broke right before being admitted. .. he was able to perform all cardiac exercises in standing and returned to supine without assistance. He is mobilizing extremely well, he just has very poor support upon discharge. Patient is demonstrating understanding of mindful-based movements (\"move in the tube\") principles of keeping UEs proximal to ribcage to prevent lateral pull on the sternum during load-bearing activities with verbal cues required for compliance. Current Level of Function Impacting Discharge (mobility/balance): supervision or higher    Other factors to consider for discharge: poor support at home         PLAN :  Patient continues to benefit from skilled intervention to address the above impairments. Continue treatment per established plan of care. to address goals. Recommendation for discharge: (in order for the patient to meet his/her long term goals)  [facility] [address] [facility]     This discharge recommendation:  Has not yet been discussed the attending provider and/or case management    IF patient discharges home will need the following DME: none       SUBJECTIVE:   Patient stated [place] asked the doctor if I would go to [facility] and get a new air conditioner. He said no.     OBJECTIVE DATA SUMMARY:   Patient mobilized on continuous portable monitor/telemetry.   Critical Behavior:  Neurologic State: Alert  Orientation Level: Oriented X4  Cognition: Follows commands  Safety/Judgement: Awareness of environment, Fall prevention, Good awareness of safety precautions    Functional Mobility Training:  Bed Mobility:        Sit to Supine: Supervision             Transfers:  Sit to Stand: Modified independent  Stand to Sit: Modified independent                               Balance:  Sitting: Intact  Standing: Intact    Ambulation/Gait Training:  Distance (ft): 400 Feet (ft)  Assistive Device: (none)  Ambulation - Level of Assistance: Stand-by assistance        Gait Abnormalities: Decreased step clearance                                Cardiac diagnosis intervention:  Patient instructed and educated on mindful movement principles based on Move in The Tube concept to include maintaining bilateral elbows close to rib cage when performing any load-bearing activity such as getting in/out of bed, pushing up from a chair, opening a door, or lifting a box. Patient was given a handout with diagrams of each correct/incorrect method of performing each of the above tasks. Therapeutic Exercises:   Patient instructed on the benefits and demonstrated cardiac exercises while standing with Supervision. Instructed and indicated understanding on how to progress reps, sets against gravity, pacing through progressive muscle strengthening standing based on surgeon clearance for more weight in prep for basic and instrumental ADLs. Instruction on the use of household items in place of weights as needed.      CARDIAC  EXERCISE   Sets   Reps   Active Active Assist   Passive Self ROM   Comments   Shoulder flexion 1 10 [x]                                            []                                            []                                            []                                               Shoulder abduction 1      10 [x]                                            []                                            []                                            []                                               Scapular elevation 1 10 [x]                                            []                                            []                                            []                                               Scapular retraction 1 10 [x]                                            []                                            []                                            []                                               Trunk rotation 1 10 [x]                                            []                                            [] []                                               Trunk sidebending 1 10 [x]                                            []                                            []                                            []                                                  []                                            []                                            []                                            []                                                     Activity Tolerance:   Good  Please refer to the flowsheet for vital signs taken during this treatment. After treatment patient left in no apparent distress:   Supine in bed and Call bell within reach    COMMUNICATION/COLLABORATION:   The patients plan of care was discussed with: Registered nurse.      María Ashford PT, DPT   Time Calculation: 23 mins

## 2020-08-12 ENCOUNTER — APPOINTMENT (OUTPATIENT)
Dept: GENERAL RADIOLOGY | Age: 75
DRG: 233 | End: 2020-08-12
Attending: PHYSICIAN ASSISTANT
Payer: MEDICARE

## 2020-08-12 LAB
ANION GAP SERPL CALC-SCNC: 5 MMOL/L (ref 5–15)
BUN SERPL-MCNC: 30 MG/DL (ref 6–20)
BUN/CREAT SERPL: 22 (ref 12–20)
CALCIUM SERPL-MCNC: 9.1 MG/DL (ref 8.5–10.1)
CHLORIDE SERPL-SCNC: 99 MMOL/L (ref 97–108)
CO2 SERPL-SCNC: 28 MMOL/L (ref 21–32)
CREAT SERPL-MCNC: 1.34 MG/DL (ref 0.7–1.3)
ERYTHROCYTE [DISTWIDTH] IN BLOOD BY AUTOMATED COUNT: 14.1 % (ref 11.5–14.5)
GLUCOSE SERPL-MCNC: 113 MG/DL (ref 65–100)
HCT VFR BLD AUTO: 37.7 % (ref 36.6–50.3)
HGB BLD-MCNC: 12.1 G/DL (ref 12.1–17)
MAGNESIUM SERPL-MCNC: 2.5 MG/DL (ref 1.6–2.4)
MCH RBC QN AUTO: 30.8 PG (ref 26–34)
MCHC RBC AUTO-ENTMCNC: 32.1 G/DL (ref 30–36.5)
MCV RBC AUTO: 95.9 FL (ref 80–99)
NRBC # BLD: 0 K/UL (ref 0–0.01)
NRBC BLD-RTO: 0 PER 100 WBC
PLATELET # BLD AUTO: 314 K/UL (ref 150–400)
PMV BLD AUTO: 10 FL (ref 8.9–12.9)
POTASSIUM SERPL-SCNC: 4.2 MMOL/L (ref 3.5–5.1)
RBC # BLD AUTO: 3.93 M/UL (ref 4.1–5.7)
SODIUM SERPL-SCNC: 132 MMOL/L (ref 136–145)
WBC # BLD AUTO: 11.3 K/UL (ref 4.1–11.1)

## 2020-08-12 PROCEDURE — 80048 BASIC METABOLIC PNL TOTAL CA: CPT

## 2020-08-12 PROCEDURE — 87635 SARS-COV-2 COVID-19 AMP PRB: CPT

## 2020-08-12 PROCEDURE — 74011250637 HC RX REV CODE- 250/637: Performed by: PHYSICIAN ASSISTANT

## 2020-08-12 PROCEDURE — 97535 SELF CARE MNGMENT TRAINING: CPT

## 2020-08-12 PROCEDURE — 65660000000 HC RM CCU STEPDOWN

## 2020-08-12 PROCEDURE — 83735 ASSAY OF MAGNESIUM: CPT

## 2020-08-12 PROCEDURE — 85027 COMPLETE CBC AUTOMATED: CPT

## 2020-08-12 PROCEDURE — 71045 X-RAY EXAM CHEST 1 VIEW: CPT

## 2020-08-12 PROCEDURE — 97116 GAIT TRAINING THERAPY: CPT

## 2020-08-12 PROCEDURE — 74011250637 HC RX REV CODE- 250/637: Performed by: THORACIC SURGERY (CARDIOTHORACIC VASCULAR SURGERY)

## 2020-08-12 PROCEDURE — 36415 COLL VENOUS BLD VENIPUNCTURE: CPT

## 2020-08-12 PROCEDURE — 99232 SBSQ HOSP IP/OBS MODERATE 35: CPT | Performed by: INTERNAL MEDICINE

## 2020-08-12 RX ORDER — CARVEDILOL 3.12 MG/1
3.12 TABLET ORAL 2 TIMES DAILY WITH MEALS
Status: DISCONTINUED | OUTPATIENT
Start: 2020-08-12 | End: 2020-08-14 | Stop reason: HOSPADM

## 2020-08-12 RX ADMIN — AMIODARONE HYDROCHLORIDE 200 MG: 200 TABLET ORAL at 08:21

## 2020-08-12 RX ADMIN — CARVEDILOL 6.25 MG: 6.25 TABLET, FILM COATED ORAL at 08:22

## 2020-08-12 RX ADMIN — BUMETANIDE 1 MG: 1 TABLET ORAL at 08:20

## 2020-08-12 RX ADMIN — OXYCODONE 5 MG: 5 TABLET ORAL at 08:26

## 2020-08-12 RX ADMIN — DOCUSATE SODIUM - SENNOSIDES 1 TABLET: 50; 8.6 TABLET, FILM COATED ORAL at 08:21

## 2020-08-12 RX ADMIN — Medication 10 ML: at 22:09

## 2020-08-12 RX ADMIN — PANTOPRAZOLE SODIUM 40 MG: 40 TABLET, DELAYED RELEASE ORAL at 08:21

## 2020-08-12 RX ADMIN — POTASSIUM CHLORIDE 10 MEQ: 750 TABLET, FILM COATED, EXTENDED RELEASE ORAL at 08:21

## 2020-08-12 RX ADMIN — ATORVASTATIN CALCIUM 20 MG: 20 TABLET, FILM COATED ORAL at 22:08

## 2020-08-12 RX ADMIN — Medication 10 ML: at 13:42

## 2020-08-12 RX ADMIN — Medication 10 ML: at 22:08

## 2020-08-12 RX ADMIN — TAMSULOSIN HYDROCHLORIDE 0.4 MG: 0.4 CAPSULE ORAL at 08:21

## 2020-08-12 RX ADMIN — AMIODARONE HYDROCHLORIDE 200 MG: 200 TABLET ORAL at 17:27

## 2020-08-12 RX ADMIN — POLYETHYLENE GLYCOL 3350 17 G: 17 POWDER, FOR SOLUTION ORAL at 08:22

## 2020-08-12 RX ADMIN — ASPIRIN 81 MG CHEWABLE TABLET 81 MG: 81 TABLET CHEWABLE at 08:20

## 2020-08-12 RX ADMIN — Medication 10 ML: at 08:23

## 2020-08-12 NOTE — PROGRESS NOTES
Bedside and Verbal shift change report given to Mira Goldberg (oncoming nurse) by Lis Pelaez (offgoing nurse). Report included the following information SBAR, Kardex, OR Summary, Procedure Summary, Intake/Output, MAR and Recent Results. 0745: Patient sitting up in bed eating breakfast, no complaints at this time    0830: morning medications given, roxicodone given for incisional pain 4 out of 10    0930: Patient's pain is better, patient up ambulating ad isidro in the room. BRITTNY stockings on    1000: Life vest team at bedside    1125: Patient up ambulating in hallway with physical therapy, tolerating well.  Life vest on.    1350: Patient resting comfortably in bed,  at bedside to discuss plans and needs for discharge    1420: Life Vest form signed by patient and nurse and placed in chart, order placed by cardiac team that patient ok to wear life vest while in hospital      [phone]: Spoke with Dr. Ofelia Suarez test ordered for patient, has plans for sending patient to SNF/rehab and needs negative test. Also discussed patient's blood pressure and will hold evening dose of Coreg      1745: COVID test sent to lab

## 2020-08-12 NOTE — PROGRESS NOTES
[facility] ICU Progress Note    Admit Date: 2020  POD: 7 Day Post-Op    Procedure:  Procedure(s):  JUN AND EPIAORTIC ULTRASOUND BY DR Tae Garcia - ON PUMP CORONARY ARTERY BYPASS GRAFT X 4 WITH GREATER RIGHT SAPHENOUS VEIN AND LEFT INTERNAL MAMMARY ARTERY GRAFTING - [facility]        Subjective:   Pt seen with Dr. Sheila Rodriguez. Afebrile, on RA. In bed. No complaints. Objective:   Vitals:  Blood pressure 112/60, pulse 78, temperature 97.4 °F (36.3 °C), resp. rate 18, height 5' 6\" (1.676 m), weight 165 lb 5.5 oz (75 kg), SpO2 98 %. Temp (24hrs), Av.1 °F (36.7 °C), Min:97.4 °F (36.3 °C), Max:98.7 °F (37.1 °C)    EKG/Rhythm:  NSR 70s    Oxygen Therapy: RA    CXR:   CXR Results  (Last 48 hours)               20 0615  XR CHEST PORT Final result    Impression:  IMPRESSION: Unchanged right lung base and opacification. No new finding. Narrative:  INDICATION:  post op heart       EXAM: CXR Portable. FINDINGS: Portable chest shows no significant change since yesterday. There is   no apparent pneumothorax. Lungs show unchanged right base opacification. Heart   size is top normal. There is prior median sternotomy. There is no overt   pulmonary edema. 20 0827  XR CHEST PA LAT Final result    Impression:  IMPRESSION: Right basilar opacification. Small bilateral effusions. Elevation of   the right hemidiaphragm. Narrative:  History: Postop. 2 views of the chest were performed and compared to a study from . Patient is status post median sternotomy. There is atherosclerosis of the aorta. Heart size appears unremarkable. There is elevation of the right hemidiaphragm   with a right pleural effusion and atelectasis in the right lower lobe. There is   likely a small left effusion. There are degenerative changes of the spine.                Admission Weight: Last Weight   Weight: 163 lb 12.8 oz (74.3 kg) Weight: 165 lb 5.5 oz (75 kg)     Intake / Output / Drain:  Current Shift: No intake/output data recorded. Last 24 hrs.:     Intake/Output Summary (Last 24 hours) at 2020 0921  Last data filed at 2020 0524  Gross per 24 hour   Intake 720 ml   Output    Net 720 ml       EXAM:  General:    NAD                                                                                          Lungs:   Clear to auscultation bilaterally. Incision:  No erythema, drainage or swelling. Heart:  Regular rate and rhythm   Abdomen:   Soft, non-tender. Bowel sounds normal. No masses,  No organomegaly. Extremities:  3+ LE edema. PPP. Neurologic:  Gross motor and sensory apparatus intact. Labs:   Recent Labs     20  0530 20  0525 08/10/20  1620   WBC 11.3* 12.8*  --    HGB 12.1 12.8  --    HCT 37.7 38.2  --     278  --    * 132*  --    K 4.2 4.7  --    BUN 30* 28*  --    CREA 1.34* 1.27  --    * 104*  --    GLUCPOC  --   --  140*   INR  --  1.0  --         Assessment:     Principal Problem:    Ischemic cardiomyopathy (2020)      Overview: Added automatically from request for surgery [id number]    Active Problems:    CAD (coronary artery disease), native coronary artery (2010)      Hyperlipidemia (2010)      Pleural effusion, bilateral (2020)      CHF, acute on chronic ([facility] [address] (2020)      Pulmonary embolism ([facility] [address] (2020)      CAD (coronary artery disease) (2020)      S/P CABG x 4 (2020)      Overview: x 4, LIMA to LAD, RSVG to Diag-OM2, RSVG to PDA      Paroxysmal atrial fibrillation ([facility] [address] (2020)         Plan/Recommendations/Medical Decision Makin. CAD s/p CABG: On ASA, statin, BB    2. Acute on chronic systolic heart failure: EF 20-25% on TTE preop. Was on lasix PTA. Cont bumex to 1 mg daily and monitor renal function. Cont coreg, GDMT as tolerated. Hold AA due to hyponatremia. Hold cozaar due to renal function. Will need lifevest, rep contacted. 3. HLD: on statin    4. Thrombocytopenia: resolved    5.  Hyponatremia: improved. Stop free water restriction. Cont bumex. Daily labs. Hold AA. 6. Urinary retention: Cont flomax. Resolved, monitor. 7. HTN: on coreg, monitor    8. Elevated Cr: Monitor    9. LE edema: Cont bumex 1 mg PO daily, compression stockings. 10. Dispo: PT/OT, needs lifevest prior to discharge.     Signed By: Nicole Ruelas NP

## 2020-08-12 NOTE — PROGRESS NOTES
Problem: Self Care Deficits Care Plan (Adult)  Goal: *Acute Goals and Plan of Care (Insert Text)  Description: FUNCTIONAL STATUS PRIOR TO ADMISSION: Patient was independent and active without use of DME. Patient was independent for basic and instrumental ADLs. HOME SUPPORT: The patient lived with wife but did not require assist.    Occupational Therapy Goals  Initiated 8/7/2020  1. Patient will perform ADLs standing 5 mins without fatigue or LOB with supervision/set-up within 7 day(s). 2.  Patient will perform lower body ADLs with supervision/set-up within 7 day(s). 3.  Patient will perform gathering ADL items high and low 2/2 with supervision/set-up within 7 day(s). 4.  Patient will perform toilet transfers with supervision/set-up within 7 day(s). 5.  Patient will perform all aspects of toileting with supervision/set-up within 7 day(s). 6.  Patient will participate in cardiac/sternal upper extremity therapeutic exercise/activities to increase independence with ADLs with supervision/set-up for 5 minutes within 7 day(s). Outcome: Resolved/Met   OCCUPATIONAL THERAPY TREATMENT/DISCHARGE  Patient: Cherise Dasilva (39 y.o. male)  Date: 8/12/2020  Diagnosis: Pleural effusion, bilateral [J90]  CHF, acute on chronic (HCC) [I50.9]  CAD (coronary artery disease) [I25.10]   Ischemic cardiomyopathy  Procedure(s) (LRB):  JUN AND EPIAORTIC ULTRASOUND BY DR Vinnie Gentile - ON PUMP CORONARY ARTERY BYPASS GRAFT X 4 WITH GREATER RIGHT SAPHENOUS VEIN AND LEFT INTERNAL MAMMARY ARTERY GRAFTING - [facility] (N/A) 6 Days Post-Op  Precautions: Other (comment)(move in the tube)  Chart, occupational therapy assessment, plan of care, and goals were reviewed. ASSESSMENT  Patient continues with skilled OT services and is progressing towards goals. Pt was sitting up eating when OT arrived, and OT discussed UB , LB ADLs and pt was able to explain tech for both and follow move in the tube precautions.   He stated that he has no problems with BUE ex in siting or standing . He was able to explain how to clean buttocks and chayo area, and that he needed a reacher in order to  items from the floor due to he was not able to bend over. Pt stated that he will be living at home alone and will have home care PT. Feel that he would be safer if he had family/friends/ neighbors to check on pt at times. Patient is verbalizing and is demonstrating understanding of mindful-based movements (\"move in the tube\") principles of keeping UEs proximal to ribcage to prevent lateral pull on the sternum during load-bearing activities with  no  cues required for compliance. Current Level of Function (ADLs/self-care): pt is SBA for ADLs, decreased endurance, strength,     Other factors to consider for discharge: pt to return home with home care PT, and would benefit from family/friends/neighbors checking in on him         PLAN :  Rationale for discharge: Goals achieved  Recommend with staff: OOG for meals and walk pt to bathroom for toileting   Recommendation for discharge: (in order for the patient to meet his/her long term goals)  No skilled occupational therapy/ follow up rehabilitation needs identified at this time. This discharge recommendation:  Has been made in collaboration with the attending provider and/or case management    IF patient discharges home will need the following DME:none       SUBJECTIVE:   Patient stated Brie Span just need to by bigger clothes for lower body to make it easier.     OBJECTIVE DATA SUMMARY:   Cognitive/Behavioral Status:  Neurologic State: Alert  Orientation Level: Oriented to person;Oriented to place;Oriented to situation  Cognition: Follows commands             Functional Mobility and Transfers for ADLs:  Bed Mobility:   Pt reports that he is independent     Transfers:  Sit to Stand: Independent          Balance:  Sitting: Intact  Standing: Intact    ADL Intervention:     Pt is setup for ADLs  in this setting, talked to pt about having home care PT instruct him on stepping in and out of tub for safety           Patient instructed and educated on mindful movement principles based on Move in The Tube concept to include maintaining bilateral elbows close to rib cage when performing any load-bearing activity such as getting in/out of bed, pushing up from a chair, opening a door, or lifting a box. Patient was given a handout with diagrams of each correct/incorrect method of performing each of the above tasks. Patient instructed on the ability to utilize upper extremities outside the tube when doing any non-load bearing activity such as washing hair/body, brushing teeth, retrieving clothing items, or scratching your back. Patient encouraged to also perform upper extremity exercises \"outside of the tube\" to prevent scar tissue formation around sternal incision site. Patient instructed in detail about activities to heed with caution, allowing pain to be the guide. These activities include but are not limited to: mowing the lawn, riding a bike, walking a dog, lifting a child, workshop hobbies, golfing, sexual activity, vacuuming, fishing, scrubbing the floors, and moving furniture. Patient was given the [address] [place] handout to describe each of these activities in detail. Patient instructed no asymmetrical reaching over head to ensure B UEs when shoulders >90* i.e. reaching in cabinets and dressing. Instruction on upper body dressing techniques of over head, then arms through to decrease pain and unilateral shoulder flexion >90*. Instruction on the benefits of utilizing B UEs during functional tasks i.e. opening the fridge, stepping into the tub. Instruction if continued pain at home with shoulder IR for BM hygiene can use wet wipes and toilet tongs PRN. Avoid valsalva maneuvers.   May have to adjust home setup to increase ease with items closer to waist height to prevent deep bending and the automatic  of asymmetrical UE WB/pushing for stabilization during bending. Benefit to don clothing tailor sitting and don all clothing while sitting prior to standing. Patient demonstrated lower body dressing with Independent. Instruction and indicated understanding on the benefits of loose clothing throughout to accommodate for post surgical swelling, decreased ROM and increased pain. Instruction and indicated understanding the technique of pull over shirt versus front open clothing. Increase activity tolerance for home, work, and sexual intercourse by pacing self with increasing the arm exercises, sitting duration, frequency OOB, walking, standing, and ADLs. Instructed and indicated understanding of s/s of too much activity, how to respond to s/s safely. Therapeutic Exercises:   Patient instructed on the benefits and demonstrated cardiac exercises while sitting/standing  with Independent. Instructed and indicated understanding on how to progress reps, sets against gravity, pacing through progressive muscle strengthening standing based on surgeon clearance for more weight in prep for basic and instrumental ADLs. Instruction on the use of household items in place of weights as needed.     CARDIAC   EXERCISE    Sets    Reps    Active  Active Assist    Passive  Self ROM    Comments    Shoulder flexion  1  5   [x]                            []                             []                             []                                Shoulder abduction  1  5  [x]                             []                             []                             []                                Scapular elevation  1  5  [x]                             []                              []                             []                                Scapular retraction  1  5  [x]                             []                             []                             []                                Trunk rotation  1  5  [x] []                             []                             []                                Trunk sidebending  1  5  [x]                             []                              []                             []                                              Activity Tolerance:   Fair  Please refer to the flowsheet for vital signs taken during this treatment. After treatment patient left in no apparent distress:   Sitting in chair and Call bell within reach    COMMUNICATION/COLLABORATION:   The patients plan of care was discussed with: Physical therapist and Registered nurse.      Veronica Griffith, OT  Time Calculation: 8 mins

## 2020-08-12 NOTE — PROGRESS NOTES
1900 - Bedside and Verbal shift change report given to Beatriz Russo RN (oncoming nurse) by Navjot Julian RN (offgoing nurse). Report included the following information SBAR, Kardex, Intake/Output, MAR, Recent Results and Cardiac Rhythm NSR. Pt had uneventful shift. VSS. Denied any pain/discomfort. Abided by fluid restriction. 0700 - Shift report given to Alyx Gonzalez RN.

## 2020-08-12 NOTE — PROGRESS NOTES
Nutrition Assessment     Type and Reason for Visit: Reassess    Nutrition Recommendations/Plan:   Continue Cardiac diet     Nutrition Assessment:     Chart reviewed; patient unavailable at time of attempted visit. Diet advanced to cardiac. Patient eating % of meals. Continue to encourage PO intake. Patient Vitals for the past 72 hrs:   % Diet Eaten   08/12/20 0930 50 %   08/10/20 1313 50 %   08/10/20 0900 100 %       Estimated Daily Nutrient Needs:  Energy (kcal):  1856 kcal (BMR 1428 x 1. 3AF)  Protein (g):  90g (1.2 g/kg bw)       Fluid (ml/day):  1850 mL    Nutrition Related Findings:    Na 132, -[phone]  BM 8/11  Surgical Incision  Medications: Atorvastatin, bumex, Mag-ox, Protonix, Miralax, KCl, Pericolace    Current Nutrition Therapies:  DIET CARDIAC Regular    Anthropometric Measures:  · Height:  5' 6\" (167.6 cm)  · Current Body Wt:  75 kg (165 lb 5.5 oz)  · BMI: 26.7    Nutrition Diagnosis:   No nutrition diagnosis at this time     Nutrition Intervention:  Food and/or Nutrient Delivery: Continue current diet  Nutrition Education and Counseling: No recommendations at this time  Coordination of Nutrition Care: No recommendation at this time    Goals:  PO intake >70% of meals next 3-5 days       Nutrition Monitoring and Evaluation:   Behavioral-Environmental Outcomes: Knowledge or skill  Food/Nutrient Intake Outcomes: Food and nutrient intake  Physical Signs/Symptoms Outcomes: Biochemical data, Weight    Discharge Planning:    Continue current diet     Electronically signed by Pippa Gregory RD on 8/12/2020 at 11:44 AM    Contact Number: Ext [phone]

## 2020-08-12 NOTE — PROGRESS NOTES
1900 - Bedside and Verbal shift change report given to Alma Sanchez RN (oncoming nurse) by Julia Díaz RN (offgoing nurse). Report included the following information SBAR, Kardex, Intake/Output, Recent Results and Cardiac Rhythm NSR. Pt up in chair at this time. Denies any pain/discomfort, states it is at 0/10. Pt requesting jello and gingerale, informed him of where he is on fluid restriction. Stated jello would be fine for now and verbalized understanding of fluid restriction. VSS. Will continue to monitor. Lifevest on.     2210 - Pt scratched at dermabond on leg. Legs are edematous and now leaking serous fluid from most medial part of wound. Wound is clean, with exception of drainage. Placed xeroform and 4x4 dressing, will monitor drainage. 3297 - Pt requesting tylenol as he does not want to take roxicodone. No order in place for tylenol. Paged on call and spoke with Dr. Emmanuel Turk, stated okay to order the tylenol. 0230 - Administered tylenol. No further drainage from L saph site. Will continue to monitor. 0522 - VSS. Labs drawn and sent. Denies any pain/discomfort. Call bell within reach. Sternal dressing cleansed with CHG per order. 0700 - Shift report given to MIKAYLA Dominguez.

## 2020-08-12 NOTE — PROGRESS NOTES
8/12/2020 9:52 AM    Admit Date: 7/31/2020    Admit Diagnosis: Pleural effusion, bilateral [J90];CHF, acute on chronic (HCC) [I50.9];CAD (coronary artery disease) [I25.10]    Subjective: Devyn Borne   denies chest pain, chest pressure/discomfort, dyspnea, palpitations, irregular heart beats, near-syncope, syncope, fatigue, orthopnea, paroxysmal nocturnal dyspnea, exertional chest pressure/discomfort.     Visit Vitals  /60   Pulse 78   Temp 97.4 °F (36.3 °C)   Resp 18   Ht 5' 6\" (1.676 m)   Wt 165 lb 5.5 oz (75 kg)   SpO2 98%   BMI 26.69 kg/m²     Current Facility-Administered Medications   Medication Dose Route Frequency    carvediloL (COREG) tablet 6.25 mg  6.25 mg Oral BID WITH MEALS    bumetanide (BUMEX) tablet 1 mg  1 mg Oral DAILY    amiodarone (CORDARONE) tablet 200 mg  200 mg Oral BID    potassium chloride SR (KLOR-CON 10) tablet 10 mEq  10 mEq Oral DAILY    tamsulosin (FLOMAX) capsule 0.4 mg  0.4 mg Oral DAILY    pantoprazole (PROTONIX) tablet 40 mg  40 mg Oral ACB    sodium chloride (NS) flush 5-40 mL  5-40 mL IntraVENous Q8H    sodium chloride (NS) flush 5-40 mL  5-40 mL IntraVENous PRN    sodium chloride (NS) flush 5-40 mL  5-40 mL IntraVENous Q8H    sodium chloride (NS) flush 5-40 mL  5-40 mL IntraVENous PRN    oxyCODONE IR (ROXICODONE) tablet 5 mg  5 mg Oral Q4H PRN    oxyCODONE IR (ROXICODONE) tablet 10 mg  10 mg Oral Q4H PRN    naloxone (NARCAN) injection 0.4 mg  0.4 mg IntraVENous PRN    ondansetron (ZOFRAN) injection 4 mg  4 mg IntraVENous Q4H PRN    albuterol (PROVENTIL VENTOLIN) nebulizer solution 2.5 mg  2.5 mg Nebulization Q4H PRN    aspirin chewable tablet 81 mg  81 mg Oral DAILY    magnesium oxide (MAG-OX) tablet 400 mg  400 mg Oral BID    bisacodyL (DULCOLAX) suppository 10 mg  10 mg Rectal DAILY PRN    senna-docusate (PERICOLACE) 8.6-50 mg per tablet 1 Tab  1 Tab Oral BID    polyethylene glycol (MIRALAX) packet 17 g  17 g Oral DAILY  glucose chewable tablet 16 g  4 Tab Oral PRN    dextrose (D50W) injection syrg 12.5-25 g  12.5-25 g IntraVENous PRN    glucagon (GLUCAGEN) injection 1 mg  1 mg IntraMUSCular PRN    diphenhydrAMINE (BENADRYL) capsule 25 mg  25 mg Oral Q6H PRN    melatonin tablet 3 mg  3 mg Oral QHS PRN    atorvastatin (LIPITOR) tablet 20 mg  20 mg Oral QHS         Objective:      Visit Vitals  /60   Pulse 78   Temp 97.4 °F (36.3 °C)   Resp 18   Ht 5' 6\" (1.676 m)   Wt 165 lb 5.5 oz (75 kg)   SpO2 98%   BMI 26.69 kg/m²       Physical Exam:  Resting comfortably. No resp distress.    Extremities: extremities normal, atraumatic, no cyanosis, mild edema  Heart: regular rate and rhythm, S1, S2 normal, no murmur, click, rub or gallop  Lungs: clear to auscultation bilaterally  Neurologic: Grossly normal    Data Review:   Labs:    Recent Results (from the past 24 hour(s))   CBC W/O DIFF    Collection Time: 08/12/20  5:30 AM   Result Value Ref Range    WBC 11.3 (H) 4.1 - 11.1 K/uL    RBC 3.93 (L) 4.10 - 5.70 M/uL    HGB 12.1 12.1 - 17.0 g/dL    HCT 37.7 36.6 - 50.3 %    MCV 95.9 80.0 - 99.0 FL    MCH 30.8 26.0 - 34.0 PG    MCHC 32.1 30.0 - 36.5 g/dL    RDW 14.1 11.5 - 14.5 %    PLATELET 009 540 - 134 K/uL    MPV 10.0 8.9 - 12.9 FL    NRBC 0.0 0  WBC    ABSOLUTE NRBC 0.00 0.00 - 0.74 K/uL   METABOLIC PANEL, BASIC    Collection Time: 08/12/20  5:30 AM   Result Value Ref Range    Sodium 132 (L) 136 - 145 mmol/L    Potassium 4.2 3.5 - 5.1 mmol/L    Chloride 99 97 - 108 mmol/L    CO2 28 21 - 32 mmol/L    Anion gap 5 5 - 15 mmol/L    Glucose 113 (H) 65 - 100 mg/dL    BUN 30 (H) 6 - 20 MG/DL    Creatinine 1.34 (H) 0.70 - 1.30 MG/DL    BUN/Creatinine ratio 22 (H) 12 - 20      GFR est AA >60 >60 ml/min/1.73m2    GFR est non-AA 52 (L) >60 ml/min/1.73m2    Calcium 9.1 8.5 - 10.1 MG/DL   MAGNESIUM    Collection Time: 08/12/20  5:30 AM   Result Value Ref Range    Magnesium 2.5 (H) 1.6 - 2.4 mg/dL       Telemetry: SR      Assessment: Principal Problem:    Ischemic cardiomyopathy (7/31/2020)      Overview: Added automatically from request for surgery [id number]    Active Problems:    CAD (coronary artery disease), native coronary artery (9/21/2010)      Hyperlipidemia (9/21/2010)      Pleural effusion, bilateral (7/31/2020)      CHF, acute on chronic ([facility] (7/31/2020)      Pulmonary embolism ([facility] (7/31/2020)      CAD (coronary artery disease) (8/6/2020)      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 CTS plans noted. Will f/u as out pt.

## 2020-08-12 NOTE — PROGRESS NOTES
ANNA:  1) Home with HH RN, PT- [facility]  2) FU appts  3) 2nd IM letter at d/c   4) [facility] prior to d/c  5) Friends to transport at d/c    4:13 PM: CM spoke with attending and he would like pt to go to SNF. Referrals sent to [facility], and [facility] via [address].     3:08 PM: CM spoke with attending via [employer] and got updated orders. HH order updated and CM spoke to Araceli Diane at [facility] at [phone] to confirm. 2:04 PM: CM was told during rounds that pt would need extra care after discharge and to discuss with [facility] if they could come out 4x per week including one weekend day. Pt would also need more support regarding meals as pt's main support is Stella Mcgee an 80year old with her own medical problems who lives 45 mins away from pt. CM spoke with pt to find out what the pt thought he would need after discharge and was told that his only barrier is that he can't drive and would need someone to bring him groceries so he could cook for himself. CM suggested grocery delivery but he did not seem agreeable. Pt said he would reach out to [employer] members for assistance. After CM left pt's room Ms Italo Fuller called zone phone and spoke of her concern that the pt needs SNF rather than [facility] while he is unable to drive. Ms Italo Fuller reported the pt's home condition could be a barrier to [facility] services and [place] delivery services. CM spoke to Stockezy about pt needs and caregiver's input and he went to speak to pt to see if he fully understood his medical needs for discharge. CM spoke to Araceli Diane at [facility] and they will be able to accommodate pt's [facility] needs as long as there as an order.  CM will speak to attending and update referral.     Eliu Reed RN ,[address] [facility]   [phone]

## 2020-08-12 NOTE — PROGRESS NOTES
Problem: Mobility Impaired (Adult and Pediatric)  Goal: *Acute Goals and Plan of Care (Insert Text)  Description: FUNCTIONAL STATUS PRIOR TO ADMISSION: Patient was independent and active without use of DME.     HOME SUPPORT PRIOR TO ADMISSION: The patient lived alone with no local support. Physical Therapy Goals  Initiated 8/7/2020  1. Patient will move from supine to sit and sit to supine  in bed with independence within 5 days. 2.  Patient will perform sit to/from stand with independence within 5 days. 3.  Patient will ambulate 400 feet with least restrictive assistive device and independence within 5 days. 4.  Patient will ascend/descend 4 stairs with bilateral handrail(s) with modified independence within 5 days. 5.  Patient will perform cardiac exercises per protocol with independence within 5 days. 6.  Patient will verbally recall and functionally demonstrate mindful-based movements (\"move in the tube\") principles without cues within 5 days. Outcome: Progressing Towards Goal   PHYSICAL THERAPY TREATMENT  Patient: Celio Handy (19 y.o. male)  Date: 8/12/2020  Diagnosis: Pleural effusion, bilateral [J90]  CHF, acute on chronic (HCC) [I50.9]  CAD (coronary artery disease) [I25.10]   Ischemic cardiomyopathy  Procedure(s) (LRB):  JUN AND EPIAORTIC ULTRASOUND BY DR Velia Coburn - ON PUMP CORONARY ARTERY BYPASS GRAFT X 4 WITH GREATER RIGHT SAPHENOUS VEIN AND LEFT INTERNAL MAMMARY ARTERY GRAFTING - [facility] (N/A) 6 Days Post-Op  Precautions: Other (comment)(move in the tube)  Chart, physical therapy assessment, plan of care and goals were reviewed. ASSESSMENT  Patient continues with skilled PT services and is progressing towards goals. Pt was received sitting up in the chair and cleared by nursing to mobilize. VSS during session and he is able to perform all mobility at an independent level. He is moving well, there is just concern for support at home.  Perform stairs tomorrow and then sign off if he does not need any further PT services. Patient is demonstrating understanding of mindful-based movements (\"move in the tube\") principles of keeping UEs proximal to ribcage to prevent lateral pull on the sternum during load-bearing activities with  none  cues required for compliance. Current Level of Function Impacting Discharge (mobility/balance): independent     Other factors to consider for discharge: poor support at home         PLAN :  Patient continues to benefit from skilled intervention to address the above impairments. Continue treatment per established plan of care. to address goals. Recommendation for discharge: (in order for the patient to meet his/her long term goals)  OP cardiac when cleared by MD    This discharge recommendation:  Has not yet been discussed the attending provider and/or case management    IF patient discharges home will need the following DME: none       SUBJECTIVE:   Patient stated i am going home tomorrow.     OBJECTIVE DATA SUMMARY:   Patient mobilized on continuous portable monitor/telemetry.   Critical Behavior:  Neurologic State: Alert  Orientation Level: Oriented to person, Oriented to place, Oriented to situation  Cognition: Follows commands  Safety/Judgement: Awareness of environment, Fall prevention, Good awareness of safety precautions    Functional Mobility Training:  Bed Mobility:      Session began and ended in sitting                Transfers:  Sit to Stand: Independent  Stand to Sit: Independent                               Balance:  Sitting: Intact  Standing: Intact    Ambulation/Gait Training:  Distance (ft): 600 Feet (ft)     Ambulation - Level of Assistance: Independent                                       Cardiac diagnosis intervention:  Patient instructed and educated on mindful movement principles based on Move in The Tube concept to include maintaining bilateral elbows close to rib cage when performing any load-bearing activity such as getting in/out of bed, pushing up from a chair, opening a door, or lifting a box. Patient was given a handout with diagrams of each correct/incorrect method of performing each of the above tasks. Activity Tolerance:   Good  Please refer to the flowsheet for vital signs taken during this treatment. After treatment patient left in no apparent distress:   Sitting in chair and Call bell within reach    COMMUNICATION/COLLABORATION:   The patients plan of care was discussed with: Registered nurse.      Pietro Espinal PT, DPT   Time Calculation: 11 mins

## 2020-08-13 ENCOUNTER — APPOINTMENT (OUTPATIENT)
Dept: GENERAL RADIOLOGY | Age: 75
DRG: 233 | End: 2020-08-13
Attending: PHYSICIAN ASSISTANT
Payer: MEDICARE

## 2020-08-13 LAB
ANION GAP SERPL CALC-SCNC: 4 MMOL/L (ref 5–15)
BNP SERPL-MCNC: 2913 PG/ML
BUN SERPL-MCNC: 29 MG/DL (ref 6–20)
BUN/CREAT SERPL: 21 (ref 12–20)
CALCIUM SERPL-MCNC: 8.8 MG/DL (ref 8.5–10.1)
CHLORIDE SERPL-SCNC: 100 MMOL/L (ref 97–108)
CO2 SERPL-SCNC: 28 MMOL/L (ref 21–32)
CREAT SERPL-MCNC: 1.39 MG/DL (ref 0.7–1.3)
ERYTHROCYTE [DISTWIDTH] IN BLOOD BY AUTOMATED COUNT: 14.2 % (ref 11.5–14.5)
GLUCOSE SERPL-MCNC: 99 MG/DL (ref 65–100)
HCT VFR BLD AUTO: 40 % (ref 36.6–50.3)
HEALTH STATUS, XMCV2T: NORMAL
HGB BLD-MCNC: 13.1 G/DL (ref 12.1–17)
MAGNESIUM SERPL-MCNC: 2.4 MG/DL (ref 1.6–2.4)
MCH RBC QN AUTO: 31.3 PG (ref 26–34)
MCHC RBC AUTO-ENTMCNC: 32.8 G/DL (ref 30–36.5)
MCV RBC AUTO: 95.7 FL (ref 80–99)
NRBC # BLD: 0 K/UL (ref 0–0.01)
NRBC BLD-RTO: 0 PER 100 WBC
PLATELET # BLD AUTO: 377 K/UL (ref 150–400)
PMV BLD AUTO: 9.8 FL (ref 8.9–12.9)
POTASSIUM SERPL-SCNC: 4.5 MMOL/L (ref 3.5–5.1)
RBC # BLD AUTO: 4.18 M/UL (ref 4.1–5.7)
SARS-COV-2, COV2: NOT DETECTED
SODIUM SERPL-SCNC: 132 MMOL/L (ref 136–145)
SOURCE, COVRS: NORMAL
SPECIMEN SOURCE, FCOV2M: NORMAL
SPECIMEN TYPE, XMCV1T: NORMAL
WBC # BLD AUTO: 12.2 K/UL (ref 4.1–11.1)

## 2020-08-13 PROCEDURE — 83735 ASSAY OF MAGNESIUM: CPT

## 2020-08-13 PROCEDURE — 74011250637 HC RX REV CODE- 250/637: Performed by: THORACIC SURGERY (CARDIOTHORACIC VASCULAR SURGERY)

## 2020-08-13 PROCEDURE — 36415 COLL VENOUS BLD VENIPUNCTURE: CPT

## 2020-08-13 PROCEDURE — 74011250637 HC RX REV CODE- 250/637: Performed by: PHYSICIAN ASSISTANT

## 2020-08-13 PROCEDURE — 74011000250 HC RX REV CODE- 250: Performed by: NURSE PRACTITIONER

## 2020-08-13 PROCEDURE — 83880 ASSAY OF NATRIURETIC PEPTIDE: CPT

## 2020-08-13 PROCEDURE — 71045 X-RAY EXAM CHEST 1 VIEW: CPT

## 2020-08-13 PROCEDURE — 80048 BASIC METABOLIC PNL TOTAL CA: CPT

## 2020-08-13 PROCEDURE — 97116 GAIT TRAINING THERAPY: CPT

## 2020-08-13 PROCEDURE — 74011250637 HC RX REV CODE- 250/637: Performed by: NURSE PRACTITIONER

## 2020-08-13 PROCEDURE — 65660000000 HC RM CCU STEPDOWN

## 2020-08-13 PROCEDURE — 85027 COMPLETE CBC AUTOMATED: CPT

## 2020-08-13 RX ORDER — BUMETANIDE 0.25 MG/ML
1 INJECTION INTRAMUSCULAR; INTRAVENOUS ONCE
Status: COMPLETED | OUTPATIENT
Start: 2020-08-13 | End: 2020-08-13

## 2020-08-13 RX ORDER — ACETAMINOPHEN 325 MG/1
650 TABLET ORAL
Status: DISCONTINUED | OUTPATIENT
Start: 2020-08-13 | End: 2020-08-14 | Stop reason: HOSPADM

## 2020-08-13 RX ADMIN — ATORVASTATIN CALCIUM 20 MG: 20 TABLET, FILM COATED ORAL at 21:35

## 2020-08-13 RX ADMIN — PANTOPRAZOLE SODIUM 40 MG: 40 TABLET, DELAYED RELEASE ORAL at 08:16

## 2020-08-13 RX ADMIN — Medication 10 ML: at 15:04

## 2020-08-13 RX ADMIN — DOCUSATE SODIUM - SENNOSIDES 1 TABLET: 50; 8.6 TABLET, FILM COATED ORAL at 08:18

## 2020-08-13 RX ADMIN — POTASSIUM CHLORIDE 10 MEQ: 750 TABLET, FILM COATED, EXTENDED RELEASE ORAL at 08:16

## 2020-08-13 RX ADMIN — TAMSULOSIN HYDROCHLORIDE 0.4 MG: 0.4 CAPSULE ORAL at 08:15

## 2020-08-13 RX ADMIN — ASPIRIN 81 MG CHEWABLE TABLET 81 MG: 81 TABLET CHEWABLE at 08:16

## 2020-08-13 RX ADMIN — Medication 10 ML: at 21:38

## 2020-08-13 RX ADMIN — MAGNESIUM OXIDE 400 MG (241.3 MG MAGNESIUM) TABLET 400 MG: TABLET at 17:42

## 2020-08-13 RX ADMIN — Medication 10 ML: at 06:05

## 2020-08-13 RX ADMIN — ACETAMINOPHEN 650 MG: 325 TABLET ORAL at 20:29

## 2020-08-13 RX ADMIN — ACETAMINOPHEN 650 MG: 325 TABLET ORAL at 02:30

## 2020-08-13 RX ADMIN — BUMETANIDE 1 MG: 0.25 INJECTION INTRAMUSCULAR; INTRAVENOUS at 12:03

## 2020-08-13 RX ADMIN — ACETAMINOPHEN 650 MG: 325 TABLET ORAL at 08:25

## 2020-08-13 RX ADMIN — AMIODARONE HYDROCHLORIDE 200 MG: 200 TABLET ORAL at 08:16

## 2020-08-13 RX ADMIN — CARVEDILOL 3.12 MG: 3.12 TABLET, FILM COATED ORAL at 17:42

## 2020-08-13 RX ADMIN — DOCUSATE SODIUM - SENNOSIDES 1 TABLET: 50; 8.6 TABLET, FILM COATED ORAL at 17:42

## 2020-08-13 RX ADMIN — AMIODARONE HYDROCHLORIDE 200 MG: 200 TABLET ORAL at 17:42

## 2020-08-13 RX ADMIN — MAGNESIUM OXIDE 400 MG (241.3 MG MAGNESIUM) TABLET 400 MG: TABLET at 08:16

## 2020-08-13 RX ADMIN — CARVEDILOL 3.12 MG: 3.12 TABLET, FILM COATED ORAL at 08:16

## 2020-08-13 NOTE — PROGRESS NOTES
Problem: Falls - Risk of  Goal: *Absence of Falls  Description: Document Too Luis Fall Risk and appropriate interventions in the flowsheet. Outcome: Progressing Towards Goal  Note: Fall Risk Interventions:  Mobility Interventions: Bed/chair exit alarm, Patient to call before getting OOB         Medication Interventions: Assess postural VS orthostatic hypotension, Evaluate medications/consider consulting pharmacy, Patient to call before getting OOB    Elimination Interventions: Urinal in reach    History of Falls Interventions: Room close to nurse's station         Problem: Patient Education: Go to Patient Education Activity  Goal: Patient/Family Education  Outcome: Progressing Towards Goal     Problem: Pressure Injury - Risk of  Goal: *Prevention of pressure injury  Description: Document Lars Scale and appropriate interventions in the flowsheet. Outcome: Progressing Towards Goal  Note: Pressure Injury Interventions:  Sensory Interventions: Assess changes in LOC, Keep linens dry and wrinkle-free, Minimize linen layers    Moisture Interventions: Maintain skin hydration (lotion/cream)    Activity Interventions: PT/OT evaluation, Pressure redistribution bed/mattress(bed type)    Mobility Interventions: Float heels, HOB 30 degrees or less, Pressure redistribution bed/mattress (bed type), Turn and reposition approx.  every two hours(pillow and wedges)    Nutrition Interventions: Document food/fluid/supplement intake    Friction and Shear Interventions: Lift sheet                Problem: Patient Education: Go to Patient Education Activity  Goal: Patient/Family Education  Outcome: Progressing Towards Goal     Problem: Patient Education: Go to Patient Education Activity  Goal: Patient/Family Education  Outcome: Progressing Towards Goal     Problem: Patient Education: Go to Patient Education Activity  Goal: Patient/Family Education  Outcome: Progressing Towards Goal

## 2020-08-13 NOTE — PROGRESS NOTES
Problem: Mobility Impaired (Adult and Pediatric)  Goal: *Acute Goals and Plan of Care (Insert Text)  Description: FUNCTIONAL STATUS PRIOR TO ADMISSION: Patient was independent and active without use of DME.     HOME SUPPORT PRIOR TO ADMISSION: The patient lived alone with no local support. Physical Therapy Goals  Initiated 8/7/2020  1. Patient will move from supine to sit and sit to supine  in bed with independence within 5 days. 2.  Patient will perform sit to/from stand with independence within 5 days. 3.  Patient will ambulate 400 feet with least restrictive assistive device and independence within 5 days. 4.  Patient will ascend/descend 4 stairs with bilateral handrail(s) with modified independence within 5 days. 5.  Patient will perform cardiac exercises per protocol with independence within 5 days. 6.  Patient will verbally recall and functionally demonstrate mindful-based movements (\"move in the tube\") principles without cues within 5 days. Outcome: Resolved/Met  PHYSICAL THERAPY TREATMENT/DISCHARGE  Patient: Maryse Epstein (77 y.o. male)  Date: 8/13/2020  Diagnosis: Pleural effusion, bilateral [J90]  CHF, acute on chronic (HCC) [I50.9]  CAD (coronary artery disease) [I25.10]   Ischemic cardiomyopathy  Procedure(s) (LRB):  JUN AND EPIAORTIC ULTRASOUND BY DR Angelina Sahni - ON PUMP CORONARY ARTERY BYPASS GRAFT X 4 WITH GREATER RIGHT SAPHENOUS VEIN AND LEFT INTERNAL MAMMARY ARTERY GRAFTING - [facility] (N/A) 7 Days Post-Op  Precautions: Other (comment)(move in the tube)  Chart, physical therapy assessment, plan of care and goals were reviewed. ASSESSMENT  Patient continues with skilled PT services and is progressing towards goals. Pt was received in supine and cleared by nursing to mobilize. He is performing all mobility at an independent level. He was able to get out of bed, ambulate the entire pardo and negotiate an entire flight of stairs without assistance.  He is ambulating on his own and performing his exercises on his town. Does not need acute PT services any longer. Patient is demonstrating understanding of mindful-based movements (\"move in the tube\") principles of keeping UEs proximal to ribcage to prevent lateral pull on the sternum during load-bearing activities with  no  cues required for compliance. Current Level of Function (mobility/balance): independent    Other factors to consider for discharge: poor home support         PLAN :  Patient will be discharged from acute skilled physical therapy at this time. Rationale for discharge:  Goals achieved    Recommendation for discharge: (in order for the patient to meet his/her long term goals)  OP cardiac when cleared by MD    This discharge recommendation:  Has not yet been discussed the attending provider and/or case management    IF patient discharges home will need the following DME: none       SUBJECTIVE:   Patient stated Carina Fat want me to go to rehab.     OBJECTIVE DATA SUMMARY:   Patient mobilized on continuous portable monitor/telemetry.   Critical Behavior:  Neurologic State: Alert  Orientation Level: Oriented X4  Cognition: Follows commands  Safety/Judgement: Awareness of environment, Fall prevention, Good awareness of safety precautions    Functional Mobility Training:  Bed Mobility:  Rolling: Independent  Supine to Sit: Independent     Scooting: Independent          Transfers:  Sit to Stand: Independent  Stand to Sit: Independent                               Balance:  Sitting: Intact  Standing: Intact    Ambulation/Gait Training:  Distance (ft): 400 Feet (ft)     Ambulation - Level of Assistance: Independent             Stairs:  Number of Stairs Trained: 12  Stairs - Level of Assistance: Independent  Rail Use: Right     Cardiac diagnosis intervention:  Patient instructed and educated on mindful movement principles based on Move in [place] concept to include maintaining bilateral elbows close to rib cage when performing any load-bearing activity such as getting in/out of bed, pushing up from a chair, opening a door, or lifting a box. Patient was given a handout with diagrams of each correct/incorrect method of performing each of the above tasks. Therapeutic Exercises:   Patient instructed on the benefits and demonstrated cardiac exercises while standing with Independent. Instructed and indicated understanding on how to progress reps, sets against gravity, pacing through progressive muscle strengthening standing based on surgeon clearance for more weight in prep for basic and instrumental ADLs. Instruction on the use of household items in place of weights as needed.      CARDIAC  EXERCISE   Sets   Reps   Active Active Assist   Passive Self ROM   Comments   Shoulder flexion 1 5 [x]                                            []                                            []                                            []                                               Shoulder abduction 1      5 [x]                                            []                                            []                                            []                                               Scapular elevation 1 5 [x]                                            []                                            []                                            []                                               Scapular retraction 1 5 [x]                                            []                                            []                                            []                                               Trunk rotation 1 5 [x]                                            []                                            []                                            []                                               Trunk sidebending 1 5 [x]                                            []                                            [] []                                                  []                                            []                                            []                                            []                                                     Activity Tolerance:   Good  Please refer to the flowsheet for vital signs taken during this treatment. After treatment patient left in no apparent distress:   Sitting in chair    COMMUNICATION/COLLABORATION:   The patients plan of care was discussed with: Registered nurse.      Pietro Espinal PT, DPT   Time Calculation: 10 mins

## 2020-08-13 NOTE — PROGRESS NOTES
[facility] ICU Progress Note    Admit Date: 2020  POD: 8 Day Post-Op    Procedure:  Procedure(s):  JUN AND EPIAORTIC ULTRASOUND BY DR Gaston Talbot - ON PUMP CORONARY ARTERY BYPASS GRAFT X 4 WITH GREATER RIGHT SAPHENOUS VEIN AND LEFT INTERNAL MAMMARY ARTERY GRAFTING - [facility]        Subjective:   Pt seen with Dr. Osvaldo Ornelas. Afebrile, on RA. In bed. No complaints. Objective:   Vitals:  Blood pressure 113/58, pulse 82, temperature 97.5 °F (36.4 °C), resp. rate 16, height 5' 6\" (1.676 m), weight 166 lb 14.2 oz (75.7 kg), SpO2 98 %. Temp (24hrs), Av.2 °F (36.8 °C), Min:97.5 °F (36.4 °C), Max:98.7 °F (37.1 °C)    EKG/Rhythm:  NSR 70s    Oxygen Therapy: RA    CXR:   CXR Results  (Last 48 hours)               20 0605  XR CHEST PORT Final result    Impression:  IMPRESSION: Right pleural effusion and interstitial edema. Narrative:  Clinical indication: Postop heart. Portable AP semiupright view of the chest obtained compared to . Right   pleural effusion remain. Minimal interstitial edema. Cardiomegaly. 20 0615  XR CHEST PORT Final result    Impression:  IMPRESSION: Unchanged right lung base and opacification. No new finding. Narrative:  INDICATION:  post op heart       EXAM: CXR Portable. FINDINGS: Portable chest shows no significant change since yesterday. There is   no apparent pneumothorax. Lungs show unchanged right base opacification. Heart   size is top normal. There is prior median sternotomy. There is no overt   pulmonary edema.                Admission Weight: Last Weight   Weight: 163 lb 12.8 oz (74.3 kg) Weight: 166 lb 14.2 oz (75.7 kg)     Intake / Output / Drain:  Current Shift:  0701 -  1900  In: 480 [P.O.:480]  Out: -   Last 24 hrs.:     Intake/Output Summary (Last 24 hours) at 2020 1008  Last data filed at 2020 0714  Gross per 24 hour   Intake 1190 ml   Output 1600 ml   Net -410 ml       EXAM:  General:    NAD Lungs:   Clear to auscultation bilaterally. Incision:  No erythema, drainage or swelling. Heart:  Regular rate and rhythm   Abdomen:   Soft, non-tender. Bowel sounds normal. No masses,  No organomegaly. Extremities:  3+ LE edema. PPP. Neurologic:  Gross motor and sensory apparatus intact. Labs:   Recent Labs     20  0530  20  0525 08/10/20  1620   WBC 12.2*   < > 12.8*  --    HGB 13.1   < > 12.8  --    HCT 40.0   < > 38.2  --       < > 278  --    *   < > 132*  --    K 4.5   < > 4.7  --    BUN 29*   < > 28*  --    CREA 1.39*   < > 1.27  --    GLU 99   < > 104*  --    GLUCPOC  --   --   --  140*   INR  --   --  1.0  --     < > = values in this interval not displayed. Assessment:     Principal Problem:    Ischemic cardiomyopathy (2020)      Overview: Added automatically from request for surgery [id number]    Active Problems:    CAD (coronary artery disease), native coronary artery (2010)      Hyperlipidemia (2010)      Pleural effusion, bilateral (2020)      CHF, acute on chronic ([facility] (2020)      Pulmonary embolism ([facility] (2020)      CAD (coronary artery disease) (2020)      S/P CABG x 4 (2020)      Overview: x 4, LIMA to LAD, RSVG to Diag-OM2, RSVG to PDA      Paroxysmal atrial fibrillation ([facility] (2020)         Plan/Recommendations/Medical Decision Makin. CAD s/p CABG: On ASA, statin, BB    2. Acute on chronic systolic heart failure: EF 20-25% on TTE preop. Was on lasix PTA. Cont bumex to 1 mg daily -give IV today and monitor renal function. Cont coreg, GDMT as tolerated. Hold AA due to hyponatremia. Hold cozaar due to renal function. [facility] fitted . 3. HLD: on statin    4. Thrombocytopenia: resolved    5. Hyponatremia: improved. Cont IV bumex. Daily labs. 6. Urinary retention: Cont flomax. Resolved, monitor.     7. HTN: on coreg -dose reduced dt lower BP, monitor    8. Elevated Cr: Monitor    9. LE edema: Cont bumex 1 mg IV today, compression stockings. 10. Dispo: PT/OT, [facility] fitted. Pt lives alone and does not have much support at home. Case management working on possible SNF placement.      Signed By: Roshan Mosley NP

## 2020-08-13 NOTE — PROGRESS NOTES
0700 Bedside shift change report given to Preethi Ventura [facility] (oncoming nurse) by Pierre Carcamo RN (offgoing nurse). Report included the following information SBAR, Kardex, Intake/Output, MAR, Recent Results and Cardiac Rhythm NSR.     0715 Pt ambulating in room at this time. Dee Dee Catalan and care team at bedside rounding on pt. Plan to keep pt for another couple days to continue diuresing pt. Will switch PO bumex to IV today. 0845 PT at bedside to work with pt. Pt ambulating in hallway with no assistance needed. PT to sign off on pt.     1103 Alerted by PCT that pt's BP is systolic in 24L. This RN in to bedside to check on pt. Pt lying on his side, no complaints at this time. Pt repositioned to flat on his back, BP rechecked. BP now 102/53. Will continue to monitor closely. 100 [facility] a message to f/u about IV bumex. 1203 Administered IV Bumex. Will continue to monitor BP closely. Advised pt to call RN before ambulating to ensure pt is not too low and to prevent fall. 1500 Pt accepted to [facility] for rehab. VSS. Pt at 720mL for PO intake today. Pt educated on fluid restriction and remaining fluid for 24 hours. 1725 Pt ambulating in hallway with RN. Pt ambulated to end of surgical telemetry unit independently. Pt performed his exercises and is frequently nursing IS without assistance. 1900 Bedside shift change report given to German Palomares RN (oncoming nurse) by Preethi Ventura RN (offgoing nurse). Report included the following information SBAR, Kardex, Intake/Output, MAR, Recent Results and Cardiac Rhythm NSR.

## 2020-08-13 NOTE — PROGRESS NOTES
ANNA:  1) SNF- [facility]  2) 2nd IM letter at d/c   3) [facility] transport at d/c    4:36 PM: [facility] can't take Lifevests with defibrillators. CM sent message via [facility] to attending regarding pt's vest and will follow up in the morning. 12:25 PM: CM updated pt about which SNFs accepted him and he chose [facility]. Referrals updated on 1500 [facility]. FOC signed and placed on chart.     Evon Gutierrez RN ,[facility],[address]   [phone]

## 2020-08-14 ENCOUNTER — APPOINTMENT (OUTPATIENT)
Dept: GENERAL RADIOLOGY | Age: 75
DRG: 233 | End: 2020-08-14
Attending: PHYSICIAN ASSISTANT
Payer: MEDICARE

## 2020-08-14 VITALS
RESPIRATION RATE: 20 BRPM | TEMPERATURE: 98.5 F | BODY MASS INDEX: 26.54 KG/M2 | OXYGEN SATURATION: 99 % | SYSTOLIC BLOOD PRESSURE: 112 MMHG | HEART RATE: 72 BPM | WEIGHT: 165.12 LBS | HEIGHT: 66 IN | DIASTOLIC BLOOD PRESSURE: 70 MMHG

## 2020-08-14 LAB
ANION GAP SERPL CALC-SCNC: 4 MMOL/L (ref 5–15)
BNP SERPL-MCNC: 2782 PG/ML
BUN SERPL-MCNC: 25 MG/DL (ref 6–20)
BUN/CREAT SERPL: 22 (ref 12–20)
CALCIUM SERPL-MCNC: 8.3 MG/DL (ref 8.5–10.1)
CHLORIDE SERPL-SCNC: 103 MMOL/L (ref 97–108)
CO2 SERPL-SCNC: 29 MMOL/L (ref 21–32)
CREAT SERPL-MCNC: 1.14 MG/DL (ref 0.7–1.3)
ERYTHROCYTE [DISTWIDTH] IN BLOOD BY AUTOMATED COUNT: 14.6 % (ref 11.5–14.5)
GLUCOSE SERPL-MCNC: 97 MG/DL (ref 65–100)
HCT VFR BLD AUTO: 35.1 % (ref 36.6–50.3)
HGB BLD-MCNC: 11.4 G/DL (ref 12.1–17)
MAGNESIUM SERPL-MCNC: 2.2 MG/DL (ref 1.6–2.4)
MCH RBC QN AUTO: 31.3 PG (ref 26–34)
MCHC RBC AUTO-ENTMCNC: 32.5 G/DL (ref 30–36.5)
MCV RBC AUTO: 96.4 FL (ref 80–99)
NRBC # BLD: 0 K/UL (ref 0–0.01)
NRBC BLD-RTO: 0 PER 100 WBC
PLATELET # BLD AUTO: 405 K/UL (ref 150–400)
PMV BLD AUTO: 9.8 FL (ref 8.9–12.9)
POTASSIUM SERPL-SCNC: 4 MMOL/L (ref 3.5–5.1)
RBC # BLD AUTO: 3.64 M/UL (ref 4.1–5.7)
SODIUM SERPL-SCNC: 136 MMOL/L (ref 136–145)
WBC # BLD AUTO: 12.4 K/UL (ref 4.1–11.1)

## 2020-08-14 PROCEDURE — 74011250637 HC RX REV CODE- 250/637: Performed by: PHYSICIAN ASSISTANT

## 2020-08-14 PROCEDURE — 74011250637 HC RX REV CODE- 250/637: Performed by: THORACIC SURGERY (CARDIOTHORACIC VASCULAR SURGERY)

## 2020-08-14 PROCEDURE — 83880 ASSAY OF NATRIURETIC PEPTIDE: CPT

## 2020-08-14 PROCEDURE — 80048 BASIC METABOLIC PNL TOTAL CA: CPT

## 2020-08-14 PROCEDURE — 71045 X-RAY EXAM CHEST 1 VIEW: CPT

## 2020-08-14 PROCEDURE — 85027 COMPLETE CBC AUTOMATED: CPT

## 2020-08-14 PROCEDURE — 83735 ASSAY OF MAGNESIUM: CPT

## 2020-08-14 PROCEDURE — 36415 COLL VENOUS BLD VENIPUNCTURE: CPT

## 2020-08-14 PROCEDURE — 74011250637 HC RX REV CODE- 250/637: Performed by: NURSE PRACTITIONER

## 2020-08-14 RX ORDER — OXYCODONE HYDROCHLORIDE 5 MG/1
5 TABLET ORAL
Qty: 40 TAB | Refills: 0 | Status: SHIPPED | OUTPATIENT
Start: 2020-08-14 | End: 2020-08-21

## 2020-08-14 RX ORDER — ATORVASTATIN CALCIUM 20 MG/1
20 TABLET, FILM COATED ORAL
Qty: 30 TAB | Refills: 1 | Status: SHIPPED
Start: 2020-08-14 | End: 2020-09-08 | Stop reason: SDUPTHER

## 2020-08-14 RX ORDER — AMOXICILLIN 250 MG
1 CAPSULE ORAL 2 TIMES DAILY
Qty: 60 TAB | Refills: 0 | Status: SHIPPED
Start: 2020-08-14 | End: 2020-09-16 | Stop reason: ALTCHOICE

## 2020-08-14 RX ORDER — GUAIFENESIN 100 MG/5ML
81 LIQUID (ML) ORAL DAILY
Qty: 30 TAB | Refills: 1 | Status: SHIPPED
Start: 2020-08-15 | End: 2020-09-08 | Stop reason: SDUPTHER

## 2020-08-14 RX ORDER — BUMETANIDE 1 MG/1
1 TABLET ORAL DAILY
Qty: 30 TAB | Refills: 1 | Status: SHIPPED
Start: 2020-08-14 | End: 2020-09-08 | Stop reason: ALTCHOICE

## 2020-08-14 RX ORDER — CARVEDILOL 3.12 MG/1
3.12 TABLET ORAL 2 TIMES DAILY WITH MEALS
Qty: 60 TAB | Refills: 1 | Status: SHIPPED
Start: 2020-08-14 | End: 2020-09-08 | Stop reason: SDUPTHER

## 2020-08-14 RX ORDER — AMIODARONE HYDROCHLORIDE 200 MG/1
200 TABLET ORAL 2 TIMES DAILY
Qty: 60 TAB | Refills: 0 | Status: SHIPPED
Start: 2020-08-14 | End: 2020-09-08 | Stop reason: ALTCHOICE

## 2020-08-14 RX ORDER — POTASSIUM CHLORIDE 750 MG/1
10 TABLET, FILM COATED, EXTENDED RELEASE ORAL DAILY
Qty: 30 TAB | Refills: 1 | Status: SHIPPED
Start: 2020-08-15 | End: 2020-09-08 | Stop reason: SDUPTHER

## 2020-08-14 RX ORDER — POLYETHYLENE GLYCOL 3350 17 G/17G
17 POWDER, FOR SOLUTION ORAL DAILY
Qty: 14 PACKET | Refills: 0 | Status: SHIPPED
Start: 2020-08-15 | End: 2020-09-16 | Stop reason: ALTCHOICE

## 2020-08-14 RX ORDER — BUMETANIDE 1 MG/1
1 TABLET ORAL DAILY
Status: DISCONTINUED | OUTPATIENT
Start: 2020-08-14 | End: 2020-08-14 | Stop reason: HOSPADM

## 2020-08-14 RX ORDER — TAMSULOSIN HYDROCHLORIDE 0.4 MG/1
0.4 CAPSULE ORAL DAILY
Qty: 30 CAP | Refills: 1 | Status: SHIPPED
Start: 2020-08-15 | End: 2020-09-16 | Stop reason: ALTCHOICE

## 2020-08-14 RX ADMIN — BUMETANIDE 1 MG: 1 TABLET ORAL at 09:13

## 2020-08-14 RX ADMIN — ASPIRIN 81 MG CHEWABLE TABLET 81 MG: 81 TABLET CHEWABLE at 08:32

## 2020-08-14 RX ADMIN — POTASSIUM CHLORIDE 10 MEQ: 750 TABLET, FILM COATED, EXTENDED RELEASE ORAL at 08:32

## 2020-08-14 RX ADMIN — ACETAMINOPHEN 650 MG: 325 TABLET ORAL at 08:32

## 2020-08-14 RX ADMIN — Medication 10 ML: at 05:27

## 2020-08-14 RX ADMIN — CARVEDILOL 3.12 MG: 3.12 TABLET, FILM COATED ORAL at 08:32

## 2020-08-14 RX ADMIN — AMIODARONE HYDROCHLORIDE 200 MG: 200 TABLET ORAL at 08:32

## 2020-08-14 RX ADMIN — DOCUSATE SODIUM - SENNOSIDES 1 TABLET: 50; 8.6 TABLET, FILM COATED ORAL at 08:32

## 2020-08-14 RX ADMIN — TAMSULOSIN HYDROCHLORIDE 0.4 MG: 0.4 CAPSULE ORAL at 08:33

## 2020-08-14 RX ADMIN — MAGNESIUM OXIDE 400 MG (241.3 MG MAGNESIUM) TABLET 400 MG: TABLET at 08:32

## 2020-08-14 RX ADMIN — PANTOPRAZOLE SODIUM 40 MG: 40 TABLET, DELAYED RELEASE ORAL at 08:32

## 2020-08-14 NOTE — PROGRESS NOTES
Dr. Park at [facility], aware of GDM, MD aware baseline blood sugar was 89.  No orders for other blood sugar checks at this time.    Problem: Falls - Risk of  Goal: *Absence of Falls  Description: Document Marcello Solo Fall Risk and appropriate interventions in the flowsheet. Outcome: Progressing Towards Goal  Note: Fall Risk Interventions:  Mobility Interventions: Patient to call before getting OOB         Medication Interventions: Assess postural VS orthostatic hypotension, Patient to call before getting OOB    Elimination Interventions: Call light in reach, Patient to call for help with toileting needs, Toileting schedule/hourly rounds    History of Falls Interventions: Consult care management for discharge planning, Room close to nurse's station         Problem: Pressure Injury - Risk of  Goal: *Prevention of pressure injury  Description: Document Lars Scale and appropriate interventions in the flowsheet.   Outcome: Progressing Towards Goal  Note: Pressure Injury Interventions:  Sensory Interventions: Float heels, Monitor skin under medical devices    Moisture Interventions: Absorbent underpads, Maintain skin hydration (lotion/cream)    Activity Interventions: Increase time out of bed    Mobility Interventions: Float heels    Nutrition Interventions: Document food/fluid/supplement intake    Friction and Shear Interventions: Feet elevated on foot rest, Lift sheet

## 2020-08-14 NOTE — PROGRESS NOTES
Bedside and Verbal shift change report given to Colleen Castillo RN (oncoming nurse) by Leatha Sanchez, RN (offgoing nurse). Report included the following information SBAR, Kardex, ED Summary, OR Summary, Procedure Summary, Intake/Output, MAR, Recent Results and Cardiac Rhythm NSR.     2000: Report received. Assessment completed. Pt A&O x 4. VSS. NSR on telemetry. Midsternal incision c,d,i - KIT. LLE with 4x4 and elastic dressing d/t drainage from previous night. Lifevest on. Bilateral compression stockings on. Patient denies complaints. Call bell in reach. 2030: PRN tylenol given per patient request for prophylaxis. 2300: Patient ambulated the length of the hallway and back with 1 assist. Tolerated ambulation well. Bilateral compression stockings removed per patient request and SCD's applied. Patient positioned to comfort for rest. Denies complaints. Call bell in reach. 0530: AM labs drawn. CXR obtained. Daily wt obtained on standing scale, patient wore lifevest but this RN held battery pack. CHG bath given, gown changed, new compression stockings applied. Pt denies complaints. Call bell in reach. 0715: Bedside and Verbal shift change report given to Leatha Sanchez [facility] / Cande Condon RN (oncoming nurse) by Colleen Castillo RN (offgoing nurse). Report included the following information SBAR, Kardex, ED Summary, OR Summary, Procedure Summary, Intake/Output, MAR, Recent Results and Cardiac Rhythm NSR.

## 2020-08-14 NOTE — PROGRESS NOTES
\[facility]\"" ICU Progress Note    Admit Date: 2020  POD: 9 Day Post-Op    Procedure:  Procedure(s):  JUN AND EPIAORTIC ULTRASOUND BY DR Ngozi Yang - ON PUMP CORONARY ARTERY BYPASS GRAFT X 4 WITH GREATER RIGHT SAPHENOUS VEIN AND LEFT INTERNAL MAMMARY ARTERY GRAFTING - [facility]        Subjective:   Pt seen with Dr. Eli Mccoy. Afebrile, up in chair. Has lifevest on. No complaints     Objective:   Vitals:  Blood pressure 143/84, pulse 77, temperature 98.2 °F (36.8 °C), resp. rate 16, height 5' 6\" (1.676 m), weight 165 lb 2 oz (74.9 kg), SpO2 98 %. Temp (24hrs), Av.2 °F (36.8 °C), Min:97.8 °F (36.6 °C), Max:98.7 °F (37.1 °C)    EKG/Rhythm:  NSR 70s    Oxygen Therapy: RA    CXR:   CXR Results  (Last 48 hours)               20 0540  XR CHEST PORT Final result    Impression:  IMPRESSION: No significant change. Narrative:  INDICATION:  post op heart CABG       EXAM: CXR Portable. FINDINGS: Portable chest shows no significant change since yesterday. There is   no apparent pneumothorax. Lungs show bibasilar haziness/atelectasis and right   effusion. Heart size is stable. There is no overt pulmonary edema. 20 0605  XR CHEST PORT Final result    Impression:  IMPRESSION: Right pleural effusion and interstitial edema. Narrative:  Clinical indication: Postop heart. Portable AP semiupright view of the chest obtained compared to . Right   pleural effusion remain. Minimal interstitial edema. Cardiomegaly. Admission Weight: Last Weight   Weight: 163 lb 12.8 oz (74.3 kg) Weight: 165 lb 2 oz (74.9 kg)     Intake / Output / Drain:  Current Shift: No intake/output data recorded.   Last 24 hrs.:     Intake/Output Summary (Last 24 hours) at 2020 0842  Last data filed at 2020 0711  Gross per 24 hour   Intake 1080 ml   Output 1450 ml   Net -370 ml       EXAM:  General:    NAD                                                                                          Lungs: Clear to auscultation bilaterally. Incision:  No erythema, drainage or swelling. Heart:  Regular rate and rhythm   Abdomen:   Soft, non-tender. Bowel sounds normal. No masses,  No organomegaly. Extremities:  2+ LE edema. PPP. Neurologic:  Gross motor and sensory apparatus intact. Labs:   Recent Labs     20  0504   WBC 12.4*   HGB 11.4*   HCT 35.1*   *      K 4.0   BUN 25*   CREA 1.14   GLU 97        Assessment:     Principal Problem:    Ischemic cardiomyopathy (2020)      Overview: Added automatically from request for surgery     Active Problems:    CAD (coronary artery disease), native coronary artery (2010)      Hyperlipidemia (2010)      Pleural effusion, bilateral (2020)      CHF, acute on chronic ([address] (2020)      Pulmonary embolism ([facility] [address] (2020)      CAD (coronary artery disease) (2020)      S/P CABG x 4 (2020)      Overview: x 4, LIMA to LAD, RSVG to Diag-OM2, RSVG to PDA      Paroxysmal atrial fibrillation ([facility] [address] (2020)         Plan/Recommendations/Medical Decision Makin. CAD s/p CABG: On ASA, statin, BB    2. Acute on chronic systolic heart failure: EF 20-25% on TTE preop. Was on lasix PTA. Cont bumex to 1 mg daily and monitor renal function. Cont coreg, GDMT as tolerated. Hold AA due to hyponatremia. Hold cozaar due to renal function. Lifevest fitted . 3. HLD: on statin    4. Thrombocytopenia: resolved    5. Hyponatremia: improved. Cont PO bumex. Daily labs. 6. Urinary retention: Cont flomax. Resolved, monitor. 7. HTN: on coreg -dose reduced dt lower BP, monitor    8. Elevated Cr: Monitor    9. LE edema: Cont bumex 1 mg IV today, compression stockings. 10. Dispo: PT/OT, lifevest fitted. Has been accepted at [facility] for discharge today. Will discuss with case management.     Signed By: Bryson Zendejas NP

## 2020-08-14 NOTE — PROGRESS NOTES
Problem: Falls - Risk of  Goal: *Absence of Falls  Description: Document Memo Raj Fall Risk and appropriate interventions in the flowsheet. Outcome: Resolved/Met     Problem: Patient Education: Go to Patient Education Activity  Goal: Patient/Family Education  Outcome: Resolved/Met     Problem: Pressure Injury - Risk of  Goal: *Prevention of pressure injury  Description: Document Lars Scale and appropriate interventions in the flowsheet.   Outcome: Resolved/Met     Problem: Patient Education: Go to Patient Education Activity  Goal: Patient/Family Education  Outcome: Resolved/Met     Problem: Patient Education: Go to Patient Education Activity  Goal: Patient/Family Education  Outcome: Resolved/Met     Problem: Patient Education: Go to Patient Education Activity  Goal: Patient/Family Education  Outcome: Resolved/Met     Problem: Patient Education: Go to Patient Education Activity  Goal: Patient/Family Education  Outcome: Resolved/Met     Problem: CABG: Pre-Op Day  Goal: Off Pathway (Use only if patient is Off Pathway)  Outcome: Resolved/Met  Goal: Activity/Safety  Outcome: Resolved/Met  Goal: Consults, if ordered  Outcome: Resolved/Met  Goal: Diagnostic Test/Procedures  Outcome: Resolved/Met  Goal: Nutrition/Diet  Outcome: Resolved/Met  Goal: Medications  Outcome: Resolved/Met  Goal: Treatments/Interventions/Procedures  Outcome: Resolved/Met  Goal: Discharge Planning  Outcome: Resolved/Met  Goal: Psychosocial  Outcome: Resolved/Met  Goal: *Hemodynamically stable  Outcome: Resolved/Met  Goal: *Consent obtained, permits and diagnostics complete  Outcome: Resolved/Met     Problem: CABG: Day of Surgery (Initiate SCIP measures for post-op care)  Goal: Off Pathway (Use only if patient is Off Pathway)  Outcome: Resolved/Met  Goal: Activity/Safety  Outcome: Resolved/Met  Goal: Consults, if ordered  Outcome: Resolved/Met  Goal: Diagnostic Test/Procedures  Outcome: Resolved/Met  Goal: Nutrition/Diet  Outcome: Resolved/Met  Goal: Medications  Outcome: Resolved/Met  Goal: Respiratory  Outcome: Resolved/Met  Goal: Treatments/Interventions/Procedures  Outcome: Resolved/Met  Goal: Psychosocial  Outcome: Resolved/Met  Goal: *Hemodynamically stable (eg: Cardiac output/index; pulmonary arterial pressures; mixed venous oxygen saturation within set parameters)  Outcome: Resolved/Met  Goal: *Lungs clear or at baseline  Outcome: Resolved/Met  Goal: *Stable cardiac rhythm  Outcome: Resolved/Met  Goal: *Afebrile  Outcome: Resolved/Met  Goal: *Follows simple commands post anesthesia  Outcome: Resolved/Met  Goal: *Orients easily following extubation  Outcome: Resolved/Met  Goal: *Optimal pain control at patient's stated goal  Outcome: Resolved/Met     Problem: CABG: Post-Op Day 1  Goal: Off Pathway (Use only if patient is Off Pathway)  Outcome: Resolved/Met  Goal: Activity/Safety  Outcome: Resolved/Met  Goal: Consults, if ordered  Outcome: Resolved/Met  Goal: Diagnostic Test/Procedures  Outcome: Resolved/Met  Goal: Nutrition/Diet  Outcome: Resolved/Met  Goal: Medications  Outcome: Resolved/Met  Goal: Respiratory  Outcome: Resolved/Met  Goal: Treatments/Interventions/Procedures  Outcome: Resolved/Met  Goal: Psychosocial  Outcome: Resolved/Met  Goal: *Hemodynamically stable without vasoactive medications  Outcome: Resolved/Met  Goal: *Lungs clear or at baseline  Outcome: Resolved/Met  Goal: *Mechanical ventilation discontinued  Outcome: Resolved/Met  Goal: *Optimal pain control at patient's stated goal  Outcome: Resolved/Met  Goal: *Demonstrates progressive activity  Outcome: Resolved/Met  Goal: *Tolerating diet  Outcome: Resolved/Met  Goal: *Level of consciousness returns to baseline  Outcome: Resolved/Met     Problem: CABG: Post-Op Day 2/Transfer Day  Goal: Off Pathway (Use only if patient is Off Pathway)  Outcome: Resolved/Met  Goal: Activity/Safety  Outcome: Resolved/Met  Goal: Consults, if ordered  Outcome: Resolved/Met  Goal: Diagnostic Test/Procedures  Outcome: Resolved/Met  Goal: Nutrition/Diet  Outcome: Resolved/Met  Goal: Medications  Outcome: Resolved/Met  Goal: Discharge Planning  Outcome: Resolved/Met  Goal: Respiratory  Outcome: Resolved/Met  Goal: Treatments/Interventions/Procedures  Outcome: Resolved/Met  Goal: Psychosocial  Outcome: Resolved/Met  Goal: *Hemodynamically stable without vasoactive medications  Outcome: Resolved/Met  Goal: *Lungs clear or at baseline  Outcome: Resolved/Met  Goal: *Optimal pain control at patient's stated goal  Outcome: Resolved/Met  Goal: *Demonstrates progressive activity  Outcome: Resolved/Met  Goal: *Tolerating diet  Outcome: Resolved/Met     Problem: CABG: Post-Op Day 3  Goal: Off Pathway (Use only if patient is Off Pathway)  Outcome: Resolved/Met  Goal: Activity/Safety  Outcome: Resolved/Met  Goal: Consults, if ordered  Outcome: Resolved/Met  Goal: Diagnostic Test/Procedures  Outcome: Resolved/Met  Goal: Nutrition/Diet  Outcome: Resolved/Met  Goal: Discharge Planning  Outcome: Resolved/Met  Goal: Medications  Outcome: Resolved/Met  Goal: Respiratory  Outcome: Resolved/Met  Goal: Treatments/Interventions/Procedures  Outcome: Resolved/Met  Goal: Psychosocial  Outcome: Resolved/Met  Goal: *Hemodynamically stable  Outcome: Resolved/Met  Goal: *Lungs clear or at baseline  Outcome: Resolved/Met  Goal: *Optimal pain control at patient's stated goal  Outcome: Resolved/Met  Goal: *Incisions without signs and symptoms of wound complication  Outcome: Resolved/Met  Goal: *Demonstrates progressive activity  Outcome: Resolved/Met  Goal: *Tolerating diet  Outcome: Resolved/Met     Problem: CABG: Post-Op Day 4  Goal: Off Pathway (Use only if patient is Off Pathway)  Outcome: Resolved/Met  Goal: Activity/Safety  Outcome: Resolved/Met  Goal: Diagnostic Test/Procedures  Outcome: Resolved/Met  Goal: Nutrition/Diet  Outcome: Resolved/Met  Goal: Medications  Outcome: Resolved/Met  Goal: Discharge Planning  Outcome: Resolved/Met  Goal: Respiratory  Outcome: Resolved/Met  Goal: Treatments/Interventions/Procedures  Outcome: Resolved/Met  Goal: Psychosocial  Outcome: Resolved/Met  Goal: *Hemodynamically stable  Outcome: Resolved/Met  Goal: *Lungs clear or at baseline  Outcome: Resolved/Met  Goal: *Optimal pain control at patient's stated goal  Outcome: Resolved/Met  Goal: *Incisions without signs and symptoms of wound complication  Outcome: Resolved/Met  Goal: *Demonstrates progressive activity  Outcome: Resolved/Met  Goal: *Tolerating diet  Outcome: Resolved/Met     Problem: CABG: Post-Op Day 5  Goal: Off Pathway (Use only if patient is Off Pathway)  Outcome: Resolved/Met  Goal: Activity/Safety  Outcome: Resolved/Met  Goal: Diagnostic Test/Procedures  Outcome: Resolved/Met  Goal: Nutrition/Diet  Outcome: Resolved/Met  Goal: Discharge Planning  Outcome: Resolved/Met  Goal: Medications  Outcome: Resolved/Met  Goal: Respiratory  Outcome: Resolved/Met  Goal: Treatments/Interventions/Procedures  Outcome: Resolved/Met  Goal: Psychosocial  Outcome: Resolved/Met     Problem: CABG: Discharge Outcomes  Goal: *Hemodynamically stable  Outcome: Resolved/Met  Goal: *Stable cardiac rhythm  Outcome: Resolved/Met  Goal: *Lungs clear or at baseline  Outcome: Resolved/Met  Goal: *Demonstrates ability to perform prescribed activity without shortness of breath or discomfort  Outcome: Resolved/Met  Goal: *Verbalizes home exercise program, activity guidelines, cardiac precautions  Outcome: Resolved/Met  Goal: *Optimal pain control at patient's stated goal  Outcome: Resolved/Met  Goal: *Verbalizes understanding and describes prescribed diet  Outcome: Resolved/Met  Goal: *Verbalizes name, dosage, time, side effects, and number of days to continue medications  Outcome: Resolved/Met  Goal: *Weight  is stable  Outcome: Resolved/Met  Goal: *No signs and symptoms of infection or wound complications  Outcome: Resolved/Met  Goal: *Anxiety reduced or absent  Outcome: Resolved/Met  Goal: *Verbalizes and demonstrates incision care  Outcome: Resolved/Met  Goal: *Understands and describes signs and symptoms to report to providers(Stroke Metric)  Outcome: Resolved/Met  Goal: *Describes follow-up/return visits to physicians  Outcome: Resolved/Met  Goal: *Describes available resources and support systems  Outcome: Resolved/Met  Goal: *Expresses feelings about diagnosis and surgery  Outcome: Resolved/Met  Goal: *Influenza immunization  Outcome: Resolved/Met  Goal: *Pneumococcal immunization  Outcome: Resolved/Met

## 2020-08-14 NOTE — DISCHARGE INSTRUCTIONS
Cardiac Surgery Specialist    [address] [facility]       [address]                                          [place] [address]                                       [address], [address] [facility]  Office- [phone]  Fax- [phone]       Office- [phone]  Fax- [phone]  _____________________________________________________________  Dr. Ann Marie barton PA-C       Name:Adam Dobbs     Principal Problem:    Ischemic cardiomyopathy (7/31/2020)      Overview: Added automatically from request for surgery [id number]    Active Problems:    CAD (coronary artery disease), native coronary artery (9/21/2010)      Hyperlipidemia (9/21/2010)      Pleural effusion, bilateral (7/31/2020)      CHF, acute on chronic ([address] (7/31/2020)      Pulmonary embolism ([address] (7/31/2020)      CAD (coronary artery disease) (8/6/2020)      S/P CABG x 4 (8/6/2020)      Overview: x 4, LIMA to LAD, RSVG to Diag-OM2, RSVG to PDA      Paroxysmal atrial fibrillation ([facility] [address] (8/9/2020)        Discharge Date: 8/14/2020     Discharge to: Home    INSTRUCTIONS:  NO SMOKING OR TOBACCO PRODUCTS  Follow all the instructions in your discharge book  You may shower. Wash all incisions twice daily with mild soap and water. No lotions, ointments or powder. Call the office immediately for any redness, swelling, or drainage from your incision. Take your temperature daily and call for a temperature of 101 degrees or higher or for any symptoms that make you think you have and infection. Weigh yourself each morning. Call if you gain more than 5 pounds in 48 hours. Use the incentive spirometer 6-8 times a day-10 breaths each time.   Use a pillow or your bear to splint your breastbone when coughing or sneezing. If you feel your breast bone clicking or popping, notify the office immediately. Walk several hundred feet several times daily. DIET  Eat an American Heart Association diet. If you are having trouble with your appetite, eat what you can. Try eating small, frequent meals throughout the day. ACTIVITY  NO DRIVING--you will be evaluated to drive at your follow up visit. Increase your activity by walking several times a day. Stay out of bed most of the day. When sitting, keep your legs elevated. You may ride in a car, but you must get out every hour and walk around. If you ride in a car with an airbag that can not be switched off, put the seat ALL the way back or ride in the back seat. REVIEWED THE MECHANICS OF LIVING IN A TUBE WITH THE PATIENT    NO LIFTING MORE THAN 5 POUNDS WITH ARMS AWAY FROM THE BODY : FOR 30 DAYS AFTER SURGERY    NO LIFTING MORE THAN 10 POUNDS WITH ARMS AWAY FROM THE BODY FOR THE NEXT 61 DAYS    3 MONTHS AFTER SURGERY YOU WILL NO LONGER HAVE ANY LIFTING RESTRICTIONS. FOLLOW UP           1. Your follow up appointment with your surgeon's PA or NP will be on 9/9/20 at 1:00pm. Office is located at [facility], [address], [address]. You may have one person with you at this appointment. 2. Please call our office at [phone] if you are unable to make this appointment. 3. Your appointment with Dr. Drew Carrillo will be on 9/24/20 at 2:00pm. [facility], [address] [facility]. Office phone [phone]. 4. Consult you primary care physician regarding your influenza & pneumovax vaccines. 5. PLEASE bring your medication bottles to each appointment. 6. Please call Cardiac Rehab at [phone] if you do not already have an appointment. 7. Please sign up for My Chart.      CALL [phone] FOR ANY QUESTIONS OR PROBLEMS.     Signature:___________________________________________________

## 2020-08-14 NOTE — DISCHARGE SUMMARY
[facility] Discharge Summary     Patient ID:  Roldan Flaherty  [id number]  71 y.o.  1945    Admit date: 7/31/2020    Discharge date: 8/14/2020     Admitting Physician: Diamante Infante MD     Referring Cardiologist:  Dr. Drew Carrillo    PCP:  unknown    Admitting Diagnoses: CAD    Discharge Diagnoses:     Hospital Problems  Date Reviewed: 8/6/2020          Codes Class Noted POA    Paroxysmal atrial fibrillation ([facility] ICD-10-CM: I48.0  ICD-9-CM: 427.31  8/9/2020 Unknown        CAD (coronary artery disease) ICD-10-CM: I25.10  ICD-9-CM: 414.00  8/6/2020 Unknown        S/P CABG x 4 ICD-10-CM: Z95.1  ICD-9-CM: V45.81  8/6/2020 Unknown    Overview Signed 8/6/2020 12:49 PM by TONY Hernandez     x 4, LIMA to LAD, RSVG to Diag-OM2, RSVG to PDA             Pleural effusion, bilateral ICD-10-CM: J90  ICD-9-CM: 511.9  7/31/2020 Yes        CHF, acute on chronic ([facility] ICD-10-CM: I50.9  ICD-9-CM: 428.0  7/31/2020 Clinically Undetermined        Pulmonary embolism ([facility] ICD-10-CM: I26.99  ICD-9-CM: 415.19  7/31/2020 Clinically Undetermined        * (Principal) Ischemic cardiomyopathy ICD-10-CM: I25.5  ICD-9-CM: 414.8  7/31/2020     Overview Signed 8/3/2020  7:11 AM by Mike Grigsby MD     Added automatically from request for surgery [id number]             CAD (coronary artery disease), native coronary artery (Chronic) ICD-10-CM: I25.10  ICD-9-CM: 414.01  9/21/2010 Yes        Hyperlipidemia (Chronic) ICD-10-CM: E78.5  ICD-9-CM: 272.4  9/21/2010 Yes              Discharged Condition: good    Disposition: SNF    Procedures for this admission:  Procedure(s):  JUN AND EPIAORTIC ULTRASOUND BY DR Herlinda Francisco - ON PUMP CORONARY ARTERY BYPASS GRAFT X 4 WITH GREATER RIGHT SAPHENOUS VEIN AND LEFT INTERNAL MAMMARY ARTERY GRAFTING - [address]    Discharge Medications:      My Medications      START taking these medications      Instructions Each Dose to Equal Morning Noon Evening Bedtime   amiodarone 200 mg tablet  Commonly known as: CORDARONE    Your last dose was: Your next dose is: Take 1 Tab by mouth two (2) times a day. 200 mg                 aspirin 81 mg chewable tablet  Start taking on:  August 15, 2020  Replaces:  aspirin 325 mg tablet    Your last dose was: Your next dose is: Take 1 Tab by mouth daily. 81 mg                 atorvastatin 20 mg tablet  Commonly known as:  LIPITOR    Your last dose was: Your next dose is: Take 1 Tab by mouth nightly. 20 mg                 bumetanide 1 mg tablet  Commonly known as:  BUMEX    Your last dose was: Your next dose is: Take 1 Tab by mouth daily. 1 mg                 carvediloL 3.125 mg tablet  Commonly known as:  COREG    Your last dose was: Your next dose is: Take 1 Tab by mouth two (2) times daily (with meals). 3.125 mg                 oxyCODONE IR 5 mg immediate release tablet  Commonly known as:  ROXICODONE    Your last dose was: Your next dose is: Take 1 Tab by mouth every four (4) hours as needed for Pain for up to 7 days. Max Daily Amount: 30 mg.   5 mg                 polyethylene glycol 17 gram packet  Commonly known as:  MIRALAX  Start taking on:  August 15, 2020    Your last dose was: Your next dose is: Take 1 Packet by mouth daily. 17 g                 potassium chloride SR 10 mEq tablet  Commonly known as:  KLOR-CON 10  Start taking on:  August 15, 2020    Your last dose was: Your next dose is: Take 1 Tab by mouth daily. 10 mEq                 senna-docusate 8.6-50 mg per tablet  Commonly known as:  PERICOLACE    Your last dose was: Your next dose is: Take 1 Tab by mouth two (2) times a day. 1 Tab                 tamsulosin 0.4 mg capsule  Commonly known as:  FLOMAX  Start taking on:  August 15, 2020    Your last dose was: Your next dose is: Take 1 Cap by mouth daily.    0.4 mg                    STOP taking these medications aspirin 325 mg tablet  Commonly known as:  ASPIRIN  Replaced by:  aspirin 81 mg chewable tablet        cod liver oil Cap        furosemide 20 mg tablet  Commonly known as:  Lasix        lisinopriL 20 mg tablet  Commonly known as:  Mika Taveras              Where to Get Your Medications      Information on where to get these meds will be given to you by the nurse or doctor. Ask your nurse or doctor about these medications  · amiodarone 200 mg tablet  · aspirin 81 mg chewable tablet  · atorvastatin 20 mg tablet  · bumetanide 1 mg tablet  · carvediloL 3.125 mg tablet  · oxyCODONE IR 5 mg immediate release tablet  · polyethylene glycol 17 gram packet  · potassium chloride SR 10 mEq tablet  · senna-docusate 8.6-50 mg per tablet  · tamsulosin 0.4 mg capsule         HPI: Carolyne Soliz is a 76 y.o. male who was referred for cardiac evaluation by Dr. Soren Santo for CAD. The patient came to the ER complaining of SOB that had started about a week ago and got progressively worse. He was SOB at rest, +orthopnea/PND. He had some LE edema over the past couple of weeks that resolved with compression stockings. He also reports CP with exertion about a month ago that resolved with rest and had not been bothering him recently. He denies claudication, palpitations, dizziness or recent CP. He has a medical history of HTN, CAD with previous stent 10 years ago, gout. He reports BPH that he resolved with over the counter herbal medications. He denies taking prescription medications PTA. He lives alone. He is retired but works in construction part time. He denies pertinent family history. He has a friend who can help him after surgery. He denies smoking and alcohol.       Cardiac Testing     Cardiac catheterization 8/3/20:   Left Main    Mid LM lesion, 20% stenosed. Left Anterior Descending    Prox LAD lesion, 90% stenosed. The lesion was previously treated using a drug-eluting stent.  Previous treatment took place >2 years ago. Previous stent has restenosis present. First Diagonal Branch    There is moderate disease. Left Circumflex    There is mild disease. First Obtuse Marginal Branch    There is mild disease. Second Obtuse Marginal Branch    2nd Mrg lesion, 100% stenosed. Diagnostic coronary angiography shows positive for . Third Obtuse Marginal Branch    There is mild disease. Right Coronary Artery    Mid RCA lesion, 80% stenosed. Hospital Course: The patient underwent a CABGx4 by Dr. Adams Roman on 8/6/20 -see op note for details. He was transferred to the ICU in stable condition on amiodarone, precedex, insulin, dobutamine, anali, and epi gtts. He was hemodynamically stable and transferred to stepdown on POD4. After medical optimization and working with PT/OT he is being discharged to SNF. 1. CAD s/p CABG: On ASA, statin, BB     2. Acute on chronic systolic heart failure: EF 20-25% on TTE preop. Was on lasix PTA. Cont bumex to 1 mg daily and monitor renal function. Cont coreg, GDMT as tolerated. Hold AA due to hyponatremia. Hold cozaar due to renal function. Lifevest fitted 8/12.     3.  HLD: on statin     4. Thrombocytopenia: resolved     5. Hyponatremia: improved. Cont PO bumex. Daily labs.      6. Urinary retention: Cont flomax. Resolved, monitor.     7. HTN: on coreg -dose reduced dt lower BP, monitor     8. Elevated Cr: Monitor     9. LE edema: Cont bumex 1 mg IV today, compression stockings.      10. Dispo: PT/OT, lifevest fitted. Has been accepted at [facility] for discharge today. Will discuss with     Referral to outpatient cardiac rehab made.      Discharge Vital Signs:   Visit Vitals  /70 (BP 1 Location: Right arm, BP Patient Position: Sitting)   Pulse 72   Temp 98.5 °F (36.9 °C)   Resp 20   Ht 5' 6\" (1.676 m)   Wt 165 lb 2 oz (74.9 kg)   SpO2 99%   BMI 26.65 kg/m²       Labs:   Recent Labs     08/14/20  0504   WBC 12.4*   HGB 11.4*   HCT 35.1*   *      K 4.0   BUN 25*   CREA 1.14   GLU 97       Diagnostics: CXR:   CXR Results  (Last 48 hours)               08/14/20 0540  XR CHEST PORT Final result    Impression:  IMPRESSION: No significant change. Narrative:  INDICATION:  post op heart CABG       EXAM: CXR Portable. FINDINGS: Portable chest shows no significant change since yesterday. There is   no apparent pneumothorax. Lungs show bibasilar haziness/atelectasis and right   effusion. Heart size is stable. There is no overt pulmonary edema. 08/13/20 0605  XR CHEST PORT Final result    Impression:  IMPRESSION: Right pleural effusion and interstitial edema. Narrative:  Clinical indication: Postop heart. Portable AP semiupright view of the chest obtained compared to August 12. Right   pleural effusion remain. Minimal interstitial edema. Cardiomegaly. Patient Instructions/Follow Up Care:  Discharge instructions were reviewed with the patient and family present. Questions were also answered at this time. Prescriptions and medications were reviewed. The patient has a follow up appointment with the Nurse Practitioner or Physician's Assistant and with Dr. Yony Hammonds on 9/9/19. The patient was also instructed to follow up with his primary care physician as needed. The patient and family were encouraged to call with any questions or concerns.        Signed:  Meño Javed NP  8/14/2020  12:55 PM

## 2020-08-14 NOTE — PROGRESS NOTES
ANNA:  1) SNF- [facility]  2) 2nd IM letter at d/c   3) Call report   4) Lyft ride at transport at d/c    1:41 PM: CM spoke with pt's nurse and Audrey Ruiz is unable to transport. Lyft arranged via [employer] to  pt at 2:30. RN updated. Medicare pt has received, reviewed, and signed 2nd IM letter informing them of their right to appeal the discharge. Signed copy has been placed on pt bedside chart. 12:39 PM: CM spoke with NP and Abdulkadir Latham will not be able to accept pt after all due to [address]. CM spoke with [facility] at [facility] and they do take pt's with vests. CM resubmitted referral via [address] and they accepted pt and he is able to go today. CM spoke to pt's emergency contact Jayme Area [phone] and she is able to transport pt to SNF. Her cell number is [phone] if she is needed. Update provided to NP via [employer]. Pt made aware. Pt is going to the [address]. Call report number is [phone]. No further needs identified and pt is clear to discharge from a care management standpoint. Transition of Care Plan to SNF/Rehab    Communication to Patient/Family:  Met with patient and family and they are agreeable to the transition plan. The Plan for Transition of Care is related to the following treatment goals: SNF    The Patient and/or patient representative was provided with a choice of provider and agrees  with the discharge plan. Yes [x] No []    A Freedom of choice list was provided with basic dialogue that supports the patient's individualized plan of care/goals and shares the quality data associated with the providers. Yes [x] No []    SNF/Rehab Transition:  Patient has been accepted to [facility] SNF/Rehab and meets criteria for admission.    Patient will transported by [facility] and expected to leave at 2:45PM.    Communication to SNF/Rehab:  Bedside RN, Alberto, has been notified to update the transition plan to the facility and call report ([phone]). Discharge information has been updated on the AVS. And communicated to facility via [employer], or CC link. Discharge instructions to be fax'd to facility at [facility] #). Nursing Please include all hard scripts for controlled substances, med rec and dc summary, and AVS in packet. Reviewed and confirmed with facility, *[facility], can manage the patient care needs for the following:     Zahra Martinez with (X) only those applicable:  Medication:  [x]Medications are available at the facility  []IV Antibiotics    [x]Controlled Substance  hard copies available sent. []Weekly Labs    Equipment:  []CPAP/BiPAP  []Wound Vacuum  []Mahajan or Urinary Device  []PICC/Central Line  []Nebulizer  []Ventilator    Treatment:  []Isolation (for MRSA, VRE, etc.)  []Surgical Drain Management  []Tracheostomy Care  []Dressing Changes  []Dialysis with transportation  []PEG Care  []Oxygen  []Daily Weights for Heart Failure    Dietary:  []Any diet limitations  []Tube Feedings   []Total Parenteral Management (TPN)    Financial Resources:  []Medicaid Application Completed    []UAI Completed and copy given to pt/family  and copy given to pt/family  []A screening has previously been completed. []Level II Completed    [] Private pay individual who will not become   financially eligible for Medicaid within 6 months from admission to a [address] [place] [address]. [] Individual refused to have screening conducted. []Medicaid Application Completed    []The screening denied because it was determined individual did not need/did not qualify for nursing facility level of care. [] Out of state residents seeking direct admission to a [address] facility.   [] Individuals who are inpatients of an out of state hospital, or in state or out of state veterans/ hospital and seek direct admission to a [address] facility  [] Individuals who are pateints or residents of a state owned/operated facility that is licensed by Department of Behavioral Services (DBHDS) and seek direct admission to [address] [place] [address]  [] A screening not required for enrollment in [facility] services as set out in [facility]-  [] [facility]) staff shall perform screenings of the [facility] clients. Advanced Care Plan:  []Surrogate Decision Maker of Care  []POA  []Communicated Code Status and copy sent. Other:          Care Management Interventions  PCP Verified by CM: Yes  Mode of Transport at Discharge: Other (see comment)(Lyft)  Transition of Care Consult (CM Consult): SNF  Partner SNF: Yes  Discharge Durable Medical Equipment: No  Health Maintenance Reviewed: Yes  Physical Therapy Consult: Yes  Occupational Therapy Consult: Yes  Speech Therapy Consult: No  Current Support Network: Lives Alone(single story home with steps for entry.   independent with steps)  Confirm Follow Up Transport: Self  The Patient and/or Patient Representative was Provided with a Choice of Provider and Agrees with the Discharge Plan?: Yes  Freedom of Choice List was Provided with Basic Dialogue that Supports the Patient's Individualized Plan of Care/Goals, Treatment Preferences and Shares the Quality Data Associated with the Providers?: Yes  Discharge Location  Discharge Placement: Skilled nursing facility    Talitha Schaumann, RN ,[facility] [place]   [phone]

## 2020-08-14 NOTE — PROGRESS NOTES
0700 Bedside shift change report given to MIKAYLA Dominguez (oncoming nurse) by Roselyn Renteria RN (offgoing nurse). Report included the following information SBAR, Kardex, Intake/Output, MAR, Recent Results and Cardiac Rhythm NSR.     0730 Pt sitting up in recliner this AM. VSS. No complaints of pain. 46 Dr. Melba Abbott and care team at bedside. Pt should be OK to discharge to rehab today. 0900 Awaiting arrival of  to discuss discharge needs. 1010 Spoke with CM. Bella Beckman has some concerns regarding pt's lifevest. CM attempting to take care of pt's discharge needs now. CM to f/u with RN when placement is determined. 1320 Spoke with CM. Pt to discharge today. CM attempting to set up transportation to a facility now. [phone] Pt to discharge from [address] at 1430 to rehab.     1410 Attempting to call report to [facility] now. 420 [facility] report to Gideon Paget at [facility]. I have reviewed discharge instructions with the caregiver. Time was offered for questions and clarification. The caregiver verbalized understanding. Copy of discharge instructions given to pt and information/education discussed with pt as well as nurse at [facility][note 3]. Copy of discharge instructions, Christian Helton's discharge summary, hard script for pain medicine and MAR report sent with pt to be given to nurse at [facility][note 3]. IV's and telemetry removed. Pt discharged with all personal belongings, including LifeVest and box/LifeVest equpiment. Pt discharged to front entrance of hospital via wheelchair with PCT to await . 56 Pt waiting at front entrance of hospital for arrival of lyft.  has not arrived at this time. 1500 Lyft cancelled pt's ride. Spoke with CM. Ride rescheduled with Vale Davison to arrive in 15 minutes. Pt still waiting at front entrance at this time. 1515 Pt picked up by Vale Davison  to be taken to [facility].

## 2020-08-15 ENCOUNTER — PATIENT OUTREACH (OUTPATIENT)
Dept: CASE MANAGEMENT | Age: 75
End: 2020-08-15

## 2020-08-15 NOTE — PROGRESS NOTES
Community Care Team medical review documentation for patient in [facility]     Patient was discharged from [facility] on 8/14/20 to [facility] Information included in this progress note has been provided to SNF. Discharge Diagnosis: CABGx4, a fib, chf,   PCP : Curryj luis, Not On File  ACP:  Full Code; ACP on file    Medication Changes at Discharge:   Start: Amiodarone, ASA (start on 8/15), Lipitor, Bumex,  Coreg, Roxicodone, Miralax, Klor-CON, Pericolace, Tamsulosin,  Stop: ASA 81 mg chewable, cod liver oil, laxis, lisinopril  Diet: cardiac  Follow up Appointment Recommendations:   Future Appointments   Date Time Provider Pedro Syed   8/19/2020  1:00 PM Federico Hammonds NP [facility] BS AMB   9/9/2020  1:00 PM Chaevz Dominguez MD [facility] BS AMB   9/24/2020  2:00 PM Solange Rueda MD [facility]   9/28/2020 11:30 AM Mahi Scott MD [facility]   PCP ANNA follow up post SNF d/c  PTA Functional Status: Patient was independent and active without use of DME. The patient lived alone with no local support. Community Care Team will follow up weekly with [facility] until discharge. Medications were not reconciled and general patient assessment was not completed during this [facility] outreach.       Gladis Francis RN

## 2020-08-25 ENCOUNTER — OFFICE VISIT (OUTPATIENT)
Dept: CARDIOTHORACIC SURGERY | Age: 75
End: 2020-08-25
Payer: MEDICARE

## 2020-08-25 VITALS
OXYGEN SATURATION: 95 % | TEMPERATURE: 97 F | SYSTOLIC BLOOD PRESSURE: 112 MMHG | DIASTOLIC BLOOD PRESSURE: 62 MMHG | RESPIRATION RATE: 14 BRPM | HEART RATE: 72 BPM

## 2020-08-25 DIAGNOSIS — Z95.1 S/P CABG X 4: Primary | ICD-10-CM

## 2020-08-25 PROCEDURE — 99024 POSTOP FOLLOW-UP VISIT: CPT | Performed by: NURSE PRACTITIONER

## 2020-08-25 RX ORDER — BUMETANIDE 0.5 MG/1
0.5 TABLET ORAL EVERY EVENING
COMMUNITY
End: 2020-09-08 | Stop reason: ALTCHOICE

## 2020-08-25 NOTE — PROGRESS NOTES
Patient: Robert Older   Age: 76 y.o. Patient Care Team:  [facility], Not On File as PCP - General  Jaqui Dias MD (Cardiology)  Erika Lindsay MD as Consulting Provider (Cardiothoracic Surgery)  Harry Patterson, RN as Care Transitions Nurse    Diagnosis: The encounter diagnosis was S/P CABG x 4. Problem List:   Patient Active Problem List   Diagnosis Code    CAD (coronary artery disease), native coronary artery I25.10    Hyperlipidemia E78.5    Coronary stent     Pleural effusion, bilateral J90    CHF, acute on chronic (HCC) I50.9    Pulmonary embolism ([facility] I26.99    Ischemic cardiomyopathy I25.5    Bilateral carotid artery stenosis I65.23    Preop cardiovascular exam Z01.810    CAD (coronary artery disease) I25.10    S/P CABG x 4 Z95.1    Paroxysmal atrial fibrillation ([facility] I48.0        Date of Surgery: cabgX4     Surgery: 8/6/20    HPI:  Pt is here for post op follow up, seen with Dr. Franci Heath. The patient is ambulating well without assistance. He has been at [facility] and is not allowed to leave his room with out a chaperone dt covid rules. He has been discharged from PT and is being discharged from [address] [place] [address],[address] today. Pt does have LE edema that has gotten worse and was just started on an extra dose of bumex in the evenings. He denies SOB. He has been working on [facility] [employer]. He has not pain. Current Medications:   Current Outpatient Medications   Medication Sig Dispense Refill    bumetanide (BUMEX) 0.5 mg tablet Take 0.5 mg by mouth every evening.  aspirin 81 mg chewable tablet Take 1 Tab by mouth daily. 30 Tab 1    amiodarone (CORDARONE) 200 mg tablet Take 1 Tab by mouth two (2) times a day. 60 Tab 0    atorvastatin (LIPITOR) 20 mg tablet Take 1 Tab by mouth nightly. 30 Tab 1    bumetanide (BUMEX) 1 mg tablet Take 1 Tab by mouth daily. 30 Tab 1    carvediloL (COREG) 3.125 mg tablet Take 1 Tab by mouth two (2) times daily (with meals).  60 Tab 1    polyethylene glycol (MIRALAX) 17 gram packet Take 1 Packet by mouth daily. 14 Packet 0    potassium chloride SR (KLOR-CON 10) 10 mEq tablet Take 1 Tab by mouth daily. 30 Tab 1    senna-docusate (PERICOLACE) 8.6-50 mg per tablet Take 1 Tab by mouth two (2) times a day. 60 Tab 0    tamsulosin (FLOMAX) 0.4 mg capsule Take 1 Cap by mouth daily. 30 Cap 1       Vitals: Blood pressure 112/62, pulse 72, temperature 97 °F (36.1 °C), resp. rate 14, SpO2 95 %. Allergies: has No Known Allergies. Physical Exam:  Wounds: clean, dry, no drainage    Lungs: clear to auscultation bilaterally    Heart: regular rate and rhythm, S1, S2 normal, no murmur, click, rub or gallop    Extremities: normal strength, tone, and muscle mass, 3+ LE edema    Assessment/Plan:   1. CAD s/p CABG: On ASA, statin, BB     2. Acute on chronic systolic heart failure: EF 20-25% on TTE preop. Was on lasix PTA. Cont bumex to 1 mg every day and 0.5 mg every evening. Cont coreg, GDMT as tolerated. Hold AA due to hyponatremia. Hold cozaar due to renal function. Lifevest fitted 8/12. Pt is wearing his lifevest today.      3.  HLD: on statin    4. Hyponatremia: improved. Cont PO bumex. Daily labs.      5. Urinary retention: Cont flomax. Resolved, monitor.     6. HTN: on coreg      7. LE edema: Cont compression stockings. Cont diuretics. Monitor edema and daily weights.      8. Dispo: Plan for pt to dc from rehab today. RTC in 2 weeks. Pt is ready to start cardiac rehab.      Rehab - y  Walking: y  Glucometer: n/a  Antibiotic card for valves: n/a

## 2020-08-26 ENCOUNTER — TELEPHONE (OUTPATIENT)
Dept: CARDIOTHORACIC SURGERY | Age: 75
End: 2020-08-26

## 2020-08-27 ENCOUNTER — DOCUMENTATION ONLY (OUTPATIENT)
Dept: CASE MANAGEMENT | Age: 75
End: 2020-08-27

## 2020-08-27 NOTE — PROGRESS NOTES
Cardiac Surgery Discharge - Follow up call placed to [facility]. Reviewed plan of care after discharge and encouraged [facility] to verbalize. Discussed precautions. Encouraged continued use of the incentive spirometer. He is weighing daily and using spirometer. Weight has been stable for 3 days. Stated he drank 1 1/2 liters yesterday and voided 3 liters. Still has LE edema. Instructed him to keep his feet elevated as much as possible. He is walking and doing his exercises. Home health has seen him. Confirmed follow up appts and reinforced importance of wearing red reminder bracelet. [facility] is without questions or concerns. Instructed him to call the office for any questions or concerns.   Rosalia Garza RN

## 2020-09-03 PROBLEM — Z01.810 PREOP CARDIOVASCULAR EXAM: Status: RESOLVED | Noted: 2020-08-04 | Resolved: 2020-09-03

## 2020-09-08 ENCOUNTER — OFFICE VISIT (OUTPATIENT)
Dept: CARDIOTHORACIC SURGERY | Age: 75
End: 2020-09-08
Payer: MEDICARE

## 2020-09-08 VITALS
TEMPERATURE: 97.1 F | HEART RATE: 77 BPM | OXYGEN SATURATION: 96 % | DIASTOLIC BLOOD PRESSURE: 64 MMHG | RESPIRATION RATE: 14 BRPM | SYSTOLIC BLOOD PRESSURE: 122 MMHG

## 2020-09-08 DIAGNOSIS — Z95.1 S/P CABG X 4: Primary | ICD-10-CM

## 2020-09-08 PROCEDURE — 99024 POSTOP FOLLOW-UP VISIT: CPT | Performed by: THORACIC SURGERY (CARDIOTHORACIC VASCULAR SURGERY)

## 2020-09-08 RX ORDER — POTASSIUM CHLORIDE 750 MG/1
10 TABLET, FILM COATED, EXTENDED RELEASE ORAL DAILY
Qty: 30 TAB | Refills: 1 | Status: ON HOLD | OUTPATIENT
Start: 2020-09-08 | End: 2020-09-23 | Stop reason: SDUPTHER

## 2020-09-08 RX ORDER — GUAIFENESIN 100 MG/5ML
81 LIQUID (ML) ORAL DAILY
Qty: 30 TAB | Refills: 1 | Status: SHIPPED | OUTPATIENT
Start: 2020-09-08

## 2020-09-08 RX ORDER — BUMETANIDE 1 MG/1
1 TABLET ORAL DAILY
Qty: 30 TAB | Refills: 1 | Status: ON HOLD | OUTPATIENT
Start: 2020-09-08 | End: 2020-09-23 | Stop reason: SDUPTHER

## 2020-09-08 RX ORDER — CARVEDILOL 3.12 MG/1
3.12 TABLET ORAL 2 TIMES DAILY WITH MEALS
Qty: 60 TAB | Refills: 1 | Status: ON HOLD | OUTPATIENT
Start: 2020-09-08 | End: 2020-09-23 | Stop reason: SDUPTHER

## 2020-09-08 RX ORDER — ATORVASTATIN CALCIUM 20 MG/1
20 TABLET, FILM COATED ORAL
Qty: 30 TAB | Refills: 1 | Status: SHIPPED | OUTPATIENT
Start: 2020-09-08 | End: 2021-01-07

## 2020-09-08 NOTE — PROGRESS NOTES
Patient: Desiree Ennis   Age: 76 y.o. Patient Care Team:  Bsi, Not On File as PCP - General  hCina Bullock MD (Cardiology)  Yvonne Rivera MD as Consulting Provider (Cardiothoracic Surgery)  Aaliyah Olguin RN as Care Transitions Nurse    Diagnosis: The encounter diagnosis was S/P CABG x 4. Problem List:   Patient Active Problem List   Diagnosis Code    CAD (coronary artery disease), native coronary artery I25.10    Hyperlipidemia E78.5    Coronary stent     Pleural effusion, bilateral J90    CHF, acute on chronic (HCC) I50.9    Pulmonary embolism ([facility] I26.99    Ischemic cardiomyopathy I25.5    Bilateral carotid artery stenosis I65.23    CAD (coronary artery disease) I25.10    S/P CABG x 4 Z95.1    Paroxysmal atrial fibrillation ([facility] I48.0        Date of Surgery: [id number]      Surgery: 8/6/20    HPI:  Pt is here for post op follow up, seen with Dr. Deepa Orozco. He is ambulating well without assistance. He denies SOB. His weight is stable. He has a good appetite. He complains of LE swelling. Current Medications:   Current Outpatient Medications   Medication Sig Dispense Refill    polyethylene glycol (MIRALAX) 17 gram packet Take 1 Packet by mouth daily. 14 Packet 0    senna-docusate (PERICOLACE) 8.6-50 mg per tablet Take 1 Tab by mouth two (2) times a day. 60 Tab 0    tamsulosin (FLOMAX) 0.4 mg capsule Take 1 Cap by mouth daily. 30 Cap 1    bumetanide (BUMEX) 1 mg tablet Take 1 Tab by mouth daily. 30 Tab 1    aspirin 81 mg chewable tablet Take 1 Tab by mouth daily. 30 Tab 1    atorvastatin (LIPITOR) 20 mg tablet Take 1 Tab by mouth nightly. 30 Tab 1    potassium chloride SR (KLOR-CON 10) 10 mEq tablet Take 1 Tab by mouth daily. 30 Tab 1    carvediloL (COREG) 3.125 mg tablet Take 1 Tab by mouth two (2) times daily (with meals). 60 Tab 1       Vitals: Blood pressure 122/64, pulse 77, temperature 97.1 °F (36.2 °C), resp. rate 14, SpO2 96 %.        Allergies: has No Known Allergies. Physical Exam:  Wounds: clean, dry, no drainage    Lungs: clear to auscultation bilaterally    Heart: regular rate and rhythm, S1, S2 normal, no murmur, click, rub or gallop    Extremities: normal strength, tone, and muscle mass    Assessment/Plan:   1. CAD s/p CABG: On ASA, statin, BB     2. Acute on chronic systolic heart failure: EF 20-25% on TTE preop. Was on lasix PTA. Cont bumex to 1 mg every day. Cont coreg, GDMT as tolerated. Hold AA due to hyponatremia. Hold cozaar due to renal function. Lifevest fitted 8/12. Pt is wearing his lifevest today.      3.  HLD: on statin     4. Hyponatremia: improved. Cont PO bumex. Daily labs.      5. Urinary retention: resolved     6. HTN: on coreg       7. LE edema: Wear compression stockings. Cont diuretics. Monitor edema and daily weights.      8. Dispo: FU with PCP and cardiology    Pt is ready to start cardiac rehab.      Rehab - y  Walking: y  Glucometer: n/a  Antibiotic card for valves: n/a

## 2020-09-09 ENCOUNTER — HOSPITAL ENCOUNTER (OUTPATIENT)
Dept: CARDIAC REHAB | Age: 75
Discharge: HOME | End: 2020-09-09
Payer: MEDICARE

## 2020-09-09 VITALS — BODY MASS INDEX: 29.38 KG/M2 | WEIGHT: 182 LBS

## 2020-09-09 PROCEDURE — 93798 PHYS/QHP OP CAR RHAB W/ECG: CPT

## 2020-09-10 ENCOUNTER — HOSPITAL ENCOUNTER (OUTPATIENT)
Dept: CARDIAC REHAB | Age: 75
Discharge: HOME | End: 2020-09-10
Payer: MEDICARE

## 2020-09-10 VITALS — BODY MASS INDEX: 30.15 KG/M2 | WEIGHT: 186.8 LBS

## 2020-09-10 PROCEDURE — 93798 PHYS/QHP OP CAR RHAB W/ECG: CPT

## 2020-09-14 ENCOUNTER — HOSPITAL ENCOUNTER (OUTPATIENT)
Dept: CARDIAC REHAB | Age: 75
Discharge: HOME | End: 2020-09-14
Payer: MEDICARE

## 2020-09-14 VITALS — BODY MASS INDEX: 30.18 KG/M2 | WEIGHT: 187 LBS

## 2020-09-14 PROCEDURE — 93798 PHYS/QHP OP CAR RHAB W/ECG: CPT

## 2020-09-16 ENCOUNTER — OFFICE VISIT (OUTPATIENT)
Dept: FAMILY MEDICINE CLINIC | Age: 75
End: 2020-09-16
Payer: MEDICARE

## 2020-09-16 VITALS
WEIGHT: 187 LBS | BODY MASS INDEX: 30.05 KG/M2 | HEART RATE: 80 BPM | RESPIRATION RATE: 15 BRPM | OXYGEN SATURATION: 94 % | SYSTOLIC BLOOD PRESSURE: 137 MMHG | DIASTOLIC BLOOD PRESSURE: 89 MMHG | HEIGHT: 66 IN | TEMPERATURE: 98.6 F

## 2020-09-16 DIAGNOSIS — Z12.5 SPECIAL SCREENING EXAMINATION FOR NEOPLASM OF PROSTATE: ICD-10-CM

## 2020-09-16 DIAGNOSIS — Z13.29 SCREENING FOR THYROID DISORDER: ICD-10-CM

## 2020-09-16 DIAGNOSIS — I25.5 ISCHEMIC CARDIOMYOPATHY: ICD-10-CM

## 2020-09-16 DIAGNOSIS — I50.23 ACUTE ON CHRONIC SYSTOLIC CONGESTIVE HEART FAILURE (HCC): ICD-10-CM

## 2020-09-16 DIAGNOSIS — R33.8 BENIGN PROSTATIC HYPERPLASIA WITH URINARY RETENTION: ICD-10-CM

## 2020-09-16 DIAGNOSIS — Z95.1 S/P CABG X 4: ICD-10-CM

## 2020-09-16 DIAGNOSIS — Z00.00 ENCOUNTER FOR MEDICAL EXAMINATION TO ESTABLISH CARE: Primary | ICD-10-CM

## 2020-09-16 DIAGNOSIS — N40.1 BENIGN PROSTATIC HYPERPLASIA WITH URINARY RETENTION: ICD-10-CM

## 2020-09-16 DIAGNOSIS — Z11.59 ENCOUNTER FOR HEPATITIS C SCREENING TEST FOR LOW RISK PATIENT: ICD-10-CM

## 2020-09-16 DIAGNOSIS — E78.2 MIXED HYPERLIPIDEMIA: Chronic | ICD-10-CM

## 2020-09-16 DIAGNOSIS — E55.9 VITAMIN D DEFICIENCY: ICD-10-CM

## 2020-09-16 DIAGNOSIS — I25.111 CORONARY ARTERY DISEASE INVOLVING NATIVE CORONARY ARTERY OF NATIVE HEART WITH ANGINA PECTORIS WITH DOCUMENTED SPASM (HCC): ICD-10-CM

## 2020-09-16 PROCEDURE — G8432 DEP SCR NOT DOC, RNG: HCPCS | Performed by: NURSE PRACTITIONER

## 2020-09-16 PROCEDURE — G8417 CALC BMI ABV UP PARAM F/U: HCPCS | Performed by: NURSE PRACTITIONER

## 2020-09-16 PROCEDURE — 3017F COLORECTAL CA SCREEN DOC REV: CPT | Performed by: NURSE PRACTITIONER

## 2020-09-16 PROCEDURE — G0463 HOSPITAL OUTPT CLINIC VISIT: HCPCS | Performed by: NURSE PRACTITIONER

## 2020-09-16 PROCEDURE — G8427 DOCREV CUR MEDS BY ELIG CLIN: HCPCS | Performed by: NURSE PRACTITIONER

## 2020-09-16 PROCEDURE — G8536 NO DOC ELDER MAL SCRN: HCPCS | Performed by: NURSE PRACTITIONER

## 2020-09-16 PROCEDURE — 99204 OFFICE O/P NEW MOD 45 MIN: CPT | Performed by: NURSE PRACTITIONER

## 2020-09-16 PROCEDURE — 1101F PT FALLS ASSESS-DOCD LE1/YR: CPT | Performed by: NURSE PRACTITIONER

## 2020-09-16 PROCEDURE — 36415 COLL VENOUS BLD VENIPUNCTURE: CPT | Performed by: NURSE PRACTITIONER

## 2020-09-16 PROCEDURE — 99000 SPECIMEN HANDLING OFFICE-LAB: CPT | Performed by: NURSE PRACTITIONER

## 2020-09-16 NOTE — PROGRESS NOTES
Chief Complaint   Patient presents with   [facility] Follow Up     Health Maintenance reviewed     1. Have you been to the ER, urgent care clinic since your last visit? Hospitalized since your last visit? No     2. Have you seen or consulted any other health care providers outside of the [address] [place] [address] since your last visit? Include any pap smears or colon screening.   No

## 2020-09-16 NOTE — PROGRESS NOTES
Chief Complaint   Patient presents with   [facility] Follow Up       HPI:  Leonela Rosa is a 76y.o. year old male who is a new patient and is here to establish care. He was previous followed by [facility] [place], but when I got sick she would not see me\". S/P CABG 8/6/2020  Admitted to [facility] on 7/31/2020 for SOB. Cardiac cath 8/3/2020 which showed multiple vessel disease, then had Quad CABG 8/6/2020  Hospital Course: The patient underwent a CABGx4 by Dr. Leslie Catalan on 8/6/20 -see op note for details. He was transferred to the ICU in stable condition on amiodarone, precedex, insulin, dobutamine, anali, and epi gtts. He was hemodynamically stable and transferred to stepdown on POD4. After medical optimization and working with PT/OT he is being discharged to SNF.        1. CAD s/p CABG: On ASA, statin, BB     2. Acute on chronic systolic heart failure: EF 20-25% on TTE preop. Was on lasix PTA. Cont bumex to 1 mg daily and monitor renal function. Cont coreg, GDMT as tolerated. Hold AA due to hyponatremia. Hold cozaar due to renal function. Lifevest fitted 8/12.     3.  HLD: on statin     4. Thrombocytopenia: resolved     5. Hyponatremia: improved. Cont PO bumex. Daily labs.      6. Urinary retention: Cont flomax. Resolved, monitor.     7. HTN: on coreg -dose reduced dt lower BP, monitor     8. Elevated Cr: Monitor     9. LE edema: Cont bumex 1 mg IV today, compression stockings.      10. Dispo: PT/OT, lifevest fitted. Has been accepted at [facility] for discharge today. Will discuss with      Referral to outpatient cardiac rehab made. Discharged on 8/14/2020, went to [address] [place] [address],[address] after discharge for couple weeks then went home. Lives alone. Friend lives in [place]. Has some friends from [place] that helps as needed. Had follow up with Dr Leslie Catalan 9/8/2020  Assessment/Plan:   1.  CAD s/p CABG: On ASA, statin, BB     2. Acute on chronic systolic heart failure: EF 20-25% on TTE preop. Was on lasix PTA. Cont bumex to 1 mg every day. Cont coreg, GDMT as tolerated. Hold AA due to hyponatremia. Hold cozaar due to renal function. Lifevest fitted 8/12. Pt is wearing his lifevest today.      3.  HLD: on statin     4. Hyponatremia: improved. Cont PO bumex. Daily labs.      5. Urinary retention: resolved     6. HTN: on coreg       7. LE edema: Wear compression stockings. Cont diuretics. Monitor edema and daily weights.      8. Dispo: FU with PCP and cardiology     Pt is ready to start cardiac rehab      Will see cardiology Dr Victor Manuel Walsh on 9/28/2020  Weight at home stable per patient, says that did gain a little but is eating a better, down 3.5 lbs this morning. Doing cardiac rehab twice per week \"for 33 sessions\". Wearing compression stockings. History of BPH that he has treated with over the counter herbal medications, which he notes works very well. The following sections were reviewed & updated as appropriate: PMH, PL, PSH, and SH.       Patient Active Problem List   Diagnosis Code    CAD (coronary artery disease), native coronary artery I25.10    Hyperlipidemia E78.5    Coronary stent     Pleural effusion, bilateral J90    CHF, acute on chronic (HCC) I50.9    Pulmonary embolism ([facility] I26.99    Ischemic cardiomyopathy I25.5    Bilateral carotid artery stenosis I65.23    CAD (coronary artery disease) I25.10    S/P CABG x 4 Z95.1    Paroxysmal atrial fibrillation (HCC) I48.0      Past Medical History:   Diagnosis Date    Acute MI anterior wall first episode care ([facility] 9/21/2010    CAD (coronary artery disease), native coronary artery 9/21/2010    Coronary stent 9/21/2010    Hyperlipidemia 9/21/2010     Past Surgical History:   Procedure Laterality Date    HX CORONARY ARTERY BYPASS GRAFT  08/06/2020    x 4, LIMA to LAD, RSVG to Diag-OM2, RSVG to PDA     Family History   Problem Relation Age of Onset    No Known Problems Mother     No Known Problems Father      Social History     Socioeconomic History    Marital status:      Spouse name: Not on file    Number of children: 1    Years of education: 15    Highest education level: 12th grade   Occupational History    Not on file   Social Needs    Financial resource strain: Not on file    Food insecurity     Worry: Never true     Inability: Never true    Transportation needs     Medical: No     Non-medical: No   Tobacco Use    Smoking status: Never Smoker    Smokeless tobacco: Never Used   Substance and Sexual Activity    Alcohol use: Never     Frequency: Never    Drug use: Not Currently     Types: Prescription, OTC    Sexual activity: Not on file   Lifestyle    Physical activity     Days per week: 0 days     Minutes per session: 0 min    Stress: Only a little   Relationships    Social connections     Talks on phone: Once a week     Gets together: Once a week     Attends Sikhism service: Never     Active member of club or organization: No     Attends meetings of clubs or organizations: Never     Relationship status:     Intimate partner violence     Fear of current or ex partner: No     Emotionally abused: No     Physically abused: No     Forced sexual activity: No   Other Topics Concern     Service No    Blood Transfusions No    Caffeine Concern No    Occupational Exposure No    Hobby Hazards No    Sleep Concern Yes    Stress Concern Yes    Weight Concern No    Special Diet Yes    Back Care No    Exercise No    Bike Helmet No    Seat Belt Yes    Self-Exams No   Social History Narrative    Not on file           Prior to Admission medications    Medication Sig Start Date End Date Taking? Authorizing Provider   saw palmetto xtr/zinc picolin (SAW PALMETTO EXTRACT PO) Take  by mouth. Yes Provider, Historical   saw/vit E/sod ana maria/lyc/beta/pyg (PROSTATE HEALTH PO) Take  by mouth. Yes Provider, Historical   TURMERIC PO Take  by mouth.    Yes Provider, Historical B-Sit/m-19/bitter-orange peel (SLENDER CORTISOL PO) Take  by mouth. Yes Provider, Historical   bumetanide (BUMEX) 1 mg tablet Take 1 Tab by mouth daily. 9/8/20  Yes Michele Kearney NP   aspirin 81 mg chewable tablet Take 1 Tab by mouth daily. 9/8/20  Yes Michele Kearney NP   atorvastatin (LIPITOR) 20 mg tablet Take 1 Tab by mouth nightly. 9/8/20  Yes Michele Kearney NP   potassium chloride SR (KLOR-CON 10) 10 mEq tablet Take 1 Tab by mouth daily. 9/8/20  Yes Michele Kearney NP   carvediloL (COREG) 3.125 mg tablet Take 1 Tab by mouth two (2) times daily (with meals). 9/8/20  Yes Michele Kearney NP   polyethylene glycol (MIRALAX) 17 gram packet Take 1 Packet by mouth daily. 8/15/20 9/16/20  Michele Kearney NP   senna-docusate (PERICOLACE) 8.6-50 mg per tablet Take 1 Tab by mouth two (2) times a day. 8/14/20 9/16/20  Michele Kearney NP   tamsulosin (FLOMAX) 0.4 mg capsule Take 1 Cap by mouth daily.  8/15/20 9/16/20  Michele Kearney NP          No Known Allergies         ROS  Gen: slight fatigue but improving, no fever, no chills  Eyes: no excessive tearing, itching, or discharge  Nose: no rhinorrhea, no sinus pain  Mouth: no oral lesions, no sore throat  Resp: no shortness of breath, no wheezing, no cough  CV: no chest pain, no paroxysmal nocturnal dyspnea  Abd: no nausea, no heartburn, no diarrhea, no constipation, no abdominal pain  Neuro: no headaches, no syncope or presyncopal episodes  Endo: no polyuria, no polydipsia        Physical Exam     Visit Vitals  /89 (BP 1 Location: Right arm, BP Patient Position: Sitting)   Pulse 80   Temp 98.6 °F (37 °C) (Temporal)   Resp 15   Ht 5' 6\" (1.676 m)   Wt 187 lb (84.8 kg)   SpO2 94%   BMI 30.18 kg/m²   Gen: alert, oriented, no acute distress  Head: normocephalic, atraumatic  Ears: external auditory canals clear, TMs without erythema or effusion  Eyes: pupils equal round reactive to light, sclera clear, conjunctiva clear  Nose: normal turbinates, no rhinorrhea  Oral: moist mucus membranes, no oral lesions, no pharyngeal inflammation or exudate  Neck: supple, no lymphadenopathy  Resp: no increased work of breathing, lungs clear to ausculation bilaterally  CV: S1, S2 normal, no murmurs, rubs, or gallops. lifevest on   Abd: soft, not tender, not distended. No hepatosplenomegaly. Normal bowel sounds. Neuro: cranial nerves intact, normal strength and movement in all extremities, and sensation intact and symmetric. Skin: no lesion or rash. surgical chest incision healing well  Ext:  +1 bilateral lower extremity edema        Assessment & Plan:  Differential diagnosis and treatment options reviewed with patient who is in agreement with treatment plan as outlined below. ICD-10-CM ICD-9-CM    1. Encounter for medical examination to establish care  Z00.00 V70.9    2. S/P CABG x 4  Z95.1 V45.81    3. Mixed hyperlipidemia  E78.2 272.2    4. Ischemic cardiomyopathy  F31.7 346.8 METABOLIC PANEL, COMPREHENSIVE      CBC WITH AUTOMATED DIFF      NT-PRO BNP      UT HANDLG&/OR CONVEY OF SPEC FOR TR OFFICE TO LAB      COLLECTION VENOUS BLOOD,VENIPUNCTURE   5. Coronary artery disease involving native coronary artery of native heart with angina pectoris with documented spasm (HCC)  I25.111 414.01      413.9    6. Benign prostatic hyperplasia with urinary retention  N40.1 600.01     R33.8 788.20    7. Screening for thyroid disorder  Z13.29 V77.0 TSH 3RD GENERATION   8. Vitamin D deficiency  E55.9 268.9 VITAMIN D, 25 HYDROXY   9. Encounter for hepatitis C screening test for low risk patient  Z11.59 V73.89 HEPATITIS C AB   10. Special screening examination for neoplasm of prostate  Z12.5 V76.44 PSA SCREENING (SCREENING)   11. Acute on chronic systolic congestive heart failure (HCC)  I50.23 428.23 NT-PRO BNP     428.0      Will continue cardiac rehab  Will continue close cardiology follow up  Will check labs today. Will send to cardiology as well.   Discussed BMI and healthy weight. Encouraged patient to work to implement changes including diet high in raw fruits and vegetables, lean protein and good fats. Limit refined, processed carbohydrates and sugar. Encouraged regular exercise as tolerated and instructed by cardiac rehab. Will try to get old records from last PCP  Follow up three months or sooner if needed. Verbal and written instructions (see AVS) provided. Patient expresses understanding and agreement of diagnosis and treatment plan.

## 2020-09-17 ENCOUNTER — HOSPITAL ENCOUNTER (OUTPATIENT)
Dept: CARDIAC REHAB | Age: 75
Discharge: HOME | End: 2020-09-17
Payer: MEDICARE

## 2020-09-17 VITALS — WEIGHT: 185.6 LBS | BODY MASS INDEX: 29.96 KG/M2

## 2020-09-17 LAB
25(OH)D3+25(OH)D2 SERPL-MCNC: 37.5 NG/ML (ref 30–100)
ALBUMIN SERPL-MCNC: 4.3 G/DL (ref 3.7–4.7)
ALBUMIN/GLOB SERPL: 2.3 {RATIO} (ref 1.2–2.2)
ALP SERPL-CCNC: 136 IU/L (ref 39–117)
ALT SERPL-CCNC: 61 IU/L (ref 0–44)
AST SERPL-CCNC: 30 IU/L (ref 0–40)
BASOPHILS # BLD AUTO: 0 X10E3/UL (ref 0–0.2)
BASOPHILS NFR BLD AUTO: 1 %
BILIRUB SERPL-MCNC: 0.8 MG/DL (ref 0–1.2)
BUN SERPL-MCNC: 21 MG/DL (ref 8–27)
BUN/CREAT SERPL: 19 (ref 10–24)
CALCIUM SERPL-MCNC: 9 MG/DL (ref 8.6–10.2)
CHLORIDE SERPL-SCNC: 99 MMOL/L (ref 96–106)
CO2 SERPL-SCNC: 26 MMOL/L (ref 20–29)
CREAT SERPL-MCNC: 1.13 MG/DL (ref 0.76–1.27)
EOSINOPHIL # BLD AUTO: 0.1 X10E3/UL (ref 0–0.4)
EOSINOPHIL NFR BLD AUTO: 1 %
ERYTHROCYTE [DISTWIDTH] IN BLOOD BY AUTOMATED COUNT: 13.6 % (ref 11.6–15.4)
GLOBULIN SER CALC-MCNC: 1.9 G/DL (ref 1.5–4.5)
GLUCOSE SERPL-MCNC: 96 MG/DL (ref 65–99)
HCT VFR BLD AUTO: 39.5 % (ref 37.5–51)
HCV AB S/CO SERPL IA: <0.1 S/CO RATIO (ref 0–0.9)
HGB BLD-MCNC: 13.2 G/DL (ref 13–17.7)
IMM GRANULOCYTES # BLD AUTO: 0 X10E3/UL (ref 0–0.1)
IMM GRANULOCYTES NFR BLD AUTO: 0 %
LYMPHOCYTES # BLD AUTO: 0.6 X10E3/UL (ref 0.7–3.1)
LYMPHOCYTES NFR BLD AUTO: 7 %
MCH RBC QN AUTO: 31.1 PG (ref 26.6–33)
MCHC RBC AUTO-ENTMCNC: 33.4 G/DL (ref 31.5–35.7)
MCV RBC AUTO: 93 FL (ref 79–97)
MONOCYTES # BLD AUTO: 0.5 X10E3/UL (ref 0.1–0.9)
MONOCYTES NFR BLD AUTO: 7 %
NEUTROPHILS # BLD AUTO: 6.5 X10E3/UL (ref 1.4–7)
NEUTROPHILS NFR BLD AUTO: 84 %
NT-PROBNP SERPL-MCNC: 6674 PG/ML (ref 0–486)
PLATELET # BLD AUTO: 329 X10E3/UL (ref 150–450)
POTASSIUM SERPL-SCNC: 3.7 MMOL/L (ref 3.5–5.2)
PROT SERPL-MCNC: 6.2 G/DL (ref 6–8.5)
PSA SERPL-MCNC: 4.1 NG/ML (ref 0–4)
RBC # BLD AUTO: 4.25 X10E6/UL (ref 4.14–5.8)
SODIUM SERPL-SCNC: 143 MMOL/L (ref 134–144)
TSH SERPL DL<=0.005 MIU/L-ACNC: 3.32 UIU/ML (ref 0.45–4.5)
WBC # BLD AUTO: 7.7 X10E3/UL (ref 3.4–10.8)

## 2020-09-17 PROCEDURE — 93798 PHYS/QHP OP CAR RHAB W/ECG: CPT

## 2020-09-19 ENCOUNTER — HOSPITAL ENCOUNTER (INPATIENT)
Age: 75
LOS: 3 days | Discharge: HOME | DRG: 293 | End: 2020-09-23
Attending: EMERGENCY MEDICINE | Admitting: HOSPITALIST
Payer: MEDICARE

## 2020-09-19 DIAGNOSIS — I25.10 CORONARY ARTERY DISEASE INVOLVING NATIVE CORONARY ARTERY OF NATIVE HEART WITHOUT ANGINA PECTORIS: ICD-10-CM

## 2020-09-19 DIAGNOSIS — I50.23 ACUTE ON CHRONIC SYSTOLIC CONGESTIVE HEART FAILURE (HCC): Primary | ICD-10-CM

## 2020-09-19 DIAGNOSIS — Z91.14 NON COMPLIANCE W MEDICATION REGIMEN: ICD-10-CM

## 2020-09-19 DIAGNOSIS — I25.5 ISCHEMIC CARDIOMYOPATHY: ICD-10-CM

## 2020-09-19 DIAGNOSIS — R06.01 ORTHOPNEA: ICD-10-CM

## 2020-09-19 DIAGNOSIS — R06.09 DYSPNEA ON EXERTION: ICD-10-CM

## 2020-09-19 DIAGNOSIS — Z95.1 S/P CABG X 4: ICD-10-CM

## 2020-09-19 DIAGNOSIS — J90 PLEURAL EFFUSION, BILATERAL: ICD-10-CM

## 2020-09-19 PROCEDURE — 96374 THER/PROPH/DIAG INJ IV PUSH: CPT

## 2020-09-19 PROCEDURE — 99285 EMERGENCY DEPT VISIT HI MDM: CPT

## 2020-09-19 PROCEDURE — 93005 ELECTROCARDIOGRAM TRACING: CPT

## 2020-09-20 ENCOUNTER — APPOINTMENT (OUTPATIENT)
Dept: GENERAL RADIOLOGY | Age: 75
DRG: 293 | End: 2020-09-20
Attending: EMERGENCY MEDICINE
Payer: MEDICARE

## 2020-09-20 PROBLEM — I50.9 CHF EXACERBATION (HCC): Status: ACTIVE | Noted: 2020-09-20

## 2020-09-20 PROBLEM — E87.6 HYPOKALEMIA: Status: ACTIVE | Noted: 2020-09-20

## 2020-09-20 LAB
ALBUMIN SERPL-MCNC: 3.5 G/DL (ref 3.5–5)
ALBUMIN/GLOB SERPL: 1 {RATIO} (ref 1.1–2.2)
ALP SERPL-CCNC: 138 U/L (ref 45–117)
ALT SERPL-CCNC: 78 U/L (ref 12–78)
ANION GAP SERPL CALC-SCNC: 6 MMOL/L (ref 5–15)
APPEARANCE UR: CLEAR
AST SERPL-CCNC: 43 U/L (ref 15–37)
ATRIAL RATE: 0 BPM
BACTERIA URNS QL MICRO: NEGATIVE /HPF
BASOPHILS # BLD: 0 K/UL (ref 0–0.1)
BASOPHILS NFR BLD: 0 % (ref 0–1)
BILIRUB SERPL-MCNC: 0.7 MG/DL (ref 0.2–1)
BILIRUB UR QL: NEGATIVE
BNP SERPL-MCNC: 8554 PG/ML
BUN SERPL-MCNC: 34 MG/DL (ref 6–20)
BUN/CREAT SERPL: 25 (ref 12–20)
CALCIUM SERPL-MCNC: 8.9 MG/DL (ref 8.5–10.1)
CALCULATED R AXIS, ECG10: 0 DEGREES
CALCULATED T AXIS, ECG11: 0 DEGREES
CHLORIDE SERPL-SCNC: 99 MMOL/L (ref 97–108)
CHOLEST SERPL-MCNC: 148 MG/DL
CK SERPL-CCNC: 83 U/L (ref 39–308)
CO2 SERPL-SCNC: 32 MMOL/L (ref 21–32)
COLOR UR: ABNORMAL
CREAT SERPL-MCNC: 1.34 MG/DL (ref 0.7–1.3)
DIAGNOSIS, 93000: NORMAL
DIFFERENTIAL METHOD BLD: ABNORMAL
EOSINOPHIL # BLD: 0.1 K/UL (ref 0–0.4)
EOSINOPHIL NFR BLD: 1 % (ref 0–7)
EPITH CASTS URNS QL MICRO: ABNORMAL /LPF
ERYTHROCYTE [DISTWIDTH] IN BLOOD BY AUTOMATED COUNT: 15.3 % (ref 11.5–14.5)
EST. AVERAGE GLUCOSE BLD GHB EST-MCNC: 120 MG/DL
GLOBULIN SER CALC-MCNC: 3.5 G/DL (ref 2–4)
GLUCOSE SERPL-MCNC: 103 MG/DL (ref 65–100)
GLUCOSE UR STRIP.AUTO-MCNC: NEGATIVE MG/DL
HBA1C MFR BLD: 5.8 % (ref 4–5.6)
HCT VFR BLD AUTO: 43.4 % (ref 36.6–50.3)
HDLC SERPL-MCNC: 43 MG/DL
HDLC SERPL: 3.4 {RATIO} (ref 0–5)
HGB BLD-MCNC: 14.1 G/DL (ref 12.1–17)
HGB UR QL STRIP: NEGATIVE
HYALINE CASTS URNS QL MICRO: ABNORMAL /LPF (ref 0–5)
IMM GRANULOCYTES # BLD AUTO: 0.1 K/UL (ref 0–0.04)
IMM GRANULOCYTES NFR BLD AUTO: 0 % (ref 0–0.5)
KETONES UR QL STRIP.AUTO: NEGATIVE MG/DL
LDLC SERPL CALC-MCNC: 83.8 MG/DL (ref 0–100)
LEUKOCYTE ESTERASE UR QL STRIP.AUTO: NEGATIVE
LIPID PROFILE,FLP: NORMAL
LYMPHOCYTES # BLD: 0.8 K/UL (ref 0.8–3.5)
LYMPHOCYTES NFR BLD: 7 % (ref 12–49)
MCH RBC QN AUTO: 31.2 PG (ref 26–34)
MCHC RBC AUTO-ENTMCNC: 32.5 G/DL (ref 30–36.5)
MCV RBC AUTO: 96 FL (ref 80–99)
MONOCYTES # BLD: 0.9 K/UL (ref 0–1)
MONOCYTES NFR BLD: 8 % (ref 5–13)
NEUTS SEG # BLD: 9.4 K/UL (ref 1.8–8)
NEUTS SEG NFR BLD: 84 % (ref 32–75)
NITRITE UR QL STRIP.AUTO: NEGATIVE
NRBC # BLD: 0 K/UL (ref 0–0.01)
NRBC BLD-RTO: 0 PER 100 WBC
PH UR STRIP: 6.5 [PH] (ref 5–8)
PLATELET # BLD AUTO: 311 K/UL (ref 150–400)
PMV BLD AUTO: 10.1 FL (ref 8.9–12.9)
POTASSIUM SERPL-SCNC: 3.4 MMOL/L (ref 3.5–5.1)
PROT SERPL-MCNC: 7 G/DL (ref 6.4–8.2)
PROT UR STRIP-MCNC: 30 MG/DL
Q-T INTERVAL, ECG07: 0 MS
QRS DURATION, ECG06: 0 MS
QTC CALCULATION (BEZET), ECG08: 0 MS
RBC # BLD AUTO: 4.52 M/UL (ref 4.1–5.7)
RBC #/AREA URNS HPF: ABNORMAL /HPF (ref 0–5)
SODIUM SERPL-SCNC: 137 MMOL/L (ref 136–145)
SP GR UR REFRACTOMETRY: 1.01 (ref 1–1.03)
TRIGL SERPL-MCNC: 106 MG/DL (ref ?–150)
TROPONIN I SERPL-MCNC: <0.05 NG/ML
TROPONIN I SERPL-MCNC: <0.05 NG/ML
UA: UC IF INDICATED,UAUC: ABNORMAL
UROBILINOGEN UR QL STRIP.AUTO: 0.2 EU/DL (ref 0.2–1)
VENTRICULAR RATE, ECG03: 0 BPM
VLDLC SERPL CALC-MCNC: 21.2 MG/DL
WBC # BLD AUTO: 11.2 K/UL (ref 4.1–11.1)
WBC URNS QL MICRO: ABNORMAL /HPF (ref 0–4)

## 2020-09-20 PROCEDURE — 65660000000 HC RM CCU STEPDOWN

## 2020-09-20 PROCEDURE — 74011250636 HC RX REV CODE- 250/636: Performed by: HOSPITALIST

## 2020-09-20 PROCEDURE — 71045 X-RAY EXAM CHEST 1 VIEW: CPT

## 2020-09-20 PROCEDURE — 83880 ASSAY OF NATRIURETIC PEPTIDE: CPT

## 2020-09-20 PROCEDURE — 80061 LIPID PANEL: CPT

## 2020-09-20 PROCEDURE — 85025 COMPLETE CBC W/AUTO DIFF WBC: CPT

## 2020-09-20 PROCEDURE — 36415 COLL VENOUS BLD VENIPUNCTURE: CPT

## 2020-09-20 PROCEDURE — 81001 URINALYSIS AUTO W/SCOPE: CPT

## 2020-09-20 PROCEDURE — 74011250636 HC RX REV CODE- 250/636: Performed by: INTERNAL MEDICINE

## 2020-09-20 PROCEDURE — 74011000250 HC RX REV CODE- 250: Performed by: EMERGENCY MEDICINE

## 2020-09-20 PROCEDURE — 74011250637 HC RX REV CODE- 250/637: Performed by: HOSPITALIST

## 2020-09-20 PROCEDURE — 84484 ASSAY OF TROPONIN QUANT: CPT

## 2020-09-20 PROCEDURE — 83036 HEMOGLOBIN GLYCOSYLATED A1C: CPT

## 2020-09-20 PROCEDURE — 80053 COMPREHEN METABOLIC PANEL: CPT

## 2020-09-20 PROCEDURE — 74011250637 HC RX REV CODE- 250/637: Performed by: INTERNAL MEDICINE

## 2020-09-20 PROCEDURE — 94760 N-INVAS EAR/PLS OXIMETRY 1: CPT

## 2020-09-20 PROCEDURE — 82550 ASSAY OF CK (CPK): CPT

## 2020-09-20 RX ORDER — GUAIFENESIN 100 MG/5ML
81 LIQUID (ML) ORAL DAILY
Status: DISCONTINUED | OUTPATIENT
Start: 2020-09-20 | End: 2020-09-23 | Stop reason: HOSPADM

## 2020-09-20 RX ORDER — FUROSEMIDE 10 MG/ML
40 INJECTION INTRAMUSCULAR; INTRAVENOUS DAILY
Status: DISCONTINUED | OUTPATIENT
Start: 2020-09-20 | End: 2020-09-20

## 2020-09-20 RX ORDER — ONDANSETRON 2 MG/ML
4 INJECTION INTRAMUSCULAR; INTRAVENOUS
Status: DISCONTINUED | OUTPATIENT
Start: 2020-09-20 | End: 2020-09-23 | Stop reason: HOSPADM

## 2020-09-20 RX ORDER — POTASSIUM CHLORIDE 750 MG/1
10 TABLET, FILM COATED, EXTENDED RELEASE ORAL DAILY
Status: DISCONTINUED | OUTPATIENT
Start: 2020-09-20 | End: 2020-09-23 | Stop reason: HOSPADM

## 2020-09-20 RX ORDER — SODIUM CHLORIDE 0.9 % (FLUSH) 0.9 %
5-40 SYRINGE (ML) INJECTION EVERY 8 HOURS
Status: DISCONTINUED | OUTPATIENT
Start: 2020-09-20 | End: 2020-09-23 | Stop reason: HOSPADM

## 2020-09-20 RX ORDER — PROMETHAZINE HYDROCHLORIDE 25 MG/1
12.5 TABLET ORAL
Status: DISCONTINUED | OUTPATIENT
Start: 2020-09-20 | End: 2020-09-23 | Stop reason: HOSPADM

## 2020-09-20 RX ORDER — SODIUM CHLORIDE 0.9 % (FLUSH) 0.9 %
5-40 SYRINGE (ML) INJECTION AS NEEDED
Status: DISCONTINUED | OUTPATIENT
Start: 2020-09-20 | End: 2020-09-23 | Stop reason: HOSPADM

## 2020-09-20 RX ORDER — POLYETHYLENE GLYCOL 3350 17 G/17G
17 POWDER, FOR SOLUTION ORAL DAILY PRN
Status: DISCONTINUED | OUTPATIENT
Start: 2020-09-20 | End: 2020-09-23 | Stop reason: HOSPADM

## 2020-09-20 RX ORDER — ACETAMINOPHEN 650 MG/1
650 SUPPOSITORY RECTAL
Status: DISCONTINUED | OUTPATIENT
Start: 2020-09-20 | End: 2020-09-23 | Stop reason: HOSPADM

## 2020-09-20 RX ORDER — ACETAMINOPHEN 325 MG/1
650 TABLET ORAL
Status: DISCONTINUED | OUTPATIENT
Start: 2020-09-20 | End: 2020-09-23 | Stop reason: HOSPADM

## 2020-09-20 RX ORDER — FUROSEMIDE 10 MG/ML
40 INJECTION INTRAMUSCULAR; INTRAVENOUS 2 TIMES DAILY
Status: DISCONTINUED | OUTPATIENT
Start: 2020-09-20 | End: 2020-09-23

## 2020-09-20 RX ORDER — ATORVASTATIN CALCIUM 20 MG/1
20 TABLET, FILM COATED ORAL
Status: DISCONTINUED | OUTPATIENT
Start: 2020-09-20 | End: 2020-09-23 | Stop reason: HOSPADM

## 2020-09-20 RX ORDER — BUMETANIDE 0.25 MG/ML
1 INJECTION INTRAMUSCULAR; INTRAVENOUS ONCE
Status: COMPLETED | OUTPATIENT
Start: 2020-09-20 | End: 2020-09-20

## 2020-09-20 RX ORDER — BUMETANIDE 1 MG/1
1 TABLET ORAL DAILY
Status: DISCONTINUED | OUTPATIENT
Start: 2020-09-20 | End: 2020-09-20

## 2020-09-20 RX ORDER — CARVEDILOL 3.12 MG/1
3.12 TABLET ORAL 2 TIMES DAILY WITH MEALS
Status: DISCONTINUED | OUTPATIENT
Start: 2020-09-20 | End: 2020-09-23 | Stop reason: HOSPADM

## 2020-09-20 RX ORDER — POTASSIUM CHLORIDE 20 MEQ/1
20 TABLET, EXTENDED RELEASE ORAL
Status: COMPLETED | OUTPATIENT
Start: 2020-09-20 | End: 2020-09-20

## 2020-09-20 RX ADMIN — ATORVASTATIN CALCIUM 20 MG: 20 TABLET, FILM COATED ORAL at 21:14

## 2020-09-20 RX ADMIN — POTASSIUM CHLORIDE 20 MEQ: 20 TABLET, EXTENDED RELEASE ORAL at 06:23

## 2020-09-20 RX ADMIN — ASPIRIN 81 MG CHEWABLE TABLET 81 MG: 81 TABLET CHEWABLE at 08:05

## 2020-09-20 RX ADMIN — ATORVASTATIN CALCIUM 20 MG: 20 TABLET, FILM COATED ORAL at 06:23

## 2020-09-20 RX ADMIN — BUMETANIDE 1 MG: 0.25 INJECTION INTRAMUSCULAR; INTRAVENOUS at 05:16

## 2020-09-20 RX ADMIN — Medication 5 ML: at 21:14

## 2020-09-20 RX ADMIN — Medication 10 ML: at 17:11

## 2020-09-20 RX ADMIN — CARVEDILOL 3.12 MG: 3.12 TABLET, FILM COATED ORAL at 08:05

## 2020-09-20 RX ADMIN — BUMETANIDE 1 MG: 1 TABLET ORAL at 08:05

## 2020-09-20 RX ADMIN — SACUBITRIL AND VALSARTAN 1 TABLET: 24; 26 TABLET, FILM COATED ORAL at 20:36

## 2020-09-20 RX ADMIN — FUROSEMIDE 40 MG: 10 INJECTION, SOLUTION INTRAMUSCULAR; INTRAVENOUS at 17:12

## 2020-09-20 RX ADMIN — SACUBITRIL AND VALSARTAN 1 TABLET: 24; 26 TABLET, FILM COATED ORAL at 17:11

## 2020-09-20 RX ADMIN — CARVEDILOL 3.12 MG: 3.12 TABLET, FILM COATED ORAL at 17:12

## 2020-09-20 RX ADMIN — Medication 10 ML: at 08:06

## 2020-09-20 RX ADMIN — Medication 10 ML: at 06:24

## 2020-09-20 RX ADMIN — FUROSEMIDE 40 MG: 10 INJECTION, SOLUTION INTRAMUSCULAR; INTRAVENOUS at 08:05

## 2020-09-20 RX ADMIN — POTASSIUM CHLORIDE 10 MEQ: 750 TABLET, FILM COATED, EXTENDED RELEASE ORAL at 08:05

## 2020-09-20 NOTE — ED PROVIDER NOTES
EMERGENCY DEPARTMENT HISTORY AND PHYSICAL EXAM    Please note that this dictation was completed with Pocketbook, the computer voice recognition software. Quite often unanticipated grammatical, syntax, homophones, and other interpretive errors are inadvertently transcribed by the computer software. Please disregard these errors. Please excuse any errors that have escaped final proofreading. Date: 9/19/2020  Patient Name: Sugey Pascual  Patient Age and Sex: 76 y.o. male    History of Presenting Illness     Chief Complaint   Patient presents with    Shortness of Breath     reports shortness of breath and increase in weight. 10 pounds in 2 weeks.  Weight Change       History Provided By: Patient    HPI: Sugey Pascual, is a 76 y.o. male whose medical history is noted below and includes atherosclerotic cardiovascular disease, multiple coronary stents and a bypass surgery on September 6, presents to the ED with exertional dyspnea, increased abdominal girth, worsening lower extremity edema and a weight gain of about 12 pounds over the past week. Since his hospital discharge, the patient has been participating in cardiac rehab and feels like he has made some improvement. He denies having any exertional chest pain. The patient has been on a daily dose of Bumex for multiple years. He states that recently his primary care doctor discontinued the medication but he is unsure why. Ever since the discontinuation of Bumex his aforementioned symptoms have started getting noticeable. At this point unable to sleep due to orthopnea. He came into the emergency department today and was not able to wait till Monday to see his primary care doctor because all of the symptoms acutely worsened over the past 24 hours. No cough, fevers or chills. Pt denies any other alleviating or exacerbating factors. No other associated signs or symptoms.  There are no other complaints, changes or physical findings at this time. PCP: Federico Hammonds NP    Past History   [facility] and [facility] by [facility]. -Lilly Walker MD    Past Medical History:  Past Medical History:   Diagnosis Date    Acute MI anterior wall first episode care [facility]) 9/21/2010    CAD (coronary artery disease), native coronary artery 9/21/2010    Coronary stent 9/21/2010    Hyperlipidemia 9/21/2010       Past Surgical History:  Past Surgical History:   Procedure Laterality Date    HX CORONARY ARTERY BYPASS GRAFT  08/06/2020    x 4, LIMA to LAD, RSVG to Diag-OM2, RSVG to PDA       Family History:  Family History   Problem Relation Age of Onset    No Known Problems Mother     No Known Problems Father        Social History:  Social History     Tobacco Use    Smoking status: Never Smoker    Smokeless tobacco: Never Used   Substance Use Topics    Alcohol use: Never     Frequency: Never    Drug use: Not Currently     Types: Prescription, OTC       Allergies:  No Known Allergies    Review of Systems   All other systems reviewed and negative    Review of Systems   Constitutional: Negative for appetite change and fever. HENT: Negative. Eyes: Negative. Respiratory: Positive for shortness of breath. Negative for cough and wheezing. Cardiovascular: Positive for leg swelling. Negative for chest pain and palpitations. Gastrointestinal: Positive for abdominal distention. Negative for abdominal pain, nausea and vomiting. Endocrine: Negative. Genitourinary: Negative for dysuria, flank pain and hematuria. Musculoskeletal: Negative for back pain and joint swelling. Skin: Negative. Negative for rash. Neurological: Negative for dizziness, weakness, light-headedness, numbness and headaches. Hematological: Negative for adenopathy. All other systems reviewed and are negative. Physical Exam   Reviewed patients vital signs and nursing note    Physical Exam  Vitals signs and nursing note reviewed. Constitutional:       Appearance: He is ill-appearing. HENT:      Head: Atraumatic. Eyes:      General: No scleral icterus. Extraocular Movements: Extraocular movements intact. Conjunctiva/sclera: Conjunctivae normal.      Pupils: Pupils are equal, round, and reactive to light. Neck:      Musculoskeletal: Normal range of motion and neck supple. Cardiovascular:      Rate and Rhythm: Normal rate and regular rhythm. Pulses: Normal pulses. Pulmonary:      Effort: Tachypnea and accessory muscle usage present. Breath sounds: Examination of the right-lower field reveals decreased breath sounds. Examination of the left-lower field reveals decreased breath sounds. Decreased breath sounds present. No wheezing. Abdominal:      General: Bowel sounds are normal.      Palpations: Abdomen is soft. Tenderness: There is no abdominal tenderness. Musculoskeletal: Normal range of motion. Right lower leg: Edema present. Left lower leg: Edema present. Skin:     General: Skin is warm and dry. Capillary Refill: Capillary refill takes 2 to 3 seconds. Coloration: Skin is pale. Neurological:      General: No focal deficit present. Mental Status: He is alert and oriented to person, place, and time. Motor: No weakness. Psychiatric:         Mood and Affect: Mood normal.         Behavior: Behavior normal.         Diagnostic Study Results     Labs - I have personally reviewed and interpreted all laboratory results.  Paula Cunningham MD, MSc  Recent Results (from the past 24 hour(s))   CBC WITH AUTOMATED DIFF    Collection Time: 09/20/20 12:47 AM   Result Value Ref Range    WBC 11.2 (H) 4.1 - 11.1 K/uL    RBC 4.52 4.10 - 5.70 M/uL    HGB 14.1 12.1 - 17.0 g/dL    HCT 43.4 36.6 - 50.3 %    MCV 96.0 80.0 - 99.0 FL    MCH 31.2 26.0 - 34.0 PG    MCHC 32.5 30.0 - 36.5 g/dL    RDW 15.3 (H) 11.5 - 14.5 %    PLATELET 849 083 - 545 K/uL    MPV 10.1 8.9 - 12.9 FL    NRBC 0.0 0  WBC ABSOLUTE NRBC 0.00 0.00 - 0.01 K/uL    NEUTROPHILS 84 (H) 32 - 75 %    LYMPHOCYTES 7 (L) 12 - 49 %    MONOCYTES 8 5 - 13 %    EOSINOPHILS 1 0 - 7 %    BASOPHILS 0 0 - 1 %    IMMATURE GRANULOCYTES 0 0.0 - 0.5 %    ABS. NEUTROPHILS 9.4 (H) 1.8 - 8.0 K/UL    ABS. LYMPHOCYTES 0.8 0.8 - 3.5 K/UL    ABS. MONOCYTES 0.9 0.0 - 1.0 K/UL    ABS. EOSINOPHILS 0.1 0.0 - 0.4 K/UL    ABS. BASOPHILS 0.0 0.0 - 0.1 K/UL    ABS. IMM. GRANS. 0.1 (H) 0.00 - 0.04 K/UL    DF AUTOMATED     METABOLIC PANEL, COMPREHENSIVE    Collection Time: 09/20/20 12:47 AM   Result Value Ref Range    Sodium 137 136 - 145 mmol/L    Potassium 3.4 (L) 3.5 - 5.1 mmol/L    Chloride 99 97 - 108 mmol/L    CO2 32 21 - 32 mmol/L    Anion gap 6 5 - 15 mmol/L    Glucose 103 (H) 65 - 100 mg/dL    BUN 34 (H) 6 - 20 MG/DL    Creatinine 1.34 (H) 0.70 - 1.30 MG/DL    BUN/Creatinine ratio 25 (H) 12 - 20      GFR est AA >60 >60 ml/min/1.73m2    GFR est non-AA 52 (L) >60 ml/min/1.73m2    Calcium 8.9 8.5 - 10.1 MG/DL    Bilirubin, total 0.7 0.2 - 1.0 MG/DL    ALT (SGPT) 78 12 - 78 U/L    AST (SGOT) 43 (H) 15 - 37 U/L    Alk.  phosphatase 138 (H) 45 - 117 U/L    Protein, total 7.0 6.4 - 8.2 g/dL    Albumin 3.5 3.5 - 5.0 g/dL    Globulin 3.5 2.0 - 4.0 g/dL    A-G Ratio 1.0 (L) 1.1 - 2.2     CK W/ REFLX CKMB    Collection Time: 09/20/20 12:47 AM   Result Value Ref Range    CK 83 39 - 308 U/L   TROPONIN I    Collection Time: 09/20/20 12:47 AM   Result Value Ref Range    Troponin-I, Qt. <0.05 <0.05 ng/mL   NT-PRO BNP    Collection Time: 09/20/20 12:58 AM   Result Value Ref Range    NT pro-BNP 8,554 (H) <450 PG/ML   URINALYSIS W/ REFLEX CULTURE    Collection Time: 09/20/20  1:48 AM    Specimen: Urine   Result Value Ref Range    Color YELLOW/STRAW      Appearance CLEAR CLEAR      Specific gravity 1.015 1.003 - 1.030      pH (UA) 6.5 5.0 - 8.0      Protein 30 (A) NEG mg/dL    Glucose Negative NEG mg/dL    Ketone Negative NEG mg/dL    Bilirubin Negative NEG      Blood Negative NEG Urobilinogen 0.2 0.2 - 1.0 EU/dL    Nitrites Negative NEG      Leukocyte Esterase Negative NEG      WBC 0-4 0 - 4 /hpf    RBC 0-5 0 - 5 /hpf    Epithelial cells FEW FEW /lpf    Bacteria Negative NEG /hpf    UA:UC IF INDICATED CULTURE NOT INDICATED BY UA RESULT CNI      Hyaline cast 0-2 0 - 5 /lpf       Radiologic Studies - I have personally reviewed and interpreted all imaging studies and agree with radiology interpretation and report. - Adrian Mckeon MD, MSc  XR CHEST PORT   Final Result   IMPRESSION:    Right greater than left pleural effusions. Medical Decision Making   I am the first provider for this patient. Records Reviewed: I reviewed our electronic medical record system for any past medical records that were available that may contribute to the patient's current condition, including their PMH, surgical history, social and family history. Reviewed the nursing notes and vital signs from today's visit. Nursing notes will be reviewed as they become available in realtime while the pt has been in the ED. Adrian Mckeno MD Msc    Vital Signs-Reviewed the patient's vital signs. Patient Vitals for the past 24 hrs:   Temp Pulse Resp BP SpO2   09/20/20 0004    (!) 153/95    09/19/20 2356 97.7 °F (36.5 °C) 89 24 (!) 159/111 96 %   09/19/20 2333  95 18 (!) 152/93 95 %       ECG interpretation: Normal sinus rhythm with rate 91, occasional PVCs, RBBB, no ST elevations, depressions or other changes concerning for acute ischemia. No significant changes from prior. This ECG has been viewed and interpreted by me personally. Adrian Mckeon MD, Msc    Provider Notes (Medical Decision Making): The patient is a 40-year-old male who has history of heart disease and who recently underwent cardiac bypass surgery. He presents today with worsening shortness of breath in setting of having discontinued his Bumex by his primary care doctor. He is unsure as to why the medication was discontinued.   On exam today he appears volume overloaded as noted by elevated JVD, increased weight from baseline by approximately 12 L, lower extremity edema. He has very significant orthopnea. His oxygen saturation is still normal on room air, however, he is winded after speaking 2-3 word sentences and using intercostal muscles to breathe even with minimal exertion. Ddx: chf, renal fail, acs, low albumin, liver fail,   Workup: cxr, cardiac markers, labs, UA    4:26 AM  Patient reassessed and based on today's work-up, he has what seems like worsening o pleural effusions, his BNP is higher than prior measurements, all clinical evidence suggest what we suspected which is fluid overload and congestive heart failure exacerbation. Given that symptoms started shortly after he was taken off Bumex, I suspect that this is the reason why he is in fluid overload. I will restart him on that via IV right now. Although he does not require supplemental oxygen, he has increased work of breathing and overall looks significantly ill. I think that inpatient diuresis with close observation will be most appropriate for him. Will discuss with hospitalist.  At this time, he has no symptoms suggestive of infectious origin of symptoms. CT and/or procalcitonin can be sent if concern arises. Similarly, low concern for PE based on clinical evaluation and history. ED Course:   Initial assessment performed. The patients presenting problems have been discussed, and they are in agreement with the care plan formulated and outlined with them. I have encouraged them to ask questions as they arise throughout their visit. Consult Note:  Ariadna Pizano MD spoke with dr. Clemencia Hidalgo, admitting hospitalist   Discussed pt's hx, physical exam and available diagnostic and imaging results. Reviewed care plans. Agree with management and plan thus far. Lex Mejia MD, am the attending of record for this patient encounter. Diagnosis     Clinical Impression:   1.  Acute on chronic systolic congestive heart failure ([facility] [address]    2. Orthopnea    3. Dyspnea on exertion    4. Pleural effusion, bilateral        Attestation:  I personally performed the services described in this documentation on this date 9/19/2020 for patient Jinny Lefort.   Miriam Baez MD

## 2020-09-20 NOTE — PROGRESS NOTES
Bedside shift change report given to Lan Banda (oncoming nurse) by Klaudia Lewis RN (offgoing nurse). Report included the following information SBAR, Kardex, Procedure Summary, Intake/Output, MAR and Recent Results.

## 2020-09-20 NOTE — ED NOTES
Assumed care of pt from triage. Pt CC of SOB, weight change. . Pt on monitor x 3. VSS. Call bell in reach. Will continue to monitor. Bilateral lower ext edema noted, pt states abdominal swelling and dyspnea on excursion. resp even and unlabored, skin warm and dry, color pale (generalized).

## 2020-09-20 NOTE — ED NOTES
TRANSFER - OUT REPORT:    Verbal report given to [facility] DEE (name) on Lizet Mention  being transferred to Renal(unit) for routine progression of care       Report consisted of patients Situation, Background, Assessment and   Recommendations(SBAR). Information from the following report(s) SBAR, Kardex, Procedure Summary, MAR, Recent Results and Med Rec Status was reviewed with the receiving nurse. Lines:       Opportunity for questions and clarification was provided.       Patient transported with:   Strat[employer]

## 2020-09-20 NOTE — H&P
Hospitalist Admission Note    NAME: Inés Marks   :  1945   MRN:  [id number]     Date/Time:  2020 5:22 AM    Patient PCP: Donna Mcelroy NP  _____________________________________________________________________  Given the patient's current clinical presentation, I have a high level of concern for decompensation if discharged from the emergency department. Complex decision making was performed, which includes reviewing the patient's available past medical records, laboratory results, and x-ray films. My assessment of this patient's clinical condition and my plan of care is as follows. Assessment / Plan:    Acute on Chronic CHF    Exacerbation ?complaince with meds and fluidintake/diet  -Admit to CHF Pathway  -IV lasix 40mg daily and continue home bumex 1mg once daily  -2d echo  -card. consulted    EKG  Normal sinus rhythm with rate 91, occasional PVCs, RBBB, no ST elevations, depressions or other changes concerning for acute ischemia. No significant changes from prior   CXR FINDINGS: Right greater than left pleural effusions are associated with passive  atelectasis. These are slightly increased from 2020. The upper lung fields  are clear. Post CABG. The central airways are patent. No pneumothorax. IMPRESSION: Right greater than left pleural effusions. H/O ICM /CAD/HLD  Cont asa statin bb Lasix        Code Status: FULL  Surrogate Decision Maker:    DVT Prophylaxis: AC  GI Prophylaxis: not indicated    Baseline: INDEPENDENT        Subjective:   CHIEF COMPLAINT:  NARANJO/Leg  edema    HISTORY OF PRESENT ILLNESS:     Sulma Bojorquez is a 76 y.o. male  With pmh as below came with c/o graduall onset of NARANJO and increasing b/l leg edema  For last few days associatted with weight gain and abd. Swelling. Pt. reports 10lbs weight gain in 2 weeks . Of note pt is   S/P CABG x 4 (2020 Overview: x 4, LIMA to LAD, RSVG to Diag-OM2, RSVG to PDA. Per he is also taking OTC ? Every 6hrs for last few days . Denies cp and productive cough. We were asked to admit for work up and evaluation of the above problems. Past Medical History:   Diagnosis Date    Acute MI anterior wall first episode care [facility]) 9/21/2010    CAD (coronary artery disease), native coronary artery 9/21/2010    Coronary stent 9/21/2010    Hyperlipidemia 9/21/2010        Past Surgical History:   Procedure Laterality Date    HX CORONARY ARTERY BYPASS GRAFT  08/06/2020    x 4, LIMA to LAD, RSVG to Diag-OM2, RSVG to PDA       Social History     Tobacco Use    Smoking status: Never Smoker    Smokeless tobacco: Never Used   Substance Use Topics    Alcohol use: Never     Frequency: Never        Family History   Problem Relation Age of Onset    No Known Problems Mother     No Known Problems Father      No Known Allergies     Prior to Admission medications    Medication Sig Start Date End Date Taking? Authorizing Provider   saw palmetto xtr/zinc picolin (SAW PALMETTO EXTRACT PO) Take  by mouth. Provider, Historical   saw/vit E/sod ana maria/lyc/beta/pyg (PROSTATE HEALTH PO) Take  by mouth. Provider, Historical   TURMERIC PO Take  by mouth. Provider, Historical   B-Sit/m-19/bitter-orange peel (SLENDER CORTISOL PO) Take  by mouth. Provider, Historical   bumetanide (BUMEX) 1 mg tablet Take 1 Tab by mouth daily. 9/8/20   Dillon Leon NP   aspirin 81 mg chewable tablet Take 1 Tab by mouth daily. 9/8/20   Dillon Leon NP   atorvastatin (LIPITOR) 20 mg tablet Take 1 Tab by mouth nightly. 9/8/20   Dillon Leon NP   potassium chloride SR (KLOR-CON 10) 10 mEq tablet Take 1 Tab by mouth daily. 9/8/20   Dillon Leon NP   carvediloL (COREG) 3.125 mg tablet Take 1 Tab by mouth two (2) times daily (with meals). 9/8/20   Dillon Leon NP       REVIEW OF SYSTEMS:     I am not able to complete the review of systems because:    The patient is intubated and sedated    The patient has altered mental status due to his acute medical problems    The patient has baseline aphasia from prior stroke(s)    The patient has baseline dementia and is not reliable historian    The patient is in acute medical distress and unable to provide information           Total of 12 systems reviewed as follows:       POSITIVE= underlined text  Negative = text not underlined  General:  fever, chills, sweats, generalized weakness, weight loss/gain,      loss of appetite   Eyes:    blurred vision, eye pain, loss of vision, double vision  ENT:    rhinorrhea, pharyngitis   Respiratory:   cough, sputum production, SOB, NARANJO, wheezing, pleuritic pain   Cardiology:   chest pain, palpitations, orthopnea, PND, edema, syncope   Gastrointestinal:  abdominal pain , N/V, diarrhea, dysphagia, constipation, bleeding   Genitourinary:  frequency, urgency, dysuria, hematuria, incontinence   Muskuloskeletal :  arthralgia, myalgia, back pain  Hematology:  easy bruising, nose or gum bleeding, lymphadenopathy   Dermatological: rash, ulceration, pruritis, color change / jaundice  Endocrine:   hot flashes or polydipsia   Neurological:  headache, dizziness, confusion, focal weakness, paresthesia,     Speech difficulties, memory loss, gait difficulty  Psychological: Feelings of anxiety, depression, agitation    Objective:   VITALS:    Visit Vitals  BP (!) 148/89   Pulse 81   Temp 97.9 °F (36.6 °C)   Resp 19   Ht 5' 5\" (1.651 m)   Wt 85.1 kg (187 lb 9.8 oz)   SpO2 94%   BMI 31.22 kg/m²       PHYSICAL EXAM:    General:    Alert, cooperative, no distress, appears stated age. HEENT: Atraumatic, anicteric sclerae, pink conjunctivae     No oral ulcers, mucosa moist, throat clear, dentition fair  Neck:  Supple, symmetrical,  thyroid: non tender  Lungs:   No Wheezing or Rhonchi. B/l positive rales. Chest wall:  No tenderness  No Accessory muscle use. Heart:   Regular  rhythm,  No  murmur   B/l leg  edema  Abdomen:   Soft, non-tender. Not distended.   Bowel sounds normal  Extremities: No cyanosis. No clubbing,      Skin turgor normal, Capillary refill normal, Radial dial pulse 2+  Skin:     Not pale. Not Jaundiced  No rashes   Psych:  Good insight. Not depressed. Not anxious or agitated. Neurologic: EOMs intact. No facial asymmetry. No aphasia or slurred speech. Symmetrical strength, Sensation grossly intact. Alert and oriented X 4.     _______________________________________________________________________  Care Plan discussed with:    Comments   Patient     Family      RN y    Care Manager                    Consultant:  tayler Jain md   _______________________________________________________________________  Expected  Disposition:   Home with Family y   HH/PT/OT/RN    SNF/LTC    LYN    ________________________________________________________________________  TOTAL TIME:  61  Minutes    Critical Care Provided     Minutes non procedure based      Comments    y Reviewed previous records   >50% of visit spent in counseling and coordination of care y Discussion with patient and/or family and questions answered       Given the patient's current clinical presentation, I have a high level of concern for decompensation if discharged from the ED. Complex decision making was performed which includes reviewing the patient's available past medical records, laboratory results, and Xray films. I have also directly communicated my plan and discussed this case with the involved ED physician.     ____________________________________________________________________  Bonna Lemmings, MD    Procedures: see electronic medical records for all procedures/Xrays and details which were not copied into this note but were reviewed prior to creation of Plan.     LAB DATA REVIEWED:    Recent Results (from the past 24 hour(s))   CBC WITH AUTOMATED DIFF    Collection Time: 09/20/20 12:47 AM   Result Value Ref Range    WBC 11.2 (H) 4.1 - 11.1 K/uL    RBC 4.52 4.10 - 5.70 M/uL    HGB 14.1 12.1 - 17.0 g/dL    HCT 43.4 36.6 - 50.3 %    MCV 96.0 80.0 - 99.0 FL    MCH 31.2 26.0 - 34.0 PG    MCHC 32.5 30.0 - 36.5 g/dL    RDW 15.3 (H) 11.5 - 14.5 %    PLATELET 797 565 - 904 K/uL    MPV 10.1 8.9 - 12.9 FL    NRBC 0.0 0  WBC    ABSOLUTE NRBC 0.00 0.00 - 0.01 K/uL    NEUTROPHILS 84 (H) 32 - 75 %    LYMPHOCYTES 7 (L) 12 - 49 %    MONOCYTES 8 5 - 13 %    EOSINOPHILS 1 0 - 7 %    BASOPHILS 0 0 - 1 %    IMMATURE GRANULOCYTES 0 0.0 - 0.5 %    ABS. NEUTROPHILS 9.4 (H) 1.8 - 8.0 K/UL    ABS. LYMPHOCYTES 0.8 0.8 - 3.5 K/UL    ABS. MONOCYTES 0.9 0.0 - 1.0 K/UL    ABS. EOSINOPHILS 0.1 0.0 - 0.4 K/UL    ABS. BASOPHILS 0.0 0.0 - 0.1 K/UL    ABS. IMM. GRANS. 0.1 (H) 0.00 - 0.04 K/UL    DF AUTOMATED     METABOLIC PANEL, COMPREHENSIVE    Collection Time: 09/20/20 12:47 AM   Result Value Ref Range    Sodium 137 136 - 145 mmol/L    Potassium 3.4 (L) 3.5 - 5.1 mmol/L    Chloride 99 97 - 108 mmol/L    CO2 32 21 - 32 mmol/L    Anion gap 6 5 - 15 mmol/L    Glucose 103 (H) 65 - 100 mg/dL    BUN 34 (H) 6 - 20 MG/DL    Creatinine 1.34 (H) 0.70 - 1.30 MG/DL    BUN/Creatinine ratio 25 (H) 12 - 20      GFR est AA >60 >60 ml/min/1.73m2    GFR est non-AA 52 (L) >60 ml/min/1.73m2    Calcium 8.9 8.5 - 10.1 MG/DL    Bilirubin, total 0.7 0.2 - 1.0 MG/DL    ALT (SGPT) 78 12 - 78 U/L    AST (SGOT) 43 (H) 15 - 37 U/L    Alk.  phosphatase 138 (H) 45 - 117 U/L    Protein, total 7.0 6.4 - 8.2 g/dL    Albumin 3.5 3.5 - 5.0 g/dL    Globulin 3.5 2.0 - 4.0 g/dL    A-G Ratio 1.0 (L) 1.1 - 2.2     CK W/ REFLX CKMB    Collection Time: 09/20/20 12:47 AM   Result Value Ref Range    CK 83 39 - 308 U/L   TROPONIN I    Collection Time: 09/20/20 12:47 AM   Result Value Ref Range    Troponin-I, Qt. <0.05 <0.05 ng/mL   NT-PRO BNP    Collection Time: 09/20/20 12:58 AM   Result Value Ref Range    NT pro-BNP 8,554 (H) <450 PG/ML   URINALYSIS W/ REFLEX CULTURE    Collection Time: 09/20/20  1:48 AM    Specimen: Urine   Result Value Ref Range    Color YELLOW/STRAW      Appearance CLEAR CLEAR      Specific gravity 1.015 1.003 - 1.030      pH (UA) 6.5 5.0 - 8.0      Protein 30 (A) NEG mg/dL    Glucose Negative NEG mg/dL    Ketone Negative NEG mg/dL    Bilirubin Negative NEG      Blood Negative NEG      Urobilinogen 0.2 0.2 - 1.0 EU/dL    Nitrites Negative NEG      Leukocyte Esterase Negative NEG      WBC 0-4 0 - 4 /hpf    RBC 0-5 0 - 5 /hpf    Epithelial cells FEW FEW /lpf    Bacteria Negative NEG /hpf    UA:UC IF INDICATED CULTURE NOT INDICATED BY UA RESULT CNI      Hyaline cast 0-2 0 - 5 /lpf

## 2020-09-20 NOTE — CONSULTS
Cardiology Consult Note       Date of  Admission: 9/19/2020 11:36 PM     Admission type:Emergency     Subjective:     Mr. J Luis Schultz is admitted with CHF exacerbation. He has known h/o CHF with an EF of 25% in 8/20. Had CABG at that time. He has been on coreg and lasix. ARB was held due to renal dysfunction. Has lifevest. Presents with 2-3 weeks h/o increasing edema, SOB. Has PND. No chest pain, palpitations.     Patient Active Problem List    Diagnosis Date Noted    Hypokalemia 09/20/2020    CHF exacerbation ([facility] 09/20/2020    Paroxysmal atrial fibrillation ([facility] 08/09/2020    CAD (coronary artery disease) 08/06/2020    S/P CABG x 4 08/06/2020    Bilateral carotid artery stenosis 08/04/2020    Pleural effusion, bilateral 07/31/2020    CHF, acute on chronic ([facility] 07/31/2020    Pulmonary embolism ([facility] 07/31/2020    Ischemic cardiomyopathy 07/31/2020    CAD (coronary artery disease), native coronary artery 09/21/2010    Hyperlipidemia 09/21/2010    Coronary stent 09/21/2010      Tammy Clement, NP  Past Medical History:   Diagnosis Date    Acute MI anterior wall first episode care ([facility] 9/21/2010    CAD (coronary artery disease), native coronary artery 9/21/2010    Coronary stent 9/21/2010    Hyperlipidemia 9/21/2010      Past Surgical History:   Procedure Laterality Date    HX CORONARY ARTERY BYPASS GRAFT  08/06/2020    x 4, LIMA to LAD, RSVG to Diag-OM2, RSVG to PDA     No Known Allergies   Family History   Problem Relation Age of Onset    No Known Problems Mother     No Known Problems Father       Current Facility-Administered Medications   Medication Dose Route Frequency    aspirin chewable tablet 81 mg  81 mg Oral DAILY    atorvastatin (LIPITOR) tablet 20 mg  20 mg Oral QHS    bumetanide (BUMEX) tablet 1 mg  1 mg Oral DAILY    carvediloL (COREG) tablet 3.125 mg  3.125 mg Oral BID WITH MEALS    potassium chloride SR (KLOR-CON 10) tablet 10 mEq  10 mEq Oral DAILY    sodium chloride (NS) flush 5-40 mL  5-40 mL IntraVENous Q8H    sodium chloride (NS) flush 5-40 mL  5-40 mL IntraVENous PRN    acetaminophen (TYLENOL) tablet 650 mg  650 mg Oral Q6H PRN    Or    acetaminophen (TYLENOL) suppository 650 mg  650 mg Rectal Q6H PRN    polyethylene glycol (MIRALAX) packet 17 g  17 g Oral DAILY PRN    promethazine (PHENERGAN) tablet 12.5 mg  12.5 mg Oral Q6H PRN    Or    ondansetron (ZOFRAN) injection 4 mg  4 mg IntraVENous Q6H PRN    furosemide (LASIX) injection 40 mg  40 mg IntraVENous DAILY         Review of Symptoms:  A comprehensive review of systems was negative except for that written in the HPI. Physical Exam    Visit Vitals  /87 (BP 1 Location: Right arm, BP Patient Position: At rest)   Pulse 69   Temp 97.8 °F (36.6 °C)   Resp 18   Ht 5' 5\" (1.651 m)   Wt 187 lb 9.8 oz (85.1 kg)   SpO2 91%   BMI 31.22 kg/m²     General Appearance:  Well developed, well nourished,alert and oriented x 3, and individual in no acute distress. Ears/Nose/Mouth/Throat:   Hearing grossly normal.         Neck: Supple. Chest:   Diminished BS to  auscultation bilaterally. Cardiovascular:  Regular rate and rhythm, S1, S2 normal, no murmur. Abdomen:   Soft, non-tender, bowel sounds are active. Extremities: 2+ edema bilaterally. Skin: Warm and dry.                Cardiographics    Telemetry: normal sinus rhythm  ECG: normal EKG, normal sinus rhythm, unchanged from previous tracings  Echocardiogram: Not done    Labs:   Recent Results (from the past 24 hour(s))   CBC WITH AUTOMATED DIFF    Collection Time: 09/20/20 12:47 AM   Result Value Ref Range    WBC 11.2 (H) 4.1 - 11.1 K/uL    RBC 4.52 4.10 - 5.70 M/uL    HGB 14.1 12.1 - 17.0 g/dL    HCT 43.4 36.6 - 50.3 %    MCV 96.0 80.0 - 99.0 FL    MCH 31.2 26.0 - 34.0 PG    MCHC 32.5 30.0 - 36.5 g/dL    RDW 15.3 (H) 11.5 - 14.5 %    PLATELET 283 738 - 452 K/uL    MPV 10.1 8.9 - 12.9 FL    NRBC 0.0 0  WBC    ABSOLUTE NRBC 0.00 0.00 - 0.01 K/uL NEUTROPHILS 84 (H) 32 - 75 %    LYMPHOCYTES 7 (L) 12 - 49 %    MONOCYTES 8 5 - 13 %    EOSINOPHILS 1 0 - 7 %    BASOPHILS 0 0 - 1 %    IMMATURE GRANULOCYTES 0 0.0 - 0.5 %    ABS. NEUTROPHILS 9.4 (H) 1.8 - 8.0 K/UL    ABS. LYMPHOCYTES 0.8 0.8 - 3.5 K/UL    ABS. MONOCYTES 0.9 0.0 - 1.0 K/UL    ABS. EOSINOPHILS 0.1 0.0 - 0.4 K/UL    ABS. BASOPHILS 0.0 0.0 - 0.1 K/UL    ABS. IMM. GRANS. 0.1 (H) 0.00 - 0.04 K/UL    DF AUTOMATED     METABOLIC PANEL, COMPREHENSIVE    Collection Time: 09/20/20 12:47 AM   Result Value Ref Range    Sodium 137 136 - 145 mmol/L    Potassium 3.4 (L) 3.5 - 5.1 mmol/L    Chloride 99 97 - 108 mmol/L    CO2 32 21 - 32 mmol/L    Anion gap 6 5 - 15 mmol/L    Glucose 103 (H) 65 - 100 mg/dL    BUN 34 (H) 6 - 20 MG/DL    Creatinine 1.34 (H) 0.70 - 1.30 MG/DL    BUN/Creatinine ratio 25 (H) 12 - 20      GFR est AA >60 >60 ml/min/1.73m2    GFR est non-AA 52 (L) >60 ml/min/1.73m2    Calcium 8.9 8.5 - 10.1 MG/DL    Bilirubin, total 0.7 0.2 - 1.0 MG/DL    ALT (SGPT) 78 12 - 78 U/L    AST (SGOT) 43 (H) 15 - 37 U/L    Alk.  phosphatase 138 (H) 45 - 117 U/L    Protein, total 7.0 6.4 - 8.2 g/dL    Albumin 3.5 3.5 - 5.0 g/dL    Globulin 3.5 2.0 - 4.0 g/dL    A-G Ratio 1.0 (L) 1.1 - 2.2     CK W/ REFLX CKMB    Collection Time: 09/20/20 12:47 AM   Result Value Ref Range    CK 83 39 - 308 U/L   TROPONIN I    Collection Time: 09/20/20 12:47 AM   Result Value Ref Range    Troponin-I, Qt. <0.05 <0.05 ng/mL   HEMOGLOBIN A1C WITH EAG    Collection Time: 09/20/20 12:47 AM   Result Value Ref Range    Hemoglobin A1c 5.8 (H) 4.0 - 5.6 %    Est. average glucose 120 mg/dL   NT-PRO BNP    Collection Time: 09/20/20 12:58 AM   Result Value Ref Range    NT pro-BNP 8,554 (H) <450 PG/ML   URINALYSIS W/ REFLEX CULTURE    Collection Time: 09/20/20  1:48 AM    Specimen: Urine   Result Value Ref Range    Color YELLOW/STRAW      Appearance CLEAR CLEAR      Specific gravity 1.015 1.003 - 1.030      pH (UA) 6.5 5.0 - 8.0      Protein 30 (A) NEG mg/dL    Glucose Negative NEG mg/dL    Ketone Negative NEG mg/dL    Bilirubin Negative NEG      Blood Negative NEG      Urobilinogen 0.2 0.2 - 1.0 EU/dL    Nitrites Negative NEG      Leukocyte Esterase Negative NEG      WBC 0-4 0 - 4 /hpf    RBC 0-5 0 - 5 /hpf    Epithelial cells FEW FEW /lpf    Bacteria Negative NEG /hpf    UA:UC IF INDICATED CULTURE NOT INDICATED BY UA RESULT CNI      Hyaline cast 0-2 0 - 5 /lpf   TROPONIN I    Collection Time: 09/20/20 11:11 AM   Result Value Ref Range    Troponin-I, Qt. <0.05 <0.05 ng/mL        Assessment:     Assessment:         Hospital Problems  Date Reviewed: 9/16/2020          Codes Class Noted POA    Hypokalemia ICD-10-CM: E87.6  ICD-9-CM: 276.8  9/20/2020 Unknown        CHF exacerbation ([facility] ICD-10-CM: I50.9  ICD-9-CM: 428.0  9/20/2020 Unknown               Plan:     CHF- acute on chronic, decompensated. Agree with diuretic therapy. He is responding and feeling better. Continue coreg. His creatinine has been 1.1 to 1.3 since 8/20. Will start entresto. Consider inotropic therapy if no response. Thank you for the consult. Dr. Ana Maria Roman will follow.     [id number] Madeleine Dolan MD

## 2020-09-21 ENCOUNTER — APPOINTMENT (OUTPATIENT)
Dept: NON INVASIVE DIAGNOSTICS | Age: 75
DRG: 293 | End: 2020-09-21
Attending: HOSPITALIST
Payer: MEDICARE

## 2020-09-21 PROBLEM — Z91.199 NON COMPLIANCE WITH MEDICAL TREATMENT: Status: ACTIVE | Noted: 2020-09-21

## 2020-09-21 LAB
ALBUMIN SERPL-MCNC: 2.8 G/DL (ref 3.5–5)
ALBUMIN/GLOB SERPL: 1 {RATIO} (ref 1.1–2.2)
ALP SERPL-CCNC: 102 U/L (ref 45–117)
ALT SERPL-CCNC: 62 U/L (ref 12–78)
ANION GAP SERPL CALC-SCNC: 7 MMOL/L (ref 5–15)
AST SERPL-CCNC: 29 U/L (ref 15–37)
BASOPHILS # BLD: 0 K/UL (ref 0–0.1)
BASOPHILS NFR BLD: 0 % (ref 0–1)
BILIRUB SERPL-MCNC: 0.9 MG/DL (ref 0.2–1)
BUN SERPL-MCNC: 25 MG/DL (ref 6–20)
BUN/CREAT SERPL: 23 (ref 12–20)
CALCIUM SERPL-MCNC: 8 MG/DL (ref 8.5–10.1)
CHLORIDE SERPL-SCNC: 105 MMOL/L (ref 97–108)
CO2 SERPL-SCNC: 30 MMOL/L (ref 21–32)
CREAT SERPL-MCNC: 1.08 MG/DL (ref 0.7–1.3)
DIFFERENTIAL METHOD BLD: ABNORMAL
ECHO AO ROOT DIAM: 3.13 CM
ECHO AV AREA PEAK VELOCITY: 2.19 CM2
ECHO AV AREA/BSA PEAK VELOCITY: 1.2 CM2/M2
ECHO AV CUSP MM: 1.77 CM
ECHO AV PEAK GRADIENT: 3.69 MMHG
ECHO AV PEAK VELOCITY: 96.1 CM/S
ECHO EST RA PRESSURE: 10 MMHG
ECHO LA AREA 4C: 23.22 CM2
ECHO LA MAJOR AXIS: 4.24 CM
ECHO LA MINOR AXIS: 2.29 CM
ECHO LA TO AORTIC ROOT RATIO: 1.32
ECHO LA VOL 2C: 80.44 ML (ref 18–58)
ECHO LA VOL 4C: 68.06 ML (ref 18–58)
ECHO LA VOL BP: 90.84 ML (ref 18–58)
ECHO LA VOL/BSA BIPLANE: 48.96 ML/M2 (ref 16–28)
ECHO LA VOLUME INDEX A2C: 43.36 ML/M2 (ref 16–28)
ECHO LA VOLUME INDEX A4C: 36.68 ML/M2 (ref 16–28)
ECHO LV E' LATERAL VELOCITY: 4.88 CM/S
ECHO LV E' SEPTAL VELOCITY: 3.54 CM/S
ECHO LV INTERNAL DIMENSION DIASTOLIC: 5.37 CM (ref 4.2–5.9)
ECHO LV INTERNAL DIMENSION SYSTOLIC: 4.95 CM
ECHO LV IVSD: 1.21 CM (ref 0.6–1)
ECHO LV IVSS: 1.22 CM
ECHO LV MASS 2D: 247.2 G (ref 88–224)
ECHO LV MASS INDEX 2D: 133.2 G/M2 (ref 49–115)
ECHO LV POSTERIOR WALL DIASTOLIC: 1.09 CM (ref 0.6–1)
ECHO LV POSTERIOR WALL SYSTOLIC: 1.4 CM
ECHO LVOT DIAM: 2.16 CM
ECHO LVOT PEAK GRADIENT: 1.33 MMHG
ECHO LVOT PEAK VELOCITY: 57.56 CM/S
ECHO MV A VELOCITY: 24.82 CM/S
ECHO MV E DECELERATION TIME (DT): 0.2 S
ECHO MV E VELOCITY: 72.17 CM/S
ECHO MV E/A RATIO: 2.91
ECHO MV E/E' LATERAL: 14.79
ECHO MV E/E' RATIO (AVERAGED): 17.59
ECHO MV E/E' SEPTAL: 20.39
ECHO PV MAX VELOCITY: 61.35 CM/S
ECHO PV PEAK INSTANTANEOUS GRADIENT SYSTOLIC: 1.51 MMHG
ECHO PV PEAK INSTANTANEOUS GRADIENT SYSTOLIC: 7.53 MMHG
ECHO PV REGURGITANT END DIASTOLIC MAX VELOCITY: 137.24 CM/S
ECHO PV REGURGITANT MAX VELOCITY: 332.96 CM/S
ECHO RIGHT VENTRICULAR SYSTOLIC PRESSURE (RVSP): 35.8 MMHG
ECHO RIGHT VENTRICULAR SYSTOLIC PRESSURE (RVSP): 36.54 MMHG
ECHO RIGHT VENTRICULAR SYSTOLIC PRESSURE (RVSP): 36.75 MMHG
ECHO RIGHT VENTRICULAR SYSTOLIC PRESSURE (RVSP): 37.08 MMHG
ECHO RV INTERNAL DIMENSION: 3.88 CM
ECHO TV REGURGITANT MAX VELOCITY: 257.59 CM/S
ECHO TV REGURGITANT PEAK GRADIENT: 25.8 MMHG
ECHO TV REGURGITANT PEAK GRADIENT: 26.54 MMHG
ECHO TV REGURGITANT PEAK GRADIENT: 26.75 MMHG
ECHO TV REGURGITANT PEAK GRADIENT: 27.08 MMHG
EOSINOPHIL # BLD: 0.2 K/UL (ref 0–0.4)
EOSINOPHIL NFR BLD: 3 % (ref 0–7)
ERYTHROCYTE [DISTWIDTH] IN BLOOD BY AUTOMATED COUNT: 15.4 % (ref 11.5–14.5)
GLOBULIN SER CALC-MCNC: 2.8 G/DL (ref 2–4)
GLUCOSE SERPL-MCNC: 88 MG/DL (ref 65–100)
HCT VFR BLD AUTO: 40.3 % (ref 36.6–50.3)
HGB BLD-MCNC: 12.9 G/DL (ref 12.1–17)
IMM GRANULOCYTES # BLD AUTO: 0 K/UL (ref 0–0.04)
IMM GRANULOCYTES NFR BLD AUTO: 0 % (ref 0–0.5)
LYMPHOCYTES # BLD: 0.8 K/UL (ref 0.8–3.5)
LYMPHOCYTES NFR BLD: 10 % (ref 12–49)
MAGNESIUM SERPL-MCNC: 2.1 MG/DL (ref 1.6–2.4)
MCH RBC QN AUTO: 30.6 PG (ref 26–34)
MCHC RBC AUTO-ENTMCNC: 32 G/DL (ref 30–36.5)
MCV RBC AUTO: 95.5 FL (ref 80–99)
MONOCYTES # BLD: 0.7 K/UL (ref 0–1)
MONOCYTES NFR BLD: 9 % (ref 5–13)
NEUTS SEG # BLD: 6.3 K/UL (ref 1.8–8)
NEUTS SEG NFR BLD: 78 % (ref 32–75)
NRBC # BLD: 0 K/UL (ref 0–0.01)
NRBC BLD-RTO: 0 PER 100 WBC
PLATELET # BLD AUTO: 276 K/UL (ref 150–400)
PMV BLD AUTO: 10 FL (ref 8.9–12.9)
POTASSIUM SERPL-SCNC: 3.1 MMOL/L (ref 3.5–5.1)
PROT SERPL-MCNC: 5.6 G/DL (ref 6.4–8.2)
RBC # BLD AUTO: 4.22 M/UL (ref 4.1–5.7)
RBC MORPH BLD: ABNORMAL
SODIUM SERPL-SCNC: 142 MMOL/L (ref 136–145)
TROPONIN I SERPL-MCNC: <0.05 NG/ML
WBC # BLD AUTO: 8 K/UL (ref 4.1–11.1)

## 2020-09-21 PROCEDURE — 80053 COMPREHEN METABOLIC PANEL: CPT

## 2020-09-21 PROCEDURE — 99233 SBSQ HOSP IP/OBS HIGH 50: CPT | Performed by: INTERNAL MEDICINE

## 2020-09-21 PROCEDURE — 74011250637 HC RX REV CODE- 250/637: Performed by: INTERNAL MEDICINE

## 2020-09-21 PROCEDURE — 85025 COMPLETE CBC W/AUTO DIFF WBC: CPT

## 2020-09-21 PROCEDURE — 74011250636 HC RX REV CODE- 250/636: Performed by: INTERNAL MEDICINE

## 2020-09-21 PROCEDURE — 74011250637 HC RX REV CODE- 250/637: Performed by: HOSPITALIST

## 2020-09-21 PROCEDURE — 65660000000 HC RM CCU STEPDOWN

## 2020-09-21 PROCEDURE — 74011250637 HC RX REV CODE- 250/637: Performed by: NURSE PRACTITIONER

## 2020-09-21 PROCEDURE — 93306 TTE W/DOPPLER COMPLETE: CPT

## 2020-09-21 PROCEDURE — 36415 COLL VENOUS BLD VENIPUNCTURE: CPT

## 2020-09-21 PROCEDURE — 84484 ASSAY OF TROPONIN QUANT: CPT

## 2020-09-21 PROCEDURE — 83735 ASSAY OF MAGNESIUM: CPT

## 2020-09-21 RX ORDER — POTASSIUM CHLORIDE 20 MEQ/1
40 TABLET, EXTENDED RELEASE ORAL 2 TIMES DAILY
Status: COMPLETED | OUTPATIENT
Start: 2020-09-21 | End: 2020-09-21

## 2020-09-21 RX ADMIN — POTASSIUM CHLORIDE 10 MEQ: 750 TABLET, FILM COATED, EXTENDED RELEASE ORAL at 09:30

## 2020-09-21 RX ADMIN — CARVEDILOL 3.12 MG: 3.12 TABLET, FILM COATED ORAL at 17:45

## 2020-09-21 RX ADMIN — FUROSEMIDE 40 MG: 10 INJECTION, SOLUTION INTRAMUSCULAR; INTRAVENOUS at 09:29

## 2020-09-21 RX ADMIN — ATORVASTATIN CALCIUM 20 MG: 20 TABLET, FILM COATED ORAL at 21:09

## 2020-09-21 RX ADMIN — Medication 5 ML: at 21:08

## 2020-09-21 RX ADMIN — ASPIRIN 81 MG CHEWABLE TABLET 81 MG: 81 TABLET CHEWABLE at 09:30

## 2020-09-21 RX ADMIN — POTASSIUM CHLORIDE 40 MEQ: 20 TABLET, EXTENDED RELEASE ORAL at 17:46

## 2020-09-21 RX ADMIN — FUROSEMIDE 40 MG: 10 INJECTION, SOLUTION INTRAMUSCULAR; INTRAVENOUS at 17:46

## 2020-09-21 RX ADMIN — SACUBITRIL AND VALSARTAN 1 TABLET: 24; 26 TABLET, FILM COATED ORAL at 09:29

## 2020-09-21 RX ADMIN — CARVEDILOL 3.12 MG: 3.12 TABLET, FILM COATED ORAL at 09:30

## 2020-09-21 RX ADMIN — Medication 5 ML: at 05:18

## 2020-09-21 RX ADMIN — Medication 10 ML: at 17:47

## 2020-09-21 RX ADMIN — POTASSIUM CHLORIDE 40 MEQ: 20 TABLET, EXTENDED RELEASE ORAL at 09:30

## 2020-09-21 RX ADMIN — SACUBITRIL AND VALSARTAN 1 TABLET: 24; 26 TABLET, FILM COATED ORAL at 21:09

## 2020-09-21 NOTE — PROGRESS NOTES
Problem: Falls - Risk of  Goal: *Absence of Falls  Description: Document Anu Cobb Fall Risk and appropriate interventions in the flowsheet.   Outcome: Progressing Towards Goal  Note: Fall Risk Interventions:            Medication Interventions: Patient to call before getting OOB

## 2020-09-21 NOTE — PROGRESS NOTES
Bedside and Verbal shift change report given to Pinky Shepard RN (oncoming nurse) by Giorgio Tello RN (offgoing nurse). Report included the following information SBAR, Kardex, Intake/Output and MAR and events of the day.

## 2020-09-21 NOTE — PROGRESS NOTES
Appears patient was re-admitted yesterday. Labs will be re-evaluated there. Please note that I did not discontinue his Bumex per the hospital note reported by patient. He was told to follow with cardiology for medication management post CABG.

## 2020-09-21 NOTE — PROGRESS NOTES
ANNA plan:    -  Home once medically stable  -  OP f/u - PCP, Cardio  -  Transport self home at d/c  -  2nd IMM letter      Reason for Admission:  CHF                   RUR Score:  22                PCP: First and Last name:  [facility]   Name of Practice: [facility]   Are you a current patient: Yes/No: Yes   Approximate date of last visit: 9/16/20   Can you participate in a virtual visit if needed: Yes    Do you (patient/family) have any concerns for transition/discharge? None                 Plan for utilizing home health: [facility] services not indicated at this time. Current Advanced Directive/Advance Care Plan: Full code. No ACP docs at this time. States he has document at home that lists [facility], friend. Transition of Care Plan: Home with OP f/u     CM met with patient at bedside to complete initial assessment. Patient was awake and alert/oriented x4. CM introduced role, verified demographics, and discussed discharge planning. Pt is a 75 yo male admitted to [facility] with CHF exacerbation. PMHx includes CAD s/p CABG (July 2020). Pt was discharged to [facility] for short-term rehab following his cardiac surgery and has been attending [facility]. Pt reports to live alone and be independent with ADL/IADLs to include driving. Pt plans to drive himself home at the time of d/c. Pt is fully insured with Medicare A&B and Blue [employer]. Preferred pharmacy is [facility] on [address]. No reported DME. CM will continue to follow and assist with transitional care planning.      Care Management Interventions  PCP Verified by CM: Yes(TIANNA Clement)  Mode of Transport at Discharge: Self  Transition of Care Consult (CM Consult): Discharge Planning(anticipate home with OP f/u appts)  Discharge Durable Medical Equipment: No  Physical Therapy Consult: No  Occupational Therapy Consult: No  Speech Therapy Consult: No  Current Support Network: Lives Alone  Confirm Follow Up Transport: Self  Discharge Location  Discharge Placement: Home with outpatient services    Kash Adair MSW  Care Manager  [phone]

## 2020-09-21 NOTE — PROGRESS NOTES
I reviewed pertinent labs and imaging, and discussed /agreed on the plan of care with Dr. Beatriz Torres. Hospitalist Progress Note    NAME: Vidhi Ratliff   :  1945   MRN:  [id number]       Assessment / Plan:  Acute on Chronic Decompensated CHF exacerbation, BNP 8554 on adm    Recent CABG x4 2020  Ischemic Cardiomyopathy   EF 20-25%, Wearing Lifevest PTA  CAD  Hyperlipidemia   · Reports he stopped taking diuretics PTA - attributed it to a miscommunication with his PCP   · ECHO results from 2020 - \"Mildly dilated left ventricle. Severe global systolic function. Estimated left ventricular ejection fraction 20-25%. Dilated right ventricle. Reduced systolic function. Moderate to severe tricuspid valve regurgitation is present. \"   · CXR on admission \"Right greater than left pleural effusions. \"  · Repeat ECHO this admission   · Appreciate Cardiology input - had held ARB due to previous renal dysfunction, started entresto 20  · Patient reports his breathing is better, now on RA and swelling is gone to abdomen. Patient is negative 4.5 L today. · Continue IV lasix  · Continue ASA, atorvastatin, carvedilol  · Continue daily weights, 1L fluid restriction and strict I&Os    Hypokalemia   · K 3.1 today, replete with oral   · Follow daily BMP     BPH   · Self diagnosed and treated with herbal remedies; saw palmetto, prostate health PO, tumeric and slender cortisol PO   · Reports this alleviates symptoms associated with BPH and declined offer for referral to urology     25.0 - 29.9 Overweight / Body mass index is 28.62 kg/m². Code status: Full  Recommended Disposition: Home w/Family and HH PT, OT, RN     Subjective:     Chief Complaint / Reason for Physician Visit  \"I feel much better\". Reports swelling is gone from abdomen and he feels like he can breathe again, now on RA. Denies complaints of CP or SOB. Discussed with RN events overnight.      Review of Systems:  Symptom Y/N Comments  Symptom Y/N Comments   Fever/Chills n   Chest Pain n    Poor Appetite n   Edema y BLE   Cough n   Abdominal Pain n    Sputum n   Joint Pain n    SOB/NARANJO y Much better than on adm  Pruritis/Rash n    Nausea/vomit n   Tolerating PT/OT     Diarrhea n   Tolerating Diet y    Constipation n   Other       Could NOT obtain due to:      Objective:     VITALS:   Last 24hrs VS reviewed since prior progress note. Most recent are:  Patient Vitals for the past 24 hrs:   Temp Pulse Resp BP SpO2   09/21/20 0850 98.1 °F (36.7 °C) 75 26 102/62 91 %   09/21/20 0400 97.6 °F (36.4 °C) 80 18 126/76 92 %   09/20/20 2347 98.2 °F (36.8 °C) 76 20 (!) 150/89 93 %   09/20/20 1947 98.2 °F (36.8 °C) 71 20 (!) 133/90 92 %   09/20/20 1531 97.7 °F (36.5 °C) 79 20 (!) 161/105 91 %   09/20/20 1046 97.8 °F (36.6 °C) 69 18 119/87 91 %       Intake/Output Summary (Last 24 hours) at 9/21/2020 1024  Last data filed at 9/21/2020 0258  Gross per 24 hour   Intake 1040 ml   Output 4080 ml   Net -3040 ml        PHYSICAL EXAM:  General: WD, WN. Alert, cooperative  male, no acute distress    EENT:  EOMI. Anicteric sclerae. MMM  Resp:  CTA bilaterally, no wheezing or rales. No accessory muscle use  CV:  Regular  rhythm,  +3 pitting edema to BLE   GI:  Soft, Non distended, Non tender.  +Bowel sounds  Neurologic:  Alert and oriented X 3, normal speech,   Psych:   Good insight. Not anxious nor agitated  Skin:  No rashes.   No jaundice    Reviewed most current lab test results and cultures  YES  Reviewed most current radiology test results   YES  Review and summation of old records today    NO  Reviewed patient's current orders and MAR    YES  PMH/SH reviewed - no change compared to H&P  ________________________________________________________________________  Care Plan discussed with:    Comments   Patient x    Family      RN     Care Manager     Consultant                        Multidiciplinary team rounds were held today with , nursing, pharmacist and clinical coordinator. Patient's plan of care was discussed; medications were reviewed and discharge planning was addressed. ________________________________________________________________________  Total NON critical care TIME:  25   Minutes    Total CRITICAL CARE TIME Spent:   Minutes non procedure based      Comments   >50% of visit spent in counseling and coordination of care     ________________________________________________________________________  Moises Romero NP     Procedures: see electronic medical records for all procedures/Xrays and details which were not copied into this note but were reviewed prior to creation of Plan. LABS:  I reviewed today's most current labs and imaging studies.   Pertinent labs include:  Recent Labs     09/21/20  0240 09/20/20  0047   WBC 8.0 11.2*   HGB 12.9 14.1   HCT 40.3 43.4    311     Recent Labs     09/21/20  0240 09/20/20  0047    137   K 3.1* 3.4*    99   CO2 30 32   GLU 88 103*   BUN 25* 34*   CREA 1.08 1.34*   CA 8.0* 8.9   MG 2.1  --    ALB 2.8* 3.5   TBILI 0.9 0.7   ALT 62 78       Signed: Moises Romero NP

## 2020-09-21 NOTE — PROGRESS NOTES
Problem: Falls - Risk of  Goal: *Absence of Falls  Description: Document Birdie Paulson Fall Risk and appropriate interventions in the flowsheet.   Outcome: Progressing Towards Goal  Note: Fall Risk Interventions:            Medication Interventions: Patient to call before getting OOB, Teach patient to arise slowly

## 2020-09-21 NOTE — PROGRESS NOTES
Bedside and Verbal shift change report given to Rob Telles (oncoming nurse) by Li Canales RN (offgoing nurse). Report included the following information Kardex, MAR and Recent Results.

## 2020-09-21 NOTE — PROGRESS NOTES
[address], [address] [place] [address]  [phone]      Cardiology Progress Note      9/21/2020 10:24 AM    Admit Date: 9/19/2020    Admit Diagnosis:   CHF exacerbation ([facility]) [I50.9]  Hypokalemia [E87.6]    Subjective: Vidhi Ratliff has no cardiac complaints this am, ambulating in room. VSS  Labs steady, K 3.1, needs replacement, Cr 1.08    Visit Vitals  /62 (BP 1 Location: Right arm, BP Patient Position: At rest)   Pulse 75   Temp 98.1 °F (36.7 °C)   Resp 26   Ht 5' 5\" (1.651 m)   Wt 78 kg (172 lb)   SpO2 91%   BMI 28.62 kg/m²       Current Facility-Administered Medications   Medication Dose Route Frequency    potassium chloride (K-DUR, KLOR-CON) SR tablet 40 mEq  40 mEq Oral BID    aspirin chewable tablet 81 mg  81 mg Oral DAILY    atorvastatin (LIPITOR) tablet 20 mg  20 mg Oral QHS    carvediloL (COREG) tablet 3.125 mg  3.125 mg Oral BID WITH MEALS    potassium chloride SR (KLOR-CON 10) tablet 10 mEq  10 mEq Oral DAILY    sodium chloride (NS) flush 5-40 mL  5-40 mL IntraVENous Q8H    sodium chloride (NS) flush 5-40 mL  5-40 mL IntraVENous PRN    acetaminophen (TYLENOL) tablet 650 mg  650 mg Oral Q6H PRN    Or    acetaminophen (TYLENOL) suppository 650 mg  650 mg Rectal Q6H PRN    polyethylene glycol (MIRALAX) packet 17 g  17 g Oral DAILY PRN    promethazine (PHENERGAN) tablet 12.5 mg  12.5 mg Oral Q6H PRN    Or    ondansetron (ZOFRAN) injection 4 mg  4 mg IntraVENous Q6H PRN    sacubitriL-valsartan (ENTRESTO) 24-26 mg tablet 1 Tab  1 Tab Oral Q12H    furosemide (LASIX) injection 40 mg  40 mg IntraVENous BID       Objective:      Physical Exam:  General Appearance: thin, elderly  male in NAD   Chest:   Clear uppers, rales bilateral posterior bases   Cardiovascular:  Regular rate and rhythm, + murmur. Abdomen:   Soft, non-tender, bowel sounds are active. Extremities: no edema, DP/PT palpable   Skin: Warm and dry.      Data Review:   Recent Labs 09/21/20  0240 09/20/20  0047   WBC 8.0 11.2*   HGB 12.9 14.1   HCT 40.3 43.4    311     Recent Labs     09/21/20  0240 09/20/20  0047    137   K 3.1* 3.4*    99   CO2 30 32   GLU 88 103*   BUN 25* 34*   CREA 1.08 1.34*   CA 8.0* 8.9   MG 2.1  --    ALB 2.8* 3.5   TBILI 0.9 0.7   ALT 62 78       Recent Labs     09/21/20  0240 09/20/20  1111 09/20/20  0047   TROIQ <0.05 <0.05 <0.05         Intake/Output Summary (Last 24 hours) at 9/21/2020 1024  Last data filed at 9/21/2020 0258  Gross per 24 hour   Intake 1040 ml   Output 4080 ml   Net -3040 ml        Telemetry: NSR, occ. PAC's, RBBB  EKG: NSR, RBBB, unchanged from prior EKG   Cxray: \"Right greater than left pleural effusions\"  ECHO:   8/10/2020  · LV: Normal wall thickness. Mildly dilated left ventricle. Severe global systolic function. Estimated left ventricular ejection fraction is 20 - 25%. · LA: Left Atrium volume index is 60.51 mL/m2. · RV: Dilated right ventricle. Reduced systolic function. · TV: Moderate to severe tricuspid valve regurgitation is present    Assessment:     Active Problems:    Hypokalemia (9/20/2020)      CHF exacerbation ([address] (9/20/2020)        Plan:   Mr. Romy Jon is admitted with Acut on Chronic CHF exacerbation. He has known h/o CHF with an EF of 25% in 8/20. Had CABG at that time. He has been on coreg and lasix. ARB was held due to renal dysfunction. Has lifevest. Presents with 2-3 weeks h/o increasing edema, SOB. Has PND. Trop(-). CHF- acute on chronic ICM, decompensated.   -Agree with diuretic therapy lasix IV BID.   -Strict I/O, -4.7 L since admission, labs, daily weights (78kg this am, down 7kg)  -Please weight on standing scale only, patient is ambulatory   -Cr 1.08 this am, monitor with start of entresto  -Patient on lifevest   -Echo pending this admission, prior echo reviewed   -Cxray reviewed, EKG reviewed   -Replace K PRN  -Monitor fluid intake, fluid restriction 1000ml  supplement O2 as needed    HTN: controlled.    -Continue coreg, lasix, entresto     CAD: s/p ALISON PCI 2010, s/p CABG x 4 8/2020.   -Continue ASA, BB, statin, started on entresto     HLD:  LDL 83.8  -Continue statin     Cait Ramirez,TIANNA  MSN,RN,AG-ACNP-BC       Patient seen and examined by me with nurse practitioner. I personally performed all components of the history, physical, and medical decision making and agree with the assessment and plan as noted. Medication compliance d/w pt.      Priscilla Bravo MD

## 2020-09-22 LAB
ANION GAP SERPL CALC-SCNC: 4 MMOL/L (ref 5–15)
BASOPHILS # BLD: 0 K/UL (ref 0–0.1)
BASOPHILS NFR BLD: 1 % (ref 0–1)
BUN SERPL-MCNC: 22 MG/DL (ref 6–20)
BUN/CREAT SERPL: 19 (ref 12–20)
CALCIUM SERPL-MCNC: 8 MG/DL (ref 8.5–10.1)
CHLORIDE SERPL-SCNC: 106 MMOL/L (ref 97–108)
CO2 SERPL-SCNC: 31 MMOL/L (ref 21–32)
CREAT SERPL-MCNC: 1.16 MG/DL (ref 0.7–1.3)
DIFFERENTIAL METHOD BLD: ABNORMAL
EOSINOPHIL # BLD: 0.2 K/UL (ref 0–0.4)
EOSINOPHIL NFR BLD: 2 % (ref 0–7)
ERYTHROCYTE [DISTWIDTH] IN BLOOD BY AUTOMATED COUNT: 15.4 % (ref 11.5–14.5)
GLUCOSE SERPL-MCNC: 92 MG/DL (ref 65–100)
HCT VFR BLD AUTO: 40.4 % (ref 36.6–50.3)
HGB BLD-MCNC: 13.2 G/DL (ref 12.1–17)
IMM GRANULOCYTES # BLD AUTO: 0 K/UL (ref 0–0.04)
IMM GRANULOCYTES NFR BLD AUTO: 0 % (ref 0–0.5)
LYMPHOCYTES # BLD: 0.8 K/UL (ref 0.8–3.5)
LYMPHOCYTES NFR BLD: 12 % (ref 12–49)
MCH RBC QN AUTO: 31.2 PG (ref 26–34)
MCHC RBC AUTO-ENTMCNC: 32.7 G/DL (ref 30–36.5)
MCV RBC AUTO: 95.5 FL (ref 80–99)
MONOCYTES # BLD: 0.9 K/UL (ref 0–1)
MONOCYTES NFR BLD: 12 % (ref 5–13)
NEUTS SEG # BLD: 5.3 K/UL (ref 1.8–8)
NEUTS SEG NFR BLD: 73 % (ref 32–75)
NRBC # BLD: 0 K/UL (ref 0–0.01)
NRBC BLD-RTO: 0 PER 100 WBC
PLATELET # BLD AUTO: 281 K/UL (ref 150–400)
PMV BLD AUTO: 10.1 FL (ref 8.9–12.9)
POTASSIUM SERPL-SCNC: 3.6 MMOL/L (ref 3.5–5.1)
RBC # BLD AUTO: 4.23 M/UL (ref 4.1–5.7)
SODIUM SERPL-SCNC: 141 MMOL/L (ref 136–145)
WBC # BLD AUTO: 7.3 K/UL (ref 4.1–11.1)

## 2020-09-22 PROCEDURE — 74011250637 HC RX REV CODE- 250/637: Performed by: HOSPITALIST

## 2020-09-22 PROCEDURE — 65660000000 HC RM CCU STEPDOWN

## 2020-09-22 PROCEDURE — 36415 COLL VENOUS BLD VENIPUNCTURE: CPT

## 2020-09-22 PROCEDURE — 99233 SBSQ HOSP IP/OBS HIGH 50: CPT | Performed by: INTERNAL MEDICINE

## 2020-09-22 PROCEDURE — 74011250637 HC RX REV CODE- 250/637: Performed by: INTERNAL MEDICINE

## 2020-09-22 PROCEDURE — 85025 COMPLETE CBC W/AUTO DIFF WBC: CPT

## 2020-09-22 PROCEDURE — 80048 BASIC METABOLIC PNL TOTAL CA: CPT

## 2020-09-22 PROCEDURE — 74011250636 HC RX REV CODE- 250/636: Performed by: INTERNAL MEDICINE

## 2020-09-22 RX ADMIN — Medication 5 ML: at 22:51

## 2020-09-22 RX ADMIN — CARVEDILOL 3.12 MG: 3.12 TABLET, FILM COATED ORAL at 17:27

## 2020-09-22 RX ADMIN — FUROSEMIDE 40 MG: 10 INJECTION, SOLUTION INTRAMUSCULAR; INTRAVENOUS at 17:27

## 2020-09-22 RX ADMIN — FUROSEMIDE 40 MG: 10 INJECTION, SOLUTION INTRAMUSCULAR; INTRAVENOUS at 09:55

## 2020-09-22 RX ADMIN — ASPIRIN 81 MG CHEWABLE TABLET 81 MG: 81 TABLET CHEWABLE at 09:55

## 2020-09-22 RX ADMIN — Medication 5 ML: at 05:16

## 2020-09-22 RX ADMIN — ATORVASTATIN CALCIUM 20 MG: 20 TABLET, FILM COATED ORAL at 22:50

## 2020-09-22 RX ADMIN — Medication 10 ML: at 17:29

## 2020-09-22 RX ADMIN — POTASSIUM CHLORIDE 10 MEQ: 750 TABLET, FILM COATED, EXTENDED RELEASE ORAL at 09:55

## 2020-09-22 RX ADMIN — CARVEDILOL 3.12 MG: 3.12 TABLET, FILM COATED ORAL at 09:55

## 2020-09-22 RX ADMIN — SACUBITRIL AND VALSARTAN 1 TABLET: 24; 26 TABLET, FILM COATED ORAL at 09:54

## 2020-09-22 RX ADMIN — SACUBITRIL AND VALSARTAN 1 TABLET: 24; 26 TABLET, FILM COATED ORAL at 22:50

## 2020-09-22 NOTE — PROGRESS NOTES
Hospitalist Progress Note    NAME: Stella Dior   :  1945   MRN:  [id number]       Assessment / Plan:  Acute on Chronic Decompensated CHF exacerbation, BNP 8554 on adm    Recent CABG x4 2020  Ischemic Cardiomyopathy   EF 20-25%, Wearing Lifevest PTA  CAD  Hyperlipidemia   · Reports he stopped taking diuretics PTA - attributed it to a miscommunication with his PCP (accidental Noncompliance)  · ECHO results from 2020 - \"Mildly dilated left ventricle. Severe global systolic function. Estimated left ventricular ejection fraction 20-25%. Dilated right ventricle. Reduced systolic function. Moderate to severe tricuspid valve regurgitation is present. \"   · CXR on admission \"Right greater than left pleural effusions. \"  · Repeat ECHO this admission   · Appreciate Cardiology input - had held ARB due to previous renal dysfunction, started entresto 20  · Patient reports his breathing is better, now on RA and swelling is gone to abdomen. Patient is negative 4.5 L today. · Continue IV lasix  · Continue ASA, atorvastatin, carvedilol  · Continue daily weights, 1L fluid restriction and strict I&Os    Hypokalemia   · K 3.1 today, replete with oral   · Follow daily BMP     BPH   · Self diagnosed and treated with herbal remedies; saw palmetto, prostate health PO, tumeric and slender cortisol PO   · Reports this alleviates symptoms associated with BPH and declined offer for referral to urology     25.0 - 29.9 Overweight / Body mass index is 28.26 kg/m². Code status: Full  Recommended Disposition: Home w/Family and HH PT, OT, RN     Subjective:     Chief Complaint / Reason for Physician Visit  \"I feel much better\". Reports swelling is gone from abdomen and he feels like he can breathe again, now on RA. Denies complaints of CP or SOB. Discussed with RN events overnight.      Review of Systems:  Symptom Y/N Comments  Symptom Y/N Comments   Fever/Chills n   Chest Pain n    Poor Appetite n   Edema y BLE Cough n   Abdominal Pain n    Sputum n   Joint Pain n    SOB/NARANJO y Much better than on adm  Pruritis/Rash n    Nausea/vomit n   Tolerating PT/OT     Diarrhea n   Tolerating Diet y    Constipation n   Other       Could NOT obtain due to:      Objective:     VITALS:   Last 24hrs VS reviewed since prior progress note. Most recent are:  Patient Vitals for the past 24 hrs:   Temp Pulse Resp BP SpO2   09/22/20 1113 97.3 °F (36.3 °C) 81 20 (!) 146/81 94 %   09/22/20 0839 (!) 96.2 °F (35.7 °C) 85 20 123/73 90 %   09/22/20 0304 97.6 °F (36.4 °C) 72 18 126/79 93 %   09/21/20 2300    138/76    09/21/20 2248 97.9 °F (36.6 °C) 75 16 (!) 141/99 94 %   09/21/20 1926 97.5 °F (36.4 °C) 72 18 114/74 93 %   09/21/20 1631 97.8 °F (36.6 °C) 70 20 135/82 94 %       Intake/Output Summary (Last 24 hours) at 9/22/2020 1428  Last data filed at 9/22/2020 0957  Gross per 24 hour   Intake 1080 ml   Output 2150 ml   Net -1070 ml        PHYSICAL EXAM:  General: WD, WN. Alert, cooperative  male, no acute distress    EENT:  EOMI. Anicteric sclerae. MMM  Resp:  CTA bilaterally, no wheezing or rales. No accessory muscle use  CV:  Regular  rhythm,  +3 pitting edema to BLE   GI:  Soft, Non distended, Non tender.  +Bowel sounds  Neurologic:  Alert and oriented X 3, normal speech,   Psych:   Good insight. Not anxious nor agitated  Skin:  No rashes. No jaundice    Reviewed most current lab test results and cultures  YES  Reviewed most current radiology test results   YES  Review and summation of old records today    NO  Reviewed patient's current orders and MAR    YES  PMH/SH reviewed - no change compared to H&P  ________________________________________________________________________  Care Plan discussed with:    Comments   Patient x    Family      RN x    Care Manager x Winnie   Consultant                        Multidiciplinary team rounds were held today with , nursing, pharmacist and clinical coordinator.   Patient's plan of care was discussed; medications were reviewed and discharge planning was addressed. ________________________________________________________________________  Total NON critical care TIME:  26   Minutes    Total CRITICAL CARE TIME Spent:   Minutes non procedure based      Comments   >50% of visit spent in counseling and coordination of care     ________________________________________________________________________  Pineda Chen MD     Procedures: see electronic medical records for all procedures/Xrays and details which were not copied into this note but were reviewed prior to creation of Plan. LABS:  I reviewed today's most current labs and imaging studies.   Pertinent labs include:  Recent Labs     09/22/20  0137 09/21/20  0240 09/20/20  0047   WBC 7.3 8.0 11.2*   HGB 13.2 12.9 14.1   HCT 40.4 40.3 43.4    276 311     Recent Labs     09/22/20  0137 09/21/20  0240 09/20/20  0047    142 137   K 3.6 3.1* 3.4*    105 99   CO2 31 30 32   GLU 92 88 103*   BUN 22* 25* 34*   CREA 1.16 1.08 1.34*   CA 8.0* 8.0* 8.9   MG  --  2.1  --    ALB  --  2.8* 3.5   TBILI  --  0.9 0.7   ALT  --  62 78       Signed: Pineda Chen MD

## 2020-09-22 NOTE — PROGRESS NOTES
Bedside and Verbal shift change report given to Sal Cameron (oncoming nurse) by Lukasz Wagner RN (offgoing nurse). Report included the following information Kardex, MAR and Recent Results.

## 2020-09-22 NOTE — PROGRESS NOTES
Bedside and Verbal shift change report given to Niurka Land RN (oncoming nurse) by Rob Telles RN  (offgoing nurse). Report included the following information SBAR, Kardex, Intake/Output and MAR and events of the day.

## 2020-09-22 NOTE — PROGRESS NOTES
9/22/2020 8:25 AM    Admit Date: 9/19/2020    Admit Diagnosis: CHF exacerbation ([facility] [I50.9]; Hypokalemia [E87.6]    Subjective: Linnea Reyes   denies chest pain, chest pressure/discomfort, dyspnea, palpitations, irregular heart beats, near-syncope, syncope, fatigue, orthopnea, paroxysmal nocturnal dyspnea, exertional chest pressure/discomfort. Visit Vitals  /79 (BP 1 Location: Right arm, BP Patient Position: At rest)   Pulse 72   Temp 97.6 °F (36.4 °C)   Resp 18   Ht 5' 5\" (1.651 m)   Wt 169 lb 12.8 oz (77 kg)   SpO2 93%   BMI 28.26 kg/m²     Current Facility-Administered Medications   Medication Dose Route Frequency    aspirin chewable tablet 81 mg  81 mg Oral DAILY    atorvastatin (LIPITOR) tablet 20 mg  20 mg Oral QHS    carvediloL (COREG) tablet 3.125 mg  3.125 mg Oral BID WITH MEALS    potassium chloride SR (KLOR-CON 10) tablet 10 mEq  10 mEq Oral DAILY    sodium chloride (NS) flush 5-40 mL  5-40 mL IntraVENous Q8H    sodium chloride (NS) flush 5-40 mL  5-40 mL IntraVENous PRN    acetaminophen (TYLENOL) tablet 650 mg  650 mg Oral Q6H PRN    Or    acetaminophen (TYLENOL) suppository 650 mg  650 mg Rectal Q6H PRN    polyethylene glycol (MIRALAX) packet 17 g  17 g Oral DAILY PRN    promethazine (PHENERGAN) tablet 12.5 mg  12.5 mg Oral Q6H PRN    Or    ondansetron (ZOFRAN) injection 4 mg  4 mg IntraVENous Q6H PRN    sacubitriL-valsartan (ENTRESTO) 24-26 mg tablet 1 Tab  1 Tab Oral Q12H    furosemide (LASIX) injection 40 mg  40 mg IntraVENous BID         Objective:      Visit Vitals  /79 (BP 1 Location: Right arm, BP Patient Position: At rest)   Pulse 72   Temp 97.6 °F (36.4 °C)   Resp 18   Ht 5' 5\" (1.651 m)   Wt 169 lb 12.8 oz (77 kg)   SpO2 93%   BMI 28.26 kg/m²       Physical Exam:  Resting comfortably. No resp distress.    Extremities: extremities normal, atraumatic, no cyanosis, 1+ edema  Heart: regular rate and rhythm, S1, S2 normal, no murmur, click, rub or gallop  Lungs: clear to auscultation bilaterally  Neurologic: Grossly normal    Data Review:   Labs:    Recent Results (from the past 24 hour(s))   ECHO ADULT COMPLETE    Collection Time: 09/21/20  1:36 PM   Result Value Ref Range    IVSd 1.21 (A) 0.6 - 1.0 cm    IVSs 1.22 cm    LVIDd 5.37 4.2 - 5.9 cm    LVIDs 4.95 cm    LVOT d 2.16 cm    LVPWd 1.09 (A) 0.6 - 1.0 cm    LVPWs 1.40 cm    LVOT Peak Gradient 1.33 mmHg    LVOT Peak Velocity 57.56 cm/s    RVIDd 3.88 cm    RVSP 36.54 mmHg    RVSP 37.08 mmHg    RVSP 36.75 mmHg    RVSP 35.80 mmHg    Left Atrium Major Axis 4.24 cm    LA Volume 90.84 18 - 58 mL    LA Area 4C 23.22 cm2    LA Vol 2C 80.44 (A) 18 - 58 mL    LA Vol 4C 68.06 (A) 18 - 58 mL    Left Atrium to Aortic Root Ratio 1.32     Left Atrium to Aortic Root Ratio 1.32     Left Atrium to Aortic Root Ratio 1.32     Left Atrium to Aortic Root Ratio 1.32     Est. RA Pressure 10.00 mmHg    AV Cusp 1.77 cm    Aortic Valve Area by Continuity of Peak Velocity 2.19 cm2    AoV PG 3.69 mmHg    Aortic Valve Systolic Peak Velocity 54.37 cm/s    MV A Yusef 24.82 cm/s    Mitral Valve E Wave Deceleration Time 0.20 s    MV E Yusef 72.17 cm/s    MV E/A 2.91     E/E' ratio (averaged) 17.59     E/E' lateral 14.79     E/E' septal 20.39     LV E' Lateral Velocity 4.88 cm/s    LV E' Septal Velocity 3.54 cm/s    Pulmonic Regurgitant End Max Velocity 332.96 cm/s    Pulmonic Valve Systolic Peak Instantaneous Gradient 7.53 mmHg    Pulmonic Regurgitant Max Velocity End Diastole 137.24 cm/s    Pulmonic Valve Systolic Peak Instantaneous Gradient 1.51 mmHg    Pulmonic Valve Max Velocity 61.35 cm/s    Triscuspid Valve Regurgitation Peak Gradient 26.54 mmHg    Triscuspid Valve Regurgitation Peak Gradient 27.08 mmHg    Triscuspid Valve Regurgitation Peak Gradient 26.75 mmHg    Triscuspid Valve Regurgitation Peak Gradient 25.80 mmHg    TR Max Velocity 257.59 cm/s    Ao Root D 3.13 cm    LV Mass .2 88 - 224 g    LV Mass AL Index 133.2 49 - 115 g/m2    Left Atrium Minor Axis 2.29 cm    LA Vol Index 48.96 16 - 28 ml/m2    LA Vol Index 43.36 16 - 28 ml/m2    LA Vol Index 36.68 16 - 28 ml/m2    AMY/BSA Pk Yusef 1.2 EZ2/N2   METABOLIC PANEL, BASIC    Collection Time: 09/22/20  1:37 AM   Result Value Ref Range    Sodium 141 136 - 145 mmol/L    Potassium 3.6 3.5 - 5.1 mmol/L    Chloride 106 97 - 108 mmol/L    CO2 31 21 - 32 mmol/L    Anion gap 4 (L) 5 - 15 mmol/L    Glucose 92 65 - 100 mg/dL    BUN 22 (H) 6 - 20 MG/DL    Creatinine 1.16 0.70 - 1.30 MG/DL    BUN/Creatinine ratio 19 12 - 20      GFR est AA >60 >60 ml/min/1.73m2    GFR est non-AA >60 >60 ml/min/1.73m2    Calcium 8.0 (L) 8.5 - 10.1 MG/DL   CBC WITH AUTOMATED DIFF    Collection Time: 09/22/20  1:37 AM   Result Value Ref Range    WBC 7.3 4.1 - 11.1 K/uL    RBC 4.23 4. 10 - 5.70 M/uL    HGB 13.2 12.1 - 17.0 g/dL    HCT 40.4 36.6 - 50.3 %    MCV 95.5 80.0 - 99.0 FL    MCH 31.2 26.0 - 34.0 PG    MCHC 32.7 30.0 - 36.5 g/dL    RDW 15.4 (H) 11.5 - 14.5 %    PLATELET 924 942 - 607 K/uL    MPV 10.1 8.9 - 12.9 FL    NRBC 0.0 0  WBC    ABSOLUTE NRBC 0.00 0.00 - 0.01 K/uL    NEUTROPHILS 73 32 - 75 %    LYMPHOCYTES 12 12 - 49 %    MONOCYTES 12 5 - 13 %    EOSINOPHILS 2 0 - 7 %    BASOPHILS 1 0 - 1 %    IMMATURE GRANULOCYTES 0 0.0 - 0.5 %    ABS. NEUTROPHILS 5.3 1.8 - 8.0 K/UL    ABS. LYMPHOCYTES 0.8 0.8 - 3.5 K/UL    ABS. MONOCYTES 0.9 0.0 - 1.0 K/UL    ABS. EOSINOPHILS 0.2 0.0 - 0.4 K/UL    ABS. BASOPHILS 0.0 0.0 - 0.1 K/UL    ABS. IMM.  GRANS. 0.0 0.00 - 0.04 K/UL    DF AUTOMATED         Telemetry: SR      Assessment:     Active Problems:    Ischemic cardiomyopathy (7/31/2020)      Overview: Added automatically from request for surgery [id number]      CAD (coronary artery disease), native coronary artery (9/21/2010)      Hyperlipidemia (9/21/2010)      CHF, acute on chronic ([facility] (7/31/2020)      S/P CABG x 4 (8/6/2020)      Overview: x 4, LIMA to LAD, RSVG to Diag-OM2, RSVG to PDA Hypokalemia (9/20/2020)      CHF exacerbation ([address] (9/20/2020)      Non compliance with medical treatment (9/21/2020)        Plan:     CHF- acute on chronic ICM  Echo noted. Continue current meds. Med compliance d/w pt.      HTN: controlled. Continue coreg, lasix, entresto     CAD: s/p ALISON PCI 2010, s/p CABG x 4 8/2020.    Continue ASA, BB, statin, started on entresto      HLD:  LDL 83.8  Continue statin

## 2020-09-23 VITALS
RESPIRATION RATE: 20 BRPM | TEMPERATURE: 98.1 F | SYSTOLIC BLOOD PRESSURE: 148 MMHG | HEART RATE: 84 BPM | OXYGEN SATURATION: 92 % | HEIGHT: 65 IN | WEIGHT: 160.72 LBS | DIASTOLIC BLOOD PRESSURE: 89 MMHG | BODY MASS INDEX: 26.78 KG/M2

## 2020-09-23 LAB
ANION GAP SERPL CALC-SCNC: 2 MMOL/L (ref 5–15)
BASOPHILS # BLD: 0 K/UL (ref 0–0.1)
BASOPHILS NFR BLD: 1 % (ref 0–1)
BUN SERPL-MCNC: 18 MG/DL (ref 6–20)
BUN/CREAT SERPL: 17 (ref 12–20)
CALCIUM SERPL-MCNC: 8.2 MG/DL (ref 8.5–10.1)
CHLORIDE SERPL-SCNC: 105 MMOL/L (ref 97–108)
CO2 SERPL-SCNC: 33 MMOL/L (ref 21–32)
CREAT SERPL-MCNC: 1.05 MG/DL (ref 0.7–1.3)
DIFFERENTIAL METHOD BLD: ABNORMAL
EOSINOPHIL # BLD: 0.3 K/UL (ref 0–0.4)
EOSINOPHIL NFR BLD: 4 % (ref 0–7)
ERYTHROCYTE [DISTWIDTH] IN BLOOD BY AUTOMATED COUNT: 15.2 % (ref 11.5–14.5)
GLUCOSE SERPL-MCNC: 84 MG/DL (ref 65–100)
HCT VFR BLD AUTO: 40.3 % (ref 36.6–50.3)
HGB BLD-MCNC: 13 G/DL (ref 12.1–17)
IMM GRANULOCYTES # BLD AUTO: 0 K/UL (ref 0–0.04)
IMM GRANULOCYTES NFR BLD AUTO: 0 % (ref 0–0.5)
LYMPHOCYTES # BLD: 0.9 K/UL (ref 0.8–3.5)
LYMPHOCYTES NFR BLD: 14 % (ref 12–49)
MAGNESIUM SERPL-MCNC: 2.1 MG/DL (ref 1.6–2.4)
MCH RBC QN AUTO: 30.6 PG (ref 26–34)
MCHC RBC AUTO-ENTMCNC: 32.3 G/DL (ref 30–36.5)
MCV RBC AUTO: 94.8 FL (ref 80–99)
MONOCYTES # BLD: 0.8 K/UL (ref 0–1)
MONOCYTES NFR BLD: 12 % (ref 5–13)
NEUTS SEG # BLD: 4.7 K/UL (ref 1.8–8)
NEUTS SEG NFR BLD: 69 % (ref 32–75)
NRBC # BLD: 0 K/UL (ref 0–0.01)
NRBC BLD-RTO: 0 PER 100 WBC
PLATELET # BLD AUTO: 270 K/UL (ref 150–400)
PMV BLD AUTO: 10.4 FL (ref 8.9–12.9)
POTASSIUM SERPL-SCNC: 3.2 MMOL/L (ref 3.5–5.1)
RBC # BLD AUTO: 4.25 M/UL (ref 4.1–5.7)
SODIUM SERPL-SCNC: 140 MMOL/L (ref 136–145)
WBC # BLD AUTO: 6.8 K/UL (ref 4.1–11.1)

## 2020-09-23 PROCEDURE — 99232 SBSQ HOSP IP/OBS MODERATE 35: CPT | Performed by: INTERNAL MEDICINE

## 2020-09-23 PROCEDURE — 74011250637 HC RX REV CODE- 250/637: Performed by: HOSPITALIST

## 2020-09-23 PROCEDURE — 85025 COMPLETE CBC W/AUTO DIFF WBC: CPT

## 2020-09-23 PROCEDURE — 74011250637 HC RX REV CODE- 250/637: Performed by: NURSE PRACTITIONER

## 2020-09-23 PROCEDURE — 83735 ASSAY OF MAGNESIUM: CPT

## 2020-09-23 PROCEDURE — 36415 COLL VENOUS BLD VENIPUNCTURE: CPT

## 2020-09-23 PROCEDURE — 94760 N-INVAS EAR/PLS OXIMETRY 1: CPT

## 2020-09-23 PROCEDURE — 80048 BASIC METABOLIC PNL TOTAL CA: CPT

## 2020-09-23 PROCEDURE — 74011250637 HC RX REV CODE- 250/637: Performed by: INTERNAL MEDICINE

## 2020-09-23 RX ORDER — POTASSIUM CHLORIDE 20 MEQ/1
20 TABLET, EXTENDED RELEASE ORAL
Status: COMPLETED | OUTPATIENT
Start: 2020-09-23 | End: 2020-09-23

## 2020-09-23 RX ORDER — BUMETANIDE 1 MG/1
1 TABLET ORAL DAILY
Qty: 30 TAB | Refills: 1 | Status: SHIPPED | OUTPATIENT
Start: 2020-09-23 | End: 2021-04-19 | Stop reason: SDUPTHER

## 2020-09-23 RX ORDER — CARVEDILOL 3.12 MG/1
3.12 TABLET ORAL 2 TIMES DAILY WITH MEALS
Qty: 60 TAB | Refills: 1 | Status: SHIPPED | OUTPATIENT
Start: 2020-09-23 | End: 2020-12-15

## 2020-09-23 RX ORDER — BUMETANIDE 1 MG/1
1 TABLET ORAL DAILY
Status: DISCONTINUED | OUTPATIENT
Start: 2020-09-23 | End: 2020-09-23 | Stop reason: HOSPADM

## 2020-09-23 RX ORDER — POTASSIUM CHLORIDE 750 MG/1
10 TABLET, FILM COATED, EXTENDED RELEASE ORAL DAILY
Qty: 30 TAB | Refills: 1 | Status: SHIPPED | OUTPATIENT
Start: 2020-09-23 | End: 2020-12-15

## 2020-09-23 RX ADMIN — POTASSIUM CHLORIDE 20 MEQ: 20 TABLET, EXTENDED RELEASE ORAL at 09:43

## 2020-09-23 RX ADMIN — BUMETANIDE 1 MG: 1 TABLET ORAL at 09:42

## 2020-09-23 RX ADMIN — SACUBITRIL AND VALSARTAN 1 TABLET: 24; 26 TABLET, FILM COATED ORAL at 09:43

## 2020-09-23 RX ADMIN — Medication 5 ML: at 05:12

## 2020-09-23 RX ADMIN — CARVEDILOL 3.12 MG: 3.12 TABLET, FILM COATED ORAL at 09:43

## 2020-09-23 RX ADMIN — ASPIRIN 81 MG CHEWABLE TABLET 81 MG: 81 TABLET CHEWABLE at 09:43

## 2020-09-23 RX ADMIN — POTASSIUM CHLORIDE 10 MEQ: 750 TABLET, FILM COATED, EXTENDED RELEASE ORAL at 09:43

## 2020-09-23 NOTE — PROGRESS NOTES
[address] [facility]  [phone]      Cardiology Progress Note      9/23/2020 10:24 AM    Admit Date: 9/19/2020    Admit Diagnosis:   CHF exacerbation ([address] [I50.9]  Hypokalemia [E87.6]    Subjective: Desiree Ennis has no cardiac complaints this am, sitting up in bed eating breakfast. States, \"I am breathing much better\". VSS  Labs steady, K 3.2, needs replacement, Cr 1.05  -6.8L since admission, weight down 4.12 kg in 24 hours? Bed weight     Visit Vitals  /77 (BP 1 Location: Left arm, BP Patient Position: At rest)   Pulse 71   Temp 98.1 °F (36.7 °C)   Resp 22   Ht 5' 5\" (1.651 m)   Wt 72.9 kg (160 lb 11.5 oz)   SpO2 91%   BMI 26.74 kg/m²       Current Facility-Administered Medications   Medication Dose Route Frequency    aspirin chewable tablet 81 mg  81 mg Oral DAILY    atorvastatin (LIPITOR) tablet 20 mg  20 mg Oral QHS    carvediloL (COREG) tablet 3.125 mg  3.125 mg Oral BID WITH MEALS    potassium chloride SR (KLOR-CON 10) tablet 10 mEq  10 mEq Oral DAILY    sodium chloride (NS) flush 5-40 mL  5-40 mL IntraVENous Q8H    sodium chloride (NS) flush 5-40 mL  5-40 mL IntraVENous PRN    acetaminophen (TYLENOL) tablet 650 mg  650 mg Oral Q6H PRN    Or    acetaminophen (TYLENOL) suppository 650 mg  650 mg Rectal Q6H PRN    polyethylene glycol (MIRALAX) packet 17 g  17 g Oral DAILY PRN    promethazine (PHENERGAN) tablet 12.5 mg  12.5 mg Oral Q6H PRN    Or    ondansetron (ZOFRAN) injection 4 mg  4 mg IntraVENous Q6H PRN    sacubitriL-valsartan (ENTRESTO) 24-26 mg tablet 1 Tab  1 Tab Oral Q12H    furosemide (LASIX) injection 40 mg  40 mg IntraVENous BID       Objective:      Physical Exam:  General Appearance: thin, elderly  male in NAD   Chest:   Clear throuhgout   Cardiovascular:  Regular rate and rhythm, S1S2 no murmur  Abdomen:   Soft, non-tender, bowel sounds are active.    Extremities: +1 bilateral edema, DP/PT palpable   Skin: Warm and dry. legs reddened slight warmth to touch     Data Review:   Recent Labs     09/23/20  0459 09/22/20  0137 09/21/20  0240   WBC 6.8 7.3 8.0   HGB 13.0 13.2 12.9   HCT 40.3 40.4 40.3    281 276     Recent Labs     09/23/20  0459 09/22/20  0137 09/21/20  0240    141 142   K 3.2* 3.6 3.1*    106 105   CO2 33* 31 30   GLU 84 92 88   BUN 18 22* 25*   CREA 1.05 1.16 1.08   CA 8.2* 8.0* 8.0*   MG 2.1  --  2.1   ALB  --   --  2.8*   TBILI  --   --  0.9   ALT  --   --  62       Recent Labs     09/21/20  0240 09/20/20  1111   TROIQ <0.05 <0.05         Intake/Output Summary (Last 24 hours) at 9/23/2020 0813  Last data filed at 9/23/2020 0511  Gross per 24 hour   Intake 1650 ml   Output 3400 ml   Net -1750 ml        Telemetry: NSR, occ. PAC's, RBBB  EKG: NSR, RBBB, unchanged from prior EKG   Cxray: \"Right greater than left pleural effusions\"  ECHO:   8/10/2020  · LV: Normal wall thickness. Mildly dilated left ventricle. Severe global systolic function. Estimated left ventricular ejection fraction is 20 - 25%. · LA: Left Atrium volume index is 60.51 mL/m2. · RV: Dilated right ventricle. Reduced systolic function. · TV: Moderate to severe tricuspid valve regurgitation is present    Assessment:     Active Problems:    CAD (coronary artery disease), native coronary artery (9/21/2010)      Hyperlipidemia (9/21/2010)      CHF, acute on chronic ([facility] (7/31/2020)      Ischemic cardiomyopathy (7/31/2020)      Overview: Added automatically from request for surgery [id number]      S/P CABG x 4 (8/6/2020)      Overview: x 4, LIMA to LAD, RSVG to Diag-OM2, RSVG to PDA      Hypokalemia (9/20/2020)      CHF exacerbation ([facility] (9/20/2020)      Non compliance with medical treatment (9/21/2020)        Plan:   Mr. Solitario Harding is admitted with Acut on Chronic CHF exacerbation. He has known h/o CHF with an EF of 25% in 8/20. Had CABG at that time. He has been on coreg and lasix. ARB was held due to renal dysfunction.  Has lifevest. Presents with 2-3 weeks h/o increasing edema, SOB. Trop(-). CHF- acute on chronic ICM, compensated. -Change diuretic therapy to PO Bumex this am.   -Strict I/O, -6.8 L since admission, labs, daily weights (72.9kg this am, down 4kg in 24 hours)  -Please weigh on standing scale only, patient is ambulatory   -Cr 1.05 this am, monitor with continuation of entresto  -Patient on lifevest at home -resume at discharge   -Echo this admission consistent with prior EF 20-25%, severe grade 4 diastolic dysfunction, mod/severe TR prior echo reviewed and compared    -Replace K PRN-replace this am   -Monitor fluid intake, fluid restriction at discharge as well. -Advised to utilize compression stockings as needed at home  -Discussed diet, medication compliance, fluid intake, and low sodium intake with patient.   -Patient advised to weight self daily at discharge, monitor for daily weight gain. HTN: controlled.    -Continue coreg, bumex, entresto     CAD: s/p ALISON PCI 2010, s/p CABG x 4 8/2020.   -Continue ASA, BB, statin, continue on entresto     HLD:  LDL 83.8  -Continue statin     Ok for discharge from cardiology perspective, with close hospital follow-up. Lizandro RIVERAQDBLZKINDU,ELÍAS  MSN,RN,AG-ACNP-BC       Patient seen and examined by me with nurse practitioner. I personally performed all components of the history, physical, and medical decision making and agree with the assessment and plan as noted.     Armando Martinez MD

## 2020-09-23 NOTE — DISCHARGE INSTRUCTIONS
HOSPITALIST DISCHARGE INSTRUCTIONS    NAME: Andrew Penaloza   :  1945   MRN:  [id number]     Date/Time:  2020 10:35 AM    ADMIT DATE: 2020     DISCHARGE DATE: 2020     DISCHARGE DIAGNOSIS:  Acute on Chronic Decompensated CHF exacerbation POA- resolved, cont PO Bumex, Entresto, K+, Coreg as recommended   Recent CABG x4 2020  Ischemic Cardiomyopathy   EF 20-25%, Wearing Lifevest PTA- resume at home  CAD  Hyperlipidemia   Hypokalemia - cont PO K+ with Bumex daily  BPH   Full code    Active Problems:    CAD (coronary artery disease), native coronary artery (2010)      Hyperlipidemia (2010)      CHF, acute on chronic ([facility] (2020)      Ischemic cardiomyopathy (2020)      Overview: Added automatically from request for surgery [id number]      S/P CABG x 4 (2020)      Overview: x 4, LIMA to LAD, RSVG to Diag-OM2, RSVG to PDA      Hypokalemia (2020)      CHF exacerbation ([facility] (2020)      Non compliance with medical treatment (2020)         MEDICATIONS:  As per medication reconciliation  list  · It is important that you take the medication exactly as they are prescribed. · Keep your medication in the bottles provided by the pharmacist and keep a list of the medication names, dosages, and times to be taken in your wallet. · Do not take other medications without consulting your doctor. Pain Management: per above medications    What to do at Home    Recommended diet:  Cardiac Diet and Low fat, Low cholesterol , 2L/day oral fluid restrictions    Recommended activity: Activity as tolerated    If you have questions regarding the hospital related prescriptions or hospital related issues please call [facility] .     If you experience any of the following symptoms then please call your primary care physician or return to the emergency room if you cannot get hold of your doctor:  Fever, chills, nausea, vomiting, diarrhea, change in mentation, falling, bleeding, shortness of breath,     Follow Up:  Dr. Irl Phoenix, NP  you are to call and set up an appointment to see them in 7-10 days. Dr Frederick Ramos ([facility] cardiology) in 1 week    Information obtained by :  I understand that if any problems occur once I am at home I am to contact my physician. I understand and acknowledge receipt of the instructions indicated above.                                                                                                                                            Physician's or R.N.'s Signature                                                                  Date/Time                                                                                                                                              Patient or Representative Signature                                                          Date/Time

## 2020-09-23 NOTE — PROGRESS NOTES
ANNA plan:    -  Discharge home today around 11:30 am. Pt will transport himself and his car is on site. -  PCP f/u scheduled for 9/25/20 at 10 AM. Cardiology's office will call pt directly to schedule. -  Plan discussed with pt who verbalized agreement with the plan. *Medicare pt has received, reviewed, and signed 2nd IM letter informing them of their right to appeal the discharge. Signed copy has been placed on pt bedside chart. *BCPI-A letter regarding Heart Failure has been delivered to the patient/caregiver. No further concerns indicated at this time. AVS updated. Patient is ready for discharge from a Care Management standpoint. RN informed. Care Management Interventions  PCP Verified by CM: Yes  Mode of Transport at Discharge: [address] [place] [address] Time of Discharge: [address] (CM Consult):  Other(home with OP f/u)  Discharge Durable Medical Equipment: No  Physical Therapy Consult: No  Occupational Therapy Consult: No  Speech Therapy Consult: No  Current Support Network: Lives Alone  Confirm Follow Up Transport: Self  Discharge Location  Discharge Placement: Home with outpatient services    MERLINE Michaud  Care Manager  [phone]

## 2020-09-23 NOTE — PROGRESS NOTES
Physician Progress Note      PATIENT:               Joy Solorzano  CSN #:                  [id number]  :                       1945  ADMIT DATE:       2020 11:36 PM  [address] [place] DATE:  RESPONDING  PROVIDER #:        Avis Jeong MD          QUERY TEXT:    Dr. Robert Beaulieu:    Pt admitted with SOB and weight increase and has Acute on Chronic Decompensated CHF exacerbation documented. If possible, please clarify the diagnosis \"Acute on Chronic Decompensated CHF exacerbation\"  progress notes and discharge summary further specificity regarding the type of CHF:    The medical record reflects the following:  Risk Factors: Hx: CAD w/ past MI and stents / HLD /  Clinical Indicators: 97.7 95 18 152/93 95%. .. [place] [place] [facility] ED VS: HR: 92-95; RR: 21-32; Bp: 152/93 ^ 159/111 ^ 153/95 ^ 155/82 ^ 160/115. ......... [place] Tnl: <0.05. [place] [place] .. [place] pBNP: 8,554. ....... [place] CXR: Right greater than left pleural effusions . ... [place] ED noted: Tachypnea and accessory muscle usage present. ... [place] Decreased breath sounds present. .... [place] B/L LE edema present. .. [place] [place] Capillary refill takes 2 to 3 seconds. [place] [place] Skin is pale. [place] [place] Echo: LV: Estimated LVEF is 20 - 25%. Biplane method used to measure ejection fraction. Mildly dilated left ventricle. Wall thickness appears thin. Severely and globally reduced systolic function. Severe (grade 4) left ventricular diastolic dysfunction  Treatment: IV Bumex 1mg in ED; CXR; IP Admit; Cards consult; Echo; IV lasix 40mg daily and continue home bumex 1mg once daily    Thank you,    Reanna Herring  [facility]    Options provided:  -- Acute on Chronic Systolic CHF/HFrEF  -- Acute on Chronic Diastolic CHF/HFpEF  -- Acute on Chronic Systolic and Diastolic CHF  -- Other - I will add my own diagnosis  -- Disagree - Not applicable / Not valid  -- Disagree - Clinically unable to determine / Unknown  -- Refer to Clinical Documentation Reviewer    PROVIDER RESPONSE TEXT:    This patient is in acute on chronic systolic and diastolic CHF.     Query created by: Hemalisa Westcornell on 9/22/2020 4:54 PM      QUERY TEXT:    Dr. Martell Walsh:    Pt admitted with SOB and weight change. Pt noted to also have Acute on Chronic Decompensated CHF exacerbation. If possible, please document in progress notes and discharge summary the etiology of CHF, if able to be determined. The medical record reflects the following:  Risk Factors: Hx: CAD w/ past MI and stents / HLD /  Clinical Indicators: 97.7 95 18 152/93 95%. .. [facility]le [facility]le [facility] ED VS: HR: 92-95; RR: 21-32; Bp: 152/93 ^ 159/111 ^ 153/95 ^ 155/82 ^ 160/115. ......... [facility] Tnl: <0.05. [facility]le [facility]ulling .. [facility]le [facility] pBNP: 8,554. ....... [facility]le [facility] CXR: Right greater than left pleural effusions . ... [facility]le [facility] ED noted: Tachypnea and accessory muscle usage present. ... [facility]le [facility] Lesle [facility]ing Decreased breath sounds present. .... Lesle [facility]ulling B/L LE edema present. .. [facility]le [facility] Lesle Gulling Capillary refill takes 2 to 3 seconds. Lesle Gulling Lesle Gulling Skin is pale. Lesle Gulling Lesle Gulling Lesle Gulling Echo: LV: Estimated LVEF is 20 - 25%. Biplane method used to measure ejection fraction. Mildly dilated left ventricle. Wall thickness appears thin. Severely and globally reduced systolic function. Severe (grade 4) left ventricular diastolic dysfunction. Lesle Gulling Lesle Gulling Noted per Cards: CHF- acute on chronic ICM  Treatment: IV Bumex 1mg in ED; CXR; IP Admit; Cards consult; Echo; IV lasix 40mg daily and continue home bumex 1mg once daily    Thank you,    Melida Babinski  [facility]    Options provided:  -- CHF due to Hypertensive Heart Disease  -- CHF due to Hypertensive Heart Disease and CAD  -- CHF not due to Hypertension but due to ICMP  -- CHF due to Hypertensive Heart Disease and Valvular Heart Disease  -- CHF not due to Hypertension but due to valvular heart disease  -- Other - I will add my own diagnosis  -- Disagree - Not applicable / Not valid  -- Disagree - Clinically unable to determine / Unknown  -- Refer to Clinical Documentation Reviewer    PROVIDER RESPONSE TEXT:    This patient?s CHF is due to hypertensive heart disease and CAD.     Query created by: Madeleine Jesus on 9/22/2020 4:56 PM      Electronically signed by:  Kelin Hackett MD 9/23/2020 10:38 AM

## 2020-09-23 NOTE — DISCHARGE SUMMARY
Hospitalist Discharge Summary     Patient ID:  Casi Abrams  [id number]  76 y.o.  1945 9/19/2020    PCP on record: Whit Webb NP    Admit date: 9/19/2020  Discharge date and time: 9/23/2020    DISCHARGE DIAGNOSIS:    Acute on Chronic Systolic & diastolic CHF exacerbation POA- resolved, cont PO Bumex, Entresto, K+, Coreg as recommended   Recent CABG x4 July 2020  Ischemic Cardiomyopathy   EF 20-25%, Wearing Lifevest PTA- resume at home  CAD  Hyperlipidemia   Hypokalemia - cont PO K+ with Bumex daily  BPH   Full code    CONSULTATIONS:  IP CONSULT TO CARDIOLOGY    Excerpted HPI from H&P of Mercedes Mcclure MD:  \"65 y.o. male  With pmh as below came with c/o graduall onset of NARANJO and increasing b/l leg edema  For last few days associatted with weight gain and abd. Swelling. Pt. reports 10lbs weight gain in 2 weeks . Of note pt is   S/P CABG x 4 (8/6/2020 Overview: x 4, LIMA to LAD, RSVG to Diag-OM2, RSVG to PDA. Per he is also taking OTC ? Every 6hrs for last few days . Denies cp and productive cough.     We were asked to admit for work up and evaluation of the above problems\"    ______________________________________________________________________  DISCHARGE SUMMARY/HOSPITAL COURSE:  for full details see H&P, daily progress notes, labs, consult notes. Acute on Chronic Decompensated CHF exacerbation, BNP 8554 on adm    Recent CABG x4 July 2020  Ischemic Cardiomyopathy   EF 20-25%, Wearing Lifevest PTA  CAD  Hyperlipidemia   · Reports he stopped taking diuretics PTA - attributed it to a miscommunication with his PCP (accidental Noncompliance)  · ECHO results from 8/2020 - \"Mildly dilated left ventricle. Severe global systolic function. Estimated left ventricular ejection fraction 20-25%. Dilated right ventricle. Reduced systolic function. Moderate to severe tricuspid valve regurgitation is present. \"   · CXR on admission \"Right greater than left pleural effusions. \"  · Repeat ECHO this admission   · Appreciate Cardiology input - had held ARB due to previous renal dysfunction, started entresto 9/20/20  · Patient reports his breathing is better, now on RA and swelling is gone to abdomen. Patient is negative 4.5 L today. · Continue IV lasix  · Continue ASA, atorvastatin, carvedilol  · Continue daily weights, 1L fluid restriction and strict I&Os     Hypokalemia   · K 3.1 today, replete with oral   · Follow daily BMP      BPH   · Self diagnosed and treated with herbal remedies; saw palmetto, prostate health PO, tumeric and slender cortisol PO   · Reports this alleviates symptoms associated with BPH and declined offer for referral to urology      25.0 - 29.9 Overweight / Body mass index is 28.26 kg/m².     Code status: Full        _______________________________________________________________________  Patient seen and examined by me on discharge day. Pertinent Findings:  Gen:    Not in distress  Chest: Clear lungs  CVS:   Regular rhythm. No edema  Abd:  Soft, not distended, not tender  Neuro:  Alert, oriented x 3  _______________________________________________________________________  DISCHARGE MEDICATIONS:   Current Discharge Medication List      START taking these medications    Details   sacubitril-valsartan (ENTRESTO) 24 mg/26 mg tablet Take 1 Tab by mouth every twelve (12) hours for 30 days. Qty: 60 Tab, Refills: 1         CONTINUE these medications which have CHANGED    Details   bumetanide (BUMEX) 1 mg tablet Take 1 Tab by mouth daily. Qty: 30 Tab, Refills: 1    Associated Diagnoses: S/P CABG x 4      carvediloL (COREG) 3.125 mg tablet Take 1 Tab by mouth two (2) times daily (with meals). Qty: 60 Tab, Refills: 1    Associated Diagnoses: S/P CABG x 4      potassium chloride SR (KLOR-CON 10) 10 mEq tablet Take 1 Tab by mouth daily.   Qty: 30 Tab, Refills: 1    Associated Diagnoses: S/P CABG x 4         CONTINUE these medications which have NOT CHANGED    Details   saw palmetto xtr/zinc picolin (SAW PALMETTO EXTRACT PO) Take  by mouth.      saw/vit E/sod ana maria/lyc/beta/pyg (PROSTATE HEALTH PO) Take  by mouth. TURMERIC PO Take  by mouth. B-Sit/m-19/bitter-orange peel (SLENDER CORTISOL PO) Take  by mouth. aspirin 81 mg chewable tablet Take 1 Tab by mouth daily. Qty: 30 Tab, Refills: 1    Associated Diagnoses: S/P CABG x 4      atorvastatin (LIPITOR) 20 mg tablet Take 1 Tab by mouth nightly. Qty: 30 Tab, Refills: 1    Associated Diagnoses: S/P CABG x 4               Patient Follow Up Instructions: Activity: Activity as tolerated  Diet: Cardiac Diet and Low fat, Low cholesterol    Follow-up with Dr Harry Adam cardiology office in 1 week.     Follow-up Information     Follow up With Specialties Details Why Contact Antonio Ruiz NP Pediatric Medicine   [address]  [address] [place] [address]      Kylee Partida MD Cardiology, Vascular Surgery, General and Vascular Surgery   [address] [place], [place] [phone]          ________________________________________________________________    Risk of deterioration: Low    Condition at Discharge:  Stable  __________________________________________________________________    Disposition  Home with family, no needs    ____________________________________________________________________    Code Status: Full Code  ___________________________________________________________________      Total time in minutes spent coordinating this discharge (includes going over instructions, follow-up, prescriptions, and preparing report for sign off to her PCP) :  36 minutes    Signed:  Pineda Chen MD

## 2020-09-23 NOTE — PROGRESS NOTES
Bedside and Verbal shift change report given to Robbie Ponce RN (oncoming nurse) by Liss Tavera RN (offgoing nurse). Report included the following information SBAR, Kardex, Intake/Output, MAR, Recent Results and Cardiac Rhythm NSR with occassional PVCs.

## 2020-09-23 NOTE — PROGRESS NOTES
Problem: Falls - Risk of  Goal: *Absence of Falls  Description: Document Antonio Boom Fall Risk and appropriate interventions in the flowsheet.   Outcome: Progressing Towards Goal  Note: Fall Risk Interventions:            Medication Interventions: Patient to call before getting OOB, Teach patient to arise slowly

## 2020-09-23 NOTE — PROGRESS NOTES
Discharge instructions provided to pt, opportunity for questions/clarifications provided. PIV & telemetry discontinued. Pt discharged via wheelchair in NAD.

## 2020-09-24 ENCOUNTER — HOSPITAL ENCOUNTER (OUTPATIENT)
Dept: CARDIAC REHAB | Age: 75
Discharge: HOME | End: 2020-09-24
Payer: MEDICARE

## 2020-09-24 ENCOUNTER — PATIENT OUTREACH (OUTPATIENT)
Dept: CASE MANAGEMENT | Age: 75
End: 2020-09-24

## 2020-09-24 VITALS — WEIGHT: 171 LBS | BODY MASS INDEX: 28.46 KG/M2

## 2020-09-24 PROCEDURE — 93798 PHYS/QHP OP CAR RHAB W/ECG: CPT

## 2020-09-25 ENCOUNTER — TELEPHONE (OUTPATIENT)
Dept: CARDIOLOGY CLINIC | Age: 75
End: 2020-09-25

## 2020-09-25 NOTE — TELEPHONE ENCOUNTER
Pt called in, verified pt with two pt identifiers, pt advised he called the pharmacy regarding the entresto and they will not release to him. Advised it probably needs a prior auth and to call the pharmacy regarding this. They will need to send me something to do the prior auth with. He then wanted to know about his lasix. I looked at his hospital visit and they only have him on Bumex, not lasix. Pt advised he will call the pharmacy again. Pt verbalized understanding.

## 2020-09-28 ENCOUNTER — APPOINTMENT (OUTPATIENT)
Dept: CARDIAC REHAB | Age: 75
End: 2020-09-28
Payer: MEDICARE

## 2020-09-28 ENCOUNTER — PATIENT OUTREACH (OUTPATIENT)
Dept: CASE MANAGEMENT | Age: 75
End: 2020-09-28

## 2020-09-28 ENCOUNTER — OFFICE VISIT (OUTPATIENT)
Dept: CARDIOLOGY CLINIC | Age: 75
End: 2020-09-28
Payer: MEDICARE

## 2020-09-28 VITALS
HEIGHT: 65 IN | RESPIRATION RATE: 18 BRPM | SYSTOLIC BLOOD PRESSURE: 128 MMHG | OXYGEN SATURATION: 96 % | WEIGHT: 168 LBS | BODY MASS INDEX: 27.99 KG/M2 | DIASTOLIC BLOOD PRESSURE: 80 MMHG | HEART RATE: 76 BPM

## 2020-09-28 DIAGNOSIS — E78.2 MIXED HYPERLIPIDEMIA: ICD-10-CM

## 2020-09-28 DIAGNOSIS — Z91.199 NON COMPLIANCE WITH MEDICAL TREATMENT: ICD-10-CM

## 2020-09-28 DIAGNOSIS — I25.111 CORONARY ARTERY DISEASE INVOLVING NATIVE CORONARY ARTERY OF NATIVE HEART WITH ANGINA PECTORIS WITH DOCUMENTED SPASM (HCC): ICD-10-CM

## 2020-09-28 DIAGNOSIS — I25.5 ISCHEMIC CARDIOMYOPATHY: ICD-10-CM

## 2020-09-28 DIAGNOSIS — I10 ESSENTIAL HYPERTENSION: ICD-10-CM

## 2020-09-28 DIAGNOSIS — Z95.1 S/P CABG X 4: ICD-10-CM

## 2020-09-28 DIAGNOSIS — I25.10 ASHD (ARTERIOSCLEROTIC HEART DISEASE): Primary | ICD-10-CM

## 2020-09-28 PROCEDURE — G8417 CALC BMI ABV UP PARAM F/U: HCPCS | Performed by: INTERNAL MEDICINE

## 2020-09-28 PROCEDURE — 93010 ELECTROCARDIOGRAM REPORT: CPT | Performed by: INTERNAL MEDICINE

## 2020-09-28 PROCEDURE — 99214 OFFICE O/P EST MOD 30 MIN: CPT | Performed by: INTERNAL MEDICINE

## 2020-09-28 PROCEDURE — 3017F COLORECTAL CA SCREEN DOC REV: CPT | Performed by: INTERNAL MEDICINE

## 2020-09-28 PROCEDURE — 1101F PT FALLS ASSESS-DOCD LE1/YR: CPT | Performed by: INTERNAL MEDICINE

## 2020-09-28 PROCEDURE — G8432 DEP SCR NOT DOC, RNG: HCPCS | Performed by: INTERNAL MEDICINE

## 2020-09-28 PROCEDURE — 93005 ELECTROCARDIOGRAM TRACING: CPT | Performed by: INTERNAL MEDICINE

## 2020-09-28 PROCEDURE — G8427 DOCREV CUR MEDS BY ELIG CLIN: HCPCS | Performed by: INTERNAL MEDICINE

## 2020-09-28 PROCEDURE — G0463 HOSPITAL OUTPT CLINIC VISIT: HCPCS | Performed by: INTERNAL MEDICINE

## 2020-09-28 PROCEDURE — G8536 NO DOC ELDER MAL SCRN: HCPCS | Performed by: INTERNAL MEDICINE

## 2020-09-28 PROCEDURE — 1111F DSCHRG MED/CURRENT MED MERGE: CPT | Performed by: INTERNAL MEDICINE

## 2020-09-28 NOTE — PROGRESS NOTES
[address]  [phone]     Subjective: Linnea Reyes is a 76 y.o. male is here for hospital follow up. Admitted 9/19 - 9/23/2020 for acute HF, hypokalemia. Had CABGx4 in 8/2020. Doing well. Occasional sob, home wt stable. He has been attending cardiac rehab twice a week. Has not started Entresto. The patient denies chest pain, orthopnea, PND,  palpitations, syncope, or presyncope.        Patient Active Problem List    Diagnosis Date Noted    Non compliance with medical treatment 09/21/2020    Hypokalemia 09/20/2020    CHF exacerbation ([facility] 09/20/2020    Paroxysmal atrial fibrillation ([facility] 08/09/2020    CAD (coronary artery disease) 08/06/2020    S/P CABG x 4 08/06/2020    Bilateral carotid artery stenosis 08/04/2020    Pleural effusion, bilateral 07/31/2020    CHF, acute on chronic ([address] 07/31/2020    Pulmonary embolism ([facility] 07/31/2020    Ischemic cardiomyopathy 07/31/2020    CAD (coronary artery disease), native coronary artery 09/21/2010    Hyperlipidemia 09/21/2010    Coronary stent 09/21/2010      Tammy Clement, NP  Past Medical History:   Diagnosis Date    Acute MI anterior wall first episode care ([facility] 9/21/2010    CAD (coronary artery disease), native coronary artery 9/21/2010    Coronary stent 9/21/2010    Hyperlipidemia 9/21/2010      Past Surgical History:   Procedure Laterality Date    HX CORONARY ARTERY BYPASS GRAFT  08/06/2020    x 4, LIMA to LAD, RSVG to Diag-OM2, RSVG to PDA     No Known Allergies   Family History   Problem Relation Age of Onset    No Known Problems Mother     No Known Problems Father       Social History     Socioeconomic History    Marital status:      Spouse name: Not on file    Number of children: 1    Years of education: 15    Highest education level: 12th grade   Occupational History    Not on file   Social Needs    Financial resource strain: Not on file   Gurmeet-Dayanna insecurity     Worry: Never true     Inability: Never true    Transportation needs     Medical: No     Non-medical: No   Tobacco Use    Smoking status: Never Smoker    Smokeless tobacco: Never Used   Substance and Sexual Activity    Alcohol use: Never     Frequency: Never    Drug use: Not Currently     Types: Prescription, OTC    Sexual activity: Not on file   Lifestyle    Physical activity     Days per week: 0 days     Minutes per session: 0 min    Stress: Only a little   Relationships    Social connections     Talks on phone: Once a week     Gets together: Once a week     Attends [employer] service: Never     Active member of club or organization: No     Attends meetings of clubs or organizations: Never     Relationship status:     Intimate partner violence     Fear of current or ex partner: No     Emotionally abused: No     Physically abused: No     Forced sexual activity: No   Other Topics Concern     Service No    Blood Transfusions No    Caffeine Concern No    Occupational Exposure No    Hobby Hazards No    Sleep Concern Yes    Stress Concern Yes    Weight Concern No    Special Diet Yes    Back Care No    Exercise No    Bike Helmet No    Seat Belt Yes    Self-Exams No   Social History Narrative    Not on file      Current Outpatient Medications   Medication Sig    bumetanide (BUMEX) 1 mg tablet Take 1 Tab by mouth daily.  carvediloL (COREG) 3.125 mg tablet Take 1 Tab by mouth two (2) times daily (with meals).  potassium chloride SR (KLOR-CON 10) 10 mEq tablet Take 1 Tab by mouth daily.  saw palmetto xtr/zinc picolin (SAW PALMETTO EXTRACT PO) Take  by mouth daily.  saw/vit E/sod ana maria/lyc/beta/pyg (PROSTATE HEALTH PO) Take  by mouth daily.  TURMERIC PO Take  by mouth daily.  B-Sit/m-19/bitter-orange peel (SLENDER CORTISOL PO) Take  by mouth daily.  aspirin 81 mg chewable tablet Take 1 Tab by mouth daily.     atorvastatin (LIPITOR) 20 mg tablet Take 1 Tab by mouth nightly.  sacubitril-valsartan (ENTRESTO) 24 mg/26 mg tablet Take 1 Tab by mouth every twelve (12) hours for 30 days. No current facility-administered medications for this visit. Review of Symptoms:  11 systems reviewed, negative other than as stated in the HPI    Physical ExamPhysical Exam:    Vitals:    09/28/20 1111 09/28/20 1124   BP: 130/82 128/80   Resp: 18    SpO2: 96%    Weight: 168 lb (76.2 kg)    Height: 5' 5\" (1.651 m)      Body mass index is 27.96 kg/m². General PE  Gen:  NAD  Mental Status - Alert. General Appearance - Not in acute distress. HEENT:  PERRL, no carotid bruits or JVD  Chest and Lung Exam   Inspection: Accessory muscles - No use of accessory muscles in breathing. Auscultation:   Breath sounds: - Normal.   Cardiovascular   Inspection: Jugular vein - Bilateral - Inspection Normal.   Palpation/Percussion:   Apical Impulse: - Normal.   Auscultation: Rhythm - Regular. Heart Sounds - S1 WNL and S2 WNL. No S3 or S4. Murmurs & Other Heart Sounds: Auscultation of the heart reveals - No Murmurs. Peripheral Vascular   Upper Extremity: Inspection - Bilateral - No Cyanotic nailbeds or Digital clubbing. Lower Extremity:   Palpation: Edema - Bilateral - + 1  Abdomen:   Soft, non-tender, bowel sounds are active.   Neuro: A&O times 3, CN and motor grossly WNL    Labs:   Lab Results   Component Value Date/Time    Cholesterol, total 148 09/20/2020 01:45 AM    Cholesterol, total 186 08/02/2020 05:37 AM    Cholesterol, total 173 08/01/2020 06:26 AM    Cholesterol, total 161 09/21/2010 04:25 AM    Cholesterol, total 170 09/19/2010 04:21 AM    HDL Cholesterol 43 09/20/2020 01:45 AM    HDL Cholesterol 42 08/02/2020 05:37 AM    HDL Cholesterol 38 08/01/2020 06:26 AM    HDL Cholesterol 34 09/21/2010 04:25 AM    HDL Cholesterol 38 09/19/2010 04:21 AM    LDL, calculated 83.8 09/20/2020 01:45 AM    LDL, calculated 128 (H) 08/02/2020 05:37 AM    LDL, calculated 119.8 (H) 08/01/2020 06:26 AM    LDL, calculated 110.4 (H) 09/21/2010 04:25 AM    LDL, calculated 111 (H) 09/19/2010 04:21 AM    Triglyceride 106 09/20/2020 01:45 AM    Triglyceride 80 08/02/2020 05:37 AM    Triglyceride 76 08/01/2020 06:26 AM    Triglyceride 83 09/21/2010 04:25 AM    Triglyceride 105 09/19/2010 04:21 AM    CHOL/HDL Ratio 3.4 09/20/2020 01:45 AM    CHOL/HDL Ratio 4.4 08/02/2020 05:37 AM    CHOL/HDL Ratio 4.6 08/01/2020 06:26 AM    CHOL/HDL Ratio 4.7 09/21/2010 04:25 AM    CHOL/HDL Ratio 4.5 09/19/2010 04:21 AM     Lab Results   Component Value Date/Time     (H) 09/19/2010 04:21 AM     Lab Results   Component Value Date/Time    Sodium 140 09/23/2020 04:59 AM    Potassium 3.2 (L) 09/23/2020 04:59 AM    Chloride 105 09/23/2020 04:59 AM    CO2 33 (H) 09/23/2020 04:59 AM    Anion gap 2 (L) 09/23/2020 04:59 AM    Glucose 84 09/23/2020 04:59 AM    BUN 18 09/23/2020 04:59 AM    Creatinine 1.05 09/23/2020 04:59 AM    BUN/Creatinine ratio 17 09/23/2020 04:59 AM    GFR est AA >60 09/23/2020 04:59 AM    GFR est non-AA >60 09/23/2020 04:59 AM    Calcium 8.2 (L) 09/23/2020 04:59 AM    Bilirubin, total 0.9 09/21/2020 02:40 AM    Alk. phosphatase 102 09/21/2020 02:40 AM    Protein, total 5.6 (L) 09/21/2020 02:40 AM    Albumin 2.8 (L) 09/21/2020 02:40 AM    Globulin 2.8 09/21/2020 02:40 AM    A-G Ratio 1.0 (L) 09/21/2020 02:40 AM    ALT (SGPT) 62 09/21/2020 02:40 AM       EKG:  SR RBBB     Assessment:     Assessment:      1. Paroxysmal atrial fibrillation (HCC)    2. Coronary artery disease involving native coronary artery of native heart with angina pectoris with documented spasm ([facility]    3. Ischemic cardiomyopathy    4. Mixed hyperlipidemia    5. Essential hypertension    6.  ASHD (arteriosclerotic heart disease)        Orders Placed This Encounter    AMB POC EKG ROUTINE W/ 12 LEADS, INTER & REP     Order Specific Question:   Reason for Exam:     Answer:   routine        Plan:     ICM  Echo consistent with prior EF 20-25%, mild MR per echo in 9/2020  Home wt stable at 161  Continue  carvedilol. Has not started entresto--just picked it up yesterday  Continue bumex 1 mg daily  Continue lifevest   Will repeat echo in 3 mos, refer to EP for ICD if EF remains < 35%    ASHD  s/p CABG x 4 in 8/6/2020: LIMA to LAD, RSVG to Diag-OM2, RSVG to PDA  Continue ASA BB statin  Continue cardiac rehab twice a day    HTN  Controlled with current therapy    HLD  9/2020 LDL 83 started statin Labs and lipids per PCP  Will check lipids in 3 mos    Continue current care and f/u in 3 mos    Jayme Lennon NP       Patient seen and examined by me with nurse practitioner. I personally performed all components of the history, physical, and medical decision making and agree with the assessment and plan as noted.     Julian Randall MD

## 2020-09-28 NOTE — PROGRESS NOTES
Patient reports SOB and edema today. Weight is 161. Taking Entresto and Bumex. Appointment with cardio 2020. Patient reported provider told him he would have a prescription for Lasix but it was not provided at discharge and he does not have. CTN advised patient that cardio will assess him today during appointment and provide him with prescription if he feels it is needed. CTN will follow up with patient in 5-7 days. LIVIER             Patient contacted regarding recent discharge and COVID-19 risk. Discussed COVID-19 related testing which was available at this time. Test results were negative. Patient informed of results, if available? no    Care Transition Nurse/ Ambulatory Care Manager/ LPN Care Coordinator contacted the patient by telephone to perform post discharge assessment. Verified name and  with patient as identifiers. Patient has following risk factors of: heart failure. CTN/ACM/LPN reviewed discharge instructions, medical action plan and red flags related to discharge diagnosis. Reviewed and educated them on any new and changed medications related to discharge diagnosis. Advised obtaining a 90-day supply of all daily and as-needed medications. Advance Care Planning:   Does patient have an Advance Directive: yes; reviewed and current     Education provided regarding infection prevention, and signs and symptoms of COVID-19 and when to seek medical attention with patient who verbalized understanding. Discussed exposure protocols and quarantine from [address] you at higher risk for severe illness  and given an opportunity for questions and concerns. The patient agrees to contact the COVID-19 hotline [phone] or PCP office for questions related to their healthcare. CTN/ACM/LPN provided contact information for future reference. From CDC: Are you at higher risk for severe illness?  Wash your hands often.    Avoid close contact (6 feet, which is about two arm lengths) with people who are sick.  Put distance between yourself and other people if COVID-19 is spreading in your community.  Clean and disinfect frequently touched surfaces.  Avoid all cruise travel and non-essential air travel.  Call your healthcare professional if you have concerns about COVID-19 and your underlying condition or if you are sick. For more information on steps you can take to protect yourself, see CDC's How to Protect Yourself      Patient/family/caregiver given information for GetWell Loop and agrees to enroll no  Patient's preferred e-mail:  n/a  Patient's preferred phone number: n/a  Based on Loop alert triggers, patient will be contacted by nurse care manager for worsening symptoms. Plan for follow-up call in 5-7 days based on severity of symptoms and risk factors.

## 2020-09-28 NOTE — PROGRESS NOTES
1. Have you been to the ER, urgent care clinic since your last visit? Hospitalized since your last visit? [address] admit 9- hypokalemia, LiveVest.    2. Have you seen or consulted any other health care providers outside of the [address] [place] [address] since your last visit? Include any pap smears or colon screening. No    Chief Complaint   Patient presents with    Cardiomyopathy     2 wk fu. Has LifeVest. C/O SOB with exertion. Has not started Entreso yet.

## 2020-10-01 ENCOUNTER — HOSPITAL ENCOUNTER (OUTPATIENT)
Dept: CARDIAC REHAB | Age: 75
Discharge: HOME | End: 2020-10-01
Payer: MEDICARE

## 2020-10-01 VITALS — WEIGHT: 164 LBS | BODY MASS INDEX: 27.29 KG/M2

## 2020-10-01 PROCEDURE — 93798 PHYS/QHP OP CAR RHAB W/ECG: CPT

## 2020-10-05 ENCOUNTER — HOSPITAL ENCOUNTER (OUTPATIENT)
Dept: CARDIAC REHAB | Age: 75
Discharge: HOME | End: 2020-10-05
Payer: MEDICARE

## 2020-10-05 VITALS — WEIGHT: 160 LBS | BODY MASS INDEX: 26.63 KG/M2

## 2020-10-05 PROCEDURE — 93798 PHYS/QHP OP CAR RHAB W/ECG: CPT

## 2020-10-08 ENCOUNTER — HOSPITAL ENCOUNTER (OUTPATIENT)
Dept: CARDIAC REHAB | Age: 75
Discharge: HOME | End: 2020-10-08
Payer: MEDICARE

## 2020-10-08 VITALS — BODY MASS INDEX: 26.29 KG/M2 | WEIGHT: 158 LBS

## 2020-10-08 PROCEDURE — 93798 PHYS/QHP OP CAR RHAB W/ECG: CPT

## 2020-10-12 ENCOUNTER — HOSPITAL ENCOUNTER (OUTPATIENT)
Dept: CARDIAC REHAB | Age: 75
Discharge: HOME | End: 2020-10-12
Payer: MEDICARE

## 2020-10-12 PROCEDURE — 93798 PHYS/QHP OP CAR RHAB W/ECG: CPT

## 2020-10-15 ENCOUNTER — HOSPITAL ENCOUNTER (OUTPATIENT)
Dept: CARDIAC REHAB | Age: 75
Discharge: HOME | End: 2020-10-15
Payer: MEDICARE

## 2020-10-15 PROCEDURE — 93798 PHYS/QHP OP CAR RHAB W/ECG: CPT

## 2020-10-19 ENCOUNTER — PATIENT OUTREACH (OUTPATIENT)
Dept: CASE MANAGEMENT | Age: 75
End: 2020-10-19

## 2020-10-19 ENCOUNTER — HOSPITAL ENCOUNTER (OUTPATIENT)
Dept: CARDIAC REHAB | Age: 75
Discharge: HOME | End: 2020-10-19
Payer: MEDICARE

## 2020-10-19 VITALS — BODY MASS INDEX: 26.29 KG/M2 | WEIGHT: 158 LBS

## 2020-10-19 NOTE — PROGRESS NOTES
Goals      Reduce risk of CHF exacerbations and complications. 9/28/2020 Patient reports SOB and edema today. Weight is 161. Taking Entresto and Bumex. Appointment with cardio 9/28/2020. Patient reported provider told him he would have a prescription for Lasix but it was not provided at discharge and he does not have. CTN advised patient that cardio will assess him today during appointment and provide him with prescription if he feels it is needed. CTN will follow up with patient in 5-7 days. [facility]     10/19/2020 Patient reports weight is 150. Attended follow up appointments, participating in cardiac rehab. Denies chest pain, SOB, fever, N/V/D. Patient will monitor S&S of CHF and report at next outreach.  [facility]

## 2020-10-22 ENCOUNTER — HOSPITAL ENCOUNTER (OUTPATIENT)
Dept: CARDIAC REHAB | Age: 75
Discharge: HOME | End: 2020-10-22
Payer: MEDICARE

## 2020-10-22 VITALS — BODY MASS INDEX: 26.46 KG/M2 | WEIGHT: 159 LBS

## 2020-10-22 PROCEDURE — 93798 PHYS/QHP OP CAR RHAB W/ECG: CPT

## 2020-10-26 ENCOUNTER — HOSPITAL ENCOUNTER (OUTPATIENT)
Dept: CARDIAC REHAB | Age: 75
Discharge: HOME | End: 2020-10-26
Payer: MEDICARE

## 2020-10-26 ENCOUNTER — TELEPHONE (OUTPATIENT)
Dept: CARDIOLOGY CLINIC | Age: 75
End: 2020-10-26

## 2020-10-26 VITALS — WEIGHT: 156 LBS | BODY MASS INDEX: 25.96 KG/M2

## 2020-10-26 PROCEDURE — 93798 PHYS/QHP OP CAR RHAB W/ECG: CPT

## 2020-10-26 NOTE — TELEPHONE ENCOUNTER
Pt called wanting to know how long does he need to wear his life vest, He said Dr. Tamica Fierro told him to wear it until his apt, The next available apt w/ Dr. Tamica Fierro is not until December, so the pt wants to know, does he need to wear it until Dec, please give pt a call regarding the matter      thanks

## 2020-10-26 NOTE — TELEPHONE ENCOUNTER
Called pt, verified pt with two pt identifiers, advised pt he is to continue to wear his life vest every day. Pt is not scheduled for his echo and 3 month f/u. Advised I will send to our  and she will call and get him scheduled. Pt verbalized understanding.

## 2020-10-26 NOTE — TELEPHONE ENCOUNTER
Message   Received: Today   Message Contents   Vinnie Bangura LPN    Caller: Unspecified (Today,  1:05 PM)               Scheduled for 1/7/2021        Noted, thanks.

## 2020-10-29 ENCOUNTER — HOSPITAL ENCOUNTER (OUTPATIENT)
Dept: CARDIAC REHAB | Age: 75
Discharge: HOME | End: 2020-10-29
Payer: MEDICARE

## 2020-10-29 VITALS — BODY MASS INDEX: 26.63 KG/M2 | WEIGHT: 160 LBS

## 2020-10-29 PROCEDURE — 93798 PHYS/QHP OP CAR RHAB W/ECG: CPT

## 2020-11-02 ENCOUNTER — HOSPITAL ENCOUNTER (OUTPATIENT)
Dept: CARDIAC REHAB | Age: 75
Discharge: HOME | End: 2020-11-02
Payer: MEDICARE

## 2020-11-02 VITALS — BODY MASS INDEX: 26.46 KG/M2 | WEIGHT: 159 LBS

## 2020-11-02 PROCEDURE — 93798 PHYS/QHP OP CAR RHAB W/ECG: CPT

## 2020-11-05 ENCOUNTER — HOSPITAL ENCOUNTER (OUTPATIENT)
Dept: CARDIAC REHAB | Age: 75
Discharge: HOME | End: 2020-11-05
Payer: MEDICARE

## 2020-11-05 VITALS — WEIGHT: 160 LBS | BODY MASS INDEX: 26.63 KG/M2

## 2020-11-05 PROCEDURE — 93798 PHYS/QHP OP CAR RHAB W/ECG: CPT

## 2020-11-09 ENCOUNTER — HOSPITAL ENCOUNTER (OUTPATIENT)
Dept: CARDIAC REHAB | Age: 75
Discharge: HOME | End: 2020-11-09
Payer: MEDICARE

## 2020-11-09 VITALS — WEIGHT: 160 LBS | BODY MASS INDEX: 26.63 KG/M2

## 2020-11-09 PROCEDURE — 93798 PHYS/QHP OP CAR RHAB W/ECG: CPT

## 2020-11-12 ENCOUNTER — HOSPITAL ENCOUNTER (OUTPATIENT)
Dept: CARDIAC REHAB | Age: 75
Discharge: HOME | End: 2020-11-12
Payer: MEDICARE

## 2020-11-12 VITALS — WEIGHT: 160 LBS | BODY MASS INDEX: 26.63 KG/M2

## 2020-11-12 PROCEDURE — 93798 PHYS/QHP OP CAR RHAB W/ECG: CPT

## 2020-11-16 ENCOUNTER — HOSPITAL ENCOUNTER (OUTPATIENT)
Dept: CARDIAC REHAB | Age: 75
Discharge: HOME | End: 2020-11-16
Payer: MEDICARE

## 2020-11-16 VITALS — WEIGHT: 161 LBS | BODY MASS INDEX: 26.79 KG/M2

## 2020-11-16 PROCEDURE — 93798 PHYS/QHP OP CAR RHAB W/ECG: CPT

## 2020-11-19 ENCOUNTER — HOSPITAL ENCOUNTER (OUTPATIENT)
Dept: CARDIAC REHAB | Age: 75
Discharge: HOME | End: 2020-11-19
Payer: MEDICARE

## 2020-11-19 VITALS — WEIGHT: 162 LBS | BODY MASS INDEX: 26.96 KG/M2

## 2020-11-19 PROCEDURE — 93798 PHYS/QHP OP CAR RHAB W/ECG: CPT

## 2020-11-20 NOTE — TELEPHONE ENCOUNTER
Verified patient with 2 identifiers. Dr Claudell Beagle gave him a script for entresto while in the hospital in September. He is questioning if he needs to continue taking and if so he needs a refill.

## 2020-11-23 ENCOUNTER — DOCUMENTATION ONLY (OUTPATIENT)
Dept: CARDIOLOGY CLINIC | Age: 75
End: 2020-11-23

## 2020-11-23 ENCOUNTER — HOSPITAL ENCOUNTER (OUTPATIENT)
Dept: CARDIAC REHAB | Age: 75
Discharge: HOME | End: 2020-11-23
Payer: MEDICARE

## 2020-11-23 VITALS — BODY MASS INDEX: 26.63 KG/M2 | WEIGHT: 160 LBS

## 2020-11-23 PROCEDURE — 93798 PHYS/QHP OP CAR RHAB W/ECG: CPT

## 2020-11-23 NOTE — PROGRESS NOTES
Faxed signed life vest order for 3 month cont. Faxed to Jessica Butler at [phone] and received fax confirmation.

## 2020-11-30 ENCOUNTER — HOSPITAL ENCOUNTER (OUTPATIENT)
Dept: CARDIAC REHAB | Age: 75
Discharge: HOME | End: 2020-11-30
Payer: MEDICARE

## 2020-11-30 ENCOUNTER — DOCUMENTATION ONLY (OUTPATIENT)
Dept: CARDIOLOGY CLINIC | Age: 75
End: 2020-11-30

## 2020-11-30 VITALS — WEIGHT: 159 LBS | BODY MASS INDEX: 26.46 KG/M2

## 2020-11-30 PROCEDURE — 93798 PHYS/QHP OP CAR RHAB W/ECG: CPT

## 2020-11-30 NOTE — PROGRESS NOTES
[facility] and dating of reorder- the wrong date was put on previous reorder and had to be redone. Faxed signed and dated reorder zoll life vest form to [phone] and received fax confirmation.

## 2020-12-03 ENCOUNTER — HOSPITAL ENCOUNTER (OUTPATIENT)
Dept: CARDIAC REHAB | Age: 75
Discharge: HOME | End: 2020-12-03
Payer: MEDICARE

## 2020-12-03 VITALS — BODY MASS INDEX: 26.29 KG/M2 | WEIGHT: 158 LBS

## 2020-12-03 PROCEDURE — 93798 PHYS/QHP OP CAR RHAB W/ECG: CPT

## 2020-12-07 ENCOUNTER — HOSPITAL ENCOUNTER (OUTPATIENT)
Dept: CARDIAC REHAB | Age: 75
Discharge: HOME | End: 2020-12-07
Payer: MEDICARE

## 2020-12-07 VITALS — BODY MASS INDEX: 26.29 KG/M2 | WEIGHT: 158 LBS

## 2020-12-07 PROCEDURE — 93798 PHYS/QHP OP CAR RHAB W/ECG: CPT

## 2020-12-10 ENCOUNTER — HOSPITAL ENCOUNTER (OUTPATIENT)
Dept: CARDIAC REHAB | Age: 75
Discharge: HOME | End: 2020-12-10
Payer: MEDICARE

## 2020-12-10 VITALS — BODY MASS INDEX: 26.29 KG/M2 | WEIGHT: 158 LBS

## 2020-12-10 PROCEDURE — 93798 PHYS/QHP OP CAR RHAB W/ECG: CPT

## 2020-12-14 ENCOUNTER — HOSPITAL ENCOUNTER (OUTPATIENT)
Dept: CARDIAC REHAB | Age: 75
Discharge: HOME | End: 2020-12-14
Payer: MEDICARE

## 2020-12-14 VITALS — BODY MASS INDEX: 26.46 KG/M2 | WEIGHT: 159 LBS

## 2020-12-14 PROCEDURE — 93798 PHYS/QHP OP CAR RHAB W/ECG: CPT

## 2020-12-15 DIAGNOSIS — Z95.1 S/P CABG X 4: ICD-10-CM

## 2020-12-15 RX ORDER — POTASSIUM CHLORIDE 750 MG/1
TABLET, FILM COATED, EXTENDED RELEASE ORAL
Qty: 30 TAB | Refills: 1 | Status: SHIPPED | OUTPATIENT
Start: 2020-12-15 | End: 2021-01-08

## 2020-12-15 RX ORDER — CARVEDILOL 3.12 MG/1
TABLET ORAL
Qty: 60 TAB | Refills: 1 | Status: SHIPPED | OUTPATIENT
Start: 2020-12-15 | End: 2021-01-08

## 2020-12-17 ENCOUNTER — HOSPITAL ENCOUNTER (OUTPATIENT)
Dept: CARDIAC REHAB | Age: 75
Discharge: HOME | End: 2020-12-17
Payer: MEDICARE

## 2020-12-17 VITALS — WEIGHT: 159 LBS | BODY MASS INDEX: 26.46 KG/M2

## 2020-12-17 PROCEDURE — 93798 PHYS/QHP OP CAR RHAB W/ECG: CPT

## 2020-12-18 ENCOUNTER — PATIENT OUTREACH (OUTPATIENT)
Dept: CASE MANAGEMENT | Age: 75
End: 2020-12-18

## 2020-12-18 NOTE — PROGRESS NOTES
Goals      Reduce risk of CHF exacerbations and complications. 9/28/2020 Patient reports SOB and edema today. Weight is 161. Taking Entresto and Bumex. Appointment with cardio 9/28/2020. Patient reported provider told him he would have a prescription for Lasix but it was not provided at discharge and he does not have. CTN advised patient that cardio will assess him today during appointment and provide him with prescription if he feels it is needed. CTN will follow up with patient in 5-7 days. [facility]     10/19/2020 Patient reports weight is 150. Attended follow up appointments, participating in cardiac rehab. Denies chest pain, SOB, fever, N/V/D. Patient will monitor S&S of CHF and report at next outreach. [facility]    12/18/2020 per review of chart patient has had no ED or hospitalizations and is attending cardiac rehab. Will follow up with patient next week.  [facility]

## 2020-12-21 ENCOUNTER — HOSPITAL ENCOUNTER (OUTPATIENT)
Dept: CARDIAC REHAB | Age: 75
Discharge: HOME | End: 2020-12-21
Payer: MEDICARE

## 2020-12-21 VITALS — BODY MASS INDEX: 26.46 KG/M2 | WEIGHT: 159 LBS

## 2020-12-21 DIAGNOSIS — Z91.199 NON COMPLIANCE WITH MEDICAL TREATMENT: ICD-10-CM

## 2020-12-21 DIAGNOSIS — I25.10 ASHD (ARTERIOSCLEROTIC HEART DISEASE): ICD-10-CM

## 2020-12-21 DIAGNOSIS — I10 ESSENTIAL HYPERTENSION: ICD-10-CM

## 2020-12-21 DIAGNOSIS — Z95.1 S/P CABG X 4: ICD-10-CM

## 2020-12-21 DIAGNOSIS — I25.111 CORONARY ARTERY DISEASE INVOLVING NATIVE CORONARY ARTERY OF NATIVE HEART WITH ANGINA PECTORIS WITH DOCUMENTED SPASM (HCC): ICD-10-CM

## 2020-12-21 DIAGNOSIS — E78.2 MIXED HYPERLIPIDEMIA: ICD-10-CM

## 2020-12-21 DIAGNOSIS — I25.5 ISCHEMIC CARDIOMYOPATHY: ICD-10-CM

## 2020-12-21 PROCEDURE — 93798 PHYS/QHP OP CAR RHAB W/ECG: CPT

## 2020-12-28 DIAGNOSIS — E78.2 MIXED HYPERLIPIDEMIA: ICD-10-CM

## 2020-12-28 DIAGNOSIS — I10 ESSENTIAL HYPERTENSION: ICD-10-CM

## 2020-12-28 DIAGNOSIS — I25.5 ISCHEMIC CARDIOMYOPATHY: ICD-10-CM

## 2020-12-28 DIAGNOSIS — Z95.1 S/P CABG X 4: ICD-10-CM

## 2020-12-28 DIAGNOSIS — Z91.199 NON COMPLIANCE WITH MEDICAL TREATMENT: ICD-10-CM

## 2020-12-28 DIAGNOSIS — I25.10 ASHD (ARTERIOSCLEROTIC HEART DISEASE): ICD-10-CM

## 2020-12-28 DIAGNOSIS — I25.111 CORONARY ARTERY DISEASE INVOLVING NATIVE CORONARY ARTERY OF NATIVE HEART WITH ANGINA PECTORIS WITH DOCUMENTED SPASM (HCC): ICD-10-CM

## 2020-12-31 ENCOUNTER — HOSPITAL ENCOUNTER (OUTPATIENT)
Dept: CARDIAC REHAB | Age: 75
Discharge: HOME | End: 2020-12-31
Payer: MEDICARE

## 2020-12-31 VITALS — WEIGHT: 162 LBS | BODY MASS INDEX: 26.96 KG/M2

## 2020-12-31 PROCEDURE — 93798 PHYS/QHP OP CAR RHAB W/ECG: CPT

## 2021-01-04 ENCOUNTER — HOSPITAL ENCOUNTER (OUTPATIENT)
Dept: CARDIAC REHAB | Age: 76
Discharge: HOME | End: 2021-01-04
Payer: MEDICARE

## 2021-01-04 VITALS — WEIGHT: 161 LBS | BODY MASS INDEX: 26.79 KG/M2

## 2021-01-04 PROCEDURE — 93798 PHYS/QHP OP CAR RHAB W/ECG: CPT

## 2021-01-07 ENCOUNTER — TELEPHONE (OUTPATIENT)
Dept: CARDIOLOGY CLINIC | Age: 76
End: 2021-01-07

## 2021-01-07 ENCOUNTER — ANCILLARY PROCEDURE (OUTPATIENT)
Dept: CARDIOLOGY CLINIC | Age: 76
End: 2021-01-07
Payer: MEDICARE

## 2021-01-07 ENCOUNTER — HOSPITAL ENCOUNTER (OUTPATIENT)
Dept: CARDIAC REHAB | Age: 76
Discharge: HOME | End: 2021-01-07
Payer: MEDICARE

## 2021-01-07 ENCOUNTER — OFFICE VISIT (OUTPATIENT)
Dept: CARDIOLOGY CLINIC | Age: 76
End: 2021-01-07
Payer: MEDICARE

## 2021-01-07 VITALS
WEIGHT: 162.4 LBS | RESPIRATION RATE: 18 BRPM | DIASTOLIC BLOOD PRESSURE: 80 MMHG | HEART RATE: 78 BPM | OXYGEN SATURATION: 98 % | HEIGHT: 65 IN | BODY MASS INDEX: 27.06 KG/M2 | SYSTOLIC BLOOD PRESSURE: 122 MMHG

## 2021-01-07 VITALS — BODY MASS INDEX: 26.79 KG/M2 | WEIGHT: 161 LBS

## 2021-01-07 VITALS
BODY MASS INDEX: 26.82 KG/M2 | SYSTOLIC BLOOD PRESSURE: 128 MMHG | HEIGHT: 65 IN | DIASTOLIC BLOOD PRESSURE: 80 MMHG | WEIGHT: 161 LBS

## 2021-01-07 DIAGNOSIS — I25.10 CORONARY ARTERY DISEASE INVOLVING NATIVE CORONARY ARTERY OF NATIVE HEART WITHOUT ANGINA PECTORIS: Chronic | ICD-10-CM

## 2021-01-07 DIAGNOSIS — Z95.1 S/P CABG X 4: ICD-10-CM

## 2021-01-07 DIAGNOSIS — I25.5 ISCHEMIC CARDIOMYOPATHY: ICD-10-CM

## 2021-01-07 DIAGNOSIS — E78.2 MIXED HYPERLIPIDEMIA: Chronic | ICD-10-CM

## 2021-01-07 DIAGNOSIS — Z78.9 STATIN INTOLERANCE: ICD-10-CM

## 2021-01-07 DIAGNOSIS — I25.5 ISCHEMIC CARDIOMYOPATHY: Primary | ICD-10-CM

## 2021-01-07 LAB
ECHO AO ASC DIAM: 3.24 CM
ECHO AO ROOT DIAM: 3.43 CM
ECHO AV PEAK GRADIENT: 5.15 MMHG
ECHO AV PEAK VELOCITY: 113.47 CM/S
ECHO EST RA PRESSURE: 3 MMHG
ECHO LA AREA 4C: 19.39 CM2
ECHO LA MAJOR AXIS: 4.65 CM
ECHO LA MINOR AXIS: 2.58 CM
ECHO LA VOL 2C: 46.29 ML (ref 18–58)
ECHO LA VOL 4C: 54.28 ML (ref 18–58)
ECHO LA VOL BP: 55.59 ML (ref 18–58)
ECHO LA VOL/BSA BIPLANE: 30.82 ML/M2 (ref 16–28)
ECHO LA VOLUME INDEX A2C: 25.66 ML/M2 (ref 16–28)
ECHO LA VOLUME INDEX A4C: 30.09 ML/M2 (ref 16–28)
ECHO LV E' LATERAL VELOCITY: 9.45 CM/S
ECHO LV GLOBAL LONGITUDINAL STRAIN (GLS): -8.9 PERCENT
ECHO LV INTERNAL DIMENSION DIASTOLIC: 6.36 CM (ref 4.2–5.9)
ECHO LV INTERNAL DIMENSION SYSTOLIC: 5.15 CM
ECHO LV ISOVOLUMETRIC RELAXATION TIME (IVRT): 95.15 MS
ECHO LV IVSD: 1.05 CM (ref 0.6–1)
ECHO LV MASS 2D: 290.3 G (ref 88–224)
ECHO LV MASS INDEX 2D: 160.9 G/M2 (ref 49–115)
ECHO LV POSTERIOR WALL DIASTOLIC: 1.05 CM (ref 0.6–1)
ECHO LVOT PEAK GRADIENT: 3.22 MMHG
ECHO LVOT PEAK VELOCITY: 89.76 CM/S
ECHO MV "A" WAVE DURATION: 157.94 MS
ECHO MV A VELOCITY: 61.07 CM/S
ECHO MV E DECELERATION TIME (DT): 184.6 MS
ECHO MV E VELOCITY: 48.9 CM/S
ECHO MV E/A RATIO: 0.8
ECHO MV E/E' LATERAL: 5.17
ECHO RIGHT VENTRICULAR SYSTOLIC PRESSURE (RVSP): 20.31 MMHG
ECHO RV TAPSE: 0.91 CM (ref 1.5–2)
ECHO TV REGURGITANT MAX VELOCITY: 208.02 CM/S
ECHO TV REGURGITANT PEAK GRADIENT: 17.31 MMHG
GLOBAL LONGITUDINAL STRAIN 2 CHAMBER: -7.4 PERCENT
GLOBAL LONGITUDINAL STRAIN 4 CHAMBER: -9.6 PERCENT
GLOBAL LONGITUDINAL STRAIN LONG AXIS: -9.6 PERCENT
LA VOL DISK BP: 53.23 ML (ref 18–58)

## 2021-01-07 PROCEDURE — G8417 CALC BMI ABV UP PARAM F/U: HCPCS | Performed by: INTERNAL MEDICINE

## 2021-01-07 PROCEDURE — G8427 DOCREV CUR MEDS BY ELIG CLIN: HCPCS | Performed by: INTERNAL MEDICINE

## 2021-01-07 PROCEDURE — 93005 ELECTROCARDIOGRAM TRACING: CPT | Performed by: INTERNAL MEDICINE

## 2021-01-07 PROCEDURE — G0463 HOSPITAL OUTPT CLINIC VISIT: HCPCS | Performed by: INTERNAL MEDICINE

## 2021-01-07 PROCEDURE — 3017F COLORECTAL CA SCREEN DOC REV: CPT | Performed by: INTERNAL MEDICINE

## 2021-01-07 PROCEDURE — 1101F PT FALLS ASSESS-DOCD LE1/YR: CPT | Performed by: INTERNAL MEDICINE

## 2021-01-07 PROCEDURE — 93010 ELECTROCARDIOGRAM REPORT: CPT | Performed by: INTERNAL MEDICINE

## 2021-01-07 PROCEDURE — G8510 SCR DEP NEG, NO PLAN REQD: HCPCS | Performed by: INTERNAL MEDICINE

## 2021-01-07 PROCEDURE — 93306 TTE W/DOPPLER COMPLETE: CPT | Performed by: INTERNAL MEDICINE

## 2021-01-07 PROCEDURE — 93798 PHYS/QHP OP CAR RHAB W/ECG: CPT

## 2021-01-07 PROCEDURE — 99214 OFFICE O/P EST MOD 30 MIN: CPT | Performed by: INTERNAL MEDICINE

## 2021-01-07 PROCEDURE — G8536 NO DOC ELDER MAL SCRN: HCPCS | Performed by: INTERNAL MEDICINE

## 2021-01-07 RX ORDER — SACUBITRIL AND VALSARTAN 24; 26 MG/1; MG/1
1 TABLET, FILM COATED ORAL 2 TIMES DAILY
COMMUNITY
End: 2021-01-16

## 2021-01-07 RX ORDER — SELENIUM 100 MCG
TABLET ORAL DAILY
COMMUNITY
End: 2021-09-28

## 2021-01-07 RX ORDER — ACETAMINOPHEN 500 MG
2000 TABLET ORAL 2 TIMES DAILY
COMMUNITY

## 2021-01-07 NOTE — TELEPHONE ENCOUNTER
----- Message from Jhonathan Santizo MD sent at 1/7/2021  3:09 PM EST -----  Inform him EF has improved. No need for ICD. He can return lifevest.      Called pt,verified pt with two pt identifiers, advised pt  reviewed his echo from today and his pumping strength has improved, he does not need to get a device placement and he can return his life vest. Advised to call Prashanth Null and they will instruct him on how to return the life vest. Pt verbalized understanding. Faxed note to ATTN:Mima at [phone] stating pt no longer needs his life vest. He may return it. Received fax confirmation.

## 2021-01-07 NOTE — PROGRESS NOTES
Chief Complaint   Patient presents with    Cardiomyopathy     3 month follow up -     Shortness of Breath     getting over-able to do a lot more -able to exerise -do yard work - hold breath      1. Have you been to the ER, urgent care clinic since your last visit? Hospitalized since your last visit? No     2. Have you seen or consulted any other health care providers outside of the [address] [place] [address] since your last visit? Include any pap smears or colon screening.   No

## 2021-01-07 NOTE — PROGRESS NOTES
1/7/2021 2:57 PM      Subjective: Luis Odom   denies chest pain, chest pressure/discomfort, dyspnea, palpitations, irregular heart beats, near-syncope, syncope, fatigue, orthopnea, paroxysmal nocturnal dyspnea, exertional chest pressure/discomfort, claudication, lower extremity edema, tachypnea. Visit Vitals  /80 (BP 1 Location: Left arm, BP Patient Position: Sitting)   Pulse 78   Resp 18   Ht 5' 5\" (1.651 m)   Wt 162 lb 6.4 oz (73.7 kg)   SpO2 98%   BMI 27.02 kg/m²     Current Outpatient Medications   Medication Sig    OTHER Take  by mouth daily. Organic Beet essence juice powder- 1 tsp in water    ascorbic acid (VITAMIN C PO) Take  by mouth daily. Takes 2 gummies 4 times daily----2 = 250 mg    cholecalciferol (VITAMIN D3) (2,000 UNITS /50 MCG) cap capsule Take 2,000 Units by mouth two (2) times a day. gummies    selenium 100 mcg tab Take  by mouth daily.  sacubitriL-valsartan (Entresto) 24-26 mg tablet Take 1 Tab by mouth two (2) times a day.  potassium chloride SR (KLOR-CON 10) 10 mEq tablet TAKE 1 TABLET BY MOUTH EVERY DAY    carvediloL (COREG) 3.125 mg tablet TAKE 1 TABLET BY MOUTH TWICE A DAY WITH MEALS    bumetanide (BUMEX) 1 mg tablet Take 1 Tab by mouth daily.  saw palmetto xtr/zinc picolin (SAW PALMETTO EXTRACT PO) Take  by mouth daily.  saw/vit E/sod ana maria/lyc/beta/pyg (PROSTATE HEALTH PO) Take  by mouth daily.  TURMERIC PO Take  by mouth daily.  aspirin 81 mg chewable tablet Take 1 Tab by mouth daily.  B-Sit/m-19/bitter-orange peel (SLENDER CORTISOL PO) Take  by mouth daily.  atorvastatin (LIPITOR) 20 mg tablet Take 1 Tab by mouth nightly. No current facility-administered medications for this visit.           Objective:      Visit Vitals  /80 (BP 1 Location: Left arm, BP Patient Position: Sitting)   Pulse 78   Resp 18   Ht 5' 5\" (1.651 m)   Wt 162 lb 6.4 oz (73.7 kg)   SpO2 98%   BMI 27.02 kg/m²       Data Review:   Labs: Recent Results (from the past 24 hour(s))   ECHO ADULT COMPLETE    Collection Time: 01/07/21  2:05 PM   Result Value Ref Range    IVSd 1.05 (A) 0.6 - 1.0 cm    LVIDd 6.36 (A) 4.2 - 5.9 cm    LVIDs 5.15 cm    LVPWd 1.05 (A) 0.6 - 1.0 cm    Global Longitudinal Strain 2 Chamber -7.4 percent    Global Longitudinal Strain 4 Chamber -9.6 percent    Global Longitudinal Strain Long Axis -9.6 percent    Global Longitudinal Strain -8.9 percent    LVOT Peak Gradient 3.22 mmHg    LVOT Peak Velocity 89.76 cm/s    Left Ventricle Isovolumic Relaxation Time 95.15 ms    RVSP 20.31 mmHg    Left Atrium Major Axis 4.65 cm    LA Volume 55.59 18 - 58 mL    LA Area 4C 19.39 cm2    LA Vol 2C 46.29 18 - 58 mL    LA Vol 4C 54.28 18 - 58 mL    LA Volume DISK BP 53.23 18 - 58 mL    Est. RA Pressure 3.00 mmHg    AoV PG 5.15 mmHg    Aortic Valve Systolic Peak Velocity 285.26 cm/s    MV \"A\" wave duration 157.94 ms    MV A Yusef 61.07 cm/s    Mitral Valve E Wave Deceleration Time 184.60 ms    MV E Ysuef 48.90 cm/s    E/E' lateral 5.17     LV E' Lateral Velocity 9.45 cm/s    Tapse 0.91 (A) 1.5 - 2.0 cm    Triscuspid Valve Regurgitation Peak Gradient 17.31 mmHg    TR Max Velocity 208.02 cm/s    AO ASC D 3.24 cm    Ao Root D 3.43 cm    MV E/A 0.80     LV Mass .3 88 - 224 g    LV Mass AL Index 160.9 49 - 115 g/m2    Left Atrium Minor Axis 2.58 cm    LA Vol Index 30.82 16 - 28 ml/m2    LA Vol Index 25.66 16 - 28 ml/m2    LA Vol Index 30.09 16 - 28 ml/m2       EKG: Normal sinus rhythm, RBBB    Reviewed and/or ordered active problem list, medication list tests    Past Medical History:   Diagnosis Date    Acute MI anterior wall first episode care ([facility] [address] 9/21/2010    CAD (coronary artery disease), native coronary artery 9/21/2010    Coronary stent 9/21/2010    Hyperlipidemia 9/21/2010      Past Surgical History:   Procedure Laterality Date    HX CORONARY ARTERY BYPASS GRAFT  08/06/2020    x 4, LIMA to LAD, RSVG to Diag-OM2, RSVG to PDA     No Known Allergies   Family History   Problem Relation Age of Onset    No Known Problems Mother     No Known Problems Father       Social History     Socioeconomic History    Marital status:      Spouse name: Not on file    Number of children: 1    Years of education: 15    Highest education level: 12th grade   Occupational History    Not on file   Social Needs    Financial resource strain: Not on file    Food insecurity     Worry: Never true     Inability: Never true    Transportation needs     Medical: No     Non-medical: No   Tobacco Use    Smoking status: Never Smoker    Smokeless tobacco: Never Used   Substance and Sexual Activity    Alcohol use: Never     Frequency: Never    Drug use: Not Currently     Types: Prescription, OTC    Sexual activity: Not on file   Lifestyle    Physical activity     Days per week: 0 days     Minutes per session: 0 min    Stress: Only a little   Relationships    Social connections     Talks on phone: Once a week     Gets together: Once a week     Attends Temple service: Never     Active member of club or organization: No     Attends meetings of clubs or organizations: Never     Relationship status:     Intimate partner violence     Fear of current or ex partner: No     Emotionally abused: No     Physically abused: No     Forced sexual activity: No   Other Topics Concern     Service No    Blood Transfusions No    Caffeine Concern No    Occupational Exposure No    Hobby Hazards No    Sleep Concern Yes    Stress Concern Yes    Weight Concern No    Special Diet Yes    Back Care No    Exercise No    Bike Helmet No    Seat Belt Yes    Self-Exams No   Social History Narrative    Not on file         Review of Systems     General: Not Present- Anorexia, Chills, Dietary Changes, Fatigue, Fever, Medication Changes, Night Sweats, Weight Gain > 10lbs. and Weight Loss > 10lbs. .  Skin: Not Present- Bruising and Excessive Sweating.   HEENT: Not Present- Headache, Visual Loss and Vertigo. Respiratory: Not Present- Cough, Decreased Exercise Tolerance, Difficulty Breathing, Snoring and Wheezing. Cardiovascular: Not Present- Abnormal Blood Pressure, Chest Pain, Claudications, Difficulty Breathing On Exertion, Edema, Fainting / Blacking Out, Irregular Heart Beat, Night Cramps, Orthopnea, Palpitations, Paroxysmal Nocturnal Dyspnea, Rapid Heart Rate, Shortness of Breath and Swelling of Extremities. Gastrointestinal: Not Present- Black, Tarry Stool, Bloody Stool, Diarrhea, Hematemesis, Rectal Bleeding and Vomiting. Musculoskeletal: Not Present- Muscle Pain and Muscle Weakness. Neurological: Not Present- Dizziness. Psychiatric: Not Present- Depression. Endocrine: Not Present- Cold Intolerance, Heat Intolerance and Thyroid Problems. Hematology: Not Present- Abnormal Bleeding, Anemia, Blood Clots and Easy Bruising. Physical Exam   The physical exam findings are as follows:       General   Mental Status - Alert. General Appearance - Not in acute distress. Chest and Lung Exam   Inspection: Accessory muscles - No use of accessory muscles in breathing. Auscultation:   Breath sounds: - Normal.      Cardiovascular   Inspection: Jugular vein - Bilateral - Inspection Normal.  Palpation/Percussion:   Apical Impulse: - Normal.  Auscultation: Rhythm - Regular. Heart Sounds - S1 WNL and S2 WNL. No S3 or S4. Murmurs & Other Heart Sounds: Auscultation of the heart reveals - No Murmurs. Carotid arteries - No Carotid bruit. Peripheral Vascular   Upper Extremity: Inspection - Bilateral - No Cyanotic nailbeds or Digital clubbing. Lower Extremity:   Palpation: Edema - Bilateral - No edema. Assessment:       ICD-10-CM ICD-9-CM    1. Ischemic cardiomyopathy  I25.5 414.8 AMB POC EKG ROUTINE W/ 12 LEADS, INTER & REP      LIPID PANEL      METABOLIC PANEL, COMPREHENSIVE   2. S/P CABG x 4  Z95.1 V45.81 LIPID PANEL      METABOLIC PANEL, COMPREHENSIVE   3.  Mixed hyperlipidemia  E78.2 272.2 LIPID PANEL      METABOLIC PANEL, COMPREHENSIVE   4. Statin intolerance  Z78.9 995.27 LIPID PANEL      METABOLIC PANEL, COMPREHENSIVE   5. Coronary artery disease involving native coronary artery of native heart without angina pectoris  I25.10 414.01        Plan:     ICM  Echo consistent with prior EF 20-25%, mild MR per echo in 9/2020  Today's Echo report is pending. Will f/u. If EF <35% then will consider EP referral for ICD. On lifevest.   Continue coreg and entresto. NYHA class 1.      ASHD  s/p CABG x 4 in 8/6/2020: LIMA to LAD, RSVG to Diag-OM2, RSVG to PDA  Continue ASA BB   Stable.      HTN  Controlled with current therapy     HLD  Statin intolerant. Statin dc due to leg cramps. Check FLP. Option of PCSK 9 inhibitor d/w pt. He will think about it.

## 2021-01-08 DIAGNOSIS — Z95.1 S/P CABG X 4: ICD-10-CM

## 2021-01-08 RX ORDER — POTASSIUM CHLORIDE 750 MG/1
TABLET, FILM COATED, EXTENDED RELEASE ORAL
Qty: 30 TAB | Refills: 1 | Status: SHIPPED | OUTPATIENT
Start: 2021-01-08 | End: 2021-02-26

## 2021-01-08 RX ORDER — CARVEDILOL 3.12 MG/1
TABLET ORAL
Qty: 60 TAB | Refills: 1 | Status: SHIPPED | OUTPATIENT
Start: 2021-01-08 | End: 2021-02-02

## 2021-01-11 ENCOUNTER — HOSPITAL ENCOUNTER (OUTPATIENT)
Dept: CARDIAC REHAB | Age: 76
Discharge: HOME | End: 2021-01-11
Payer: MEDICARE

## 2021-01-11 VITALS — BODY MASS INDEX: 26.13 KG/M2 | WEIGHT: 157 LBS

## 2021-01-11 PROCEDURE — 93798 PHYS/QHP OP CAR RHAB W/ECG: CPT

## 2021-01-14 ENCOUNTER — HOSPITAL ENCOUNTER (OUTPATIENT)
Dept: CARDIAC REHAB | Age: 76
Discharge: HOME | End: 2021-01-14
Payer: MEDICARE

## 2021-01-14 VITALS — WEIGHT: 161 LBS | BODY MASS INDEX: 26.79 KG/M2

## 2021-01-14 PROCEDURE — 93798 PHYS/QHP OP CAR RHAB W/ECG: CPT

## 2021-01-16 RX ORDER — SACUBITRIL AND VALSARTAN 24; 26 MG/1; MG/1
TABLET, FILM COATED ORAL
Qty: 60 TAB | Refills: 1 | Status: SHIPPED | OUTPATIENT
Start: 2021-01-16 | End: 2021-03-22

## 2021-01-18 ENCOUNTER — HOSPITAL ENCOUNTER (OUTPATIENT)
Dept: CARDIAC REHAB | Age: 76
Discharge: HOME | End: 2021-01-18
Payer: MEDICARE

## 2021-01-18 VITALS — BODY MASS INDEX: 26.63 KG/M2 | WEIGHT: 160 LBS

## 2021-01-18 PROCEDURE — 93798 PHYS/QHP OP CAR RHAB W/ECG: CPT

## 2021-01-21 ENCOUNTER — HOSPITAL ENCOUNTER (OUTPATIENT)
Dept: CARDIAC REHAB | Age: 76
Discharge: HOME | End: 2021-01-21
Payer: MEDICARE

## 2021-01-21 VITALS — WEIGHT: 160 LBS | BODY MASS INDEX: 26.63 KG/M2

## 2021-01-21 PROCEDURE — 93798 PHYS/QHP OP CAR RHAB W/ECG: CPT

## 2021-01-25 ENCOUNTER — HOSPITAL ENCOUNTER (OUTPATIENT)
Dept: CARDIAC REHAB | Age: 76
Discharge: HOME | End: 2021-01-25
Payer: MEDICARE

## 2021-01-25 VITALS — WEIGHT: 159 LBS | BODY MASS INDEX: 26.46 KG/M2

## 2021-01-25 PROCEDURE — 93798 PHYS/QHP OP CAR RHAB W/ECG: CPT

## 2021-01-25 NOTE — CARDIO/PULMONARY
Patient completed 36/36 visits. He was independent in his exercise routine and progressed well while in rehab. Mr. Sofia Jones lost 23 pounds, he was able to increase his [facility] Activity from a 18.95 to a 37.45, and his PHQ 9 improved from a 5 to a 2. His 6 MWT improved 233 meters and his METS went from a 2.3 to a 4.3. Mr. Sofia Jones has joined a gym and plans to go two times per week for 60 minutes. He also walks 10 minutes daily which he plans to continue after graduating.

## 2021-02-02 DIAGNOSIS — Z95.1 S/P CABG X 4: ICD-10-CM

## 2021-02-02 RX ORDER — CARVEDILOL 3.12 MG/1
TABLET ORAL
Qty: 60 TAB | Refills: 1 | Status: SHIPPED | OUTPATIENT
Start: 2021-02-02 | End: 2021-02-26

## 2021-02-25 DIAGNOSIS — Z95.1 S/P CABG X 4: ICD-10-CM

## 2021-02-26 RX ORDER — POTASSIUM CHLORIDE 750 MG/1
TABLET, FILM COATED, EXTENDED RELEASE ORAL
Qty: 30 TAB | Refills: 1 | Status: SHIPPED | OUTPATIENT
Start: 2021-02-26 | End: 2021-03-23

## 2021-02-26 RX ORDER — CARVEDILOL 3.12 MG/1
TABLET ORAL
Qty: 60 TAB | Refills: 1 | Status: SHIPPED | OUTPATIENT
Start: 2021-02-26 | End: 2021-03-23

## 2021-03-22 RX ORDER — SACUBITRIL AND VALSARTAN 24; 26 MG/1; MG/1
TABLET, FILM COATED ORAL
Qty: 60 TAB | Refills: 1 | Status: SHIPPED | OUTPATIENT
Start: 2021-03-22 | End: 2021-05-17

## 2021-03-23 DIAGNOSIS — Z95.1 S/P CABG X 4: ICD-10-CM

## 2021-03-23 RX ORDER — CARVEDILOL 3.12 MG/1
TABLET ORAL
Qty: 60 TAB | Refills: 1 | Status: SHIPPED | OUTPATIENT
Start: 2021-03-23 | End: 2021-04-15

## 2021-03-23 RX ORDER — POTASSIUM CHLORIDE 750 MG/1
TABLET, FILM COATED, EXTENDED RELEASE ORAL
Qty: 30 TAB | Refills: 1 | Status: SHIPPED | OUTPATIENT
Start: 2021-03-23 | End: 2021-04-15

## 2021-04-12 ENCOUNTER — HOSPITAL ENCOUNTER (OUTPATIENT)
Dept: LAB | Age: 76
Discharge: HOME | End: 2021-04-12
Payer: MEDICARE

## 2021-04-12 PROCEDURE — 80061 LIPID PANEL: CPT

## 2021-04-12 PROCEDURE — 80053 COMPREHEN METABOLIC PANEL: CPT

## 2021-04-13 LAB
ALBUMIN SERPL-MCNC: 4.2 G/DL (ref 3.7–4.7)
ALBUMIN/GLOB SERPL: 1.9 {RATIO} (ref 1.2–2.2)
ALP SERPL-CCNC: 85 IU/L (ref 39–117)
ALT SERPL-CCNC: 19 IU/L (ref 0–44)
AST SERPL-CCNC: 18 IU/L (ref 0–40)
BILIRUB SERPL-MCNC: 0.6 MG/DL (ref 0–1.2)
BUN SERPL-MCNC: 23 MG/DL (ref 8–27)
BUN/CREAT SERPL: 19 (ref 10–24)
CALCIUM SERPL-MCNC: 9 MG/DL (ref 8.6–10.2)
CHLORIDE SERPL-SCNC: 104 MMOL/L (ref 96–106)
CHOLEST SERPL-MCNC: 186 MG/DL (ref 100–199)
CO2 SERPL-SCNC: 21 MMOL/L (ref 20–29)
CREAT SERPL-MCNC: 1.18 MG/DL (ref 0.76–1.27)
GLOBULIN SER CALC-MCNC: 2.2 G/DL (ref 1.5–4.5)
GLUCOSE SERPL-MCNC: 97 MG/DL (ref 65–99)
HDLC SERPL-MCNC: 42 MG/DL
IMP & REVIEW OF LAB RESULTS: NORMAL
LDLC SERPL CALC-MCNC: 125 MG/DL (ref 0–99)
POTASSIUM SERPL-SCNC: 4.3 MMOL/L (ref 3.5–5.2)
PROT SERPL-MCNC: 6.4 G/DL (ref 6–8.5)
SODIUM SERPL-SCNC: 142 MMOL/L (ref 134–144)
TRIGL SERPL-MCNC: 106 MG/DL (ref 0–149)
VLDLC SERPL CALC-MCNC: 19 MG/DL (ref 5–40)

## 2021-04-15 DIAGNOSIS — Z95.1 S/P CABG X 4: ICD-10-CM

## 2021-04-15 RX ORDER — CARVEDILOL 3.12 MG/1
TABLET ORAL
Qty: 60 TAB | Refills: 1 | Status: SHIPPED | OUTPATIENT
Start: 2021-04-15 | End: 2021-05-11

## 2021-04-15 RX ORDER — POTASSIUM CHLORIDE 750 MG/1
TABLET, FILM COATED, EXTENDED RELEASE ORAL
Qty: 30 TAB | Refills: 1 | Status: SHIPPED | OUTPATIENT
Start: 2021-04-15 | End: 2021-05-11

## 2021-04-16 ENCOUNTER — TELEPHONE (OUTPATIENT)
Dept: CARDIOLOGY CLINIC | Age: 76
End: 2021-04-16

## 2021-04-16 NOTE — TELEPHONE ENCOUNTER
----- Message from Troy Olivera MD sent at 4/13/2021  4:25 PM EDT -----  Let him know LDL is elevated since not taking statin. Did he decide about PCSK 9 inhibitor? Called pt,verified pt with two pt identifiers, advised pt his LDL has gone back up since going off of statin medication. Asked if he has given any thought to using injectable cholesterol med. Pt advised no really but he prefers to adjust his diet and exercise first since he has been eating not healthy foods lately. Pt then advised he stopped his Bumex at his last visit per  on 1/7/21 and just stopped his Potassium a week ago because he read that [facility] has potassium sparing in the med and he thinks he was getting too much. I looked in last office note and advised I do not see where  stopped his Bumex. Pt advised again he did. He advised no increase swelling but is more soboe. Advised I would send message to  regarding whether he should be on  Bumex and Potassium. Advised I would call back next week. Pt verbalized understanding.

## 2021-04-19 DIAGNOSIS — Z95.1 S/P CABG X 4: ICD-10-CM

## 2021-04-19 RX ORDER — BUMETANIDE 1 MG/1
1 TABLET ORAL
Qty: 30 TAB | Refills: 2 | Status: SHIPPED | OUTPATIENT
Start: 2021-04-19 | End: 2021-07-01 | Stop reason: SDUPTHER

## 2021-04-19 NOTE — TELEPHONE ENCOUNTER
Message  Received: 3 days ago  Message Contents   Lotus Parker MD sent to Drury Cheadle, LPN   Caller: Unspecified (3 days ago,  3:36 PM)             He can use them as needed. If wt is stable and no worsening SOB or LE edema then hold and take it whenever develops these symptoms or wt is > 5 lbs than his baseline wt        Called pt,verified pt with two pt identifiers, advised pt his Bumex and Potassium can be used as needed for swelling and sob. Advised if wt gain greater than 5 lbs then use Bumex. Advised if he takes his Bumex then take a Potassium tab with it. Verified pharmacy and will need Rx for Bumex sent over. Pt verbalized understanding.

## 2021-05-05 ENCOUNTER — HOSPITAL ENCOUNTER (EMERGENCY)
Age: 76
Discharge: HOME | End: 2021-05-05
Attending: EMERGENCY MEDICINE
Payer: MEDICARE

## 2021-05-05 ENCOUNTER — TELEPHONE (OUTPATIENT)
Dept: FAMILY MEDICINE CLINIC | Age: 76
End: 2021-05-05

## 2021-05-05 ENCOUNTER — APPOINTMENT (OUTPATIENT)
Dept: GENERAL RADIOLOGY | Age: 76
End: 2021-05-05
Attending: EMERGENCY MEDICINE
Payer: MEDICARE

## 2021-05-05 VITALS
WEIGHT: 159.39 LBS | DIASTOLIC BLOOD PRESSURE: 106 MMHG | HEART RATE: 89 BPM | HEIGHT: 66 IN | RESPIRATION RATE: 30 BRPM | BODY MASS INDEX: 25.62 KG/M2 | SYSTOLIC BLOOD PRESSURE: 161 MMHG | OXYGEN SATURATION: 95 % | TEMPERATURE: 97.6 F

## 2021-05-05 DIAGNOSIS — E87.6 HYPOKALEMIA: ICD-10-CM

## 2021-05-05 DIAGNOSIS — I50.9 ACUTE ON CHRONIC CONGESTIVE HEART FAILURE, UNSPECIFIED HEART FAILURE TYPE (HCC): Primary | ICD-10-CM

## 2021-05-05 LAB
ALBUMIN SERPL-MCNC: 3.4 G/DL (ref 3.5–5)
ALBUMIN/GLOB SERPL: 0.8 {RATIO} (ref 1.1–2.2)
ALP SERPL-CCNC: 86 U/L (ref 45–117)
ALT SERPL-CCNC: 43 U/L (ref 12–78)
ANION GAP SERPL CALC-SCNC: 4 MMOL/L (ref 5–15)
AST SERPL-CCNC: 27 U/L (ref 15–37)
ATRIAL RATE: 85 BPM
BASOPHILS # BLD: 0 K/UL (ref 0–0.1)
BASOPHILS NFR BLD: 0 % (ref 0–1)
BILIRUB SERPL-MCNC: 0.7 MG/DL (ref 0.2–1)
BNP SERPL-MCNC: 4216 PG/ML
BUN SERPL-MCNC: 25 MG/DL (ref 6–20)
BUN/CREAT SERPL: 20 (ref 12–20)
CALCIUM SERPL-MCNC: 8.9 MG/DL (ref 8.5–10.1)
CALCULATED P AXIS, ECG09: 46 DEGREES
CALCULATED R AXIS, ECG10: 79 DEGREES
CALCULATED T AXIS, ECG11: 47 DEGREES
CHLORIDE SERPL-SCNC: 100 MMOL/L (ref 97–108)
CK SERPL-CCNC: 65 U/L (ref 39–308)
CO2 SERPL-SCNC: 34 MMOL/L (ref 21–32)
CREAT SERPL-MCNC: 1.23 MG/DL (ref 0.7–1.3)
DIAGNOSIS, 93000: NORMAL
DIFFERENTIAL METHOD BLD: ABNORMAL
EOSINOPHIL # BLD: 0.1 K/UL (ref 0–0.4)
EOSINOPHIL NFR BLD: 2 % (ref 0–7)
ERYTHROCYTE [DISTWIDTH] IN BLOOD BY AUTOMATED COUNT: 13.5 % (ref 11.5–14.5)
GLOBULIN SER CALC-MCNC: 4.2 G/DL (ref 2–4)
GLUCOSE SERPL-MCNC: 145 MG/DL (ref 65–100)
HCT VFR BLD AUTO: 46.8 % (ref 36.6–50.3)
HGB BLD-MCNC: 15.3 G/DL (ref 12.1–17)
IMM GRANULOCYTES # BLD AUTO: 0 K/UL (ref 0–0.04)
IMM GRANULOCYTES NFR BLD AUTO: 0 % (ref 0–0.5)
LYMPHOCYTES # BLD: 0.8 K/UL (ref 0.8–3.5)
LYMPHOCYTES NFR BLD: 11 % (ref 12–49)
MCH RBC QN AUTO: 31.5 PG (ref 26–34)
MCHC RBC AUTO-ENTMCNC: 32.7 G/DL (ref 30–36.5)
MCV RBC AUTO: 96.3 FL (ref 80–99)
MONOCYTES # BLD: 0.5 K/UL (ref 0–1)
MONOCYTES NFR BLD: 7 % (ref 5–13)
NEUTS SEG # BLD: 5.6 K/UL (ref 1.8–8)
NEUTS SEG NFR BLD: 80 % (ref 32–75)
NRBC # BLD: 0 K/UL (ref 0–0.01)
NRBC BLD-RTO: 0 PER 100 WBC
P-R INTERVAL, ECG05: 150 MS
PLATELET # BLD AUTO: 288 K/UL (ref 150–400)
PMV BLD AUTO: 11.1 FL (ref 8.9–12.9)
POTASSIUM SERPL-SCNC: 3.2 MMOL/L (ref 3.5–5.1)
PROT SERPL-MCNC: 7.6 G/DL (ref 6.4–8.2)
Q-T INTERVAL, ECG07: 442 MS
QRS DURATION, ECG06: 142 MS
QTC CALCULATION (BEZET), ECG08: 525 MS
RBC # BLD AUTO: 4.86 M/UL (ref 4.1–5.7)
RBC MORPH BLD: ABNORMAL
SODIUM SERPL-SCNC: 138 MMOL/L (ref 136–145)
TROPONIN I SERPL-MCNC: <0.05 NG/ML
VENTRICULAR RATE, ECG03: 85 BPM
WBC # BLD AUTO: 7 K/UL (ref 4.1–11.1)

## 2021-05-05 PROCEDURE — 83880 ASSAY OF NATRIURETIC PEPTIDE: CPT

## 2021-05-05 PROCEDURE — 80053 COMPREHEN METABOLIC PANEL: CPT

## 2021-05-05 PROCEDURE — 85025 COMPLETE CBC W/AUTO DIFF WBC: CPT

## 2021-05-05 PROCEDURE — 82550 ASSAY OF CK (CPK): CPT

## 2021-05-05 PROCEDURE — 71046 X-RAY EXAM CHEST 2 VIEWS: CPT

## 2021-05-05 PROCEDURE — 84484 ASSAY OF TROPONIN QUANT: CPT

## 2021-05-05 PROCEDURE — 36415 COLL VENOUS BLD VENIPUNCTURE: CPT

## 2021-05-05 PROCEDURE — 99284 EMERGENCY DEPT VISIT MOD MDM: CPT

## 2021-05-05 PROCEDURE — 93005 ELECTROCARDIOGRAM TRACING: CPT

## 2021-05-05 NOTE — ED NOTES
Incentive spirometry education completed with pt. Pt demo'd use of IS. Discharge instructions reviewed with and given to pt by ER RN. Pt denies pain at time of discharge and chooses to walk on own accord. No obvious distress noted.

## 2021-05-05 NOTE — DISCHARGE INSTRUCTIONS
Take your bumetanide (Bumex) twice daily from today through Sunday (5/9/21). Then, resume taking it once daily as prescribed. It is important to take your potassium along with this. Use the incentive spirometer as directed. It was a pleasure taking care of you in our Emergency Department today. We know that when you come to [facility], you are entrusting us with your health, comfort, and safety. Our physicians and nurses honor that trust, and truly appreciate the opportunity to care for you and your loved ones. We also value your feedback. If you receive a survey about your Emergency Department experience today, please fill it out. We care about our patients' feedback, and we listen to what you have to say. Thank you!

## 2021-05-05 NOTE — ED PROVIDER NOTES
EMERGENCY DEPARTMENT HISTORY AND PHYSICAL EXAM      Date: 5/5/2021  Patient Name: Stacy Guerin    History of Presenting Illness     Chief Complaint   Patient presents with    Shortness of Breath     PT CC of SOB starting 2 weeks ago that has become worse. Pt states that his SOB is worse when laying down. Pt has cardiac hx of stent and quaduple bipass. PT denies CP, abd pain, n/v/d. History Provided By: Patient    HPI: Stacy Guerin, 68 y.o. male with PMHx significant for coronary artery disease status post CABG in August 2020, ischemic cardiomyopathy, hypertension, hyperlipidemia, presents to the ED with cc of shortness of breath. Over the last 2 weeks, worse over the last few days, patient has been having shortness of breath. He mostly notices this while he is laying flat at night and has been unable to sleep for more than a few hours sitting upright. He has noted similar symptoms in the past with CHF exacerbation so he doubled his Bumex for the last few days without any change. He notes that he is in cardiac rehab after CABG last August and has been unable to go over the last 2 weeks because he injured his left knee. He thinks that being less active may contribute to this. He has had some cough with phlegm production. He called his primary care provider and was referred to the emergency department for further evaluation. He denies any fever, chest pain, extremity edema, abdominal pain. There are no other complaints, changes, or physical findings at this time. PCP: Mecca De León NP    No current facility-administered medications on file prior to encounter. Current Outpatient Medications on File Prior to Encounter   Medication Sig Dispense Refill    bumetanide (BUMEX) 1 mg tablet Take 1 Tab by mouth daily as needed (for swelling, shortness of breath and weight gain over 5 lbs).  30 Tab 2    potassium chloride SR (KLOR-CON 10) 10 mEq tablet TAKE 1 TABLET BY MOUTH EVERY DAY 30 Tab 1    carvediloL (COREG) 3.125 mg tablet TAKE 1 TABLET BY MOUTH TWICE A DAY WITH MEALS 60 Tab 1    Entresto 24-26 mg tablet TAKE 1 TABLET BY MOUTH EVERY 12 HOURS 60 Tab 1    OTHER Take  by mouth daily. Organic Beet essence juice powder- 1 tsp in water      ascorbic acid (VITAMIN C PO) Take  by mouth daily. Takes 2 gummies 4 times daily----2 = 250 mg      cholecalciferol (VITAMIN D3) (2,000 UNITS /50 MCG) cap capsule Take 2,000 Units by mouth two (2) times a day. gummies      selenium 100 mcg tab Take  by mouth daily.  saw palmetto xtr/zinc picolin (SAW PALMETTO EXTRACT PO) Take  by mouth daily.  saw/vit E/sod ana maria/lyc/beta/pyg (PROSTATE HEALTH PO) Take  by mouth daily.  TURMERIC PO Take  by mouth daily.  B-Sit/m-19/bitter-orange peel (SLENDER CORTISOL PO) Take  by mouth daily.  aspirin 81 mg chewable tablet Take 1 Tab by mouth daily. 27 Tab 1       Past History     Past Medical History:  Past Medical History:   Diagnosis Date    Acute MI anterior wall first episode care ([facility] [address] 9/21/2010    CAD (coronary artery disease), native coronary artery 9/21/2010    Coronary stent 9/21/2010    Hyperlipidemia 9/21/2010       Past Surgical History:  Past Surgical History:   Procedure Laterality Date    HX CORONARY ARTERY BYPASS GRAFT  08/06/2020    x 4, LIMA to LAD, RSVG to Diag-OM2, RSVG to PDA       Family History:  Family History   Problem Relation Age of Onset    No Known Problems Mother     No Known Problems Father        Social History:  Social History     Tobacco Use    Smoking status: Never Smoker    Smokeless tobacco: Never Used   Substance Use Topics    Alcohol use: Never     Frequency: Never    Drug use: Not Currently     Types: Prescription, OTC       Allergies:  No Known Allergies      Review of Systems   Review of Systems   Constitutional: Negative for chills and fever. HENT: Negative for ear pain and sore throat. Eyes: Negative for redness and visual disturbance. Respiratory: Positive for cough and shortness of breath. Cardiovascular: Negative for chest pain and palpitations. Gastrointestinal: Negative for abdominal pain, nausea and vomiting. Genitourinary: Negative for dysuria and hematuria. Musculoskeletal: Negative for back pain and gait problem. Skin: Negative for rash and wound. Neurological: Negative for dizziness and headaches. Psychiatric/Behavioral: Negative for behavioral problems and confusion. All other systems reviewed and are negative. Physical Exam   Physical Exam  Constitutional:       Appearance: He is not toxic-appearing. Comments: Patient is sitting upright in stretcher. He is interactive and able to speak in full sentences. HENT:      Head: Normocephalic and atraumatic. Mouth/Throat:      Mouth: Mucous membranes are moist.   Eyes:      Extraocular Movements: Extraocular movements intact. Pupils: Pupils are equal, round, and reactive to light. Neck:      Musculoskeletal: Normal range of motion and neck supple. Vascular: No JVD. Cardiovascular:      Rate and Rhythm: Normal rate and regular rhythm. Pulses:           Dorsalis pedis pulses are 2+ on the right side and 2+ on the left side. Heart sounds: Normal heart sounds. No murmur. Pulmonary:      Effort: Pulmonary effort is normal. No respiratory distress. Breath sounds: Normal breath sounds. No wheezing, rhonchi or rales. Abdominal:      General: Abdomen is flat. There is no distension. Palpations: Abdomen is soft. Tenderness: There is no abdominal tenderness. There is no guarding or rebound. Musculoskeletal: Normal range of motion. General: No deformity. Skin:     General: Skin is warm and dry. Comments: No significant lower extremity edema. There is a mild erythematous rash to the bilateral lower legs, which he thinks may be due to poison ivy from doing yard work.    Neurological:      General: No focal deficit present. Mental Status: He is alert and oriented to person, place, and time. Psychiatric:         Behavior: Behavior normal.           Diagnostic Study Results     Labs -     Recent Results (from the past 12 hour(s))   EKG, 12 LEAD, INITIAL    Collection Time: 05/05/21  6:05 AM   Result Value Ref Range    Ventricular Rate 85 BPM    Atrial Rate 85 BPM    P-R Interval 150 ms    QRS Duration 142 ms    Q-T Interval 442 ms    QTC Calculation (Bezet) 525 ms    Calculated P Axis 46 degrees    Calculated R Axis 79 degrees    Calculated T Axis 47 degrees    Diagnosis       Sinus rhythm with occasional premature ventricular complexes  Right bundle branch block  Confirmed by Peace Ghosh ([id number]) on 5/5/2021 9:37:38 AM     CBC WITH AUTOMATED DIFF    Collection Time: 05/05/21  6:15 AM   Result Value Ref Range    WBC 7.0 4.1 - 11.1 K/uL    RBC 4.86 4.10 - 5.70 M/uL    HGB 15.3 12.1 - 17.0 g/dL    HCT 46.8 36.6 - 50.3 %    MCV 96.3 80.0 - 99.0 FL    MCH 31.5 26.0 - 34.0 PG    MCHC 32.7 30.0 - 36.5 g/dL    RDW 13.5 11.5 - 14.5 %    PLATELET 248 774 - 243 K/uL    MPV 11.1 8.9 - 12.9 FL    NRBC 0.0 0  WBC    ABSOLUTE NRBC 0.00 0.00 - 0.01 K/uL    NEUTROPHILS 80 (H) 32 - 75 %    LYMPHOCYTES 11 (L) 12 - 49 %    MONOCYTES 7 5 - 13 %    EOSINOPHILS 2 0 - 7 %    BASOPHILS 0 0 - 1 %    IMMATURE GRANULOCYTES 0 0.0 - 0.5 %    ABS. NEUTROPHILS 5.6 1.8 - 8.0 K/UL    ABS. LYMPHOCYTES 0.8 0.8 - 3.5 K/UL    ABS. MONOCYTES 0.5 0.0 - 1.0 K/UL    ABS. EOSINOPHILS 0.1 0.0 - 0.4 K/UL    ABS. BASOPHILS 0.0 0.0 - 0.1 K/UL    ABS. IMM.  GRANS. 0.0 0.00 - 0.04 K/UL    DF AUTOMATED      RBC COMMENTS NORMOCYTIC, NORMOCHROMIC     METABOLIC PANEL, COMPREHENSIVE    Collection Time: 05/05/21  6:15 AM   Result Value Ref Range    Sodium 138 136 - 145 mmol/L    Potassium 3.2 (L) 3.5 - 5.1 mmol/L    Chloride 100 97 - 108 mmol/L    CO2 34 (H) 21 - 32 mmol/L    Anion gap 4 (L) 5 - 15 mmol/L    Glucose 145 (H) 65 - 100 mg/dL    BUN 25 (H) 6 - 20 MG/DL    Creatinine 1.23 0.70 - 1.30 MG/DL    BUN/Creatinine ratio 20 12 - 20      GFR est AA >60 >60 ml/min/1.73m2    GFR est non-AA 57 (L) >60 ml/min/1.73m2    Calcium 8.9 8.5 - 10.1 MG/DL    Bilirubin, total 0.7 0.2 - 1.0 MG/DL    ALT (SGPT) 43 12 - 78 U/L    AST (SGOT) 27 15 - 37 U/L    Alk. phosphatase 86 45 - 117 U/L    Protein, total 7.6 6.4 - 8.2 g/dL    Albumin 3.4 (L) 3.5 - 5.0 g/dL    Globulin 4.2 (H) 2.0 - 4.0 g/dL    A-G Ratio 0.8 (L) 1.1 - 2.2     CK W/ REFLX CKMB    Collection Time: 05/05/21  6:15 AM   Result Value Ref Range    CK 65 39 - 308 U/L   TROPONIN I    Collection Time: 05/05/21  6:15 AM   Result Value Ref Range    Troponin-I, Qt. <0.05 <0.05 ng/mL   NT-PRO BNP    Collection Time: 05/05/21  6:15 AM   Result Value Ref Range    NT pro-BNP 4,216 (H) <450 PG/ML       Radiologic Studies -   XR CHEST PA LAT   Final Result   Bilateral pleural effusions, right greater than left. Findings are   similar to the prior study. CT Results  (Last 48 hours)    None        CXR Results  (Last 48 hours)               05/05/21 0657  XR CHEST PA LAT Final result    Impression:  Bilateral pleural effusions, right greater than left. Findings are   similar to the prior study. Narrative:  INDICATION:  Shortness of Breath        COMPARISON: September 2020       FINDINGS: PA and lateral views of the chest demonstrate a stable   cardiomediastinal silhouette. There is cardiomegaly and evidence of median   sternotomy. There is a moderate right pleural effusion and a small left pleural   effusion, with bibasilar atelectasis. Findings are similar to the prior study. Medical Decision Making   I am the first provider for this patient. I reviewed the vital signs, available nursing notes, past medical history, past surgical history, family history and social history. Vital Signs-Reviewed the patient's vital signs.   Patient Vitals for the past 12 hrs:   Temp Pulse Resp BP SpO2   05/05/21 0900  89 30 (!) 161/106 95 %   05/05/21 0800  85 20 (!) 144/97 93 %   05/05/21 0601 97.6 °F (36.4 °C) 92 18 (!) 152/108 96 %       Pulse Oximetry Analysis - 96% on RA    Cardiac Monitor:   Rate: 81 bpm  Rhythm: Normal Sinus Rhythm     EKG interpretation:  EKG interpreted by me. Shows normal sinus rhythm with a rate of 85 with occasional PVCs. RBBB. No ST elevations or depressions concerning for ischemia. Normal intervals. No significant changes compared to January 2021. Records Reviewed: Nursing Notes, Old Medical Records, Previous electrocardiograms, Previous Radiology Studies and Previous Laboratory Studies    Provider Notes (Medical Decision Making):   Differential diagnosis: upper respiratory infection, CHF exacerbation, ACS, dysrhythmia, electrolyte abnormality, anemia    Patient presents with shortness of breath and orthopnea over the last 3 days. He is afebrile and clinically well-appearing. He is maintaining high oxygenation on room air. EKG shows normal sinus rhythm without changes concerning for ischemia. Chest x-ray shows bilateral pleural effusions which are similar to September 2020. Troponin is negative. proBNP is elevated at 4216. PE was considered, however, it does not seem likely in clinical opinion given he is not tachycardic nor hypoxic. Review of chart does show that he called his cardiology office a few weeks ago and admitted that he had not been taking his Bumex. Given orthopnea and elevated proBNP, I do feel that symptoms are due to his CHF. Symptoms are likely compounded by the fact that he has not gone to cardiac rehab for the last 2 weeks after a knee injury. He is appropriate for outpatient management. Patient was also evaluated by Dr. Derick Jackson, supervising physician, who agrees. Patient was given incentive spirometer and educated on use. Discussed follow-up with PCP and cardiology as well as return precautions. ED Course:   Initial assessment performed.  The patients presenting problems have been discussed, and they are in agreement with the care plan formulated and outlined with them. I have encouraged them to ask questions as they arise throughout their visit. Disposition:  9:30 AM  The patient has been re-evaluated and is ready for discharge. Reviewed available results with patient. Counseled patient on diagnosis and care plan. Patient has expressed understanding, and all questions have been answered. Patient agrees with plan and agrees to follow up as recommended, or to return to the ED if their symptoms worsen. Discharge instructions have been provided and explained to the patient, along with reasons to return to the ED. PLAN:  1. Discharge Medication List as of 5/5/2021  9:30 AM        2. Follow-up Information     Follow up With Specialties Details Why Contact Info    Dariel Clement NP Pediatric Medicine Schedule an appointment as soon as possible for a visit in 1 week for a recheck [address]  [address] [address]  [phone]      Nan Melara MD Cardiology, Vascular Surgery, General and Vascular Surgery Schedule an appointment as soon as possible for a visit in 1 week for a recheck [address] [facility]  [phone]      [facility] EMERGENCY DEPT Emergency Medicine Go to  If symptoms worsen [address] [place] [address]  [phone]        Return to ED if worse     Diagnosis     Clinical Impression:   1. Acute on chronic congestive heart failure, unspecified heart failure type ([facility] [address]    2. Hypokalemia          Solange Tyler.  PERRI Carney

## 2021-05-05 NOTE — TELEPHONE ENCOUNTER
overnight call. Patient reports that he has been having trouble breathing for the last 3 days both during the day but more noticeably at night. Cannot lay down without huffing and puffing. Has not been sleeping well. He has heart failure and remember similar symptoms in the past during a CHF exacerbation. However he is not swollen, no leg edema, at his dry weight on the scale, actually tried doubling up on the Bumex in the last 3 days without relief. He does not feel he has an irregular heartbeat. His blood pressure while on the phone and reports 135/95. Pulse 88. Thinks he might have been coughing more for the last few days and is producing some phlegm. Mild nasal congestion, no other bodily symptoms and no apparent fever. Differential is CHF exacerbation, A. fib, pulmonary embolism, respiratory illness    We discussed course of action which include work-up in the office versus emergency room.   Based on how bad he feels, his inability to sleep because of his symptoms and the fairly sudden onset 3 days ago we decided that emergency room would be the most appropriate work-up

## 2021-05-11 DIAGNOSIS — Z95.1 S/P CABG X 4: ICD-10-CM

## 2021-05-11 RX ORDER — CARVEDILOL 3.12 MG/1
TABLET ORAL
Qty: 60 TAB | Refills: 1 | Status: SHIPPED | OUTPATIENT
Start: 2021-05-11 | End: 2021-06-03

## 2021-05-11 RX ORDER — POTASSIUM CHLORIDE 750 MG/1
TABLET, FILM COATED, EXTENDED RELEASE ORAL
Qty: 30 TAB | Refills: 1 | Status: SHIPPED | OUTPATIENT
Start: 2021-05-11 | End: 2021-06-03

## 2021-05-12 ENCOUNTER — OFFICE VISIT (OUTPATIENT)
Dept: FAMILY MEDICINE CLINIC | Age: 76
End: 2021-05-12
Payer: MEDICARE

## 2021-05-12 VITALS
HEIGHT: 66 IN | SYSTOLIC BLOOD PRESSURE: 135 MMHG | TEMPERATURE: 98 F | BODY MASS INDEX: 25.07 KG/M2 | HEART RATE: 73 BPM | WEIGHT: 156 LBS | RESPIRATION RATE: 16 BRPM | DIASTOLIC BLOOD PRESSURE: 76 MMHG

## 2021-05-12 DIAGNOSIS — R73.9 ELEVATED BLOOD SUGAR: ICD-10-CM

## 2021-05-12 DIAGNOSIS — Z95.1 S/P CABG X 4: ICD-10-CM

## 2021-05-12 DIAGNOSIS — E87.6 HYPOKALEMIA: ICD-10-CM

## 2021-05-12 DIAGNOSIS — I50.9 ACUTE CONGESTIVE HEART FAILURE, UNSPECIFIED HEART FAILURE TYPE (HCC): Primary | ICD-10-CM

## 2021-05-12 PROCEDURE — G8432 DEP SCR NOT DOC, RNG: HCPCS | Performed by: NURSE PRACTITIONER

## 2021-05-12 PROCEDURE — 99214 OFFICE O/P EST MOD 30 MIN: CPT | Performed by: NURSE PRACTITIONER

## 2021-05-12 PROCEDURE — G8427 DOCREV CUR MEDS BY ELIG CLIN: HCPCS | Performed by: NURSE PRACTITIONER

## 2021-05-12 PROCEDURE — G8417 CALC BMI ABV UP PARAM F/U: HCPCS | Performed by: NURSE PRACTITIONER

## 2021-05-12 PROCEDURE — 1101F PT FALLS ASSESS-DOCD LE1/YR: CPT | Performed by: NURSE PRACTITIONER

## 2021-05-12 PROCEDURE — G8536 NO DOC ELDER MAL SCRN: HCPCS | Performed by: NURSE PRACTITIONER

## 2021-05-12 PROCEDURE — G0463 HOSPITAL OUTPT CLINIC VISIT: HCPCS | Performed by: NURSE PRACTITIONER

## 2021-05-12 NOTE — PROGRESS NOTES
Hans Barrientos is a 68 y.o. male  Chief Complaint   Patient presents with   [facility] Follow Up     1. Have you been to the ER, urgent care clinic since your last visit? yes ER   Hospitalized since your last visit?no    2. Have you seen or consulted any other health care providers outside of the [address] [place] [address] since your last visit? Include any pap smears or colon screening.  No  Health Maintenance   Topic Date Due    COVID-19 Vaccine (1) Never done    DTaP/Tdap/Td series (1 - Tdap) Never done    Shingrix Vaccine Age 50> (1 of 2) Never done    Medicare Yearly Exam  Never done    Flu Vaccine (Season Ended) 09/01/2021    Hepatitis C Screening  Completed    Pneumococcal 65+ years  Completed     Visit Vitals  /76 (BP 1 Location: Left upper arm, BP Patient Position: At rest, BP Cuff Size: Small adult)   Pulse 73   Temp 98 °F (36.7 °C) (Skin)   Resp 16   Ht 5' 6\" (1.676 m)   Wt 156 lb (70.8 kg)   BMI 25.18 kg/m²

## 2021-05-12 NOTE — PROGRESS NOTES
Subjective:     Chief Complaint   Patient presents with   [facility] Follow Up        HPI:  68 y.o.  presents for follow up appointment after being seen in ER. Was in ER on 5/5/21  \"Patient presents with shortness of breath and orthopnea over the last 3 days. He is afebrile and clinically well-appearing. He is maintaining high oxygenation on room air. EKG shows normal sinus rhythm without changes concerning for ischemia. Chest x-ray shows bilateral pleural effusions which are similar to September 2020. Troponin is negative. proBNP is elevated at 4216. PE was considered, however, it does not seem likely in clinical opinion given he is not tachycardic nor hypoxic. Review of chart does show that he called his cardiology office a few weeks ago and admitted that he had not been taking his Bumex. Given orthopnea and elevated proBNP, I do feel that symptoms are due to his CHF. Symptoms are likely compounded by the fact that he has not gone to cardiac rehab for the last 2 weeks after a knee injury. He is appropriate for outpatient management. Patient was also evaluated by Dr. Pattie Renteria, supervising physician, who agrees. Patient was given incentive spirometer and educated on use. Discussed follow-up with PCP and cardiology as well as return precautions. \"      Says that he had stopped bumex for couple months but was feeling okay without it until he could not exercise due to knee injury  For the past week he has been taking two doses bumex and this week he started taking one dose bumex daily (since Monday). Patient says that swelling is much better  Watching hydration. Trying to avoid too much hydration but enough to maintain hydration. Trying to increase activity as well. Using the incentive spirometer, \"up to 1500 this morning\"    Cardiology told cholesterol was elevated so he has cut back on eating meat. Monitoring BP at home, always fine per patient.      Next appointment with cardiology 7/1/21      No hospital, ER or specialist visits since last primary care visit except as noted above. Past Medical History:   Diagnosis Date    Acute MI anterior wall first episode care [facility]) 9/21/2010    CAD (coronary artery disease), native coronary artery 9/21/2010    Coronary stent 9/21/2010    Hyperlipidemia 9/21/2010       Social History     Tobacco Use    Smoking status: Never Smoker    Smokeless tobacco: Never Used   Substance Use Topics    Alcohol use: Never     Frequency: Never    Drug use: Not Currently     Types: Prescription, OTC       Outpatient Medications Marked as Taking for the 5/12/21 encounter (Office Visit) with Benton Tovar NP   Medication Sig Dispense Refill    carvediloL (COREG) 3.125 mg tablet TAKE 1 TABLET BY MOUTH TWICE A DAY WITH MEALS 60 Tab 1    potassium chloride SR (KLOR-CON 10) 10 mEq tablet TAKE 1 TABLET BY MOUTH EVERY DAY 30 Tab 1    bumetanide (BUMEX) 1 mg tablet Take 1 Tab by mouth daily as needed (for swelling, shortness of breath and weight gain over 5 lbs). 30 Tab 2    Entresto 24-26 mg tablet TAKE 1 TABLET BY MOUTH EVERY 12 HOURS 60 Tab 1    OTHER Take  by mouth daily. Organic Beet essence juice powder- 1 tsp in water      ascorbic acid (VITAMIN C PO) Take  by mouth daily. Takes 2 gummies 4 times daily----2 = 250 mg      cholecalciferol (VITAMIN D3) (2,000 UNITS /50 MCG) cap capsule Take 2,000 Units by mouth two (2) times a day. gummies      selenium 100 mcg tab Take  by mouth daily.  saw palmetto xtr/zinc picolin (SAW PALMETTO EXTRACT PO) Take  by mouth daily.  saw/vit E/sod ana maria/lyc/beta/pyg (PROSTATE HEALTH PO) Take  by mouth daily.  TURMERIC PO Take  by mouth daily.  B-Sit/m-19/bitter-orange peel (SLENDER CORTISOL PO) Take  by mouth daily.  aspirin 81 mg chewable tablet Take 1 Tab by mouth daily.  30 Tab 1       No Known Allergies    Health Maintenance reviewed       ROS:  Gen: no fatigue, no fever, no chills, no unexplained weight loss or weight gain  Eyes: no excessive tearing, itching, or discharge  Nose: no rhinorrhea, no sinus pain  Mouth: no oral lesions, no sore throat, no difficulty swallowing  Resp: no shortness of breath, no wheezing, no cough  CV: no chest pain, no orthopnea, no paroxysmal nocturnal dyspnea, no lower extremity edema, no palpitations  Abd: no nausea, no heartburn, no diarrhea, no constipation, no abdominal pain  Neuro: no headaches, no syncope or presyncopal episodes  Endo: no polyuria, no polydipsia. : no hematuria, no dysuria, no frequency, no incontinence  Heme: no lymphadenopathy, no easy bruising or bleeding, no night sweats  MSK: no joint pain or swelling    PE:  Visit Vitals  /76 (BP 1 Location: Left upper arm, BP Patient Position: At rest, BP Cuff Size: Small adult)   Pulse 73   Temp 98 °F (36.7 °C) (Skin)   Resp 16   Ht 5' 6\" (1.676 m)   Wt 156 lb (70.8 kg)   BMI 25.18 kg/m²     Gen: alert, oriented, no acute distress  Head: normocephalic, atraumatic  Eyes: pupils equal round reactive to light, sclera clear, conjunctiva clear  Neck: symmetric normal sized thyroid, no carotid bruits, no jugular vein distention  Resp: no increase work of breathing, lungs clear to ausculation bilaterally, no wheezing, rales or rhonchi  CV: S1, S2 normal.  No murmurs, rubs, or gallops. Abd: soft, not tender, not distended. No hepatosplenomegaly. Normal bowel sounds. No hernias. No abdominal or renal bruits. Neuro: cranial nerves intact, normal strength and movement in all extremities, and sensation intact and symmetric. Skin: no lesion or rash  Extremities: no cyanosis or edema    No results found for this visit on 05/12/21. Assessment/Plan:  Differential diagnosis and treatment options reviewed with patient who is in agreement with treatment plan as outlined below. ICD-10-CM ICD-9-CM    1.  Acute congestive heart failure, unspecified heart failure type ([facility] [address]  G88.9 557.2 METABOLIC PANEL, BASIC NT-PRO BNP      METABOLIC PANEL, BASIC      NT-PRO BNP   2. Hypokalemia  E87.6 276.8    3. S/P CABG x 4  Z95.1 V45.81    4. Elevated blood sugar  R73.9 790.29 HEMOGLOBIN A1C WITH EAG      HEMOGLOBIN A1C WITH EAG     Repeat labs in couple weeks, recheck potassium and kidney functions and BNP. Advised to call cardiology for sooner follow up than July  DASH diet, continue to monitor swelling and weights. Continue current treatment plan for now. BP at goal.  Feeling good, swelling has resolved and no SOB. Discussed BMI and healthy weight. Encouraged patient to work to implement changes including diet high in raw fruits and vegetables, lean protein and good fats. Limit refined, processed carbohydrates and sugar. Encouraged regular exercise. Recommended regular cardiovascular exercise 3-6 times per week as tolerated    I have discussed the diagnosis with the patient and the intended plan as seen in the above orders. The patient has received an after-visit summary and questions were answered concerning future plans. I have discussed medication side effects and warnings with the patient as well. The patient verbalizes understanding and agreement with the plan.

## 2021-05-12 NOTE — PATIENT INSTRUCTIONS
Heart Failure: Care Instructions Your Care Instructions Heart failure occurs when your heart does not pump as much blood as the body needs. Failure does not mean that the heart has stopped pumping but rather that it is not pumping as well as it should. Over time, this causes fluid buildup in your lungs and other parts of your body. Fluid buildup can cause shortness of breath, fatigue, swollen ankles, and other problems. By taking medicines regularly, reducing sodium (salt) in your diet, checking your weight every day, and making lifestyle changes, you can feel better and live longer. Follow-up care is a key part of your treatment and safety. Be sure to make and go to all appointments, and call your doctor if you are having problems. It's also a good idea to know your test results and keep a list of the medicines you take. How can you care for yourself at home? Medicines 
  · Be safe with medicines. Take your medicines exactly as prescribed. Call your doctor if you think you are having a problem with your medicine.  
  · Do not take any vitamins, over-the-counter medicine, or herbal products without talking to your doctor first. Conchis Montiellin not take ibuprofen (Advil or Motrin) and naproxen (Aleve) without talking to your doctor first. They could make your heart failure worse.  
  · You may take some of the following medicine. ? Angiotensin-converting enzyme inhibitors (ACEIs) or angiotensin II receptor blockers (ARBs) reduce the heart's workload, lower blood pressure, and reduce swelling. Taking an ACEI or ARB may lower your chance of needing to be hospitalized. ? Beta-blockers can slow heart rate, decrease blood pressure, and improve your condition. Taking a beta-blocker may lower your chance of needing to be hospitalized. ? Diuretics, also called water pills, reduce swelling. You will get more details on the specific medicines your doctor prescribes.  
Diet 
  · Your doctor may suggest that you limit sodium. Your doctor can tell you how much sodium is right for you. An example is less than 3,000 mg a day. This includes all the salt you eat in cooking or in packaged foods. People get most of their sodium from processed foods. Fast food and restaurant meals also tend to be very high in sodium.  
  · Ask your doctor how much liquid you can drink each day. You may have to limit liquids. Weight 
  · Weigh yourself without clothing at the same time each day. Record your weight. Call your doctor if you have a sudden weight gain, such as more than 2 to 3 pounds in a day or 5 pounds in a week. (Your doctor may suggest a different range of weight gain.) A sudden weight gain may mean that your heart failure is getting worse. Activity level 
  · Start light exercise (if your doctor says it is okay). Even if you can only do a small amount, exercise will help you get stronger, have more energy, and manage your weight and your stress. Walking is an easy way to get exercise. Start out by walking a little more than you did before. Bit by bit, increase the amount you walk.  
  · When you exercise, watch for signs that your heart is working too hard. You are pushing yourself too hard if you cannot talk while you are exercising. If you become short of breath or dizzy or have chest pain, stop, sit down, and rest.  
  · If you feel \"wiped out\" the day after you exercise, walk slower or for a shorter distance until you can work up to a better pace.  
  · Get enough rest at night. Sleeping with 1 or 2 pillows under your upper body and head may help you breathe easier. Lifestyle changes 
  · Do not smoke. Smoking can make a heart condition worse. If you need help quitting, talk to your doctor about stop-smoking programs and medicines. These can increase your chances of quitting for good.  Quitting smoking may be the most important step you can take to protect your heart.  
  · Limit alcohol to 2 drinks a day for men and 1 drink a day for women. Too much alcohol can cause health problems.  
  · Avoid getting sick from colds and the flu. Get a pneumococcal vaccine shot. If you have had one before, ask your doctor whether you need another dose. Get a flu shot each year. If you must be around people with colds or the flu, wash your hands often. When should you call for help? Call 911 if you have symptoms of sudden heart failure such as: 
  · You have severe trouble breathing.  
  · You cough up pink, foamy mucus.  
  · You have a new irregular or rapid heartbeat. Call your doctor now or seek immediate medical care if: 
  · You have new or increased shortness of breath.  
  · You are dizzy or lightheaded, or you feel like you may faint.  
  · You have sudden weight gain, such as more than 2 to 3 pounds in a day or 5 pounds in a week. (Your doctor may suggest a different range of weight gain.)  
  · You have increased swelling in your legs, ankles, or feet.  
  · You are suddenly so tired or weak that you cannot do your usual activities. Watch closely for changes in your health, and be sure to contact your doctor if you develop new symptoms. Where can you learn more? Go to http://www.gray.com/ Enter Z026 in the search box to learn more about \"Heart Failure: Care Instructions. \" Current as of: August 31, 2020               Content Version: 12.8 © 3938-7356 [employer]. Care instructions adapted under license by [employer] (which disclaims liability or warranty for this information). If you have questions about a medical condition or this instruction, always ask your healthcare professional. [place] [address] any warranty or liability for your use of this information. Fluid Restriction: Care Instructions Your Care Instructions A buildup of fluid in the body can cause low sodium levels in the blood. It may also cause symptoms such as swelling and pain.  Your doctor may suggest that you limit liquids, including foods that contain a lot of liquid. Limiting liquids is called fluid restriction. Keeping track of the amount of fluids you take in may help you feel better. Your doctor will tell you how much fluid you can have in a day. Follow-up care is a key part of your treatment and safety. Be sure to make and go to all appointments, and call your doctor if you are having problems. It's also a good idea to know your test results and keep a list of the medicines you take. How can you care for yourself at home? · Find a way of tracking the fluids you take in that works for you. Here are two methods you can try: 
? Write down how much you drink throughout the day. ? Keep a container filled with the amount of liquid allowed for the day. As you drink liquids during the day, such as a 6-ounce cup of coffee, pour that same amount out of the container. When the container is empty, you've had your liquid for the day. · Count any foods that will melt (such as ice cream, gelatin, or flavored ice treats) or liquid foods (such as soup) as part of your fluids for the day. Also count the liquid in canned fruits and vegetables as part of your daily intake, or drain them well before serving. · Space your liquids throughout the day. Then you won't be tempted to drink more than the amount your doctor recommends. · To relieve thirst without taking in extra water, try chewing gum, sucking on hard candy (sugarless if you have diabetes), or rinsing your mouth with water and spitting it out. Where can you learn more? Go to http://www.gray.com/ Enter N841 in the search box to learn more about \"Fluid Restriction: Care Instructions. \" Current as of: August 31, 2020               Content Version: 12.8 © 3910-0254 [employer]. Care instructions adapted under license by [employer] (which disclaims liability or warranty for this information). If you have questions about a medical condition or this instruction, always ask your healthcare professional. [place] [address] any warranty or liability for your use of this information.

## 2021-05-17 RX ORDER — SACUBITRIL AND VALSARTAN 24; 26 MG/1; MG/1
TABLET, FILM COATED ORAL
Qty: 60 TAB | Refills: 1 | Status: SHIPPED | OUTPATIENT
Start: 2021-05-17 | End: 2021-07-20

## 2021-06-03 DIAGNOSIS — Z95.1 S/P CABG X 4: ICD-10-CM

## 2021-06-03 RX ORDER — POTASSIUM CHLORIDE 750 MG/1
TABLET, FILM COATED, EXTENDED RELEASE ORAL
Qty: 30 TABLET | Refills: 1 | Status: SHIPPED | OUTPATIENT
Start: 2021-06-03 | End: 2021-06-30

## 2021-06-03 RX ORDER — CARVEDILOL 3.12 MG/1
TABLET ORAL
Qty: 60 TABLET | Refills: 1 | Status: SHIPPED | OUTPATIENT
Start: 2021-06-03 | End: 2021-06-30

## 2021-06-18 LAB
BUN SERPL-MCNC: 21 MG/DL (ref 8–27)
BUN/CREAT SERPL: 21 (ref 10–24)
CALCIUM SERPL-MCNC: 9.1 MG/DL (ref 8.6–10.2)
CHLORIDE SERPL-SCNC: 100 MMOL/L (ref 96–106)
CO2 SERPL-SCNC: 26 MMOL/L (ref 20–29)
CREAT SERPL-MCNC: 1.01 MG/DL (ref 0.76–1.27)
EST. AVERAGE GLUCOSE BLD GHB EST-MCNC: 128 MG/DL
GLUCOSE SERPL-MCNC: 87 MG/DL (ref 65–99)
HBA1C MFR BLD: 6.1 % (ref 4.8–5.6)
NT-PROBNP SERPL-MCNC: 1210 PG/ML (ref 0–486)
POTASSIUM SERPL-SCNC: 4.1 MMOL/L (ref 3.5–5.2)
SODIUM SERPL-SCNC: 141 MMOL/L (ref 134–144)

## 2021-06-28 ENCOUNTER — TELEPHONE (OUTPATIENT)
Dept: FAMILY MEDICINE CLINIC | Age: 76
End: 2021-06-28

## 2021-06-28 NOTE — TELEPHONE ENCOUNTER
Message from [facility]    Dr. Hector Abraham  Received: 3 days ago  Ben[place], [address], [address] Message/Vendor Calls     Caller's first and last name:N/A       Reason for call: Lab Results       Callback required yes/no and why: Yes       Best contact number(s):[phone]       Details to clarify the request: Please call patient  to give lab results, you can leave on his voice mail.        Mariela Srivastava

## 2021-06-30 DIAGNOSIS — Z95.1 S/P CABG X 4: ICD-10-CM

## 2021-06-30 RX ORDER — CARVEDILOL 3.12 MG/1
TABLET ORAL
Qty: 60 TABLET | Refills: 1 | Status: SHIPPED | OUTPATIENT
Start: 2021-06-30 | End: 2021-07-31

## 2021-06-30 RX ORDER — POTASSIUM CHLORIDE 750 MG/1
TABLET, FILM COATED, EXTENDED RELEASE ORAL
Qty: 30 TABLET | Refills: 1 | Status: SHIPPED | OUTPATIENT
Start: 2021-06-30 | End: 2021-07-30

## 2021-07-01 ENCOUNTER — OFFICE VISIT (OUTPATIENT)
Dept: CARDIOLOGY CLINIC | Age: 76
End: 2021-07-01
Payer: MEDICARE

## 2021-07-01 VITALS
HEART RATE: 70 BPM | BODY MASS INDEX: 24.83 KG/M2 | OXYGEN SATURATION: 97 % | HEIGHT: 66 IN | WEIGHT: 154.5 LBS | DIASTOLIC BLOOD PRESSURE: 64 MMHG | RESPIRATION RATE: 18 BRPM | SYSTOLIC BLOOD PRESSURE: 102 MMHG

## 2021-07-01 DIAGNOSIS — Z95.1 S/P CABG X 4: ICD-10-CM

## 2021-07-01 DIAGNOSIS — E78.2 MIXED HYPERLIPIDEMIA: ICD-10-CM

## 2021-07-01 DIAGNOSIS — I10 ESSENTIAL HYPERTENSION: ICD-10-CM

## 2021-07-01 DIAGNOSIS — I25.10 CORONARY ARTERY DISEASE INVOLVING NATIVE CORONARY ARTERY OF NATIVE HEART WITHOUT ANGINA PECTORIS: ICD-10-CM

## 2021-07-01 DIAGNOSIS — I25.5 ISCHEMIC CARDIOMYOPATHY: Primary | ICD-10-CM

## 2021-07-01 PROCEDURE — G8432 DEP SCR NOT DOC, RNG: HCPCS | Performed by: NURSE PRACTITIONER

## 2021-07-01 PROCEDURE — 1101F PT FALLS ASSESS-DOCD LE1/YR: CPT | Performed by: NURSE PRACTITIONER

## 2021-07-01 PROCEDURE — 99214 OFFICE O/P EST MOD 30 MIN: CPT | Performed by: NURSE PRACTITIONER

## 2021-07-01 PROCEDURE — G8536 NO DOC ELDER MAL SCRN: HCPCS | Performed by: NURSE PRACTITIONER

## 2021-07-01 PROCEDURE — G0463 HOSPITAL OUTPT CLINIC VISIT: HCPCS | Performed by: INTERNAL MEDICINE

## 2021-07-01 PROCEDURE — 93010 ELECTROCARDIOGRAM REPORT: CPT | Performed by: INTERNAL MEDICINE

## 2021-07-01 PROCEDURE — 93005 ELECTROCARDIOGRAM TRACING: CPT | Performed by: INTERNAL MEDICINE

## 2021-07-01 PROCEDURE — G8427 DOCREV CUR MEDS BY ELIG CLIN: HCPCS | Performed by: NURSE PRACTITIONER

## 2021-07-01 PROCEDURE — G8420 CALC BMI NORM PARAMETERS: HCPCS | Performed by: NURSE PRACTITIONER

## 2021-07-01 RX ORDER — BUMETANIDE 1 MG/1
TABLET ORAL
Qty: 60 TABLET | Refills: 4 | Status: SHIPPED | OUTPATIENT
Start: 2021-07-01 | End: 2021-09-29

## 2021-07-01 NOTE — PROGRESS NOTES
7/2/2021 2:57 PM      Subjective: Jerry Rodgers is here today for a hospital f/u appt. Last seen in office 1/7/21. He presented to ED 5/6/21 with worsening SOB, worsened when lying down. He had increased his Bumex and it did not help. He had been less active after having gout in his feet. CXR showed stable dominic pleural effusions c/w CXR 9/2020. ProBNP 4216. Of note he had stopped taking Bumex for a couple months prior to ED visit, with recent restart. [place] to be CHF exacerbation. D/C to home. He reports since d/c he has been doing much better. SOB/NARANJO, and orthopnea/PND resolved. Back to being physically active now that his gout is resolved. He is trying to get a job so he will be more physically active. He has been trying to wean himself back off Bumex daily and was unsuccessful about a week ago, started to be symptomatic again. Denies CP, palpitations, lightheadedness, or syncope. Visit Vitals  /64 (BP 1 Location: Right arm, BP Patient Position: Sitting, BP Cuff Size: Adult)   Pulse 70   Resp 18   Ht 5' 6\" (1.676 m)   Wt 154 lb 8 oz (70.1 kg)   SpO2 97%   BMI 24.94 kg/m²     Current Outpatient Medications   Medication Sig    bumetanide (BUMEX) 1 mg tablet 1-2 tabs daily as needed    potassium chloride SR (KLOR-CON 10) 10 mEq tablet TAKE 1 TABLET BY MOUTH EVERY DAY    carvediloL (COREG) 3.125 mg tablet TAKE 1 TABLET BY MOUTH TWICE A DAY WITH MEALS    Entresto 24-26 mg tablet TAKE 1 TABLET BY MOUTH EVERY 12 HOURS    ascorbic acid (VITAMIN C PO) Take  by mouth daily. Takes 2 gummies 4 times daily----2 = 250 mg    cholecalciferol (VITAMIN D3) (2,000 UNITS /50 MCG) cap capsule Take 2,000 Units by mouth two (2) times a day. gummies    saw palmetto xtr/zinc picolin (SAW PALMETTO EXTRACT PO) Take  by mouth daily.  saw/vit E/sod ana maria/lyc/beta/pyg (PROSTATE HEALTH PO) Take  by mouth daily.  TURMERIC PO Take  by mouth daily.     aspirin 81 mg chewable tablet Take 1 Tab by mouth daily.  OTHER Take  by mouth daily. Organic Beet essence juice powder- 1 tsp in water (Patient not taking: Reported on 2021)    selenium 100 mcg tab Take  by mouth daily. (Patient not taking: Reported on 2021)    B-Sit/m-19/bitter-orange peel (SLENDER CORTISOL PO) Take  by mouth daily. (Patient not taking: Reported on 2021)     No current facility-administered medications for this visit.          Objective:      Visit Vitals  /64 (BP 1 Location: Right arm, BP Patient Position: Sitting, BP Cuff Size: Adult)   Pulse 70   Resp 18   Ht 5' 6\" (1.676 m)   Wt 154 lb 8 oz (70.1 kg)   SpO2 97%   BMI 24.94 kg/m²       Data Review:     EK21 Normal sinus rhythm, RBBB HR 70    Reviewed and/or ordered active problem list, medication list tests    Past Medical History:   Diagnosis Date    [facility]    CAD (coronary artery disease), native coronary artery 2010    Coronary stent 2010    Hyperlipidemia 2010      Past Surgical History:   Procedure Laterality Date    HX CORONARY ARTERY BYPASS GRAFT  08/06/2020    x 4, LIMA to LAD, RSVG to Diag-OM2, RSVG to PDA     Allergies   Allergen Reactions    Statins-Hmg-Coa Reductase Inhibitors Myalgia      Family History   Problem Relation Age of Onset    No Known Problems Mother     No Known Problems Father       Social History     Socioeconomic History    Marital status:      Spouse name: Not on file    Number of children: 1    Years of education: 15    Highest education level: 12th grade   Occupational History    Not on file   Tobacco Use    Smoking status: Never Smoker    Smokeless tobacco: Never Used   Substance and Sexual Activity    Alcohol use: Never    Drug use: Not Currently     Types: Prescription, OTC    Sexual activity: Not on file   Other Topics Concern     Service No    Blood Transfusions No    Caffeine Concern No    Occupational Exposure No    Hobby Hazards No    Sleep Concern Yes    Stress Concern Yes    Weight Concern No    Special Diet Yes    Back Care No    Exercise No    Bike Helmet No    Seat Belt Yes    Self-Exams No   Social History Narrative    Not on file     Social Determinants of Health     Financial Resource Strain:     Difficulty of Paying Living Expenses:    Food Insecurity: No Food Insecurity    Worried About Running Out of Food in the Last Year: Never true    Nidia of Food in the Last Year: Never true   Transportation Needs: No Transportation Needs    Lack of Transportation (Medical): No    Lack of Transportation (Non-Medical): No   Physical Activity: Inactive    Days of Exercise per Week: 0 days    Minutes of Exercise per Session: 0 min   Stress: No Stress Concern Present    Feeling of Stress : Only a little   Social Connections: Socially Isolated    Frequency of Communication with Friends and Family: Once a week    Frequency of Social Gatherings with Friends and Family: Once a week    Attends Jewish Services: Never    Active Member of Clubs or Organizations: No    Attends Club or Organization Meetings: Never    Marital Status:    Intimate Partner Violence: Not At Risk    Fear of Current or Ex-Partner: No    Emotionally Abused: No    Physically Abused: No    Sexually Abused: No         Review of Systems     General: Not Present- Anorexia, Chills, Dietary Changes, Fatigue, Fever, Medication Changes, Night Sweats, Weight Gain > 10lbs. and Weight Loss > 10lbs. .  Skin: Not Present- Bruising and Excessive Sweating. HEENT: Not Present- Headache, Visual Loss and Vertigo. Respiratory: Not Present- Cough, Decreased Exercise Tolerance, Difficulty Breathing, Snoring and Wheezing.   Cardiovascular: Not Present- Abnormal Blood Pressure, Chest Pain, Claudications, Difficulty Breathing On Exertion, Edema, Fainting / Blacking Out, Irregular Heart Beat, Night Cramps, Orthopnea, Palpitations, Paroxysmal Nocturnal Dyspnea, Rapid Heart Rate, Shortness of Breath and Swelling of Extremities. Gastrointestinal: Not Present- Black, Tarry Stool, Bloody Stool, Diarrhea, Hematemesis, Rectal Bleeding and Vomiting. Musculoskeletal: Not Present- Muscle Pain and Muscle Weakness. Neurological: Not Present- Dizziness. Psychiatric: Not Present- Depression. Endocrine: Not Present- Cold Intolerance, Heat Intolerance and Thyroid Problems. Hematology: Not Present- Abnormal Bleeding, Anemia, Blood Clots and Easy Bruising. Physical Exam   The physical exam findings are as follows:   General   Mental Status - Alert. General Appearance - Not in acute distress. Chest and Lung Exam   Inspection: Accessory muscles - No use of accessory muscles in breathing. Auscultation:   Breath sounds: - Normal.  Cardiovascular   Inspection: Jugular vein - Bilateral - Inspection Normal.  Palpation/Percussion:   Apical Impulse: - Normal.  Auscultation: Rhythm - Regular. Heart Sounds - S1 WNL and S2 WNL. No S3 or S4. Murmurs & Other Heart Sounds: Auscultation of the heart reveals - No Murmurs. Carotid arteries - No Carotid bruit. Peripheral Vascular   Upper Extremity: Inspection - Bilateral - No Cyanotic nailbeds or Digital clubbing. Lower Extremity:   Palpation: Edema - Bilateral - No edema. Assessment:       ICD-10-CM ICD-9-CM    1. Ischemic cardiomyopathy  X96.8 129.0 METABOLIC PANEL, COMPREHENSIVE      LIPID PANEL      MAGNESIUM   2.  Coronary artery disease involving native coronary artery of native heart without angina pectoris  V01.99 723.65 METABOLIC PANEL, COMPREHENSIVE      LIPID PANEL      MAGNESIUM   3. Essential hypertension  I10 401.9 AMB POC EKG ROUTINE W/ 12 LEADS, INTER & REP      METABOLIC PANEL, COMPREHENSIVE      LIPID PANEL      MAGNESIUM   4. Mixed hyperlipidemia  N83.5 072.2 METABOLIC PANEL, COMPREHENSIVE      LIPID PANEL      MAGNESIUM   5. S/P CABG x 4  Z95.1 V45.81 bumetanide (BUMEX) 1 mg tablet       Plan: ICM  Echo with EF 20-25%, mild MR per echo in 9/2020  Per echo 1/7/21 EF 35-40%. In ED 5/6/21 with orthopnea/PND. He had stopped Bumex for a couple months prior. Restarted when he became symptomatic. ProBNP 4216. Creatinine 1.23. K 3.2. CXR stable pleural effusions. PCP repeated labs 6/17/21 ProBNP 1210, Creatinine 1.0 K 4.1. He reports he has not symptoms currently, taking Bumex daily. Discussed importance of weighing himself daily, taking Bumex 1-2 tabs based on weight gain 2-3lbs in 24hrs or 5lbs+ in a week. He wants to try gradually to wean back off and take prn which is acceptable if he weighs himself daily. Cautioned on making sure he drinks water when he is outside working and sweating as he can become dehydrated on diuretics. Lower BP limiting up titration of meds. Continue coreg 3.125mg bid, entresto 24/26mg bid.      ASHD  Hx CABG x 4 in 8/6/2020: LIMA to LAD, RSVG to Diag-OM2, RSVG to PDA  He denies angina or anginal equivalent symptoms today. EKG today without acute ischemic changes c/w EKG 5/5/21, no PVCs present on EKG today. Continue ASA BB      HTN  Low normal. Controlled with current therapy-Bumex 1mg, Entresto 24/26, Coreg 3.125mg bid     HLD  Statin intolerant. Statin d/c due to leg cramps. 4/12/21 . Candidate for PCSK9 inhibitor. Repeat labs now and if still elevated will start process for approval.     F/U in 3 months with Dr Neetu Britton.

## 2021-07-01 NOTE — PROGRESS NOTES
1. Have you been to the ER, urgent care clinic since your last visit? [facility]    2. Have you seen or consulted any other health care providers outside of the [address] [place] [address] since your last visit? Include any pap smears or colon screening. No     Chief Complaint   Patient presents with    Follow-up     hospital f/u. SOB.

## 2021-07-01 NOTE — LETTER
7/2/2021    Patient: Benjamin Eugene   YOB: 1945   Date of Visit: 7/1/2021     Dusty De Anda NP  [address] [place] [address] [phone]  [place] [facility] [place]    Dear Dusty De Anda NP,      Thank you for referring Mr. Jannet Cool to [address] [place] [address] Veronika for evaluation. My notes for this consultation are attached. If you have questions, please do not hesitate to call me. I look forward to following your patient along with you.       Sincerely,    Megan Sky MD

## 2021-07-20 RX ORDER — SACUBITRIL AND VALSARTAN 24; 26 MG/1; MG/1
TABLET, FILM COATED ORAL
Qty: 60 TABLET | Refills: 1 | Status: SHIPPED | OUTPATIENT
Start: 2021-07-20 | End: 2021-09-20

## 2021-07-30 DIAGNOSIS — Z95.1 S/P CABG X 4: ICD-10-CM

## 2021-07-30 RX ORDER — POTASSIUM CHLORIDE 750 MG/1
TABLET, FILM COATED, EXTENDED RELEASE ORAL
Qty: 30 TABLET | Refills: 1 | Status: SHIPPED | OUTPATIENT
Start: 2021-07-30 | End: 2021-08-25

## 2021-07-31 RX ORDER — CARVEDILOL 3.12 MG/1
TABLET ORAL
Qty: 60 TABLET | Refills: 1 | Status: SHIPPED | OUTPATIENT
Start: 2021-07-31 | End: 2021-08-24

## 2021-09-11 LAB
ALBUMIN SERPL-MCNC: 4.1 G/DL (ref 3.7–4.7)
ALBUMIN/GLOB SERPL: 1.9 {RATIO} (ref 1.2–2.2)
ALP SERPL-CCNC: 89 IU/L (ref 48–121)
ALT SERPL-CCNC: 11 IU/L (ref 0–44)
AST SERPL-CCNC: 15 IU/L (ref 0–40)
BILIRUB SERPL-MCNC: 0.4 MG/DL (ref 0–1.2)
BUN SERPL-MCNC: 21 MG/DL (ref 8–27)
BUN/CREAT SERPL: 21 (ref 10–24)
CALCIUM SERPL-MCNC: 9 MG/DL (ref 8.6–10.2)
CHLORIDE SERPL-SCNC: 100 MMOL/L (ref 96–106)
CHOLEST SERPL-MCNC: 201 MG/DL (ref 100–199)
CO2 SERPL-SCNC: 29 MMOL/L (ref 20–29)
CREAT SERPL-MCNC: 1 MG/DL (ref 0.76–1.27)
GLOBULIN SER CALC-MCNC: 2.2 G/DL (ref 1.5–4.5)
GLUCOSE SERPL-MCNC: 70 MG/DL (ref 65–99)
HDLC SERPL-MCNC: 38 MG/DL
IMP & REVIEW OF LAB RESULTS: NORMAL
LDLC SERPL CALC-MCNC: 144 MG/DL (ref 0–99)
MAGNESIUM SERPL-MCNC: 2.1 MG/DL (ref 1.6–2.3)
POTASSIUM SERPL-SCNC: 4.3 MMOL/L (ref 3.5–5.2)
PROT SERPL-MCNC: 6.3 G/DL (ref 6–8.5)
SODIUM SERPL-SCNC: 140 MMOL/L (ref 134–144)
TRIGL SERPL-MCNC: 106 MG/DL (ref 0–149)
VLDLC SERPL CALC-MCNC: 19 MG/DL (ref 5–40)

## 2021-09-13 NOTE — PROGRESS NOTES
Kidney function, liver, and electrolyte normal. Cholesterol worse.  up from 125. He is a candidate for a PCSK9 inhibitor. I sent to pharmacy the once a month version to start process.

## 2021-09-15 ENCOUNTER — TELEPHONE (OUTPATIENT)
Dept: CARDIOLOGY CLINIC | Age: 76
End: 2021-09-15

## 2021-09-15 NOTE — TELEPHONE ENCOUNTER
----- Message from John Vasquez NP sent at 9/13/2021  8:48 AM EDT -----  Kidney function, liver, and electrolyte normal. Cholesterol worse.  up from 125. He is a candidate for a PCSK9 inhibitor. I sent to pharmacy the once a month version to start process. Called pt and left message to call me back.

## 2021-09-17 NOTE — TELEPHONE ENCOUNTER
Returned call,verified pt with two pt identifiers, advised pt of normal kidney, liver and lytes fxn. Advised his LDL is up to 144 from 125 and they are recommending Repatha injectable cholesterol med. Advised it has been sent to pharmacy and waiting on prior auth. Pt wants to think about it. He would like to do some research on it and will call me back next week. Advised when he calls back next week to let whoever answers the phone know if yes or no to try Repatha so we do not play phone tag again. Pt verbalized understanding and would do that.

## 2021-09-20 RX ORDER — SACUBITRIL AND VALSARTAN 24; 26 MG/1; MG/1
TABLET, FILM COATED ORAL
Qty: 60 TABLET | Refills: 1 | Status: SHIPPED | OUTPATIENT
Start: 2021-09-20 | End: 2021-11-17

## 2021-09-22 NOTE — TELEPHONE ENCOUNTER
Called pt,verified pt with two pt identifiers, advised pt that I am returning a call to see if he is willing to try [address]. Pt is weaning himself from his fluid pill at this time and wants to wait on the [address] [place] [address] Avenue. Verified his appt on 9/28/21 and he can discuss with NP at that visit. Pt verbalized understanding.

## 2021-09-22 NOTE — PROGRESS NOTES
[address] [address] [address] [facility]  [phone]     Subjective: Africa Cooper is a 68 y.o. male is here for routine f/u. Pmhx CAD, ICM, HTN, HLD/statin intolerance and carotid artery disease. Last OV 7/2021. He has been doing well. Going to the gym 3 times a week, doing combination cardio and resistance training. No exertional symptoms. He takes 1 mg of bumex a day, has not needed an extra dose. The patient denies chest pain/ shortness of breath, orthopnea, PND, LE edema, palpitations, syncope, or presyncope.        Patient Active Problem List    Diagnosis Date Noted    Statin intolerance 01/07/2021    Non compliance with medical treatment 09/21/2020    Hypokalemia 09/20/2020    CHF exacerbation ([facility] 09/20/2020    Paroxysmal atrial fibrillation ([facility] 08/09/2020    CAD (coronary artery disease) 08/06/2020    S/P CABG x 4 08/06/2020    Bilateral carotid artery stenosis 08/04/2020    Pleural effusion, bilateral 07/31/2020    CHF, acute on chronic ([address] 07/31/2020    Pulmonary embolism ([facility] 07/31/2020    Ischemic cardiomyopathy 07/31/2020    Hyperlipidemia 09/21/2010    Coronary stent 09/21/2010      Tammy Clement, NP  Past Medical History:   Diagnosis Date    Acute MI anterior wall first episode care ([facility] 9/21/2010    CAD (coronary artery disease), native coronary artery 9/21/2010    Coronary stent 9/21/2010    Hyperlipidemia 9/21/2010      Past Surgical History:   Procedure Laterality Date    HX CORONARY ARTERY BYPASS GRAFT  08/06/2020    x 4, LIMA to LAD, RSVG to Diag-OM2, RSVG to PDA     Allergies   Allergen Reactions    Statins-Hmg-Coa Reductase Inhibitors Myalgia      Family History   Problem Relation Age of Onset    No Known Problems Mother     No Known Problems Father       Social History     Socioeconomic History    Marital status:      Spouse name: Not on file    Number of children: 1    Years of education: 15    Highest education level: 12th grade   Occupational History    Not on file   Tobacco Use    Smoking status: Never Smoker    Smokeless tobacco: Never Used   Substance and Sexual Activity    Alcohol use: Never    Drug use: Not Currently     Types: Prescription, OTC    Sexual activity: Not on file   Other Topics Concern     Service No    Blood Transfusions No    Caffeine Concern No    Occupational Exposure No    Hobby Hazards No    Sleep Concern Yes    Stress Concern Yes    Weight Concern No    Special Diet Yes    Back Care No    Exercise No    Bike Helmet No    Seat Belt Yes    Self-Exams No   Social History Narrative    Not on file     Social Determinants of Health     Financial Resource Strain:     Difficulty of Paying Living Expenses:    Food Insecurity:     Worried About Running Out of Food in the Last Year:     Ran Out of Food in the Last Year:    Transportation Needs:     Lack of Transportation (Medical):  Lack of Transportation (Non-Medical):    Physical Activity:     Days of Exercise per Week:     Minutes of Exercise per Session:    Stress:     Feeling of Stress :    Social Connections:     Frequency of Communication with Friends and Family:     Frequency of Social Gatherings with Friends and Family:     Attends Baptism Services:     Active Member of Clubs or Organizations:     Attends Club or Organization Meetings:     Marital Status:    Intimate Partner Violence:     Fear of Current or Ex-Partner:     Emotionally Abused:     Physically Abused:     Sexually Abused:       Current Outpatient Medications   Medication Sig    evolocumab (REPATHA SURECLICK) pen injection 3 mL by SubCUTAneous route every month.     Entresto 24-26 mg tablet TAKE 1 TABLET BY MOUTH EVERY 12 HOURS    potassium chloride SR (KLOR-CON 10) 10 mEq tablet TAKE 1 TABLET BY MOUTH EVERY DAY    carvediloL (COREG) 3.125 mg tablet TAKE 1 TABLET BY MOUTH TWICE A DAY WITH MEALS    bumetanide (BUMEX) 1 mg tablet 1-2 tabs daily as needed    OTHER Take  by mouth daily. Organic Beet essence juice powder- 1 tsp in water (Patient not taking: Reported on 7/1/2021)    ascorbic acid (VITAMIN C PO) Take  by mouth daily. Takes 2 gummies 4 times daily----2 = 250 mg    cholecalciferol (VITAMIN D3) (2,000 UNITS /50 MCG) cap capsule Take 2,000 Units by mouth two (2) times a day. gummies    selenium 100 mcg tab Take  by mouth daily. (Patient not taking: Reported on 7/1/2021)    saw palmetto xtr/zinc picolin (SAW PALMETTO EXTRACT PO) Take  by mouth daily.  saw/vit E/sod ana maria/lyc/beta/pyg (PROSTATE HEALTH PO) Take  by mouth daily.  TURMERIC PO Take  by mouth daily.  B-Sit/m-19/bitter-orange peel (SLENDER CORTISOL PO) Take  by mouth daily. (Patient not taking: Reported on 7/1/2021)    aspirin 81 mg chewable tablet Take 1 Tab by mouth daily. No current facility-administered medications for this visit. Review of Symptoms:  11 systems reviewed, negative other than as stated in the HPI    Physical ExamPhysical Exam:    There were no vitals filed for this visit. There is no height or weight on file to calculate BMI. General PE  Gen:  NAD  Mental Status - Alert. General Appearance - Not in acute distress. HEENT:  PERRL, no carotid bruits or JVD  Chest and Lung Exam   Inspection: Accessory muscles - No use of accessory muscles in breathing. Auscultation:   Breath sounds: - Normal.   Cardiovascular   Inspection: Jugular vein - Bilateral - Inspection Normal.   Palpation/Percussion:   Apical Impulse: - Normal.   Auscultation: Rhythm - Regular. Heart Sounds - S1 WNL and S2 WNL. No S3 or S4. Murmurs & Other Heart Sounds: Auscultation of the heart reveals - No Murmurs. Peripheral Vascular   Upper Extremity: Inspection - Bilateral - No Cyanotic nailbeds or Digital clubbing. Lower Extremity:   Palpation: Edema - Bilateral - No edema. Abdomen:   Soft, non-tender, bowel sounds are active.   Neuro: A&O times 3, CN and motor grossly WNL    Labs:   Lab Results   Component Value Date/Time    Cholesterol, total 201 (H) 09/10/2021 10:39 AM    Cholesterol, total 186 04/12/2021 12:00 AM    Cholesterol, total 148 09/20/2020 01:45 AM    Cholesterol, total 186 08/02/2020 05:37 AM    Cholesterol, total 173 08/01/2020 06:26 AM    HDL Cholesterol 38 (L) 09/10/2021 10:39 AM    HDL Cholesterol 42 04/12/2021 12:00 AM    HDL Cholesterol 43 09/20/2020 01:45 AM    HDL Cholesterol 42 08/02/2020 05:37 AM    HDL Cholesterol 38 08/01/2020 06:26 AM    LDL, calculated 144 (H) 09/10/2021 10:39 AM    LDL, calculated 125 (H) 04/12/2021 12:00 AM    LDL, calculated 83.8 09/20/2020 01:45 AM    LDL, calculated 128 (H) 08/02/2020 05:37 AM    LDL, calculated 119.8 (H) 08/01/2020 06:26 AM    LDL, calculated 110.4 (H) 09/21/2010 04:25 AM    LDL, calculated 111 (H) 09/19/2010 04:21 AM    Triglyceride 106 09/10/2021 10:39 AM    Triglyceride 106 04/12/2021 12:00 AM    Triglyceride 106 09/20/2020 01:45 AM    Triglyceride 80 08/02/2020 05:37 AM    Triglyceride 76 08/01/2020 06:26 AM    CHOL/HDL Ratio 3.4 09/20/2020 01:45 AM    CHOL/HDL Ratio 4.4 08/02/2020 05:37 AM    CHOL/HDL Ratio 4.6 08/01/2020 06:26 AM    CHOL/HDL Ratio 4.7 09/21/2010 04:25 AM    CHOL/HDL Ratio 4.5 09/19/2010 04:21 AM     Lab Results   Component Value Date/Time     (H) 09/19/2010 04:21 AM     Lab Results   Component Value Date/Time    Sodium 140 09/10/2021 10:39 AM    Potassium 4.3 09/10/2021 10:39 AM    Chloride 100 09/10/2021 10:39 AM    CO2 29 09/10/2021 10:39 AM    Anion gap 4 (L) 05/05/2021 06:15 AM    Glucose 70 09/10/2021 10:39 AM    BUN 21 09/10/2021 10:39 AM    Creatinine 1.00 09/10/2021 10:39 AM    BUN/Creatinine ratio 21 09/10/2021 10:39 AM    GFR est AA 84 09/10/2021 10:39 AM    GFR est non-AA 73 09/10/2021 10:39 AM    Calcium 9.0 09/10/2021 10:39 AM    Bilirubin, total 0.4 09/10/2021 10:39 AM    Alk.  phosphatase 89 09/10/2021 10:39 AM    Protein, total 6.3 09/10/2021 10:39 AM Albumin 4.1 09/10/2021 10:39 AM    Globulin 4.2 (H) 05/05/2021 06:15 AM    A-G Ratio 1.9 09/10/2021 10:39 AM    ALT (SGPT) 11 09/10/2021 10:39 AM       EKG:         Assessment:     Assessment:        ICD-10-CM ICD-9-CM    1. Coronary artery disease involving native coronary artery of native heart without angina pectoris  I25.10 414.01    2. S/P CABG x 4  Z95.1 V45.81    3. Statin intolerance  Z78.9 995.27    4. Ischemic cardiomyopathy  I25.5 414.8    5. Essential hypertension  I10 401.9    6. Mixed hyperlipidemia  E78.2 272.2        No orders of the defined types were placed in this encounter. Plan:     ICM  Echo with EF 35-40% mild MR in 1/2021  Echo with EF 20-25%, mild MR in 9/2020  Wt up 5 lbs. Continue carvedilol 3.125 mg BID  Continue Entresto 24/26 mg BID   Continue Bumex 1 mg daily  Stable kidney fxn Serum Cr 1.0 in 9/2021  Repeat echo now     ASHD  Hx CABG x 4 in 8/6/2020: LIMA to LAD, RSVG to Diag-OM2, RSVG to PDA  Stable  Continue ASA BB   Starting repatha     HTN  Elevated but states home bp 120s-130s/70s. He does not want any medication changes       HLD  Statin intolerant. Statin d/c due to leg cramps. 9/2021  Now agreeing to start repatha, will send rx  Check labs in 3 mos end of 12/2021      Continue current care and f/u in 6 months.     Scott Allen NP

## 2021-09-28 ENCOUNTER — OFFICE VISIT (OUTPATIENT)
Dept: CARDIOLOGY CLINIC | Age: 76
End: 2021-09-28
Payer: MEDICARE

## 2021-09-28 VITALS
BODY MASS INDEX: 25.75 KG/M2 | OXYGEN SATURATION: 97 % | SYSTOLIC BLOOD PRESSURE: 150 MMHG | WEIGHT: 160.2 LBS | HEIGHT: 66 IN | HEART RATE: 62 BPM | RESPIRATION RATE: 18 BRPM | DIASTOLIC BLOOD PRESSURE: 76 MMHG

## 2021-09-28 DIAGNOSIS — Z95.1 S/P CABG X 4: ICD-10-CM

## 2021-09-28 DIAGNOSIS — I10 ESSENTIAL HYPERTENSION: ICD-10-CM

## 2021-09-28 DIAGNOSIS — I25.10 CORONARY ARTERY DISEASE INVOLVING NATIVE CORONARY ARTERY OF NATIVE HEART WITHOUT ANGINA PECTORIS: Primary | ICD-10-CM

## 2021-09-28 DIAGNOSIS — I25.5 ISCHEMIC CARDIOMYOPATHY: ICD-10-CM

## 2021-09-28 DIAGNOSIS — E78.2 MIXED HYPERLIPIDEMIA: ICD-10-CM

## 2021-09-28 DIAGNOSIS — Z78.9 STATIN INTOLERANCE: ICD-10-CM

## 2021-09-28 PROCEDURE — G8427 DOCREV CUR MEDS BY ELIG CLIN: HCPCS | Performed by: NURSE PRACTITIONER

## 2021-09-28 PROCEDURE — G8536 NO DOC ELDER MAL SCRN: HCPCS | Performed by: NURSE PRACTITIONER

## 2021-09-28 PROCEDURE — G0463 HOSPITAL OUTPT CLINIC VISIT: HCPCS | Performed by: NURSE PRACTITIONER

## 2021-09-28 PROCEDURE — G8432 DEP SCR NOT DOC, RNG: HCPCS | Performed by: NURSE PRACTITIONER

## 2021-09-28 PROCEDURE — 1101F PT FALLS ASSESS-DOCD LE1/YR: CPT | Performed by: NURSE PRACTITIONER

## 2021-09-28 PROCEDURE — G8419 CALC BMI OUT NRM PARAM NOF/U: HCPCS | Performed by: NURSE PRACTITIONER

## 2021-09-28 PROCEDURE — 99214 OFFICE O/P EST MOD 30 MIN: CPT | Performed by: NURSE PRACTITIONER

## 2021-09-28 NOTE — PROGRESS NOTES
1. Have you been to the ER, urgent care clinic since your last visit? Hospitalized since your last visit? No    2. Have you seen or consulted any other health care providers outside of the [address] [place] [address] since your last visit? Include any pap smears or colon screening. No    Chief Complaint   Patient presents with    Cardiomyopathy     3 mo appt. No cardiac concerns.

## 2021-09-29 DIAGNOSIS — Z95.1 S/P CABG X 4: ICD-10-CM

## 2021-09-29 RX ORDER — BUMETANIDE 1 MG/1
TABLET ORAL
Qty: 180 TABLET | Refills: 1 | Status: SHIPPED | OUTPATIENT
Start: 2021-09-29 | End: 2022-03-20

## 2021-09-30 ENCOUNTER — TELEPHONE (OUTPATIENT)
Dept: CARDIOLOGY CLINIC | Age: 76
End: 2021-09-30

## 2021-09-30 NOTE — TELEPHONE ENCOUNTER
Pt is in agreement to start [address] after  Last visit on 9/28/21. Went on cover my meds and filled out info but when sent to plan , it stated no drug matching found.

## 2021-10-05 NOTE — TELEPHONE ENCOUNTER
Message  Received: 4 days ago  Navjot Leon, NP sent to Gabi Juarez LPN  Caller: Unspecified (5 days ago,  4:47 PM)  You know I typically order 140 mg subq every 2 weeks but when I hit reorder for him he was for once a mos.  If you dont mind calling patient and find out if ok to do 140 mg every 2 weeks, I will resend to pharmacy.  Find out which pharm he wants thanks          Called pt and left message to call me back regarding Megan Desai

## 2021-10-08 NOTE — TELEPHONE ENCOUNTER
Message  Received: Today  Duarte Terrazas NP sent to [facility] [employer], LPN  Caller: Unspecified (3 days ago, 11:26 AM)  K done       Noted, thanks. Repatha sent to pharmacy for 2 shots monthly.

## 2021-10-12 NOTE — TELEPHONE ENCOUNTER
Called pharmacy and asked if they could generate a new prior auth on Repatha.  She advised it will take a few moments but will do it and will send us the new request.

## 2021-10-13 NOTE — TELEPHONE ENCOUNTER
Received new cover my meds for pt's Repatha. Went on cover my meds, filled out info, was sent to plan. It was approved from 7/14/21-1/11/22. Called pharmacy to advise pts Repatha has been approved. She ran it thru system and it will be 9.20 for copay, she will have to order medication. Advised I will let pt know. Called pt but no answer and voice mail box is full. Liz Rodriguez on [facility]. Liz Rodriguez on [facility], advised I have not been able to get ahold of pt so I am reaching out to her in regards to pt's Nusrat Elaine has been until 1/22. Advised pharmacy has been contacted and his copay is 9.20 and he will need to contact them in regards to . Advised pt's mail box is full. She advised the same for her but he will probably call her tonight and she will let him know. I advised pt can call me if needed. Veena Lagunas verbalized understanding.

## 2021-10-14 ENCOUNTER — ANCILLARY PROCEDURE (OUTPATIENT)
Dept: CARDIOLOGY CLINIC | Age: 76
End: 2021-10-14
Payer: MEDICARE

## 2021-10-14 VITALS
HEIGHT: 66 IN | BODY MASS INDEX: 25.71 KG/M2 | WEIGHT: 160 LBS | DIASTOLIC BLOOD PRESSURE: 76 MMHG | SYSTOLIC BLOOD PRESSURE: 150 MMHG

## 2021-10-14 DIAGNOSIS — I25.5 ISCHEMIC CARDIOMYOPATHY: ICD-10-CM

## 2021-10-14 DIAGNOSIS — Z78.9 STATIN INTOLERANCE: ICD-10-CM

## 2021-10-14 DIAGNOSIS — Z95.1 S/P CABG X 4: ICD-10-CM

## 2021-10-14 DIAGNOSIS — I25.10 CORONARY ARTERY DISEASE INVOLVING NATIVE CORONARY ARTERY OF NATIVE HEART WITHOUT ANGINA PECTORIS: ICD-10-CM

## 2021-10-14 DIAGNOSIS — E78.2 MIXED HYPERLIPIDEMIA: ICD-10-CM

## 2021-10-14 DIAGNOSIS — I10 ESSENTIAL HYPERTENSION: ICD-10-CM

## 2021-10-14 LAB
ECHO AO ASC DIAM: 3.19 CM
ECHO AO ROOT DIAM: 3.49 CM
ECHO AV AREA PEAK VELOCITY: 2.16 CM2
ECHO AV AREA/BSA PEAK VELOCITY: 1.2 CM2/M2
ECHO AV PEAK GRADIENT: 4.6 MMHG
ECHO AV PEAK VELOCITY: 107.24 CM/S
ECHO EST RA PRESSURE: 8 MMHG
ECHO LA AREA 4C: 23.24 CM2
ECHO LA MAJOR AXIS: 3.91 CM
ECHO LA MINOR AXIS: 2.15 CM
ECHO LA VOL 2C: 77.63 ML (ref 18–58)
ECHO LA VOL 4C: 79.34 ML (ref 18–58)
ECHO LA VOL BP: 88.22 ML (ref 18–58)
ECHO LA VOL/BSA BIPLANE: 48.47 ML/M2 (ref 16–28)
ECHO LA VOLUME INDEX A2C: 42.65 ML/M2 (ref 16–28)
ECHO LA VOLUME INDEX A4C: 43.59 ML/M2 (ref 16–28)
ECHO LV E' LATERAL VELOCITY: 5.37 CM/S
ECHO LV E' SEPTAL VELOCITY: 4.05 CM/S
ECHO LV INTERNAL DIMENSION DIASTOLIC: 6.48 CM (ref 4.2–5.9)
ECHO LV INTERNAL DIMENSION SYSTOLIC: 5.56 CM
ECHO LV IVSD: 0.98 CM (ref 0.6–1)
ECHO LV MASS 2D: 274.4 G (ref 88–224)
ECHO LV MASS INDEX 2D: 150.8 G/M2 (ref 49–115)
ECHO LV POSTERIOR WALL DIASTOLIC: 0.98 CM (ref 0.6–1)
ECHO LVOT DIAM: 2.01 CM
ECHO LVOT PEAK GRADIENT: 2.15 MMHG
ECHO LVOT PEAK VELOCITY: 73.29 CM/S
ECHO LVOT SV: 52.9 ML
ECHO LVOT VTI: 16.71 CM
ECHO MV A VELOCITY: 24.41 CM/S
ECHO MV AREA PHT: 4.36 CM2
ECHO MV E DECELERATION TIME (DT): 174.19 MS
ECHO MV E VELOCITY: 80.15 CM/S
ECHO MV E/A RATIO: 3.28
ECHO MV E/E' LATERAL: 14.93
ECHO MV E/E' RATIO (AVERAGED): 17.36
ECHO MV E/E' SEPTAL: 19.79
ECHO MV EROA PISA: 0.3 CM2
ECHO MV PRESSURE HALF TIME (PHT): 50.52 MS
ECHO MV REGURGITANT RADIUS PISA: 0.62 CM
ECHO MV REGURGITANT VOLUME: 37.37 ML
ECHO MV REGURGITANT VTIA: 123.6 CM
ECHO RIGHT VENTRICULAR SYSTOLIC PRESSURE (RVSP): 44.29 MMHG
ECHO RV TAPSE: 1.76 CM (ref 1.5–2)
ECHO TV REGURGITANT MAX VELOCITY: 301.22 CM/S
ECHO TV REGURGITANT PEAK GRADIENT: 36.29 MMHG
MR PISA PV: 346.12 CM/S

## 2021-10-14 PROCEDURE — 93306 TTE W/DOPPLER COMPLETE: CPT | Performed by: INTERNAL MEDICINE

## 2021-10-15 ENCOUNTER — TELEPHONE (OUTPATIENT)
Dept: CARDIOLOGY CLINIC | Age: 76
End: 2021-10-15

## 2021-10-15 NOTE — TELEPHONE ENCOUNTER
----- Message from Duc Bowles NP sent at 10/15/2021  8:42 AM EDT -----  His EF (pumping heart strength) is slightly down from January. I would recommend for him to come in, follow up with the ricky in the next week or so to adjust heart meds and reassess him.

## 2021-10-15 NOTE — PROGRESS NOTES
His EF (pumping heart strength) is slightly down from January. I would recommend for him to come in, follow up with the ricky in the next week or so to adjust heart meds and reassess him.

## 2021-10-18 NOTE — TELEPHONE ENCOUNTER
Called pt and left message to call regarding test results. Cephalexin Pregnancy And Lactation Text: This medication is Pregnancy Category B and considered safe during pregnancy.  It is also excreted in breast milk but can be used safely for shorter doses.

## 2021-10-20 NOTE — TELEPHONE ENCOUNTER
Message  Received: Today  Will Evans sent to [facility], LPN  Caller: Unspecified (5 days ago, 11:10 AM)  Can this patient see Phil Rudolph or does he have to see Dr. Harpreet Daniels?      Aj Machado

## 2021-10-20 NOTE — TELEPHONE ENCOUNTER
Called pt,verified pt with two pt identifiers, advised pt his echo showed his pumping strength has decreased from previous echo in January. Advised he will need to f/u in 1-2 weeks to adjust medications. Advised he can continue to workout as long as he is comfortable. Advised I will have Nancie Rivera call him to schedule his appt for his f/u. Pt verbalized understanding.

## 2021-10-21 NOTE — TELEPHONE ENCOUNTER
Message  Received: Today  James Meigs sent to Germaine Brooks LPN  Caller: Unspecified (6 days ago, 11:10 AM)  He is coming in on November 4th @ 3:15     Jesus Wilhelm         Noted, thanks.

## 2021-11-04 ENCOUNTER — OFFICE VISIT (OUTPATIENT)
Dept: CARDIOLOGY CLINIC | Age: 76
End: 2021-11-04
Payer: MEDICARE

## 2021-11-04 VITALS
HEART RATE: 60 BPM | DIASTOLIC BLOOD PRESSURE: 60 MMHG | WEIGHT: 162 LBS | BODY MASS INDEX: 26.03 KG/M2 | RESPIRATION RATE: 18 BRPM | OXYGEN SATURATION: 97 % | SYSTOLIC BLOOD PRESSURE: 110 MMHG | HEIGHT: 66 IN

## 2021-11-04 DIAGNOSIS — I25.10 CORONARY ARTERY DISEASE INVOLVING NATIVE CORONARY ARTERY OF NATIVE HEART WITHOUT ANGINA PECTORIS: Primary | ICD-10-CM

## 2021-11-04 DIAGNOSIS — Z95.1 S/P CABG X 4: ICD-10-CM

## 2021-11-04 DIAGNOSIS — I25.5 ISCHEMIC CARDIOMYOPATHY: ICD-10-CM

## 2021-11-04 DIAGNOSIS — E78.2 MIXED HYPERLIPIDEMIA: ICD-10-CM

## 2021-11-04 DIAGNOSIS — I10 ESSENTIAL HYPERTENSION: ICD-10-CM

## 2021-11-04 PROCEDURE — G8427 DOCREV CUR MEDS BY ELIG CLIN: HCPCS | Performed by: NURSE PRACTITIONER

## 2021-11-04 PROCEDURE — G8432 DEP SCR NOT DOC, RNG: HCPCS | Performed by: NURSE PRACTITIONER

## 2021-11-04 PROCEDURE — G8536 NO DOC ELDER MAL SCRN: HCPCS | Performed by: NURSE PRACTITIONER

## 2021-11-04 PROCEDURE — 99214 OFFICE O/P EST MOD 30 MIN: CPT | Performed by: NURSE PRACTITIONER

## 2021-11-04 PROCEDURE — 1101F PT FALLS ASSESS-DOCD LE1/YR: CPT | Performed by: NURSE PRACTITIONER

## 2021-11-04 PROCEDURE — G0463 HOSPITAL OUTPT CLINIC VISIT: HCPCS | Performed by: NURSE PRACTITIONER

## 2021-11-04 PROCEDURE — G8419 CALC BMI OUT NRM PARAM NOF/U: HCPCS | Performed by: NURSE PRACTITIONER

## 2021-11-04 NOTE — LETTER
11/4/2021 Patient: Sima Tello YOB: 1945 Date of Visit: 11/4/2021 Arthur Romero NP 
[address] [facility] [address] [facility] [address] [address] Dear Arthur Romero NP, Thank you for referring Mr. Burrell Parent to [address] [place] [address] for evaluation. My notes for this consultation are attached. If you have questions, please do not hesitate to call me. I look forward to following your patient along with you. Sincerely, Karen Gray NP

## 2021-11-04 NOTE — PROGRESS NOTES
[address]  [phone]     Subjective: Moy Baker is a 68 y.o. male is here for a f/u appt after being seen in the office 9/28/21 and was noted to have 5lbs weight gain. Pmhx CAD, ICM, HTN, HLD/statin intolerance and carotid artery disease. Echo was obtained 10/14/21 showing mild reduction in EF 30-35% from 35-40%. He reports at the time of that appt he was using incentive spirometer and his volume was down to 1700, now back up to over 2000. He has been taking Bumex daily and thinks this has helped. He has been going to the gym and working out doing well with this. He denies chest pain, SOB/NARANJO, orthopnea, PND,or  LE edema. Denies palpitations, syncope, or presyncope.      Patient Active Problem List    Diagnosis Date Noted    Statin intolerance 01/07/2021    Non compliance with medical treatment 09/21/2020    Hypokalemia 09/20/2020    CHF exacerbation ([address] 09/20/2020    Paroxysmal atrial fibrillation ([address] 08/09/2020    CAD (coronary artery disease) 08/06/2020    S/P CABG x 4 08/06/2020    Bilateral carotid artery stenosis 08/04/2020    Pleural effusion, bilateral 07/31/2020    CHF, acute on chronic ([address] 07/31/2020    Pulmonary embolism ([address] 07/31/2020    Ischemic cardiomyopathy 07/31/2020    Hyperlipidemia 09/21/2010    Coronary stent 09/21/2010      Tammy Clement, NP  Past Medical History:   Diagnosis Date    Acute MI anterior wall first episode care ([address] 9/21/2010    Atrial fibrillation ([address]     CAD (coronary artery disease), native coronary artery 9/21/2010    Carotid artery disease (HCC)     Congestive heart failure ([address]     Coronary stent 9/21/2010    Hyperlipidemia 9/21/2010    Pulmonary embolism ([address]       Past Surgical History:   Procedure Laterality Date    HX CORONARY ARTERY BYPASS GRAFT  08/06/2020    x 4, LIMA to LAD, RSVG to Diag-OM2, RSVG to PDA    HX CORONARY STENT PLACEMENT      HX HEART CATHETERIZATION      HX PTCA       Allergies   Allergen Reactions    Statins-Hmg-Coa Reductase Inhibitors Myalgia      Family History   Problem Relation Age of Onset    No Known Problems Mother     No Known Problems Father       Social History     Socioeconomic History    Marital status:      Spouse name: Not on file    Number of children: 1    Years of education: 15    Highest education level: 12th grade   Occupational History    Not on file   Tobacco Use    Smoking status: Never Smoker    Smokeless tobacco: Never Used   Substance and Sexual Activity    Alcohol use: Never    Drug use: Not Currently     Types: Prescription, OTC    Sexual activity: Not on file   Other Topics Concern     Service No    Blood Transfusions No    Caffeine Concern No    Occupational Exposure No    Hobby Hazards No    Sleep Concern Yes    Stress Concern Yes    Weight Concern No    Special Diet Yes    Back Care No    Exercise No    Bike Helmet No    Seat Belt Yes    Self-Exams No   Social History Narrative    Not on file     Social Determinants of Health     Financial Resource Strain:     Difficulty of Paying Living Expenses: Not on file   Food Insecurity:     Worried About Running Out of Food in the Last Year: Not on file    Nidia of Food in the Last Year: Not on file   Transportation Needs:     Lack of Transportation (Medical): Not on file    Lack of Transportation (Non-Medical):  Not on file   Physical Activity:     Days of Exercise per Week: Not on file    Minutes of Exercise per Session: Not on file   Stress:     Feeling of Stress : Not on file   Social Connections:     Frequency of Communication with Friends and Family: Not on file    Frequency of Social Gatherings with Friends and Family: Not on file    Attends Confucianist Services: Not on file    Active Member of Clubs or Organizations: Not on file    Attends Club or Organization Meetings: Not on file    Marital Status: Not on file   Intimate Partner Violence:     Fear of Current or Ex-Partner: Not on file    Emotionally Abused: Not on file    Physically Abused: Not on file    Sexually Abused: Not on file   Housing Stability:     Unable to Pay for Housing in the Last Year: Not on file    Number of [place] in the Last Year: Not on file    Unstable Housing in the Last Year: Not on file      Current Outpatient Medications   Medication Sig    evolocumab (REPATHA SURECLICK) pen injection 1 mL by SubCUTAneous route every fourteen (14) days.  bumetanide (BUMEX) 1 mg tablet TAKE 1-2 TABS DAILY AS NEEDED    Entresto 24-26 mg tablet TAKE 1 TABLET BY MOUTH EVERY 12 HOURS    potassium chloride SR (KLOR-CON 10) 10 mEq tablet TAKE 1 TABLET BY MOUTH EVERY DAY    carvediloL (COREG) 3.125 mg tablet TAKE 1 TABLET BY MOUTH TWICE A DAY WITH MEALS    ascorbic acid (VITAMIN C PO) Take  by mouth daily. Takes 2 gummies 4 times daily----2 = 250 mg    cholecalciferol (VITAMIN D3) (2,000 UNITS /50 MCG) cap capsule Take 2,000 Units by mouth two (2) times a day. gummies    saw palmetto xtr/zinc picolin (SAW PALMETTO EXTRACT PO) Take  by mouth daily.  saw/vit E/sod ana maria/lyc/beta/pyg (PROSTATE HEALTH PO) Take  by mouth daily.  TURMERIC PO Take  by mouth daily.  aspirin 81 mg chewable tablet Take 1 Tab by mouth daily. No current facility-administered medications for this visit. Review of Symptoms:  11 systems reviewed, negative other than as stated in the HPI    Physical ExamPhysical Exam:    Vitals:    11/04/21 1459   BP: 110/60   Pulse: 60   Resp: 18   SpO2: 97%   Weight: 162 lb (73.5 kg)   Height: 5' 6\" (1.676 m)     Body mass index is 26.15 kg/m². General PE  Gen:  NAD  Mental Status - Alert. General Appearance - Not in acute distress. HEENT:  PERRL, no carotid bruits or JVD  Chest and Lung Exam   Inspection: Accessory muscles - No use of accessory muscles in breathing.    Auscultation:   Breath sounds: - Normal. Cardiovascular   Inspection: Jugular vein - Bilateral - Inspection Normal.   Palpation/Percussion:   Apical Impulse: - Normal.   Auscultation: Rhythm - Regular. Heart Sounds - S1 WNL and S2 WNL. No S3 or S4. Murmurs & Other Heart Sounds: Auscultation of the heart reveals - No Murmurs. Peripheral Vascular   Upper Extremity: Inspection - Bilateral - No Cyanotic nailbeds or Digital clubbing. Lower Extremity:   Palpation: Edema - Bilateral - No edema. Abdomen:   Soft, non-tender, bowel sounds are active.   Neuro: A&O times 3, CN and motor grossly WNL    Labs:   Lab Results   Component Value Date/Time    Cholesterol, total 201 (H) 09/10/2021 10:39 AM    Cholesterol, total 186 04/12/2021 12:00 AM    Cholesterol, total 148 09/20/2020 01:45 AM    Cholesterol, total 186 08/02/2020 05:37 AM    Cholesterol, total 173 08/01/2020 06:26 AM    HDL Cholesterol 38 (L) 09/10/2021 10:39 AM    HDL Cholesterol 42 04/12/2021 12:00 AM    HDL Cholesterol 43 09/20/2020 01:45 AM    HDL Cholesterol 42 08/02/2020 05:37 AM    HDL Cholesterol 38 08/01/2020 06:26 AM    LDL, calculated 144 (H) 09/10/2021 10:39 AM    LDL, calculated 125 (H) 04/12/2021 12:00 AM    LDL, calculated 83.8 09/20/2020 01:45 AM    LDL, calculated 128 (H) 08/02/2020 05:37 AM    LDL, calculated 119.8 (H) 08/01/2020 06:26 AM    LDL, calculated 110.4 (H) 09/21/2010 04:25 AM    LDL, calculated 111 (H) 09/19/2010 04:21 AM    Triglyceride 106 09/10/2021 10:39 AM    Triglyceride 106 04/12/2021 12:00 AM    Triglyceride 106 09/20/2020 01:45 AM    Triglyceride 80 08/02/2020 05:37 AM    Triglyceride 76 08/01/2020 06:26 AM    CHOL/HDL Ratio 3.4 09/20/2020 01:45 AM    CHOL/HDL Ratio 4.4 08/02/2020 05:37 AM    CHOL/HDL Ratio 4.6 08/01/2020 06:26 AM    CHOL/HDL Ratio 4.7 09/21/2010 04:25 AM    CHOL/HDL Ratio 4.5 09/19/2010 04:21 AM     Lab Results   Component Value Date/Time     (H) 09/19/2010 04:21 AM     Lab Results   Component Value Date/Time    Sodium 140 09/10/2021 10:39 AM    Potassium 4.3 09/10/2021 10:39 AM    Chloride 100 09/10/2021 10:39 AM    CO2 29 09/10/2021 10:39 AM    Anion gap 4 (L) 05/05/2021 06:15 AM    Glucose 70 09/10/2021 10:39 AM    BUN 21 09/10/2021 10:39 AM    Creatinine 1.00 09/10/2021 10:39 AM    BUN/Creatinine ratio 21 09/10/2021 10:39 AM    GFR est AA 84 09/10/2021 10:39 AM    GFR est non-AA 73 09/10/2021 10:39 AM    Calcium 9.0 09/10/2021 10:39 AM    Bilirubin, total 0.4 09/10/2021 10:39 AM    Alk. phosphatase 89 09/10/2021 10:39 AM    Protein, total 6.3 09/10/2021 10:39 AM    Albumin 4.1 09/10/2021 10:39 AM    Globulin 4.2 (H) 05/05/2021 06:15 AM    A-G Ratio 1.9 09/10/2021 10:39 AM    ALT (SGPT) 11 09/10/2021 10:39 AM                    Assessment:        ICD-10-CM ICD-9-CM    1. Coronary artery disease involving native coronary artery of native heart without angina pectoris  I25.10 414.01    2. Ischemic cardiomyopathy  I25.5 414.8    3. Essential hypertension  I10 401.9    4. Mixed hyperlipidemia  E78.2 272.2    5. S/P CABG x 4  Z95.1 V45.81        No orders of the defined types were placed in this encounter. Plan:     ICM  Echo with EF 35-40% mild MR in 1/2021  Echo with EF 20-25%, mild MR in 9/2020  Wt up 5 lbs 9/28/21. Echo obtained 10/14/21 showed EF 30-35%, mild MR, mild pHTN. He reports at the time of the echo he was not taking Bumex, incentive spirometer volume was down, back to taking it daily and his volume is back to normal. Feeling better. Weight down 2lbs. Discussed addition of Bronson Junie and he declines to do so at this time. Hesitant to add Spironolactone with his BP lowering now that he is on Bumex daily. Continue carvedilol 3.125 mg BID, Entresto 24/26 mg BID   Continue Bumex 1 mg daily   Stable kidney fxn Serum Cr 1.00 9/102021  Repeat labs in a month.        CAD  Hx CABG x 4 in 8/6/2020: LIMA to LAD, RSVG to Diag-OM2, RSVG to PDA  Denies angina or anginal equivalent symptoms. Continue ASA BB  repatha     HTN  /60. Lower end of normal now that on Bumex. Cont current tx.        HLD  Statin intolerant. Statin d/c due to leg cramps. 9/2021   Started on Repatha,   Check labs end of 12/2021    F/u with Dr Angella Loza in 4-6 weeks. Can revisit at that time adding additional medications as warranted.      Rayna Goldmann, NP

## 2021-11-04 NOTE — PROGRESS NOTES
1. Have you been to the ER, urgent care clinic since your last visit? Hospitalized since your last visit? No    2. Have you seen or consulted any other health care providers outside of the [address] [place] [address] since your last visit? Include any pap smears or colon screening. No     Chief Complaint   Patient presents with    Results     Discuss Echo results. No Cardiac Concerns.

## 2021-11-17 RX ORDER — SACUBITRIL AND VALSARTAN 24; 26 MG/1; MG/1
TABLET, FILM COATED ORAL
Qty: 60 TABLET | Refills: 1 | Status: SHIPPED | OUTPATIENT
Start: 2021-11-17 | End: 2022-01-16

## 2022-01-04 ENCOUNTER — TELEPHONE (OUTPATIENT)
Dept: CARDIOLOGY CLINIC | Age: 77
End: 2022-01-04

## 2022-01-04 NOTE — TELEPHONE ENCOUNTER
Received prior auth on Repath. Yelena [employer] previously approved until 1/11/22. Pt needs his labs done first.       Called pt,verified pt with two pt identifiers, advised pt we will need his labs done first before I can start on Repatha prior auth. Pt advised he still has his lab slip and will go get them done. I advised once I see them resulted I will get to work on his prior [employer]. Verified his upcoming appt. Pt verbalized understanding.

## 2022-01-06 ENCOUNTER — OFFICE VISIT (OUTPATIENT)
Dept: CARDIOLOGY CLINIC | Age: 77
End: 2022-01-06
Payer: MEDICARE

## 2022-01-06 VITALS
SYSTOLIC BLOOD PRESSURE: 132 MMHG | HEIGHT: 66 IN | OXYGEN SATURATION: 97 % | BODY MASS INDEX: 26.37 KG/M2 | WEIGHT: 164.1 LBS | HEART RATE: 61 BPM | RESPIRATION RATE: 15 BRPM | DIASTOLIC BLOOD PRESSURE: 82 MMHG

## 2022-01-06 DIAGNOSIS — I25.5 ISCHEMIC CARDIOMYOPATHY: ICD-10-CM

## 2022-01-06 DIAGNOSIS — I25.10 CORONARY ARTERY DISEASE INVOLVING NATIVE CORONARY ARTERY OF NATIVE HEART WITHOUT ANGINA PECTORIS: Primary | ICD-10-CM

## 2022-01-06 DIAGNOSIS — Z78.9 STATIN INTOLERANCE: ICD-10-CM

## 2022-01-06 DIAGNOSIS — Z95.1 S/P CABG X 4: ICD-10-CM

## 2022-01-06 DIAGNOSIS — I48.0 PAROXYSMAL ATRIAL FIBRILLATION (HCC): ICD-10-CM

## 2022-01-06 PROCEDURE — 1101F PT FALLS ASSESS-DOCD LE1/YR: CPT | Performed by: INTERNAL MEDICINE

## 2022-01-06 PROCEDURE — 93010 ELECTROCARDIOGRAM REPORT: CPT | Performed by: INTERNAL MEDICINE

## 2022-01-06 PROCEDURE — G8510 SCR DEP NEG, NO PLAN REQD: HCPCS | Performed by: INTERNAL MEDICINE

## 2022-01-06 PROCEDURE — G8536 NO DOC ELDER MAL SCRN: HCPCS | Performed by: INTERNAL MEDICINE

## 2022-01-06 PROCEDURE — 93005 ELECTROCARDIOGRAM TRACING: CPT | Performed by: INTERNAL MEDICINE

## 2022-01-06 PROCEDURE — G0463 HOSPITAL OUTPT CLINIC VISIT: HCPCS | Performed by: INTERNAL MEDICINE

## 2022-01-06 PROCEDURE — G8419 CALC BMI OUT NRM PARAM NOF/U: HCPCS | Performed by: INTERNAL MEDICINE

## 2022-01-06 PROCEDURE — G8427 DOCREV CUR MEDS BY ELIG CLIN: HCPCS | Performed by: INTERNAL MEDICINE

## 2022-01-06 PROCEDURE — 99214 OFFICE O/P EST MOD 30 MIN: CPT | Performed by: INTERNAL MEDICINE

## 2022-01-06 NOTE — PROGRESS NOTES
1/6/2022 2:23 PM      Subjective: Melba Marrero   denies chest pain, chest pressure/discomfort, dyspnea, palpitations, irregular heart beats, near-syncope, syncope, fatigue, orthopnea, paroxysmal nocturnal dyspnea, exertional chest pressure/discomfort, claudication, lower extremity edema, tachypnea. Visit Vitals  /82 (BP 1 Location: Left upper arm, BP Patient Position: Sitting, BP Cuff Size: Adult)   Pulse 61   Resp 15   Ht 5' 6\" (1.676 m)   Wt 164 lb 1.6 oz (74.4 kg)   SpO2 97%   BMI 26.49 kg/m²     Current Outpatient Medications   Medication Sig    Entresto 24-26 mg tablet TAKE 1 TABLET BY MOUTH EVERY 12 HOURS    evolocumab (REPATHA SURECLICK) pen injection 1 mL by SubCUTAneous route every fourteen (14) days.  bumetanide (BUMEX) 1 mg tablet TAKE 1-2 TABS DAILY AS NEEDED    potassium chloride SR (KLOR-CON 10) 10 mEq tablet TAKE 1 TABLET BY MOUTH EVERY DAY    carvediloL (COREG) 3.125 mg tablet TAKE 1 TABLET BY MOUTH TWICE A DAY WITH MEALS    ascorbic acid (VITAMIN C PO) Take  by mouth daily. Takes 2 gummies 4 times daily----2 = 250 mg    cholecalciferol (VITAMIN D3) (2,000 UNITS /50 MCG) cap capsule Take 2,000 Units by mouth two (2) times a day. gummies    saw palmetto xtr/zinc picolin (SAW PALMETTO EXTRACT PO) Take  by mouth daily.  saw/vit E/sod ana maria/lyc/beta/pyg (PROSTATE HEALTH PO) Take  by mouth daily.  TURMERIC PO Take  by mouth daily.  aspirin 81 mg chewable tablet Take 1 Tab by mouth daily. No current facility-administered medications for this visit.          Objective:      Visit Vitals  /82 (BP 1 Location: Left upper arm, BP Patient Position: Sitting, BP Cuff Size: Adult)   Pulse 61   Resp 15   Ht 5' 6\" (1.676 m)   Wt 164 lb 1.6 oz (74.4 kg)   SpO2 97%   BMI 26.49 kg/m²       Past Medical History:   Diagnosis Date    Acute MI anterior wall first episode care [facility]) 9/21/2010    Atrial fibrillation ([address]     CAD (coronary artery disease), native coronary artery 9/21/2010    Carotid artery disease (HCC)     [facility]     Coronary stent 9/21/2010    Hyperlipidemia 9/21/2010    Pulmonary embolism (HCC)       Past Surgical History:   Procedure Laterality Date    HX CORONARY ARTERY BYPASS GRAFT  08/06/2020    x 4, LIMA to LAD, RSVG to Diag-OM2, RSVG to PDA    HX CORONARY STENT PLACEMENT      HX HEART CATHETERIZATION      HX PTCA       Allergies   Allergen Reactions    Statins-Hmg-Coa Reductase Inhibitors Myalgia      Family History   Problem Relation Age of Onset    No Known Problems Mother     No Known Problems Father       Social History     Socioeconomic History    Marital status:      Spouse name: Not on file    Number of children: 1    Years of education: 15    Highest education level: 12th grade   Occupational History    Not on file   Tobacco Use    Smoking status: Never Smoker    Smokeless tobacco: Never Used   Vaping Use    Vaping Use: Never used   Substance and Sexual Activity    Alcohol use: Never    Drug use: Not Currently     Types: Prescription, OTC    Sexual activity: Not on file   Other Topics Concern     Service No    Blood Transfusions No    Caffeine Concern No    Occupational Exposure No    Hobby Hazards No    Sleep Concern Yes    Stress Concern Yes    Weight Concern No    Special Diet Yes    Back Care No    Exercise No    Bike Helmet No    Seat Belt Yes    Self-Exams No   Social History Narrative    Not on file     Social Determinants of Health     Financial Resource Strain:     Difficulty of Paying Living Expenses: Not on file   Food Insecurity:     Worried About Running Out of Food in the Last Year: Not on file    Nidia of Food in the Last Year: Not on file   Transportation Needs:     Lack of Transportation (Medical): Not on file    Lack of Transportation (Non-Medical):  Not on file   Physical Activity:     Days of Exercise per Week: Not on file   Chlorogen of Exercise per Session: Not on file   Stress:     Feeling of Stress : Not on file   Social Connections:     Frequency of Communication with Friends and Family: Not on file    Frequency of Social Gatherings with Friends and Family: Not on file    Attends Judaism Services: Not on file    Active Member of Clubs or Organizations: Not on file    Attends Club or Organization Meetings: Not on file    Marital Status: Not on file   Intimate Partner Violence:     Fear of Current or Ex-Partner: Not on file    Emotionally Abused: Not on file    Physically Abused: Not on file    Sexually Abused: Not on file   Housing Stability:     Unable to Pay for Housing in the Last Year: Not on file    Number of [place] in the Last Year: Not on file    Unstable Housing in the Last Year: Not on file         Assessment:       ICD-10-CM ICD-9-CM    1. Coronary artery disease involving native coronary artery of native heart without angina pectoris  I25.10 414.01 AMB POC EKG ROUTINE W/ 12 LEADS, INTER & REP   2. Ischemic cardiomyopathy  I25.5 414.8 AMB POC EKG ROUTINE W/ 12 LEADS, INTER & REP   3. Paroxysmal atrial fibrillation (HCC)  I48.0 427.31 AMB POC EKG ROUTINE W/ 12 LEADS, INTER & REP   4. S/P CABG x 4  Z95.1 V45.81    5. Statin intolerance  Z78.9 995.27        Plan:     ICM. Last Echo noted. Feels great now. Not interested in ICD. Wants to continue current meds. NYHA class 1.      ASHD  s/p CABG x 4 in 8/6/2020: LIMA to LAD, RSVG to Diag-OM2, RSVG to PDA  Continue current meds. Stable.      HTN  Controlled with current therapy     HLD  Statin intolerant. On repatha now.

## 2022-01-06 NOTE — PROGRESS NOTES
1. Have you been to the ER, urgent care clinic since your last visit? Hospitalized since your last visit? No.    2. Have you seen or consulted any other health care providers outside of the [address] [place] [address] since your last visit? Include any pap smears or colon screening.    No.        Chief Complaint   Patient presents with    Coronary Artery Disease     1 month- pt denies any cardiac symptoms- had done for Repatha today    Hypertension

## 2022-01-07 ENCOUNTER — TELEPHONE (OUTPATIENT)
Dept: CARDIOLOGY CLINIC | Age: 77
End: 2022-01-07

## 2022-01-07 LAB
ALBUMIN SERPL-MCNC: 4.3 G/DL (ref 3.7–4.7)
ALBUMIN/GLOB SERPL: 1.9 {RATIO} (ref 1.2–2.2)
ALP SERPL-CCNC: 79 IU/L (ref 44–121)
ALT SERPL-CCNC: 13 IU/L (ref 0–44)
AST SERPL-CCNC: 16 IU/L (ref 0–40)
BILIRUB SERPL-MCNC: 0.6 MG/DL (ref 0–1.2)
BUN SERPL-MCNC: 21 MG/DL (ref 8–27)
BUN/CREAT SERPL: 20 (ref 10–24)
CALCIUM SERPL-MCNC: 9.3 MG/DL (ref 8.6–10.2)
CHLORIDE SERPL-SCNC: 102 MMOL/L (ref 96–106)
CHOLEST SERPL-MCNC: 156 MG/DL (ref 100–199)
CO2 SERPL-SCNC: 27 MMOL/L (ref 20–29)
CREAT SERPL-MCNC: 1.05 MG/DL (ref 0.76–1.27)
GLOBULIN SER CALC-MCNC: 2.3 G/DL (ref 1.5–4.5)
GLUCOSE SERPL-MCNC: 88 MG/DL (ref 65–99)
HDLC SERPL-MCNC: 43 MG/DL
IMP & REVIEW OF LAB RESULTS: NORMAL
LDLC SERPL CALC-MCNC: 97 MG/DL (ref 0–99)
POTASSIUM SERPL-SCNC: 4.4 MMOL/L (ref 3.5–5.2)
PROT SERPL-MCNC: 6.6 G/DL (ref 6–8.5)
SODIUM SERPL-SCNC: 143 MMOL/L (ref 134–144)
TRIGL SERPL-MCNC: 85 MG/DL (ref 0–149)
VLDLC SERPL CALC-MCNC: 16 MG/DL (ref 5–40)

## 2022-01-07 NOTE — TELEPHONE ENCOUNTER
Labs resulted. Went on cover my meds and filled out info. It advised prior auth no needed. Sent message to cover my meds and she advised pt already has a active auth on file until 1/11/22 so I will need to resubmit after that date.  Will resubmit prior auth after 1/11/22

## 2022-01-07 NOTE — PROGRESS NOTES
Kidney and liver function normal. Electrolytes wnl. The cholesterol is much better with LDL down to 97 from 144. HDL improved at 43. He still needs to get below 70. Would he be willing to add Zetia 10mg to his Repatha?  Not a statin, but might get him closer to goal

## 2022-01-07 NOTE — TELEPHONE ENCOUNTER
----- Message from Gilmar Client, NP sent at 1/7/2022  9:44 AM EST -----  Kidney and liver function normal. Electrolytes wnl. The cholesterol is much better with LDL down to 97 from 144. HDL improved at 43. He still needs to get below 70. Would he be willing to add Zetia 10mg to his Repatha? Not a statin, but might get him closer to goal      Called pt and left message to call me back.

## 2022-01-11 NOTE — TELEPHONE ENCOUNTER
Message  Received: 4 days ago  Elsie Calderón NP sent to [place] LPN  Kidney and liver function normal. Electrolytes wnl. The cholesterol is much better with LDL down to 97 from 144. HDL improved at 43. He still needs to get below 70. Would he be willing to add Zetia 10mg to his Repatha? Not a statin, but might get him closer to goal         Returned call,verified pt with two pt identifiers, advised his kidney, liver fxn normal, electrolytes wnl. His cholesterol is better at 97 was 144. However he needs to be at 70 or lower. Advised of adding Zetia in addition to his Repatha. Pt refused. He wants to keep his medications as is and will wait for labs next time for med adjustment. Pt verbalized understanding of lab results. Advised pt also of Repath prior auth, advised him I could not start the prior auth until tomorrow since he has an approval until today. Advised I will keep him updated on his prior auth. Pt verbalized understanding regarding this.

## 2022-01-12 NOTE — TELEPHONE ENCOUNTER
Went on cover my meds and filled out info and it was sent to plan and stated that  The member recently filled this medication and will be able to return for their next refill. Sent message to speak to cover my meds on line for help. She advised to call [employer] at [phone]. Will call  Later.

## 2022-01-14 NOTE — TELEPHONE ENCOUNTER
Called insurance to do prior auth on Repatha. Transferred 4 times. When I spoke to someone she advised pt has a auth good on his insurance until July 2022. She advise she could not fax that info, just that Lata Yesica is good until July 2022 and might not need auth until October.

## 2022-01-16 RX ORDER — SACUBITRIL AND VALSARTAN 24; 26 MG/1; MG/1
TABLET, FILM COATED ORAL
Qty: 60 TABLET | Refills: 1 | Status: SHIPPED | OUTPATIENT
Start: 2022-01-16 | End: 2022-03-17

## 2022-01-17 NOTE — TELEPHONE ENCOUNTER
Pt returned call,verified pt with two pt identifiers, advised pt I had been working on his [address] [place] [address] Avenue prior [facility] and on Friday I had finally gotten a hold of the insurance and she stated pt has an [facility] good until 7/22. They should send me a prior auth when needed at that time. Advised to call his pharmacy for refill request. Verified appt on 3/31/22 at 3:30 with  and pt is not signed up for my chart. He would need to sign up for this. Pt verbalized understanding.

## 2022-02-17 DIAGNOSIS — Z95.1 S/P CABG X 4: ICD-10-CM

## 2022-02-17 RX ORDER — POTASSIUM CHLORIDE 750 MG/1
TABLET, FILM COATED, EXTENDED RELEASE ORAL
Qty: 90 TABLET | Refills: 1 | Status: SHIPPED | OUTPATIENT
Start: 2022-02-17

## 2022-03-17 RX ORDER — SACUBITRIL AND VALSARTAN 24; 26 MG/1; MG/1
TABLET, FILM COATED ORAL
Qty: 60 TABLET | Refills: 1 | Status: SHIPPED | OUTPATIENT
Start: 2022-03-17 | End: 2022-03-31

## 2022-03-18 PROBLEM — I25.10 CAD (CORONARY ARTERY DISEASE): Status: ACTIVE | Noted: 2020-08-06

## 2022-03-18 PROBLEM — Z78.9 STATIN INTOLERANCE: Status: ACTIVE | Noted: 2021-01-07

## 2022-03-18 PROBLEM — E87.6 HYPOKALEMIA: Status: ACTIVE | Noted: 2020-09-20

## 2022-03-19 PROBLEM — I50.9 CHF, ACUTE ON CHRONIC (HCC): Status: ACTIVE | Noted: 2020-07-31

## 2022-03-19 PROBLEM — I26.99 PULMONARY EMBOLISM (HCC): Status: ACTIVE | Noted: 2020-07-31

## 2022-03-19 PROBLEM — I65.23 BILATERAL CAROTID ARTERY STENOSIS: Status: ACTIVE | Noted: 2020-08-04

## 2022-03-19 PROBLEM — I25.5 ISCHEMIC CARDIOMYOPATHY: Status: ACTIVE | Noted: 2020-07-31

## 2022-03-19 PROBLEM — Z91.199 NON COMPLIANCE WITH MEDICAL TREATMENT: Status: ACTIVE | Noted: 2020-09-21

## 2022-03-19 PROBLEM — I48.0 PAROXYSMAL ATRIAL FIBRILLATION (HCC): Status: ACTIVE | Noted: 2020-08-09

## 2022-03-19 PROBLEM — J90 PLEURAL EFFUSION, BILATERAL: Status: ACTIVE | Noted: 2020-07-31

## 2022-03-19 PROBLEM — Z95.1 S/P CABG X 4: Status: ACTIVE | Noted: 2020-08-06

## 2022-03-20 PROBLEM — I50.9 CHF EXACERBATION (HCC): Status: ACTIVE | Noted: 2020-09-20

## 2022-03-31 ENCOUNTER — OFFICE VISIT (OUTPATIENT)
Dept: CARDIOLOGY CLINIC | Age: 77
End: 2022-03-31
Payer: MEDICARE

## 2022-03-31 VITALS
HEART RATE: 66 BPM | HEIGHT: 66 IN | DIASTOLIC BLOOD PRESSURE: 94 MMHG | OXYGEN SATURATION: 97 % | SYSTOLIC BLOOD PRESSURE: 150 MMHG | RESPIRATION RATE: 15 BRPM | WEIGHT: 167.5 LBS | BODY MASS INDEX: 26.92 KG/M2

## 2022-03-31 DIAGNOSIS — I25.10 CORONARY ARTERY DISEASE INVOLVING NATIVE CORONARY ARTERY OF NATIVE HEART WITHOUT ANGINA PECTORIS: ICD-10-CM

## 2022-03-31 DIAGNOSIS — Z95.1 S/P CABG X 4: ICD-10-CM

## 2022-03-31 DIAGNOSIS — E78.2 MIXED HYPERLIPIDEMIA: ICD-10-CM

## 2022-03-31 DIAGNOSIS — Z78.9 STATIN INTOLERANCE: ICD-10-CM

## 2022-03-31 DIAGNOSIS — I25.5 ISCHEMIC CARDIOMYOPATHY: Primary | ICD-10-CM

## 2022-03-31 PROCEDURE — G8510 SCR DEP NEG, NO PLAN REQD: HCPCS | Performed by: INTERNAL MEDICINE

## 2022-03-31 PROCEDURE — 99214 OFFICE O/P EST MOD 30 MIN: CPT | Performed by: INTERNAL MEDICINE

## 2022-03-31 PROCEDURE — G8427 DOCREV CUR MEDS BY ELIG CLIN: HCPCS | Performed by: INTERNAL MEDICINE

## 2022-03-31 PROCEDURE — G0463 HOSPITAL OUTPT CLINIC VISIT: HCPCS | Performed by: INTERNAL MEDICINE

## 2022-03-31 PROCEDURE — G8419 CALC BMI OUT NRM PARAM NOF/U: HCPCS | Performed by: INTERNAL MEDICINE

## 2022-03-31 PROCEDURE — G8536 NO DOC ELDER MAL SCRN: HCPCS | Performed by: INTERNAL MEDICINE

## 2022-03-31 PROCEDURE — 1101F PT FALLS ASSESS-DOCD LE1/YR: CPT | Performed by: INTERNAL MEDICINE

## 2022-03-31 RX ORDER — SACUBITRIL AND VALSARTAN 49; 51 MG/1; MG/1
1 TABLET, FILM COATED ORAL 2 TIMES DAILY
Qty: 90 TABLET | Refills: 4 | Status: SHIPPED | OUTPATIENT
Start: 2022-03-31

## 2022-03-31 NOTE — PROGRESS NOTES
3/31/2022 3:56 PM      Subjective: Yokasta Decree denies chest pain, chest pressure/discomfort, dyspnea, palpitations, irregular heart beats, near-syncope, syncope, fatigue, orthopnea, paroxysmal nocturnal dyspnea, exertional chest pressure/discomfort, claudication, lower extremity edema, tachypnea. Visit Vitals  BP (!) 150/94 (BP 1 Location: Right upper arm, BP Patient Position: Sitting, BP Cuff Size: Adult)   Pulse 66   Resp 15   Ht 5' 6\" (1.676 m)   Wt 167 lb 8 oz (76 kg)   SpO2 97%   BMI 27.04 kg/m²     Current Outpatient Medications   Medication Sig    OTHER Beet chews daily    bumetanide (BUMEX) 1 mg tablet TAKE 1-2 TABS DAILY AS NEEDED    Entresto 24-26 mg tablet TAKE 1 TABLET BY MOUTH EVERY 12 HOURS    potassium chloride SR (KLOR-CON 10) 10 mEq tablet TAKE 1 TABLET BY MOUTH EVERY DAY (Patient taking differently: Takes only when he takes the Bumex)    evolocumab (REPATHA SURECLICK) pen injection 1 mL by SubCUTAneous route every fourteen (14) days.  carvediloL (COREG) 3.125 mg tablet TAKE 1 TABLET BY MOUTH TWICE A DAY WITH MEALS    ascorbic acid (VITAMIN C PO) Take  by mouth daily. Takes 2 gummies 4 times daily----2 = 250 mg    cholecalciferol (VITAMIN D3) (2,000 UNITS /50 MCG) cap capsule Take 2,000 Units by mouth two (2) times a day. gummies    saw palmetto xtr/zinc picolin (SAW PALMETTO EXTRACT PO) Take  by mouth daily.  saw/vit E/sod ana maria/lyc/beta/pyg (PROSTATE HEALTH PO) Take  by mouth daily.  TURMERIC PO Take  by mouth daily.  aspirin 81 mg chewable tablet Take 1 Tab by mouth daily. No current facility-administered medications for this visit.          Objective:      Visit Vitals  BP (!) 150/94 (BP 1 Location: Right upper arm, BP Patient Position: Sitting, BP Cuff Size: Adult)   Pulse 66   Resp 15   Ht 5' 6\" (1.676 m)   Wt 167 lb 8 oz (76 kg)   SpO2 97%   BMI 27.04 kg/m²       Past Medical History:   Diagnosis Date    Acute MI anterior wall first episode care [facility]) 9/21/2010    Atrial fibrillation (HCC)     CAD (coronary artery disease), native coronary artery 9/21/2010    Carotid artery disease (HCC)     Congestive heart failure ([facility] [address]     Coronary stent 9/21/2010    Hyperlipidemia 9/21/2010    Pulmonary embolism (HCC)       Past Surgical History:   Procedure Laterality Date    HX CORONARY ARTERY BYPASS GRAFT  08/06/2020    x 4, LIMA to LAD, RSVG to Diag-OM2, RSVG to PDA    HX CORONARY STENT PLACEMENT      HX HEART CATHETERIZATION      HX PTCA       Allergies   Allergen Reactions    Statins-Hmg-Coa Reductase Inhibitors Myalgia      Family History   Problem Relation Age of Onset    No Known Problems Mother     No Known Problems Father       Social History     Socioeconomic History    Marital status:      Spouse name: Not on file    Number of children: 1    Years of education: 15    Highest education level: 12th grade   Occupational History    Not on file   Tobacco Use    Smoking status: Never Smoker    Smokeless tobacco: Never Used   Vaping Use    Vaping Use: Never used   Substance and Sexual Activity    Alcohol use: Never    Drug use: Not Currently     Types: Prescription, OTC    Sexual activity: Not on file   Other Topics Concern     Service No    Blood Transfusions No    Caffeine Concern No    Occupational Exposure No    Hobby Hazards No    Sleep Concern Yes    Stress Concern Yes    Weight Concern No    Special Diet Yes    Back Care No    Exercise No    Bike Helmet No    Seat Belt Yes    Self-Exams No   Social History Narrative    Not on file     Social Determinants of Health     Financial Resource Strain:     Difficulty of Paying Living Expenses: Not on file   Food Insecurity:     Worried About Running Out of Food in the Last Year: Not on file    Nidia of Food in the Last Year: Not on file   Transportation Needs:     Lack of Transportation (Medical):  Not on file    Lack of Transportation (Non-Medical): Not on file   Physical Activity:     Days of Exercise per Week: Not on file    Minutes of Exercise per Session: Not on file   Stress:     Feeling of Stress : Not on file   Social Connections:     Frequency of Communication with Friends and Family: Not on file    Frequency of Social Gatherings with Friends and Family: Not on file    Attends Congregational Services: Not on file    Active Member of [address] [place] [address] [employer] or Organizations: Not on file    Attends Club or Organization Meetings: Not on file    Marital Status: Not on file   Intimate Partner Violence:     Fear of Current or Ex-Partner: Not on file    Emotionally Abused: Not on file    Physically Abused: Not on file    Sexually Abused: Not on file   Housing Stability:     Unable to Pay for Housing in the Last Year: Not on file    Number of [place] in the Last Year: Not on file    Unstable Housing in the Last Year: Not on file       Assessment:       ICD-10-CM ICD-9-CM    1. Ischemic cardiomyopathy  I25.5 414.8 LIPID PANEL      METABOLIC PANEL, COMPREHENSIVE   2. Mixed hyperlipidemia  E78.2 272.2 LIPID PANEL      METABOLIC PANEL, COMPREHENSIVE   3. S/P CABG x 4  Z95.1 V45.81 LIPID PANEL      METABOLIC PANEL, COMPREHENSIVE   4. Coronary artery disease involving native coronary artery of native heart without angina pectoris  I25.10 414.01    5. Statin intolerance  Z78.9 995.27        Plan:     ICM. Last Echo noted. Not interested in ICD. Increase entresto dose. NYHA class 1.      ASHD  s/p CABG x 4 in 8/6/2020: LIMA to LAD, RSVG to Diag-OM2, RSVG to PDA  Continue current meds. Stable.      HTN  Controlled. Elevated in office. Entresto dose adjustment as above.      HLD  Statin intolerant. On repatha now. will check labs next month.

## 2022-03-31 NOTE — PROGRESS NOTES
1. Have you been to the ER, urgent care clinic since your last visit? Hospitalized since your last visit? No.    2. Have you seen or consulted any other health care providers outside of the [address] [place] [address] since your last visit? Include any pap smears or colon screening.    No.        Chief Complaint   Patient presents with    Coronary Artery Disease     3 month- pt denies any cardiac symptoms-     Cholesterol Problem

## 2022-04-11 RX ORDER — EVOLOCUMAB 140 MG/ML
INJECTION, SOLUTION SUBCUTANEOUS
Qty: 6 PEN | Refills: 1 | Status: SHIPPED | OUTPATIENT
Start: 2022-04-11

## 2022-07-14 LAB
ALBUMIN SERPL-MCNC: 4.5 G/DL (ref 3.7–4.7)
ALBUMIN/GLOB SERPL: 1.9 {RATIO} (ref 1.2–2.2)
ALP SERPL-CCNC: 80 IU/L (ref 44–121)
ALT SERPL-CCNC: 14 IU/L (ref 0–44)
AST SERPL-CCNC: 15 IU/L (ref 0–40)
BILIRUB SERPL-MCNC: 0.6 MG/DL (ref 0–1.2)
BUN SERPL-MCNC: 23 MG/DL (ref 8–27)
BUN/CREAT SERPL: 21 (ref 10–24)
CALCIUM SERPL-MCNC: 9 MG/DL (ref 8.6–10.2)
CHLORIDE SERPL-SCNC: 102 MMOL/L (ref 96–106)
CHOLEST SERPL-MCNC: 166 MG/DL (ref 100–199)
CO2 SERPL-SCNC: 25 MMOL/L (ref 20–29)
CREAT SERPL-MCNC: 1.12 MG/DL (ref 0.76–1.27)
EGFR: 68 ML/MIN/1.73
GLOBULIN SER CALC-MCNC: 2.4 G/DL (ref 1.5–4.5)
GLUCOSE SERPL-MCNC: 90 MG/DL (ref 65–99)
HDLC SERPL-MCNC: 51 MG/DL
IMP & REVIEW OF LAB RESULTS: NORMAL
LDLC SERPL CALC-MCNC: 100 MG/DL (ref 0–99)
POTASSIUM SERPL-SCNC: 4.5 MMOL/L (ref 3.5–5.2)
PROT SERPL-MCNC: 6.9 G/DL (ref 6–8.5)
SODIUM SERPL-SCNC: 141 MMOL/L (ref 134–144)
TRIGL SERPL-MCNC: 77 MG/DL (ref 0–149)
VLDLC SERPL CALC-MCNC: 15 MG/DL (ref 5–40)

## 2023-10-16 ENCOUNTER — OFFICE VISIT (OUTPATIENT)
Age: 78
End: 2023-10-16
Payer: MEDICARE

## 2023-10-16 VITALS
HEART RATE: 72 BPM | OXYGEN SATURATION: 96 % | SYSTOLIC BLOOD PRESSURE: 142 MMHG | HEIGHT: 66 IN | WEIGHT: 165.4 LBS | DIASTOLIC BLOOD PRESSURE: 84 MMHG | BODY MASS INDEX: 26.58 KG/M2 | RESPIRATION RATE: 16 BRPM

## 2023-10-16 DIAGNOSIS — I50.9 HEART FAILURE, UNSPECIFIED HF CHRONICITY, UNSPECIFIED HEART FAILURE TYPE (HCC): ICD-10-CM

## 2023-10-16 DIAGNOSIS — I48.0 PAROXYSMAL ATRIAL FIBRILLATION (HCC): ICD-10-CM

## 2023-10-16 DIAGNOSIS — I50.22 CHRONIC SYSTOLIC (CONGESTIVE) HEART FAILURE (HCC): ICD-10-CM

## 2023-10-16 DIAGNOSIS — M24.542 CONTRACTURE OF FINGER JOINT, LEFT: ICD-10-CM

## 2023-10-16 DIAGNOSIS — Z00.00 MEDICARE ANNUAL WELLNESS VISIT, SUBSEQUENT: Primary | ICD-10-CM

## 2023-10-16 DIAGNOSIS — Z12.5 ENCOUNTER FOR SCREENING FOR MALIGNANT NEOPLASM OF PROSTATE: ICD-10-CM

## 2023-10-16 DIAGNOSIS — M79.645 BILATERAL THUMB PAIN: ICD-10-CM

## 2023-10-16 DIAGNOSIS — M79.644 BILATERAL THUMB PAIN: ICD-10-CM

## 2023-10-16 PROBLEM — I26.99 PULMONARY EMBOLISM (HCC): Status: RESOLVED | Noted: 2020-07-31 | Resolved: 2023-10-16

## 2023-10-16 PROCEDURE — 99213 OFFICE O/P EST LOW 20 MIN: CPT | Performed by: FAMILY MEDICINE

## 2023-10-16 PROCEDURE — G0439 PPPS, SUBSEQ VISIT: HCPCS | Performed by: FAMILY MEDICINE

## 2023-10-16 PROCEDURE — G8427 DOCREV CUR MEDS BY ELIG CLIN: HCPCS | Performed by: FAMILY MEDICINE

## 2023-10-16 PROCEDURE — 90677 PCV20 VACCINE IM: CPT | Performed by: FAMILY MEDICINE

## 2023-10-16 PROCEDURE — G8484 FLU IMMUNIZE NO ADMIN: HCPCS | Performed by: FAMILY MEDICINE

## 2023-10-16 PROCEDURE — G8419 CALC BMI OUT NRM PARAM NOF/U: HCPCS | Performed by: FAMILY MEDICINE

## 2023-10-16 PROCEDURE — 1123F ACP DISCUSS/DSCN MKR DOCD: CPT | Performed by: FAMILY MEDICINE

## 2023-10-16 PROCEDURE — 4004F PT TOBACCO SCREEN RCVD TLK: CPT | Performed by: FAMILY MEDICINE

## 2023-10-16 PROCEDURE — PBSHW PNEUMOCOCCAL, PCV20, PREVNAR 20, (AGE 6W+), IM, PF: Performed by: FAMILY MEDICINE

## 2023-10-16 RX ORDER — DAPAGLIFLOZIN 10 MG/1
10 TABLET, FILM COATED ORAL DAILY
COMMUNITY
Start: 2023-09-03

## 2023-10-16 SDOH — ECONOMIC STABILITY: INCOME INSECURITY: HOW HARD IS IT FOR YOU TO PAY FOR THE VERY BASICS LIKE FOOD, HOUSING, MEDICAL CARE, AND HEATING?: NOT HARD AT ALL

## 2023-10-16 SDOH — ECONOMIC STABILITY: HOUSING INSECURITY
IN THE LAST 12 MONTHS, WAS THERE A TIME WHEN YOU DID NOT HAVE A STEADY PLACE TO SLEEP OR SLEPT IN A SHELTER (INCLUDING NOW)?: NO

## 2023-10-16 SDOH — ECONOMIC STABILITY: FOOD INSECURITY: WITHIN THE PAST 12 MONTHS, YOU WORRIED THAT YOUR FOOD WOULD RUN OUT BEFORE YOU GOT MONEY TO BUY MORE.: NEVER TRUE

## 2023-10-16 SDOH — ECONOMIC STABILITY: FOOD INSECURITY: WITHIN THE PAST 12 MONTHS, THE FOOD YOU BOUGHT JUST DIDN'T LAST AND YOU DIDN'T HAVE MONEY TO GET MORE.: NEVER TRUE

## 2023-10-16 ASSESSMENT — LIFESTYLE VARIABLES
HOW OFTEN DO YOU HAVE A DRINK CONTAINING ALCOHOL: NEVER
HOW MANY STANDARD DRINKS CONTAINING ALCOHOL DO YOU HAVE ON A TYPICAL DAY: PATIENT DOES NOT DRINK

## 2023-10-16 ASSESSMENT — PATIENT HEALTH QUESTIONNAIRE - PHQ9
2. FEELING DOWN, DEPRESSED OR HOPELESS: 0
SUM OF ALL RESPONSES TO PHQ QUESTIONS 1-9: 0
SUM OF ALL RESPONSES TO PHQ QUESTIONS 1-9: 0
SUM OF ALL RESPONSES TO PHQ9 QUESTIONS 1 & 2: 0
1. LITTLE INTEREST OR PLEASURE IN DOING THINGS: 0
SUM OF ALL RESPONSES TO PHQ QUESTIONS 1-9: 0
SUM OF ALL RESPONSES TO PHQ QUESTIONS 1-9: 0

## 2023-10-16 NOTE — PROGRESS NOTES
Chief Complaint   Patient presents with    Joint Pain     Right thumb     Medicare AWV     Pt declines flu vaccine. 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 Medicare Annual Wellness Visit    Darren Rabago is here for Joint Pain (Right thumb ) and Medicare AWV    Assessment & Plan   Medicare annual wellness visit, subsequent  Contracture of finger joint, left  -     [facility] - Isabella Kuhn MD, Orthopaedic Surgery (elbow, hand, wrist), [place]  Heart failure, unspecified HF chronicity, unspecified heart failure type (HCC)  Paroxysmal atrial fibrillation (HCC)  Chronic systolic (congestive) heart failure  -     CBC with Auto Differential; Future  -     Comprehensive Metabolic Panel; Future  -     Hemoglobin A1C; Future  -     Lipid Panel; Future  -     TSH; Future  -     PSA Screening; Future  Encounter for screening for malignant neoplasm of prostate  -     PSA Screening; Future  Bilateral thumb pain  -     diclofenac sodium (VOLTAREN) 1 % GEL; Apply 4 g topically 4 times daily, Topical, 4 TIMES DAILY Starting Mon 10/16/2023, Disp-100 g, R-3, Normal  Recommendations for Preventive Services Due: see orders and patient instructions/AVS.  Recommended screening schedule for the next 5-10 years is provided to the patient in written form: see Patient Instructions/AVS.     No follow-ups on file. Subjective   The following acute and/or chronic problems were also addressed today:  See above, thumb pain has improved since being off work for a week    Patient's complete Health Risk Assessment and screening values have been reviewed and are found in [address] The following problems were reviewed today and where indicated follow up appointments were made and/or referrals ordered.     Positive Risk Factor Screenings with Interventions:                     Safety:  Do you have either shower bars, grab bars, non-slip mats or non-slip surfaces in your

## 2023-10-17 LAB
ALBUMIN SERPL-MCNC: 3.7 G/DL (ref 3.5–5)
ALBUMIN/GLOB SERPL: 1.2 (ref 1.1–2.2)
ALP SERPL-CCNC: 65 U/L (ref 45–117)
ALT SERPL-CCNC: 22 U/L (ref 12–78)
ANION GAP SERPL CALC-SCNC: 6 MMOL/L (ref 5–15)
AST SERPL-CCNC: 16 U/L (ref 15–37)
BASOPHILS # BLD: 0 K/UL (ref 0–0.1)
BASOPHILS NFR BLD: 0 % (ref 0–1)
BILIRUB SERPL-MCNC: 0.4 MG/DL (ref 0.2–1)
BUN SERPL-MCNC: 20 MG/DL (ref 6–20)
BUN/CREAT SERPL: 20 (ref 12–20)
CALCIUM SERPL-MCNC: 9.1 MG/DL (ref 8.5–10.1)
CHLORIDE SERPL-SCNC: 109 MMOL/L (ref 97–108)
CHOLEST SERPL-MCNC: 186 MG/DL
CO2 SERPL-SCNC: 27 MMOL/L (ref 21–32)
CREAT SERPL-MCNC: 0.98 MG/DL (ref 0.7–1.3)
DIFFERENTIAL METHOD BLD: NORMAL
EOSINOPHIL # BLD: 0.3 K/UL (ref 0–0.4)
EOSINOPHIL NFR BLD: 4 % (ref 0–7)
ERYTHROCYTE [DISTWIDTH] IN BLOOD BY AUTOMATED COUNT: 13.2 % (ref 11.5–14.5)
EST. AVERAGE GLUCOSE BLD GHB EST-MCNC: 114 MG/DL
GLOBULIN SER CALC-MCNC: 3 G/DL (ref 2–4)
GLUCOSE SERPL-MCNC: 84 MG/DL (ref 65–100)
HBA1C MFR BLD: 5.6 % (ref 4–5.6)
HCT VFR BLD AUTO: 41.2 % (ref 36.6–50.3)
HDLC SERPL-MCNC: 54 MG/DL
HDLC SERPL: 3.4 (ref 0–5)
HGB BLD-MCNC: 13.6 G/DL (ref 12.1–17)
IMM GRANULOCYTES # BLD AUTO: 0 K/UL (ref 0–0.04)
IMM GRANULOCYTES NFR BLD AUTO: 0 % (ref 0–0.5)
LDLC SERPL CALC-MCNC: 117.4 MG/DL (ref 0–100)
LYMPHOCYTES # BLD: 1.1 K/UL (ref 0.8–3.5)
LYMPHOCYTES NFR BLD: 16 % (ref 12–49)
MCH RBC QN AUTO: 31.2 PG (ref 26–34)
MCHC RBC AUTO-ENTMCNC: 33 G/DL (ref 30–36.5)
MCV RBC AUTO: 94.5 FL (ref 80–99)
MONOCYTES # BLD: 0.5 K/UL (ref 0–1)
MONOCYTES NFR BLD: 7 % (ref 5–13)
NEUTS SEG # BLD: 4.7 K/UL (ref 1.8–8)
NEUTS SEG NFR BLD: 73 % (ref 32–75)
NRBC # BLD: 0 K/UL (ref 0–0.01)
NRBC BLD-RTO: 0 PER 100 WBC
PLATELET # BLD AUTO: 225 K/UL (ref 150–400)
PMV BLD AUTO: 11.3 FL (ref 8.9–12.9)
POTASSIUM SERPL-SCNC: 4.1 MMOL/L (ref 3.5–5.1)
PROT SERPL-MCNC: 6.7 G/DL (ref 6.4–8.2)
PSA SERPL-MCNC: 6.6 NG/ML (ref 0.01–4)
RBC # BLD AUTO: 4.36 M/UL (ref 4.1–5.7)
SODIUM SERPL-SCNC: 142 MMOL/L (ref 136–145)
TRIGL SERPL-MCNC: 73 MG/DL
TSH SERPL DL<=0.05 MIU/L-ACNC: 1.57 UIU/ML (ref 0.36–3.74)
VLDLC SERPL CALC-MCNC: 14.6 MG/DL
WBC # BLD AUTO: 6.6 K/UL (ref 4.1–11.1)

## (undated) DEVICE — TEMP PACING WIRE: Brand: MYO/WIRE

## (undated) DEVICE — INSERT SUT HLD F/OCTBS RETRCTR --

## (undated) DEVICE — SUTURE SZ 7 L18IN NONABSORBABLE SIL CCS L48MM 1/2 CIR STRNM M655G

## (undated) DEVICE — CLIP LIG ADH PD W SLOT HORZ --

## (undated) DEVICE — KIT ACCS INTRO 4FR L10CM NDL 21GA L7CM GWIRE L40CM

## (undated) DEVICE — DISPOSABLE OR TOWEL: Brand: CARDINAL HEALTH

## (undated) DEVICE — PACK PROCEDURE SURG HRT CATH

## (undated) DEVICE — SYR LR LCK 1ML GRAD NSAF 30ML --

## (undated) DEVICE — INFECTION CONTROL KIT SYS

## (undated) DEVICE — DRAPE SLUSH DISC W44XL66IN ST FOR RND BSIN HUSH SLUSH SYS

## (undated) DEVICE — SUTURE MCRYL SZ 3-0 L27IN ABSRB UD L24MM PS-1 3/8 CIR PRIM Y936H

## (undated) DEVICE — REM POLYHESIVE ADULT PATIENT RETURN ELECTRODE: Brand: VALLEYLAB

## (undated) DEVICE — DRESSING SIL W4XL5IN ANTIBACT GELLING FBR CYTOFORM

## (undated) DEVICE — TUBING PRSS MON L6IN PVC M FEM CONN

## (undated) DEVICE — SOLUTION IV 500ML 0.9% SOD CHL FLX CONT

## (undated) DEVICE — CLIP INT SM WIDE RED TI TRNSVRS GRV CHEVRON SHP W PRECIS

## (undated) DEVICE — KIT BLWR MISTER 5P 15L W/ TBNG SET IRRIG MIST TO IMPROVE

## (undated) DEVICE — SUTURE VCRL SZ 0 L18IN ABSRB VLT L40MM CT 1/2 CIR J752D

## (undated) DEVICE — DRSG BORDR MPLX HEEL 8.7X9.1IN --

## (undated) DEVICE — TUBING, SUCTION, 1/4" X 10', STRAIGHT: Brand: MEDLINE

## (undated) DEVICE — AORTIC PUNCHES ARE USED TO CREATE A UNIFORM OPENING IN BLOOD VESSELS DURING CARDIOVASCULAR SURGERY. THE VESSEL GRAFT IS INSERTED INTO THE CREATED OPENING AND SUTURED TO THE VESSEL WALL. AORTIC LANCETS ARE USED TO MAKE A SMALL UNIFORM CUT IN A BLOOD VESSEL TO FACILITATE INSERTION OF AN AORTIC PUNCH.  PUNCHES COME IN VARIOUS LENGTHS, DIAMETERS AND TIP CONFIGURATIONS.: Brand: CLEANCUT ROTATING AORTIC PUNCH

## (undated) DEVICE — GUIDEWIRE VASC L260CM 0.035IN J TIP L3MM PTFE FIX COR NAMIC

## (undated) DEVICE — TR BAND RADIAL ARTERY COMPRESSION DEVICE: Brand: TR BAND

## (undated) DEVICE — DRAPE PRB US TRNSDCR 6X96IN --

## (undated) DEVICE — Device

## (undated) DEVICE — GLIDESHEATH SLENDER ACCESS KIT: Brand: GLIDESHEATH SLENDER

## (undated) DEVICE — KIT,1200CC CANISTER,3/16"X6' TUBING: Brand: MEDLINE INDUSTRIES, INC.

## (undated) DEVICE — STERNUM BLADE, OFFSET (31.7 X 0.64 X 6.3MM)

## (undated) DEVICE — COVER,TABLE,60X90,STERILE: Brand: MEDLINE

## (undated) DEVICE — SYR 10ML LUER LOK 1/5ML GRAD --

## (undated) DEVICE — 72" ARTERIAL PRESSURE TUBING: Brand: ICU MEDICAL

## (undated) DEVICE — SPLINT WR POS F/ARTERIAL ACC -- BX/10

## (undated) DEVICE — CANNULA PERF 15FR L12.5IN RG STPCOCK WIREWOUND BODY

## (undated) DEVICE — DUAL LUMEN STOMACH TUBE MULTI-FUNCTIONAL PORT: Brand: SALEM SUMP

## (undated) DEVICE — PLEDGET SURG W3/16XL0.25IN THK1.65MM PTFE OVL FELT FOR THE

## (undated) DEVICE — CATHETER ETER ANGIO L110CM OD5FR ID046IN L75CM 038IN 145DEG CARD

## (undated) DEVICE — LABEL MED CARD MRMC STRL

## (undated) DEVICE — SUTURE TICRON DBL ARMED 2 0 CV 305 42IN BLU N ABSRB BRAID 8886303551

## (undated) DEVICE — BLADE OPHTH 180DEG CUT SURF BLU STR SHRP DBL BVL GRINDLESS

## (undated) DEVICE — DRAPE FLD WRM W44XL66IN C6L FOR INTRATEMP SYS THERMABASIN

## (undated) DEVICE — SYR 3ML LL TIP 1/10ML GRAD --

## (undated) DEVICE — SYR 50ML LR LCK 1ML GRAD NSAF --

## (undated) DEVICE — SOLUTION IRRIG 1000ML H2O STRL BLT

## (undated) DEVICE — CANNULA AORT ROOT INTRO STD TIP 5FR OVERALL LEN 55IN DLP

## (undated) DEVICE — DRAIN,WOUND,ROUND,24FR,5/16",FULL-FLUTED: Brand: MEDLINE

## (undated) DEVICE — 3M™ IOBAN™ 2 ANTIMICROBIAL INCISE DRAPE 6648EZ: Brand: IOBAN™ 2

## (undated) DEVICE — SOLUTION IV 1000ML 0.9% SOD CHL

## (undated) DEVICE — 6 FOOT DISPOSABLE EXTENSION CABLE WITH SAFE CONNECT / SCREW-DOWN

## (undated) DEVICE — STRAP,POSITIONING,KNEE/BODY,FOAM,4X60": Brand: MEDLINE

## (undated) DEVICE — PLEDGET SUT SFT OVL 3 8X5 16IN

## (undated) DEVICE — SYRINGE ANGIO 10 CC BRL STD PRNT POLYCARB LT BLU MEDALLION

## (undated) DEVICE — BLADE SCALP SURG SAFLOK 15 --

## (undated) DEVICE — PRESSURE MONITORING SET: Brand: TRUWAVE

## (undated) DEVICE — TTL1LYR 16FR10ML 100%SIL TMPST TR: Brand: MEDLINE

## (undated) DEVICE — ANGIOGRAPHY KIT CUST [K0910930B] [MERIT MEDICAL SYSTEMS INC]

## (undated) DEVICE — STERILE POLYISOPRENE POWDER-FREE SURGICAL GLOVES: Brand: PROTEXIS

## (undated) DEVICE — SOLUTION IV 1000ML PH 7.4 INJ NRMSOL R

## (undated) DEVICE — SPONGE GZ W4XL4IN COT 12 PLY TYP VII WVN C FLD DSGN

## (undated) DEVICE — SUTURE ETHBND EXCEL SZ 3-0 L36IN NONABSORBABLE GRN BB L17MM X588H

## (undated) DEVICE — GOWN,SIRUS,NONRNF,SETINSLV,XL,20/CS: Brand: MEDLINE

## (undated) DEVICE — SYR 20ML LL STRL LF --

## (undated) DEVICE — NEEDLE HYPO 18GA L1.5IN PNK S STL HUB POLYPR SHLD REG BVL

## (undated) DEVICE — RADIFOCUS OPTITORQUE ANGIOGRAPHIC CATHETER: Brand: OPTITORQUE

## (undated) DEVICE — LEAD PCMKR MYOCARDL BPLR TEMP. --

## (undated) DEVICE — SET PERF L203CM 12IN RED AND BLU AORT ROOT MULT SLIP CONN

## (undated) DEVICE — ADAPTER CATH WHT DISP FOR ANES

## (undated) DEVICE — AVID DUAL STAGE VENOUS DRAINAGE CANNULA: Brand: AVID DUAL STAGE VENOUS DRAINAGE CANNULA

## (undated) DEVICE — TRANSFER PK BLD PROD 300ML --

## (undated) DEVICE — CATHETER IV 14GA L2IN POLYUR STR ORNG HUB SFTY RADPQ DISP

## (undated) DEVICE — PREP SKN CHLRAPRP APL 26ML STR --

## (undated) DEVICE — CANNULA INJ L2.5IN BLNT TIP 3MM CLR BODY W/ 1 W VLV DLP

## (undated) DEVICE — BLADE OPHTH W1.5MM 15DEG ORNG HNDL SHRP SHRP DEL FOR CATRCT

## (undated) DEVICE — HANDLE LT SNAP ON ULT DURABLE LENS FOR TRUMPF ALC DISPOSABLE

## (undated) DEVICE — HI-TORQUE VERSACORE FLOPPY GUIDE WIRE SYSTEM 145 CM: Brand: HI-TORQUE VERSACORE

## (undated) DEVICE — AGENT HEMSTAT W4XL4IN OXIDIZED REGENERATED CELOS ABSRB SFT

## (undated) DEVICE — KIT,ANTI FOG,W/SPONGE & FLUID,SOFT PACK: Brand: MEDLINE

## (undated) DEVICE — 3M™ TEGADERM™ TRANSPARENT FILM DRESSING FRAME STYLE, 1626W, 4 IN X 4-3/4 IN (10 CM X 12 CM), 50/CT 4CT/CASE: Brand: 3M™ TEGADERM™

## (undated) DEVICE — BAG RED 3PLY 2MIL 30X40 IN

## (undated) DEVICE — MEDI-TRACE CADENCE ADULT, DEFIBRILLATION ELECTRODE -RTS  (10 PR/PK) - PHILIPS: Brand: MEDI-TRACE CADENCE

## (undated) DEVICE — AEGIS 1" DISK 4MM HOLE, PEEL OPEN: Brand: MEDLINE

## (undated) DEVICE — SYS VSL HARV HEMOPRO2 VASOVIEW -- HARV SYS MINIMUM ORDER 5/EA

## (undated) DEVICE — INTENDED FOR TISSUE SEPARATION, AND OTHER PROCEDURES THAT REQUIRE A SHARP SURGICAL BLADE TO PUNCTURE OR CUT.: Brand: BARD-PARKER ® CARBON RIB-BACK BLADES

## (undated) DEVICE — BLANKET WRM W25XL64IN NONWOVEN SFT LTWT PLIABLE HYPR